# Patient Record
Sex: FEMALE | Race: WHITE | HISPANIC OR LATINO | Employment: PART TIME | URBAN - METROPOLITAN AREA
[De-identification: names, ages, dates, MRNs, and addresses within clinical notes are randomized per-mention and may not be internally consistent; named-entity substitution may affect disease eponyms.]

---

## 2017-04-17 ENCOUNTER — HOSPITAL ENCOUNTER (OUTPATIENT)
Dept: FAMILY MEDICINE CLINIC | Facility: CLINIC | Age: 62
Setting detail: SPECIMEN
Discharge: HOME | End: 2017-04-17
Attending: FAMILY MEDICINE | Admitting: FAMILY MEDICINE

## 2017-04-17 LAB
ALBUMIN SERPL-MCNC: 3.6 G/DL (ref 3.5–4.8)
ALBUMIN/GLOB SERPL: 1.2 {RATIO} (ref 1–1.7)
ALP SERPL-CCNC: 77 IU/L (ref 32–91)
ALT SERPL-CCNC: 22 IU/L (ref 14–54)
ANION GAP SERPL CALC-SCNC: 16.5 MMOL/L (ref 10–20)
AST SERPL-CCNC: 23 IU/L (ref 15–41)
BASOPHILS # BLD AUTO: 0.1 10*3/UL (ref 0–0.2)
BASOPHILS NFR BLD AUTO: 1 % (ref 0–2)
BILIRUB SERPL-MCNC: 0.6 MG/DL (ref 0.3–1.2)
BUN SERPL-MCNC: 13 MG/DL (ref 8–20)
BUN/CREAT SERPL: 18.6 (ref 5.4–26.2)
CALCIUM SERPL-MCNC: 9.4 MG/DL (ref 8.9–10.3)
CHLORIDE SERPL-SCNC: 106 MMOL/L (ref 101–111)
CONV CO2: 26 MMOL/L (ref 22–32)
CONV TOTAL PROTEIN: 6.7 G/DL (ref 6.1–7.9)
CREAT UR-MCNC: 0.7 MG/DL (ref 0.4–1)
DIFFERENTIAL METHOD BLD: (no result)
EOSINOPHIL # BLD AUTO: 0.4 10*3/UL (ref 0–0.3)
EOSINOPHIL # BLD AUTO: 4 % (ref 0–3)
ERYTHROCYTE [DISTWIDTH] IN BLOOD BY AUTOMATED COUNT: 14.1 % (ref 11.5–14.5)
GLOBULIN UR ELPH-MCNC: 3.1 G/DL (ref 2.5–3.8)
GLUCOSE SERPL-MCNC: 93 MG/DL (ref 65–99)
HCT VFR BLD AUTO: 45.2 % (ref 35–49)
HGB BLD-MCNC: 15.1 G/DL (ref 12–15)
LYMPHOCYTES # BLD AUTO: 2.4 10*3/UL (ref 0.8–4.8)
LYMPHOCYTES NFR BLD AUTO: 25 % (ref 18–42)
MCH RBC QN AUTO: 28.4 PG (ref 26–32)
MCHC RBC AUTO-ENTMCNC: 33.4 G/DL (ref 32–36)
MCV RBC AUTO: 85 FL (ref 80–94)
MONOCYTES # BLD AUTO: 0.8 10*3/UL (ref 0.1–1.3)
MONOCYTES NFR BLD AUTO: 9 % (ref 2–11)
NEUTROPHILS # BLD AUTO: 5.8 10*3/UL (ref 2.3–8.6)
NEUTROPHILS NFR BLD AUTO: 61 % (ref 50–75)
NRBC BLD AUTO-RTO: 0 /100{WBCS}
NRBC/RBC NFR BLD MANUAL: 0 10*3/UL
PLATELET # BLD AUTO: 184 10*3/UL (ref 150–450)
PMV BLD AUTO: 10.5 FL (ref 7.4–10.4)
POTASSIUM SERPL-SCNC: 4.5 MMOL/L (ref 3.6–5.1)
RBC # BLD AUTO: 5.32 10*6/UL (ref 4–5.4)
SODIUM SERPL-SCNC: 144 MMOL/L (ref 136–144)
WBC # BLD AUTO: 9.4 10*3/UL (ref 4.5–11.5)

## 2017-10-23 ENCOUNTER — HOSPITAL ENCOUNTER (OUTPATIENT)
Dept: FAMILY MEDICINE CLINIC | Facility: CLINIC | Age: 62
Setting detail: SPECIMEN
Discharge: HOME | End: 2017-10-23
Attending: FAMILY MEDICINE | Admitting: FAMILY MEDICINE

## 2017-10-23 LAB
ALBUMIN SERPL-MCNC: 3.7 G/DL (ref 3.5–4.8)
ALBUMIN/GLOB SERPL: 1.2 {RATIO} (ref 1–1.7)
ALP SERPL-CCNC: 81 IU/L (ref 32–91)
ALT SERPL-CCNC: 18 IU/L (ref 14–54)
ANION GAP SERPL CALC-SCNC: 12.1 MMOL/L (ref 10–20)
AST SERPL-CCNC: 20 IU/L (ref 15–41)
BASOPHILS # BLD AUTO: 0.1 10*3/UL (ref 0–0.2)
BASOPHILS NFR BLD AUTO: 1 % (ref 0–2)
BILIRUB SERPL-MCNC: 0.5 MG/DL (ref 0.3–1.2)
BUN SERPL-MCNC: 15 MG/DL (ref 8–20)
BUN/CREAT SERPL: 18.8 (ref 5.4–26.2)
CALCIUM SERPL-MCNC: 9 MG/DL (ref 8.9–10.3)
CHLORIDE SERPL-SCNC: 105 MMOL/L (ref 101–111)
CHOLEST SERPL-MCNC: 133 MG/DL
CHOLEST/HDLC SERPL: 4.5 {RATIO}
CONV CO2: 27 MMOL/L (ref 22–32)
CONV LDL CHOLESTEROL DIRECT: 78 MG/DL (ref 0–100)
CONV PLATELETS GIANT IN BLOOD BY LIGHT MICROSCOPY: (no result)
CONV SMEAR REVIEW: (no result)
CONV TOTAL PROTEIN: 6.9 G/DL (ref 6.1–7.9)
CREAT UR-MCNC: 0.8 MG/DL (ref 0.4–1)
DIFFERENTIAL METHOD BLD: (no result)
EOSINOPHIL # BLD AUTO: 0.3 10*3/UL (ref 0–0.3)
EOSINOPHIL # BLD AUTO: 4 % (ref 0–3)
ERYTHROCYTE [DISTWIDTH] IN BLOOD BY AUTOMATED COUNT: 14.4 % (ref 11.5–14.5)
GLOBULIN UR ELPH-MCNC: 3.2 G/DL (ref 2.5–3.8)
GLUCOSE SERPL-MCNC: 85 MG/DL (ref 65–99)
HCT VFR BLD AUTO: 44.6 % (ref 35–49)
HDLC SERPL-MCNC: 30 MG/DL
HGB BLD-MCNC: 14.7 G/DL (ref 12–15)
LDLC/HDLC SERPL: 2.6 {RATIO}
LIPID INTERPRETATION: ABNORMAL
LYMPHOCYTES # BLD AUTO: 2 10*3/UL (ref 0.8–4.8)
LYMPHOCYTES NFR BLD AUTO: 23 % (ref 18–42)
MCH RBC QN AUTO: 28.5 PG (ref 26–32)
MCHC RBC AUTO-ENTMCNC: 33.1 G/DL (ref 32–36)
MCV RBC AUTO: 86.3 FL (ref 80–94)
MONOCYTES # BLD AUTO: 0.8 10*3/UL (ref 0.1–1.3)
MONOCYTES NFR BLD AUTO: 9 % (ref 2–11)
NEUTROPHILS # BLD AUTO: 5.4 10*3/UL (ref 2.3–8.6)
NEUTROPHILS NFR BLD AUTO: 63 % (ref 50–75)
NRBC BLD AUTO-RTO: 0 /100{WBCS}
PLATELET # BLD AUTO: 178 10*3/UL (ref 150–450)
PMV BLD AUTO: 10.2 FL (ref 7.4–10.4)
POTASSIUM SERPL-SCNC: 4.1 MMOL/L (ref 3.6–5.1)
RBC # BLD AUTO: 5.16 10*6/UL (ref 4–5.4)
SODIUM SERPL-SCNC: 140 MMOL/L (ref 136–144)
TRIGL SERPL-MCNC: 219 MG/DL
TSH SERPL-ACNC: 2.29 UIU/ML (ref 0.34–5.6)
VLDLC SERPL CALC-MCNC: 25.2 MG/DL
WBC # BLD AUTO: 8.6 10*3/UL (ref 4.5–11.5)

## 2017-11-06 ENCOUNTER — HOSPITAL ENCOUNTER (OUTPATIENT)
Dept: MAMMOGRAPHY | Facility: HOSPITAL | Age: 62
Discharge: HOME | End: 2017-11-06
Attending: FAMILY MEDICINE | Admitting: FAMILY MEDICINE

## 2017-11-13 ENCOUNTER — HOSPITAL ENCOUNTER (OUTPATIENT)
Dept: SLEEP MEDICINE | Facility: HOSPITAL | Age: 62
Discharge: HOME | End: 2017-11-13
Attending: INTERNAL MEDICINE | Admitting: INTERNAL MEDICINE

## 2018-04-23 ENCOUNTER — HOSPITAL ENCOUNTER (OUTPATIENT)
Dept: FAMILY MEDICINE CLINIC | Facility: CLINIC | Age: 63
Setting detail: SPECIMEN
Discharge: HOME | End: 2018-04-23
Attending: FAMILY MEDICINE | Admitting: FAMILY MEDICINE

## 2018-04-23 LAB
ANION GAP SERPL CALC-SCNC: 10.6 MMOL/L (ref 10–20)
BUN SERPL-MCNC: 9 MG/DL (ref 8–20)
BUN/CREAT SERPL: 11.3 (ref 5.4–26.2)
CALCIUM SERPL-MCNC: 9.2 MG/DL (ref 8.9–10.3)
CHLORIDE SERPL-SCNC: 107 MMOL/L (ref 101–111)
CONV CO2: 26 MMOL/L (ref 22–32)
CREAT UR-MCNC: 0.8 MG/DL (ref 0.4–1)
GLUCOSE SERPL-MCNC: 85 MG/DL (ref 65–99)
POTASSIUM SERPL-SCNC: 3.6 MMOL/L (ref 3.6–5.1)
SODIUM SERPL-SCNC: 140 MMOL/L (ref 136–144)

## 2018-10-25 ENCOUNTER — HOSPITAL ENCOUNTER (OUTPATIENT)
Dept: FAMILY MEDICINE CLINIC | Facility: CLINIC | Age: 63
Setting detail: SPECIMEN
Discharge: HOME | End: 2018-10-25
Attending: FAMILY MEDICINE | Admitting: FAMILY MEDICINE

## 2018-10-25 LAB
ALBUMIN SERPL-MCNC: 3.6 G/DL (ref 3.5–4.8)
ALBUMIN/GLOB SERPL: 1.2 {RATIO} (ref 1–1.7)
ALP SERPL-CCNC: 92 IU/L (ref 32–91)
ALT SERPL-CCNC: 17 IU/L (ref 14–54)
ANION GAP SERPL CALC-SCNC: 14 MMOL/L (ref 10–20)
AST SERPL-CCNC: 19 IU/L (ref 15–41)
BASOPHILS # BLD AUTO: 0.1 10*3/UL (ref 0–0.2)
BASOPHILS NFR BLD AUTO: 1 % (ref 0–2)
BILIRUB SERPL-MCNC: 0.7 MG/DL (ref 0.3–1.2)
BUN SERPL-MCNC: 12 MG/DL (ref 8–20)
BUN/CREAT SERPL: 15 (ref 5.4–26.2)
CALCIUM SERPL-MCNC: 8.9 MG/DL (ref 8.9–10.3)
CHLORIDE SERPL-SCNC: 105 MMOL/L (ref 101–111)
CHOLEST SERPL-MCNC: 135 MG/DL
CHOLEST/HDLC SERPL: 4.1 {RATIO}
CONV CO2: 25 MMOL/L (ref 22–32)
CONV LDL CHOLESTEROL DIRECT: 88 MG/DL (ref 0–100)
CONV TOTAL PROTEIN: 6.6 G/DL (ref 6.1–7.9)
CREAT UR-MCNC: 0.8 MG/DL (ref 0.4–1)
DIFFERENTIAL METHOD BLD: (no result)
EOSINOPHIL # BLD AUTO: 0.2 10*3/UL (ref 0–0.3)
EOSINOPHIL # BLD AUTO: 4 % (ref 0–3)
ERYTHROCYTE [DISTWIDTH] IN BLOOD BY AUTOMATED COUNT: 14.6 % (ref 11.5–14.5)
GLOBULIN UR ELPH-MCNC: 3 G/DL (ref 2.5–3.8)
GLUCOSE SERPL-MCNC: 120 MG/DL (ref 65–99)
HCT VFR BLD AUTO: 45.7 % (ref 35–49)
HDLC SERPL-MCNC: 33 MG/DL
HGB BLD-MCNC: 15.1 G/DL (ref 12–15)
LDLC/HDLC SERPL: 2.6 {RATIO}
LIPID INTERPRETATION: ABNORMAL
LYMPHOCYTES # BLD AUTO: 1.9 10*3/UL (ref 0.8–4.8)
LYMPHOCYTES NFR BLD AUTO: 28 % (ref 18–42)
MCH RBC QN AUTO: 28.7 PG (ref 26–32)
MCHC RBC AUTO-ENTMCNC: 33.1 G/DL (ref 32–36)
MCV RBC AUTO: 86.6 FL (ref 80–94)
MONOCYTES # BLD AUTO: 0.6 10*3/UL (ref 0.1–1.3)
MONOCYTES NFR BLD AUTO: 8 % (ref 2–11)
NEUTROPHILS # BLD AUTO: 4 10*3/UL (ref 2.3–8.6)
NEUTROPHILS NFR BLD AUTO: 59 % (ref 50–75)
NRBC BLD AUTO-RTO: 0 /100{WBCS}
NRBC/RBC NFR BLD MANUAL: 0 10*3/UL
PLATELET # BLD AUTO: 172 10*3/UL (ref 150–450)
PMV BLD AUTO: 11.3 FL (ref 7.4–10.4)
POTASSIUM SERPL-SCNC: 4 MMOL/L (ref 3.6–5.1)
RBC # BLD AUTO: 5.27 10*6/UL (ref 4–5.4)
SODIUM SERPL-SCNC: 140 MMOL/L (ref 136–144)
TRIGL SERPL-MCNC: 246 MG/DL
TSH SERPL-ACNC: 4.26 UIU/ML (ref 0.34–5.6)
VLDLC SERPL CALC-MCNC: 14.2 MG/DL
WBC # BLD AUTO: 6.8 10*3/UL (ref 4.5–11.5)

## 2018-11-05 ENCOUNTER — HOSPITAL ENCOUNTER (OUTPATIENT)
Dept: MAMMOGRAPHY | Facility: HOSPITAL | Age: 63
Discharge: HOME | End: 2018-11-05
Attending: FAMILY MEDICINE | Admitting: FAMILY MEDICINE

## 2018-11-12 ENCOUNTER — HOSPITAL ENCOUNTER (OUTPATIENT)
Dept: SLEEP MEDICINE | Facility: HOSPITAL | Age: 63
Discharge: HOME | End: 2018-11-12
Attending: INTERNAL MEDICINE | Admitting: INTERNAL MEDICINE

## 2019-08-06 RX ORDER — LEVOTHYROXINE SODIUM 75 MCG
TABLET ORAL
Qty: 90 TABLET | Refills: 0 | Status: SHIPPED | OUTPATIENT
Start: 2019-08-06 | End: 2019-11-05 | Stop reason: DRUGHIGH

## 2019-10-28 ENCOUNTER — LAB (OUTPATIENT)
Dept: LAB | Facility: HOSPITAL | Age: 64
End: 2019-10-28

## 2019-10-28 ENCOUNTER — OFFICE VISIT (OUTPATIENT)
Dept: FAMILY MEDICINE CLINIC | Facility: CLINIC | Age: 64
End: 2019-10-28

## 2019-10-28 VITALS
RESPIRATION RATE: 18 BRPM | OXYGEN SATURATION: 95 % | WEIGHT: 212 LBS | HEIGHT: 60 IN | TEMPERATURE: 98 F | DIASTOLIC BLOOD PRESSURE: 82 MMHG | BODY MASS INDEX: 41.62 KG/M2 | HEART RATE: 63 BPM | SYSTOLIC BLOOD PRESSURE: 150 MMHG

## 2019-10-28 DIAGNOSIS — E78.5 HYPERLIPIDEMIA, UNSPECIFIED HYPERLIPIDEMIA TYPE: ICD-10-CM

## 2019-10-28 DIAGNOSIS — E03.9 ACQUIRED HYPOTHYROIDISM: ICD-10-CM

## 2019-10-28 DIAGNOSIS — I35.0 NONRHEUMATIC AORTIC VALVE STENOSIS: ICD-10-CM

## 2019-10-28 DIAGNOSIS — I10 ESSENTIAL HYPERTENSION: ICD-10-CM

## 2019-10-28 DIAGNOSIS — Z00.00 WELLNESS EXAMINATION: Primary | ICD-10-CM

## 2019-10-28 LAB
ALBUMIN SERPL-MCNC: 3.9 G/DL (ref 3.5–5.2)
ALBUMIN/GLOB SERPL: 1 G/DL
ALP SERPL-CCNC: 98 U/L (ref 39–117)
ALT SERPL W P-5'-P-CCNC: 16 U/L (ref 1–33)
ANION GAP SERPL CALCULATED.3IONS-SCNC: 9.9 MMOL/L (ref 5–15)
AST SERPL-CCNC: 14 U/L (ref 1–32)
BASOPHILS # BLD AUTO: 0.05 10*3/MM3 (ref 0–0.2)
BASOPHILS NFR BLD AUTO: 0.8 % (ref 0–1.5)
BILIRUB SERPL-MCNC: 0.5 MG/DL (ref 0.2–1.2)
BUN BLD-MCNC: 18 MG/DL (ref 8–23)
BUN/CREAT SERPL: 22.8 (ref 7–25)
CALCIUM SPEC-SCNC: 9.4 MG/DL (ref 8.6–10.5)
CHLORIDE SERPL-SCNC: 101 MMOL/L (ref 98–107)
CHOLEST SERPL-MCNC: 133 MG/DL (ref 0–200)
CO2 SERPL-SCNC: 27.1 MMOL/L (ref 22–29)
CREAT BLD-MCNC: 0.79 MG/DL (ref 0.57–1)
DEPRECATED RDW RBC AUTO: 43.4 FL (ref 37–54)
EOSINOPHIL # BLD AUTO: 0.29 10*3/MM3 (ref 0–0.4)
EOSINOPHIL NFR BLD AUTO: 4.7 % (ref 0.3–6.2)
ERYTHROCYTE [DISTWIDTH] IN BLOOD BY AUTOMATED COUNT: 13.7 % (ref 12.3–15.4)
GFR SERPL CREATININE-BSD FRML MDRD: 74 ML/MIN/1.73
GFR SERPL CREATININE-BSD FRML MDRD: 89 ML/MIN/1.73
GLOBULIN UR ELPH-MCNC: 3.9 GM/DL
GLUCOSE BLD-MCNC: 104 MG/DL (ref 65–99)
HCT VFR BLD AUTO: 49.3 % (ref 34–46.6)
HDLC SERPL-MCNC: 35 MG/DL (ref 40–60)
HGB BLD-MCNC: 15.9 G/DL (ref 12–15.9)
IMM GRANULOCYTES # BLD AUTO: 0.02 10*3/MM3 (ref 0–0.05)
IMM GRANULOCYTES NFR BLD AUTO: 0.3 % (ref 0–0.5)
LDLC SERPL CALC-MCNC: 71 MG/DL (ref 0–100)
LDLC/HDLC SERPL: 2.02 {RATIO}
LYMPHOCYTES # BLD AUTO: 1.85 10*3/MM3 (ref 0.7–3.1)
LYMPHOCYTES NFR BLD AUTO: 30.2 % (ref 19.6–45.3)
MCH RBC QN AUTO: 28 PG (ref 26.6–33)
MCHC RBC AUTO-ENTMCNC: 32.3 G/DL (ref 31.5–35.7)
MCV RBC AUTO: 86.8 FL (ref 79–97)
MONOCYTES # BLD AUTO: 0.5 10*3/MM3 (ref 0.1–0.9)
MONOCYTES NFR BLD AUTO: 8.2 % (ref 5–12)
NEUTROPHILS # BLD AUTO: 3.42 10*3/MM3 (ref 1.7–7)
NEUTROPHILS NFR BLD AUTO: 55.8 % (ref 42.7–76)
NRBC BLD AUTO-RTO: 0.2 /100 WBC (ref 0–0.2)
PLATELET # BLD AUTO: 179 10*3/MM3 (ref 140–450)
PMV BLD AUTO: 12.7 FL (ref 6–12)
POTASSIUM BLD-SCNC: 4.4 MMOL/L (ref 3.5–5.2)
PROT SERPL-MCNC: 7.8 G/DL (ref 6–8.5)
RBC # BLD AUTO: 5.68 10*6/MM3 (ref 3.77–5.28)
SODIUM BLD-SCNC: 138 MMOL/L (ref 136–145)
TRIGL SERPL-MCNC: 137 MG/DL (ref 0–150)
TSH SERPL DL<=0.05 MIU/L-ACNC: 7.43 UIU/ML (ref 0.27–4.2)
VLDLC SERPL-MCNC: 27.4 MG/DL (ref 5–40)
WBC NRBC COR # BLD: 6.13 10*3/MM3 (ref 3.4–10.8)

## 2019-10-28 PROCEDURE — 84443 ASSAY THYROID STIM HORMONE: CPT

## 2019-10-28 PROCEDURE — 85025 COMPLETE CBC W/AUTO DIFF WBC: CPT

## 2019-10-28 PROCEDURE — 80061 LIPID PANEL: CPT

## 2019-10-28 PROCEDURE — 99396 PREV VISIT EST AGE 40-64: CPT | Performed by: FAMILY MEDICINE

## 2019-10-28 PROCEDURE — 36415 COLL VENOUS BLD VENIPUNCTURE: CPT

## 2019-10-28 PROCEDURE — 80053 COMPREHEN METABOLIC PANEL: CPT

## 2019-10-28 RX ORDER — SIMVASTATIN 20 MG
1 TABLET ORAL EVERY 24 HOURS
COMMUNITY
Start: 2018-01-28 | End: 2019-11-24 | Stop reason: SDUPTHER

## 2019-10-28 NOTE — PROGRESS NOTES
"Magalie Graves is a 64 y.o. female.     Chief Complaint   Patient presents with   • Annual Exam     ANTHEM INS PHYSICAL       HPI  Chief complaint: Wellness exam    Patient is a 64-year-old female comes in for wellness exam.      Patient has history of stable hypertension hyperlipidemia aortic stenosis sleep apnea fatty liver Gastroesophageal reflux disease hypo thyroidism aortic stenosis.  Patient had aortic valve replaced.    She has lost 10 pounds since last time.  She is doing this by decreasing her caloric intake        The following portions of the patient's history were reviewed and updated as appropriate: allergies, current medications, past family history, past medical history, past social history, past surgical history and problem list.    Review of Systems   Constitutional: Negative for chills and fever.   HENT: Negative for congestion, sinus pressure and swollen glands.    Eyes: Negative for blurred vision and pain.   Respiratory: Negative for cough and shortness of breath.    Cardiovascular: Negative for chest pain and leg swelling.   Gastrointestinal: Negative for abdominal pain, nausea and indigestion.   Endocrine: Negative for cold intolerance, heat intolerance, polydipsia, polyphagia and polyuria.   Skin: Negative for dry skin, rash and bruise.   Neurological: Negative for dizziness, syncope and headache.   Psychiatric/Behavioral: Negative for dysphoric mood and stress.       Objective     /82 (BP Location: Right arm, Patient Position: Sitting, Cuff Size: Adult)   Pulse 63   Temp 98 °F (36.7 °C) (Oral)   Resp 18   Ht 152.4 cm (60\")   Wt 96.2 kg (212 lb)   SpO2 95%   BMI 41.40 kg/m²     Physical Exam   Constitutional: She is oriented to person, place, and time. She appears well-developed and well-nourished.   HENT:   Head: Normocephalic and atraumatic.   Eyes: Conjunctivae and EOM are normal. Pupils are equal, round, and reactive to light.   Neck: Normal range of motion. Neck " supple.   Cardiovascular: Normal rate, regular rhythm and intact distal pulses.   Murmur heard.   Crescendo systolic murmur is present with a grade of 1/6.  Pulmonary/Chest: Effort normal and breath sounds normal.   Abdominal: Soft. Bowel sounds are normal.   Musculoskeletal: Normal range of motion.   Neurological: She is alert and oriented to person, place, and time.   Skin: Skin is warm and dry.   Psychiatric: She has a normal mood and affect. Her behavior is normal.   Nursing note and vitals reviewed.        Assessment/Plan   Lulu was seen today for annual exam.    Diagnoses and all orders for this visit:    Wellness examination  -     Mammo Screening Digital Tomosynthesis Bilateral With CAD; Future    Essential hypertension  -     Comprehensive Metabolic Panel; Future  -     CBC & Differential; Future    Hyperlipidemia, unspecified hyperlipidemia type  -     Lipid Panel; Future    Nonrheumatic aortic valve stenosis    Acquired hypothyroidism  -     TSH; Future      Patient Instructions   Continue your current medications and treatment.    Hsave the follow up labs done and call for results.    Follow up in the office in 1year.      Hardeep Camejo Jr., MD    10/28/19

## 2019-10-28 NOTE — PATIENT INSTRUCTIONS
Continue your current medications and treatment.    Hsave the follow up labs done and call for results.    Follow up in the office in 1year.

## 2019-11-04 ENCOUNTER — OFFICE VISIT (OUTPATIENT)
Dept: SLEEP MEDICINE | Facility: HOSPITAL | Age: 64
End: 2019-11-04

## 2019-11-04 VITALS
WEIGHT: 209.4 LBS | DIASTOLIC BLOOD PRESSURE: 75 MMHG | OXYGEN SATURATION: 93 % | SYSTOLIC BLOOD PRESSURE: 130 MMHG | HEIGHT: 60 IN | HEART RATE: 75 BPM | BODY MASS INDEX: 41.11 KG/M2

## 2019-11-04 DIAGNOSIS — G47.33 OBSTRUCTIVE SLEEP APNEA, ADULT: Primary | ICD-10-CM

## 2019-11-04 PROCEDURE — G0463 HOSPITAL OUTPT CLINIC VISIT: HCPCS

## 2019-11-04 NOTE — PROGRESS NOTES
SLEEP MEDICINE CONSULT      Patient Identification:     Name: Lulu Graves   Age: 64 y.o.   Gender: female   : 1955   MRN: [id number]     Date of consult: 2019    PCP/Referring Physician(s):     PCP: Hardeep Camejo Jr., MD  Referring Provider: Inan Dunaway MD      Chief Complaint:   Obstructive sleep apnea.  Yearly follow-up for compliance.    History of Present Illness:   This is a very pleasant 84 years old lady who is here today for yearly follow-up for compliance after using her mask with given pressures for an year or so.  Memory card has been downloaded.    She uses nasal pillows which she finds quite comfortable.  No air leaks as noted by her.  The pressures are very tolerable to her.  She uses humidifier on regular basis.    Her usual bedtime nowadays is from 11 PM to midnight.  She dozes off within 15 minutes.  She wakes up around 6:30 AM to start her day.  She does not wake up at night.  She denies any symptoms of dry mouth or heartburn at night or in the morning.  She has stopped all antiacids.  No sinus congestion at night or in the morning.  She takes Zyrtec at bedtime.  She denies any symptoms of headaches.    She has woken up in the morning awake and alert and rested but with body aches and pains.  Her sciatic nerve pain is acting up.  She does not like coffee.  She likes to have 2 glasses of iced tea throughout the day.  Rarely has a decaffeinated coffee.  She remains active throughout the day she does not take any naps.  She has not dozed off during sedentary activities.  No episodes of supraventricular tachycardia at night or during the daytime.    Allergies, Medications, and Past History:   Allergies:    No Known Allergies  Current Medications:    Prior to Admission medications    Medication Sig Start Date End Date Taking? Authorizing Provider   aspirin 81 MG tablet Take 1 tablet by mouth Daily. 12  Yes Provider, MD Cait   metoprolol tartrate (LOPRESSOR) 25 MG  tablet TAKE 1/2 TABLET BY MOUTH TWICE A DAY 10/7/19  Yes Coleen Hawk APRN   PROBIOTIC PRODUCT PO Take 1 capsule by mouth Daily. 9/8/16  Yes ProviderCait MD   simvastatin (ZOCOR) 20 MG tablet Take 1 tablet by mouth Daily. 1/28/18  Yes Provider, MD Cait   SYNTHROID 75 MCG tablet TAKE 1 TABLET ONCE DAILY 8/6/19  Yes Hardeep Camejo Jr., MD     Past Medical History:    Past Medical History:   Diagnosis Date   • Aortic stenosis    • GERD (gastroesophageal reflux disease)    • Hyperlipidemia    • Hypertension    • Hypothyroidism    • PAT (paroxysmal atrial tachycardia) (CMS/[facility])       Past Surgical History:    Past Surgical History:   Procedure Laterality Date   • AORTIC VALVE REPAIR/REPLACEMENT        Social History:    Social History     Tobacco Use   • Smoking status: Never Smoker   • Smokeless tobacco: Never Used   Substance Use Topics   • Alcohol use: No     Frequency: Never   • Drug use: No     Family Medical History:  Family History   Problem Relation Age of Onset   • Other Father          Review of Systems:   Constitutional:  Denies fever or chills .she has lost 10 pounds in last 2 months.    Eyes:  Denies change in visual acuity   HENT:  Denies nasal congestion, sore throat or post nasal drainage . she takes Zyrtec 10 mg on regular basis at night.  Respiratory:  Denies cough, shortness of breath or dyspnea on exertion recently.   Cardiovascular:  Denies chest pain or edema .  Has a history of supraventricular tachycardia.  Underwent ablation.  Aortic valve replacement with a pig valve.  GI:  Denies abdominal pain, nausea, vomiting, bloody stools or diarrhea .  No aerophagia.  :  Denies dysuria or nocturia   Musculoskeletal: She has low back pain with radiculopathy.     Integument:  Denies rash   Neurologic:  Denies headache, focal weakness or sensory changes. No history of stroke or seizures.   Endocrine:  Denies polyuria or polydipsia   Lymphatic:  Denies swollen glands   Psychiatric:   "Denies depression, anxiety or claustrophobia      Physical Exam:     Vitals:    11/04/19 1346   BP: 130/75   Pulse: 75   SpO2: 93%   Weight: 95 kg (209 lb 6.4 oz)   Height: 152.4 cm (60\")     HEENT: Head is normocephalic, atraumatic, normal distribution of hair. Pupils reactive to light. Ocular movements intact. No nasal congestion on sniff test. No deviated nasal septum. No micrognathia.  Throat: Mallapatti stage 4, tongue midline, mucosa moist.  Neck: no JVD, thyromegaly, mass, +midline trachea, Neck short supple.  No lymphadenopathy.  Lungs: clear, no wheeze, rhonchi, crackles  Cardiovascular: S1, S2, RRR no murmurs, rubs or gallops  Abd: +BS's soft NT/ND, no CVA tenderness.  Obese.   Ext: no edema, cyanosis, clubbing, pulses intact  Neuro: Awake alert, oriented to time place and person. Grossly intact to motor, sensory cerebral, and cerebellar (without focal deficit)  Psych: No overt benjamin, depression, psychosis or inappropriate behavior  Skin: No rash, cellulitis, or ulcerations.  No facial rash or erosions    DIAGNOSTIC DATA  Memory card download shows data from 11/12/2018 to 11/3/2019.  Overall 357 days of data reviewed.  100% use noted.  Maximum hours use 9 hours 35 minutes minimum time use 4 hours 6 minutes.  100% of the time the mask has been used for more than 4 hours.  The average apnea hypopnea index was 1.8 events per hour an auto CPAP mode of therapy with a pressure range between 6-20 CWP.  Assessment/Plan:   Obstructive sleep apnea:  This is a very pleasant lady who has been diagnosed with obstructive sleep apnea.  She uses an auto CPAP mode of therapy.  She has remained compliant with therapy.  She is getting full benefit from it.    The results of the memory card download was discussed in detail with the patient all questions were answered.  Recommendation.  She is advised to continue using her mask with given pressures on a nightly basis.  She is advised to use the mask with given pressures for at " least 4 hours a minimum benefit or as long as she sleeps a maximum benefit.  She is advised to use the mask with given pressure for nap.:  Obesity;  Based on the BMI of 40.9.  She has lost weight on a weight loss regimen.  Recommendation.  She is advised to continue her weight loss regimen.    Driving instructions. Patient is advised to avoid driving if tired or somnolent. To avoid all alcoholic beverages or medications causing somnolence.         Diagnosis Plan   1. Obstructive sleep apnea, adult  CPAP Therapy     No orders of the defined types were placed in this encounter.    Orders Placed This Encounter   Procedures   • CPAP Therapy     Order Specific Question:   PAP Machine Brand     Answer:   Respironics     Order Specific Question:   PAP Tubing (1 per 3 months)     Answer:    6' Standard tubing     Order Specific Question:   PAP Mask (1 per 3 months)     Answer:    Nasal mask     Order Specific Question:   Nasal cushion or pillow (2 per month)     Answer:    Pillow replacement for use on nasal cannula type     Order Specific Question:   PAP Accessories     Answer:    Water Chamber for PAP Humidifier (1 per 6 month) &  Filter PAP Disposable (2 per month) &  Filter PAP Non-Disposable (1 per 6 months) &  Chinstrap to be used with PAP (1 per 6 months) &  Headgear to be used with PAP (1 per 6 mo)     Order Specific Question:   Does the patient have Obstructive Sleep Apnea or other qualifying diagnosis for PAP ordered?     Answer:   Yes     Order Specific Question:   Does the patient require humidification?     Answer:   Yes     Order Specific Question:   Humidifier     Answer:    Humidifier Heated for CPAP or BiPAP     Order Specific Question:   The patient requires a PAP Device & continued use of Supplies to administer effective PAP therapy at the frequency of use ordered above     Answer:   Yes     Order Specific Question:   Initial Supplies and refills authorized for  12 months?     Answer:   Yes     Order Specific Question:   The face to face evaluation was performed on     Answer:   11/4/2019     Order Specific Question:   Length of Need (99 Months = Lifetime)     Answer:   99 Months = Lifetime       This document has been electronically signed by:  Inna Dunaway MD  11/4/2019  2:17 PM

## 2019-11-05 ENCOUNTER — TELEPHONE (OUTPATIENT)
Dept: FAMILY MEDICINE CLINIC | Facility: CLINIC | Age: 64
End: 2019-11-05

## 2019-11-05 RX ORDER — LEVOTHYROXINE SODIUM 0.1 MG/1
100 TABLET ORAL DAILY
Qty: 90 TABLET | Refills: 1 | Status: SHIPPED | OUTPATIENT
Start: 2019-11-05 | End: 2020-03-19 | Stop reason: SDUPTHER

## 2019-11-05 NOTE — TELEPHONE ENCOUNTER
Regarding: Test Results Question  Contact: [phone]  ----- Message from Mychart, Generic sent at 11/5/2019 12:05 PM EST -----    I have a question about TSH resulted on 10/28/19, 5:08 PM.    I just was wondering about TSH results. Seems to be way out of normal range. What do I need to do to do to get it normal?      Thanks    Tresa

## 2019-11-07 ENCOUNTER — HOSPITAL ENCOUNTER (OUTPATIENT)
Dept: MAMMOGRAPHY | Facility: HOSPITAL | Age: 64
Discharge: HOME | End: 2019-11-07
Admitting: FAMILY MEDICINE

## 2019-11-07 DIAGNOSIS — Z00.00 WELLNESS EXAMINATION: ICD-10-CM

## 2019-11-07 PROCEDURE — 77063 BREAST TOMOSYNTHESIS BI: CPT

## 2019-11-07 PROCEDURE — 77067 SCR MAMMO BI INCL CAD: CPT

## 2019-11-25 RX ORDER — SIMVASTATIN 20 MG
TABLET ORAL
Qty: 90 TABLET | Refills: 0 | Status: SHIPPED | OUTPATIENT
Start: 2019-11-25 | End: 2020-03-02 | Stop reason: SDUPTHER

## 2020-03-03 RX ORDER — SIMVASTATIN 20 MG
20 TABLET ORAL EVERY EVENING
Qty: 90 TABLET | Refills: 1 | Status: SHIPPED | OUTPATIENT
Start: 2020-03-03 | End: 2020-09-04

## 2020-03-19 ENCOUNTER — OFFICE VISIT (OUTPATIENT)
Dept: CARDIOLOGY | Facility: CLINIC | Age: 65
End: 2020-03-19

## 2020-03-19 VITALS
HEART RATE: 71 BPM | BODY MASS INDEX: 38.09 KG/M2 | OXYGEN SATURATION: 94 % | HEIGHT: 60 IN | WEIGHT: 194 LBS | DIASTOLIC BLOOD PRESSURE: 76 MMHG | SYSTOLIC BLOOD PRESSURE: 113 MMHG

## 2020-03-19 DIAGNOSIS — E03.9 ACQUIRED HYPOTHYROIDISM: ICD-10-CM

## 2020-03-19 DIAGNOSIS — I35.0 NONRHEUMATIC AORTIC VALVE STENOSIS: Primary | ICD-10-CM

## 2020-03-19 DIAGNOSIS — G47.33 OBSTRUCTIVE SLEEP APNEA: ICD-10-CM

## 2020-03-19 DIAGNOSIS — E78.00 PURE HYPERCHOLESTEROLEMIA: ICD-10-CM

## 2020-03-19 DIAGNOSIS — I10 ESSENTIAL HYPERTENSION: ICD-10-CM

## 2020-03-19 DIAGNOSIS — Z95.2 STATUS POST AORTIC VALVE REPLACEMENT: ICD-10-CM

## 2020-03-19 PROCEDURE — 99213 OFFICE O/P EST LOW 20 MIN: CPT | Performed by: INTERNAL MEDICINE

## 2020-03-19 RX ORDER — LEVOTHYROXINE SODIUM 0.1 MG/1
100 TABLET ORAL DAILY
Qty: 90 TABLET | Refills: 1 | Status: SHIPPED | OUTPATIENT
Start: 2020-03-19 | End: 2020-10-16

## 2020-03-19 NOTE — PROGRESS NOTES
"    Subjective:     Encounter Date:03/19/2020      Patient ID: Lulu Graves is a 64 y.o. female.    Chief Complaint:  History of Present Illness 64-year-old white female with history of severe aortic stenosis status post aortic valve replacement with a bioprosthetic valve history of hypertension hyperlipidemia gastroesophageal valve disease and also sleep apnea and hypothyroidism presents to my office for follow-up.  Patient is currently stable without any symptoms of chest pain or shortness of breath at rest on exertion.  No complains of any PND orthopnea.  No palpitation dizziness syncope or swelling of the feet.  Patient has been taking all the medicines regularly.  Patient does not smoke.  Patient is trying to exercise regularly.  She follows a good diet.    The following portions of the patient's history were reviewed and updated as appropriate: allergies, current medications, past family history, past medical history, past social history, past surgical history and problem list.  Past Medical History:   Diagnosis Date   • Aortic stenosis    • Aortic valve replaced 12/04/2013   • GERD (gastroesophageal reflux disease)    • Heart murmur 1996   • Hyperlipidemia    • Hypertension    • Hypothyroidism    • PAT (paroxysmal atrial tachycardia) (CMS/[facility])    • Sleep apnea 2011     Past Surgical History:   Procedure Laterality Date   • ABLATION OF DYSRHYTHMIC FOCUS  2012 or 2013   • AORTIC VALVE REPAIR/REPLACEMENT     • AORTIC VALVE SURGERY  12/04/2013   • CARDIAC CATHETERIZATION      Before open heart surgery   • CARDIAC VALVE REPLACEMENT  12/04/2013     /76   Pulse 71   Ht 152.4 cm (60\")   Wt 88 kg (194 lb)   SpO2 94%   BMI 37.89 kg/m²   Family History   Problem Relation Age of Onset   • Other Father        Current Outpatient Medications:   •  aspirin 81 MG tablet, Take 1 tablet by mouth Daily., Disp: , Rfl:   •  levothyroxine (SYNTHROID) 100 MCG tablet, Take 1 tablet by mouth Daily., Disp: 90 tablet, Rfl: 1  • "  metoprolol tartrate (LOPRESSOR) 25 MG tablet, TAKE 1/2 TABLET BY MOUTH TWO TIMES A DAY , Disp: 90 tablet, Rfl: 0  •  PROBIOTIC PRODUCT PO, Take 1 capsule by mouth Daily., Disp: , Rfl:   •  simvastatin (ZOCOR) 20 MG tablet, Take 1 tablet by mouth Every Evening., Disp: 90 tablet, Rfl: 1  No Known Allergies  Social History     Socioeconomic History   • Marital status:      Spouse name: Not on file   • Number of children: Not on file   • Years of education: Not on file   • Highest education level: Not on file   Tobacco Use   • Smoking status: Never Smoker   • Smokeless tobacco: Never Used   Substance and Sexual Activity   • Alcohol use: No     Frequency: Never   • Drug use: Never   • Sexual activity: Yes     Partners: Male     Birth control/protection: None     Review of Systems   Constitution: Negative for fever and malaise/fatigue.   Cardiovascular: Negative for chest pain, dyspnea on exertion, leg swelling and palpitations.   Respiratory: Negative for cough and shortness of breath.    Skin: Negative for rash.   Gastrointestinal: Negative for abdominal pain, nausea and vomiting.   Neurological: Negative for focal weakness, headaches, light-headedness and numbness.   All other systems reviewed and are negative.             Objective:     Physical Exam   Constitutional: She appears well-developed and well-nourished.   HENT:   Head: Normocephalic and atraumatic.   Eyes: Conjunctivae are normal. No scleral icterus.   Neck: Normal range of motion. Neck supple. No JVD present. Carotid bruit is not present.   Cardiovascular: Normal rate, regular rhythm, S1 normal, S2 normal, normal heart sounds and intact distal pulses. PMI is not displaced.   Pulmonary/Chest: Effort normal and breath sounds normal. She has no wheezes. She has no rales.   Abdominal: Soft. Bowel sounds are normal.   Neurological: She is alert. She has normal strength.   Skin: Skin is warm and dry. No rash noted.     Procedures    Lab Review:        Assessment:          Diagnosis Plan   1. Nonrheumatic aortic valve stenosis     2. Essential hypertension     3. Pure hypercholesterolemia     4. Status post aortic valve replacement     5. Obstructive sleep apnea     6. Acquired hypothyroidism            Plan:       Patient has history of aortic stenosis status post aortic valve replacement with a bioprosthetic valve and is currently stable on medications  Patient has normal function  Patient is sleep apnea and uses CPAP machine  Patient blood pressure and heart rate are stable  Patient's lipids are followed by the primary care doctor  Patient is also on thyroid medicine for hypothyroidism.

## 2020-09-04 RX ORDER — SIMVASTATIN 20 MG
TABLET ORAL
Qty: 90 TABLET | Refills: 0 | Status: SHIPPED | OUTPATIENT
Start: 2020-09-04 | End: 2020-12-07

## 2020-09-10 ENCOUNTER — OFFICE VISIT (OUTPATIENT)
Dept: FAMILY MEDICINE CLINIC | Facility: CLINIC | Age: 65
End: 2020-09-10

## 2020-09-10 ENCOUNTER — HOSPITAL ENCOUNTER (OUTPATIENT)
Dept: GENERAL RADIOLOGY | Facility: HOSPITAL | Age: 65
Discharge: HOME | End: 2020-09-10
Admitting: FAMILY MEDICINE

## 2020-09-10 VITALS
RESPIRATION RATE: 20 BRPM | HEIGHT: 60 IN | HEART RATE: 54 BPM | OXYGEN SATURATION: 96 % | DIASTOLIC BLOOD PRESSURE: 75 MMHG | WEIGHT: 199.8 LBS | TEMPERATURE: 96.6 F | SYSTOLIC BLOOD PRESSURE: 127 MMHG | BODY MASS INDEX: 39.23 KG/M2

## 2020-09-10 DIAGNOSIS — M25.521 RIGHT ELBOW PAIN: ICD-10-CM

## 2020-09-10 DIAGNOSIS — M25.521 RIGHT ELBOW PAIN: Primary | ICD-10-CM

## 2020-09-10 PROCEDURE — 73080 X-RAY EXAM OF ELBOW: CPT

## 2020-09-10 PROCEDURE — 99213 OFFICE O/P EST LOW 20 MIN: CPT | Performed by: FAMILY MEDICINE

## 2020-09-10 NOTE — PROGRESS NOTES
"Subjective   Lulu Graves is a 65 y.o. female.     Chief Complaint   Patient presents with   • Elbow Pain     right X 1 month   • Fall       HPI  Chief complaint: Right elbow pain    Patient is a 65-year-old white female comes in with right elbow pain.  Patient states she fell a month ago on her elbow.  She states she has had pain since then.  She states initially the elbow was bruised.  She complains of tenderness in the proximal ulna close to the olecranon.  She states she has full range of motion of her elbow.  She does not complain of weakness.        The following portions of the patient's history were reviewed and updated as appropriate: allergies, current medications, past family history, past medical history, past social history, past surgical history and problem list.    Review of Systems    Objective     /75 (BP Location: Left arm, Patient Position: Sitting, Cuff Size: Large Adult)   Pulse 54   Temp 96.6 °F (35.9 °C) (Infrared)   Resp 20   Ht 152.4 cm (60\")   Wt 90.6 kg (199 lb 12.8 oz)   SpO2 96%   BMI 39.02 kg/m²     Physical Exam   Constitutional: She is oriented to person, place, and time. She appears well-developed and well-nourished.   HENT:   Head: Normocephalic and atraumatic.   Eyes: Pupils are equal, round, and reactive to light. Conjunctivae and EOM are normal.   Neck: Normal range of motion. Neck supple.   Cardiovascular: Normal rate, regular rhythm, normal heart sounds and intact distal pulses.   Pulmonary/Chest: Effort normal and breath sounds normal.   Abdominal: Soft. Bowel sounds are normal.   Musculoskeletal: Normal range of motion.   FROM of the elbow;   No effusion;  Tenderness of the proximal ulna with a defect close to the olecranon   Neurological: She is alert and oriented to person, place, and time.   Skin: Skin is warm and dry.   Psychiatric: She has a normal mood and affect. Her behavior is normal.   Nursing note and vitals reviewed.        Assessment/Plan   Lulu was " seen today for elbow pain and fall.    Diagnoses and all orders for this visit:    Right elbow pain  -     XR Elbow 3+ View Right; Future      Patient Instructions   Have the xrays done and call for results.    Further care will depend on the results of the xrays and how you progress.      Hardeep Camejo Jr., MD    09/10/20

## 2020-09-10 NOTE — PATIENT INSTRUCTIONS
Have the xrays done and call for results.    Further care will depend on the results of the xrays and how you progress.

## 2020-09-11 ENCOUNTER — TELEPHONE (OUTPATIENT)
Dept: FAMILY MEDICINE CLINIC | Facility: CLINIC | Age: 65
End: 2020-09-11

## 2020-09-11 NOTE — TELEPHONE ENCOUNTER
I spoke with the patient.  Her xrays did not reveal a fracture.  I recommended symptomatic Rx.  The next step would be to do an MRI if her symptoms persist.

## 2020-10-16 RX ORDER — LEVOTHYROXINE SODIUM 0.1 MG/1
TABLET ORAL
Qty: 30 TABLET | Refills: 0 | Status: SHIPPED | OUTPATIENT
Start: 2020-10-16 | End: 2021-01-27

## 2020-10-29 ENCOUNTER — LAB (OUTPATIENT)
Dept: LAB | Facility: HOSPITAL | Age: 65
End: 2020-10-29

## 2020-10-29 ENCOUNTER — OFFICE VISIT (OUTPATIENT)
Dept: FAMILY MEDICINE CLINIC | Facility: CLINIC | Age: 65
End: 2020-10-29

## 2020-10-29 VITALS
RESPIRATION RATE: 20 BRPM | WEIGHT: 206 LBS | HEIGHT: 60 IN | SYSTOLIC BLOOD PRESSURE: 134 MMHG | OXYGEN SATURATION: 96 % | HEART RATE: 69 BPM | TEMPERATURE: 96.9 F | BODY MASS INDEX: 40.44 KG/M2 | DIASTOLIC BLOOD PRESSURE: 78 MMHG

## 2020-10-29 DIAGNOSIS — E78.5 HYPERLIPIDEMIA, UNSPECIFIED HYPERLIPIDEMIA TYPE: ICD-10-CM

## 2020-10-29 DIAGNOSIS — I35.0 NONRHEUMATIC AORTIC VALVE STENOSIS: ICD-10-CM

## 2020-10-29 DIAGNOSIS — I10 ESSENTIAL HYPERTENSION: ICD-10-CM

## 2020-10-29 DIAGNOSIS — K21.9 GASTROESOPHAGEAL REFLUX DISEASE WITHOUT ESOPHAGITIS: ICD-10-CM

## 2020-10-29 DIAGNOSIS — E03.9 ACQUIRED HYPOTHYROIDISM: ICD-10-CM

## 2020-10-29 DIAGNOSIS — Z00.00 WELLNESS EXAMINATION: Primary | ICD-10-CM

## 2020-10-29 PROBLEM — M25.521 RIGHT ELBOW PAIN: Status: RESOLVED | Noted: 2020-09-10 | Resolved: 2020-10-29

## 2020-10-29 LAB
ALBUMIN SERPL-MCNC: 4 G/DL (ref 3.5–5.2)
ALBUMIN/GLOB SERPL: 1.3 G/DL
ALP SERPL-CCNC: 97 U/L (ref 39–117)
ALT SERPL W P-5'-P-CCNC: 13 U/L (ref 1–33)
ANION GAP SERPL CALCULATED.3IONS-SCNC: 8.9 MMOL/L (ref 5–15)
AST SERPL-CCNC: 19 U/L (ref 1–32)
BASOPHILS # BLD AUTO: 0.04 10*3/MM3 (ref 0–0.2)
BASOPHILS NFR BLD AUTO: 0.7 % (ref 0–1.5)
BILIRUB SERPL-MCNC: 0.4 MG/DL (ref 0–1.2)
BUN SERPL-MCNC: 15 MG/DL (ref 8–23)
BUN/CREAT SERPL: 21.1 (ref 7–25)
CALCIUM SPEC-SCNC: 9.3 MG/DL (ref 8.6–10.5)
CHLORIDE SERPL-SCNC: 104 MMOL/L (ref 98–107)
CHOLEST SERPL-MCNC: 151 MG/DL (ref 0–200)
CO2 SERPL-SCNC: 28.1 MMOL/L (ref 22–29)
CREAT SERPL-MCNC: 0.71 MG/DL (ref 0.57–1)
DEPRECATED RDW RBC AUTO: 40.7 FL (ref 37–54)
EOSINOPHIL # BLD AUTO: 0.23 10*3/MM3 (ref 0–0.4)
EOSINOPHIL NFR BLD AUTO: 3.8 % (ref 0.3–6.2)
ERYTHROCYTE [DISTWIDTH] IN BLOOD BY AUTOMATED COUNT: 12.7 % (ref 12.3–15.4)
GFR SERPL CREATININE-BSD FRML MDRD: 83 ML/MIN/1.73
GLOBULIN UR ELPH-MCNC: 3.1 GM/DL
GLUCOSE SERPL-MCNC: 87 MG/DL (ref 65–99)
HCT VFR BLD AUTO: 45.2 % (ref 34–46.6)
HDLC SERPL-MCNC: 42 MG/DL (ref 40–60)
HGB BLD-MCNC: 15.1 G/DL (ref 12–15.9)
IMM GRANULOCYTES # BLD AUTO: 0.02 10*3/MM3 (ref 0–0.05)
IMM GRANULOCYTES NFR BLD AUTO: 0.3 % (ref 0–0.5)
LDLC SERPL CALC-MCNC: 86 MG/DL (ref 0–100)
LDLC/HDLC SERPL: 1.98 {RATIO}
LYMPHOCYTES # BLD AUTO: 1.91 10*3/MM3 (ref 0.7–3.1)
LYMPHOCYTES NFR BLD AUTO: 31.3 % (ref 19.6–45.3)
MCH RBC QN AUTO: 29.5 PG (ref 26.6–33)
MCHC RBC AUTO-ENTMCNC: 33.4 G/DL (ref 31.5–35.7)
MCV RBC AUTO: 88.3 FL (ref 79–97)
MONOCYTES # BLD AUTO: 0.56 10*3/MM3 (ref 0.1–0.9)
MONOCYTES NFR BLD AUTO: 9.2 % (ref 5–12)
NEUTROPHILS NFR BLD AUTO: 3.34 10*3/MM3 (ref 1.7–7)
NEUTROPHILS NFR BLD AUTO: 54.7 % (ref 42.7–76)
NRBC BLD AUTO-RTO: 0 /100 WBC (ref 0–0.2)
PLATELET # BLD AUTO: 159 10*3/MM3 (ref 140–450)
PMV BLD AUTO: 12.6 FL (ref 6–12)
POTASSIUM SERPL-SCNC: 4.1 MMOL/L (ref 3.5–5.2)
PROT SERPL-MCNC: 7.1 G/DL (ref 6–8.5)
RBC # BLD AUTO: 5.12 10*6/MM3 (ref 3.77–5.28)
SODIUM SERPL-SCNC: 141 MMOL/L (ref 136–145)
TRIGL SERPL-MCNC: 130 MG/DL (ref 0–150)
TSH SERPL DL<=0.05 MIU/L-ACNC: 0.4 UIU/ML (ref 0.27–4.2)
VLDLC SERPL-MCNC: 23 MG/DL (ref 5–40)
WBC # BLD AUTO: 6.1 10*3/MM3 (ref 3.4–10.8)

## 2020-10-29 PROCEDURE — 99396 PREV VISIT EST AGE 40-64: CPT | Performed by: FAMILY MEDICINE

## 2020-10-29 PROCEDURE — 84443 ASSAY THYROID STIM HORMONE: CPT

## 2020-10-29 PROCEDURE — 85025 COMPLETE CBC W/AUTO DIFF WBC: CPT

## 2020-10-29 PROCEDURE — 80061 LIPID PANEL: CPT

## 2020-10-29 PROCEDURE — 80053 COMPREHEN METABOLIC PANEL: CPT

## 2020-10-29 PROCEDURE — 36415 COLL VENOUS BLD VENIPUNCTURE: CPT

## 2020-10-29 NOTE — PROGRESS NOTES
"Magalie Graves is a 65 y.o. female.     Chief Complaint   Patient presents with   • Annual Exam     [facility] physical       HPI  Chief complaint: Wellness exam    The patient is a 65-year-old white female comes in for wellness exam.    Patient has a history of hypertension hyperlipidemia aortic valvular disease with aortic valve replacement sleep apnea hypothyroidism.    These problems are all stable.    She says her elbow is significantly better.  I saw her recently for elbow pain.        The following portions of the patient's history were reviewed and updated as appropriate: allergies, current medications, past family history, past medical history, past social history, past surgical history and problem list.    Review of Systems   Constitutional: Negative for chills and fever.   HENT: Negative for congestion, sinus pressure and swollen glands.    Eyes: Negative for blurred vision and pain.   Respiratory: Negative for cough and shortness of breath.    Cardiovascular: Negative for chest pain and leg swelling.   Gastrointestinal: Negative for abdominal pain, nausea and indigestion.   Endocrine: Negative for cold intolerance, heat intolerance, polydipsia, polyphagia and polyuria.   Skin: Negative for dry skin, rash and bruise.   Neurological: Negative for dizziness, syncope and headache.   Psychiatric/Behavioral: Negative for dysphoric mood and stress.       Objective     /78 (BP Location: Left arm, Patient Position: Sitting, Cuff Size: Large Adult)   Pulse 69   Temp 96.9 °F (36.1 °C) (Infrared)   Resp 20   Ht 152.4 cm (60\")   Wt 93.4 kg (206 lb)   SpO2 96%   BMI 40.23 kg/m²     Physical Exam  Vitals signs and nursing note reviewed.   Constitutional:       Appearance: She is well-developed. She is obese.   HENT:      Head: Normocephalic and atraumatic.      Nose: Nose normal.      Mouth/Throat:      Mouth: Mucous membranes are moist.      Pharynx: Oropharynx is clear.   Eyes:      " Extraocular Movements: Extraocular movements intact.      Conjunctiva/sclera: Conjunctivae normal.      Pupils: Pupils are equal, round, and reactive to light.   Neck:      Musculoskeletal: Normal range of motion and neck supple.   Cardiovascular:      Rate and Rhythm: Normal rate and regular rhythm.      Pulses: Normal pulses.      Heart sounds: Normal heart sounds.   Pulmonary:      Effort: Pulmonary effort is normal.      Breath sounds: Normal breath sounds.   Abdominal:      General: Abdomen is flat. Bowel sounds are normal.      Palpations: Abdomen is soft.   Musculoskeletal: Normal range of motion.   Skin:     General: Skin is warm and dry.   Neurological:      Mental Status: She is alert and oriented to person, place, and time.   Psychiatric:         Behavior: Behavior normal.         Thought Content: Thought content normal.         Judgment: Judgment normal.           Assessment/Plan   Diagnoses and all orders for this visit:    1. Wellness examination (Primary)    2. Essential hypertension  -     CBC & Differential; Future  -     Comprehensive Metabolic Panel; Future  -     TSH; Future    3. Hyperlipidemia, unspecified hyperlipidemia type  -     Lipid Panel; Future    4. Acquired hypothyroidism    5. Gastroesophageal reflux disease without esophagitis    6. Nonrheumatic aortic valve stenosis      Patient Instructions   Continue your current medications and treatment.    Have the follow up labs done and call for results.    Get a PAP smear.    Follow up in the office in 12 months.          Hardeep Camejo Jr., MD    10/29/20

## 2020-10-29 NOTE — PATIENT INSTRUCTIONS
Continue your current medications and treatment.    Have the follow up labs done and call for results.    Get a PAP smear.    Follow up in the office in 12 months.

## 2020-11-11 ENCOUNTER — OFFICE VISIT (OUTPATIENT)
Dept: SLEEP MEDICINE | Facility: HOSPITAL | Age: 65
End: 2020-11-11

## 2020-11-11 VITALS
DIASTOLIC BLOOD PRESSURE: 68 MMHG | HEIGHT: 60 IN | BODY MASS INDEX: 39.27 KG/M2 | WEIGHT: 200 LBS | HEART RATE: 72 BPM | OXYGEN SATURATION: 97 % | SYSTOLIC BLOOD PRESSURE: 120 MMHG

## 2020-11-11 DIAGNOSIS — G47.33 OBSTRUCTIVE SLEEP APNEA, ADULT: Primary | ICD-10-CM

## 2020-11-11 PROCEDURE — G0463 HOSPITAL OUTPT CLINIC VISIT: HCPCS

## 2020-11-11 NOTE — PROGRESS NOTES
SLEEP MEDICINE CONSULT      Patient Identification:     Name: Lulu Graves   Age: 65 y.o.   Gender: female   : 1955   MRN: [id number]     Date of consult: 2020    PCP/Referring Physician(s):     PCP: Hardeep Camejo Jr., MD  Referring Provider: Hardeep Camejo Jr., *      Chief Complaint:   Obstructive sleep apnea.  Yearly follow-up for compliance.    History of Present Illness:   This is a very pleasant 65 years old lady who is here today for yearly follow-up for compliance after using her mask with given pressures for an year or so.  Memory card has been downloaded.    She uses nasal pillows which she finds quite comfortable.  No air leaks as noted by her.  The pressures are very tolerable to her.  She uses humidifier on regular basis.    Her usual bedtime nowadays is from 11.30 PM to midnight.  She dozes off within 15 minutes.  She wakes up around 7:30 AM to 8 AM to start her day.  She does not wake up at night.  She denies any symptoms of dry mouth or heartburn at night or in the morning.  She has stopped all antiacids.  No sinus congestion at night or in the morning.  She takes Zyrtec at bedtime.  She denies any symptoms of headaches.    She has woken up in the morning awake and alert and rested but with body aches and pains.  She does not like coffee.  She likes to have 2 glasses of iced tea throughout the day.  Rarely has a decaffeinated coffee.  She remains active throughout the day she does not take any naps.  She has not dozed off during sedentary activities.  No episodes of supraventricular tachycardia at night or during the daytime.    Allergies, Medications, and Past History:   Allergies:    No Known Allergies  Current Medications:    Prior to Admission medications    Medication Sig Start Date End Date Taking? Authorizing Provider   aspirin 81 MG tablet Take 1 tablet by mouth Daily. 12  Yes Provider, MD Cait   metoprolol tartrate (LOPRESSOR) 25 MG tablet TAKE 1/2 TABLET BY  MOUTH TWICE A DAY 10/7/19  Yes Coleen Hawk APRN   PROBIOTIC PRODUCT PO Take 1 capsule by mouth Daily. 16  Yes Provider, MD Cait   simvastatin (ZOCOR) 20 MG tablet Take 1 tablet by mouth Daily. 18  Yes Provider, MD Cait   SYNTHROID 75 MCG tablet TAKE 1 TABLET ONCE DAILY 19  Yes Hardeep Camejo Jr., MD     Past Medical History:    Past Medical History:   Diagnosis Date   • Aortic stenosis    • Aortic valve replaced 2013   • GERD (gastroesophageal reflux disease)    • Heart murmur    • Hyperlipidemia    • Hypertension    • Hypothyroidism    • PAT (paroxysmal atrial tachycardia) (CMS/[facility])    • Sleep apnea       Past Surgical History:    Past Surgical History:   Procedure Laterality Date   • ABLATION OF DYSRHYTHMIC FOCUS   or    • AORTIC VALVE REPAIR/REPLACEMENT     • AORTIC VALVE SURGERY  2013   • CARDIAC CATHETERIZATION      Before open heart surgery   • CARDIAC VALVE REPLACEMENT  2013   •  SECTION  1982   • COLONOSCOPY     • TUBAL ABDOMINAL LIGATION        Social History:    Social History     Tobacco Use   • Smoking status: Never Smoker   • Smokeless tobacco: Never Used   Substance Use Topics   • Alcohol use: No     Frequency: Never   • Drug use: No     Family Medical History:  Family History   Problem Relation Age of Onset   • Other Father    • Arthritis Mother    • Diabetes Brother    • Hearing loss Brother    • Stroke Paternal Grandmother          Review of Systems:   Constitutional:  Denies fever or chills .she has lost 30 pounds in one year butgained some weight back during pandemic.  Eyes:  Denies change in visual acuity   HENT:  Denies nasal congestion, sore throat or post nasal drainage . she takes Zyrtec 10 mg on regular basis at night.  Respiratory:  Denies cough, shortness of breath or dyspnea on exertion recently.   Cardiovascular:  Denies chest pain or edema .  Has a history of supraventricular tachycardia.  Underwent  "ablation.  Aortic valve replacement with a pig valve.  GI:  Denies abdominal pain, nausea, vomiting, bloody stools or diarrhea .  No aerophagia.  :  Denies dysuria or nocturia   Musculoskeletal: She has low back pain with radiculopathy.     Integument:  Denies rash   Neurologic:  Denies headache, focal weakness or sensory changes. No history of stroke or seizures.   Endocrine:  Denies polyuria or polydipsia   Lymphatic:  Denies swollen glands   Psychiatric:  Denies depression, anxiety or claustrophobia      Physical Exam:     Vitals:    11/11/20 1400   BP: 120/68   BP Location: Right arm   Patient Position: Sitting   Cuff Size: Adult   Pulse: 72   SpO2: 97%   Weight: 90.7 kg (200 lb)   Height: 152.4 cm (60\")     HEENT: Head is normocephalic, atraumatic, normal distribution of hair. Pupils reactive to light. Ocular movements intact. No nasal congestion on sniff test. No deviated nasal septum. No micrognathia.  Throat: Mallapatti stage 4, tongue midline, mucosa moist.  Neck: no JVD, thyromegaly, mass, +midline trachea, Neck short supple.  No lymphadenopathy.  Lungs: clear, no wheeze, rhonchi, crackles  Cardiovascular: S1, S2, RRR no murmurs, rubs or gallops  Abd: +BS's soft NT/ND, no CVA tenderness.  Obese.   Ext: no edema, cyanosis, clubbing, pulses intact  Neuro: Awake alert, oriented to time place and person. Grossly intact to motor, sensory cerebral, and cerebellar (without focal deficit)  Psych: No overt benjamin, depression, psychosis or inappropriate behavior  Skin: No rash, cellulitis, or ulcerations.  No facial rash or erosions    DIAGNOSTIC DATA  Memory card download shows data from 11/15/2019 to 11/10/2020.  Overall 362 days of data reviewed.  99.7% use noted.  Maximum hours use 9 hours 3 minutes . minimum time used 30 seconds.  99.4% of the time the mask has been used for more than 4 hours.  The average apnea hypopnea index was 2.6 events per hour an auto CPAP mode of therapy with a pressure range between " 6-20 CWP.  Assessment/Plan:   Obstructive sleep apnea:  This is a very pleasant lady who has been diagnosed with obstructive sleep apnea.  She uses an auto CPAP mode of therapy.  She has remained compliant with therapy.  She is getting full benefit from it.    The results of the memory card download was discussed in detail with the patient all questions were answered.  Recommendation.  She is advised to continue using her mask with given pressures on a nightly basis.  She is advised to use the mask with given pressures for at least 4 hours a minimum benefit or as long as she sleeps a maximum benefit.  She is advised to use the mask with given pressure for nap.:  Obesity;  Based on the BMI of 39.6.  She has lost weight on a weight loss regimen.  Recommendation.  She is advised to continue her weight loss regimen.    Driving instructions. Patient is advised to avoid driving if tired or somnolent. To avoid all alcoholic beverages or medications causing somnolence.         Diagnosis Plan   1. Obstructive sleep apnea, adult  CPAP Therapy     No orders of the defined types were placed in this encounter.    Orders Placed This Encounter   Procedures   • CPAP Therapy     Order Specific Question:   Equipment:     Answer:   PAP Supplies Only     Order Specific Question:   PAP Tubing (1 per 3 months)     Answer:    6' Standard tubing     Order Specific Question:   PAP Mask (1 per 3 months)     Answer:    Nasal mask     Order Specific Question:   Nasal cushion or pillow (2 per month)     Answer:    Pillow replacement for use on nasal cannula type     Order Specific Question:   PAP Accessories     Answer:    Water Chamber for PAP Humidifier (1 per 6 month) &  Filter PAP Disposable (2 per month) &  Filter PAP Non-Disposable (1 per 6 months) &  Chinstrap to be used with PAP (1 per 6 months) &  Headgear to be used with PAP (1 per 6 mo)     Order Specific Question:   Does the patient have  Obstructive Sleep Apnea or other qualifying diagnosis for PAP ordered?     Answer:   Yes     Order Specific Question:   Does the patient require humidification?     Answer:   Yes     Order Specific Question:   Humidifier     Answer:    Humidifier Heated for CPAP or BiPAP     Order Specific Question:   If ordering a BiPAP for LUÍS a CPAP has been tried and/or ruled out?     Answer:   Does not apply     Order Specific Question:   The patient requires a PAP Device & continued use of Supplies to administer effective PAP therapy at the frequency of use ordered above     Answer:   Yes     Order Specific Question:   Initial Supplies and refills authorized for 12 months?     Answer:   Yes     Order Specific Question:   Length of Need (99 Months = Lifetime)     Answer:   99 Months = Lifetime       This document has been electronically signed by:  Inna Dunaway MD  11/11/2020  14:33 EST

## 2020-12-04 ENCOUNTER — TELEPHONE (OUTPATIENT)
Dept: FAMILY MEDICINE CLINIC | Facility: CLINIC | Age: 65
End: 2020-12-04

## 2020-12-04 DIAGNOSIS — Z12.39 ENCOUNTER FOR SCREENING FOR MALIGNANT NEOPLASM OF BREAST, UNSPECIFIED SCREENING MODALITY: Primary | ICD-10-CM

## 2020-12-07 RX ORDER — SIMVASTATIN 20 MG
TABLET ORAL
Qty: 90 TABLET | Refills: 0 | Status: SHIPPED | OUTPATIENT
Start: 2020-12-07 | End: 2021-03-03

## 2020-12-17 ENCOUNTER — HOSPITAL ENCOUNTER (OUTPATIENT)
Dept: MAMMOGRAPHY | Facility: HOSPITAL | Age: 65
Discharge: HOME | End: 2020-12-17
Admitting: NURSE PRACTITIONER

## 2020-12-17 PROCEDURE — 77063 BREAST TOMOSYNTHESIS BI: CPT

## 2020-12-17 PROCEDURE — 77067 SCR MAMMO BI INCL CAD: CPT

## 2021-01-27 RX ORDER — LEVOTHYROXINE SODIUM 0.1 MG/1
TABLET ORAL
Qty: 90 TABLET | Refills: 0 | Status: SHIPPED | OUTPATIENT
Start: 2021-01-27 | End: 2021-05-11

## 2021-03-03 RX ORDER — SIMVASTATIN 20 MG
TABLET ORAL
Qty: 90 TABLET | Refills: 2 | Status: SHIPPED | OUTPATIENT
Start: 2021-03-03 | End: 2021-11-11

## 2021-03-18 ENCOUNTER — HOSPITAL ENCOUNTER (OUTPATIENT)
Dept: CARDIOLOGY | Facility: HOSPITAL | Age: 66
Discharge: HOME | End: 2021-03-18
Admitting: INTERNAL MEDICINE

## 2021-03-18 ENCOUNTER — OFFICE VISIT (OUTPATIENT)
Dept: CARDIOLOGY | Facility: CLINIC | Age: 66
End: 2021-03-18

## 2021-03-18 VITALS
BODY MASS INDEX: 40.84 KG/M2 | HEIGHT: 60 IN | TEMPERATURE: 97.8 F | OXYGEN SATURATION: 98 % | SYSTOLIC BLOOD PRESSURE: 134 MMHG | HEART RATE: 54 BPM | DIASTOLIC BLOOD PRESSURE: 68 MMHG | WEIGHT: 208 LBS

## 2021-03-18 VITALS — HEIGHT: 60 IN | WEIGHT: 200 LBS | BODY MASS INDEX: 39.27 KG/M2

## 2021-03-18 DIAGNOSIS — E78.00 PURE HYPERCHOLESTEROLEMIA: ICD-10-CM

## 2021-03-18 DIAGNOSIS — G47.33 OBSTRUCTIVE SLEEP APNEA: ICD-10-CM

## 2021-03-18 DIAGNOSIS — E03.9 ACQUIRED HYPOTHYROIDISM: ICD-10-CM

## 2021-03-18 DIAGNOSIS — I35.0 NONRHEUMATIC AORTIC VALVE STENOSIS: ICD-10-CM

## 2021-03-18 DIAGNOSIS — I35.0 NONRHEUMATIC AORTIC VALVE STENOSIS: Primary | ICD-10-CM

## 2021-03-18 DIAGNOSIS — I10 ESSENTIAL HYPERTENSION: ICD-10-CM

## 2021-03-18 DIAGNOSIS — Z95.2 STATUS POST AORTIC VALVE REPLACEMENT: ICD-10-CM

## 2021-03-18 LAB
BH CV ECHO MEAS - AO MAX PG (FULL): 20.6 MMHG
BH CV ECHO MEAS - AO MAX PG: 24.7 MMHG
BH CV ECHO MEAS - AO MEAN PG (FULL): 15.1 MMHG
BH CV ECHO MEAS - AO MEAN PG: 17.4 MMHG
BH CV ECHO MEAS - AO ROOT AREA (BSA CORRECTED): 1.6
BH CV ECHO MEAS - AO ROOT AREA: 7.3 CM^2
BH CV ECHO MEAS - AO ROOT DIAM: 3 CM
BH CV ECHO MEAS - AO V2 MAX: 248.1 CM/SEC
BH CV ECHO MEAS - AO V2 MEAN: 202.5 CM/SEC
BH CV ECHO MEAS - AO V2 VTI: 67.3 CM
BH CV ECHO MEAS - ASC AORTA: 3.7 CM
BH CV ECHO MEAS - BSA(HAYCOCK): 2 M^2
BH CV ECHO MEAS - BSA: 1.9 M^2
BH CV ECHO MEAS - BZI_BMI: 39.1 KILOGRAMS/M^2
BH CV ECHO MEAS - BZI_METRIC_HEIGHT: 152.4 CM
BH CV ECHO MEAS - BZI_METRIC_WEIGHT: 90.7 KG
BH CV ECHO MEAS - EDV(CUBED): 57.6 ML
BH CV ECHO MEAS - EDV(MOD-SP4): 70.2 ML
BH CV ECHO MEAS - EDV(TEICH): 64.4 ML
BH CV ECHO MEAS - EF(CUBED): 91.5 %
BH CV ECHO MEAS - EF(MOD-SP4): 64.7 %
BH CV ECHO MEAS - EF(TEICH): 87 %
BH CV ECHO MEAS - ESV(CUBED): 4.9 ML
BH CV ECHO MEAS - ESV(MOD-SP4): 24.8 ML
BH CV ECHO MEAS - ESV(TEICH): 8.4 ML
BH CV ECHO MEAS - FS: 56.1 %
BH CV ECHO MEAS - IVS/LVPW: 1.3
BH CV ECHO MEAS - IVSD: 1.4 CM
BH CV ECHO MEAS - LA DIMENSION: 3.5 CM
BH CV ECHO MEAS - LA/AO: 1.1
BH CV ECHO MEAS - LV DIASTOLIC VOL/BSA (35-75): 37.6 ML/M^2
BH CV ECHO MEAS - LV MASS(C)D: 165.9 GRAMS
BH CV ECHO MEAS - LV MASS(C)DI: 88.9 GRAMS/M^2
BH CV ECHO MEAS - LV MAX PG: 4.1 MMHG
BH CV ECHO MEAS - LV MEAN PG: 2.4 MMHG
BH CV ECHO MEAS - LV SYSTOLIC VOL/BSA (12-30): 13.3 ML/M^2
BH CV ECHO MEAS - LV V1 MAX: 100.9 CM/SEC
BH CV ECHO MEAS - LV V1 MEAN: 74 CM/SEC
BH CV ECHO MEAS - LV V1 VTI: 24.3 CM
BH CV ECHO MEAS - LVIDD: 3.9 CM
BH CV ECHO MEAS - LVIDS: 1.7 CM
BH CV ECHO MEAS - LVPWD: 1.1 CM
BH CV ECHO MEAS - MV A MAX VEL: 114 CM/SEC
BH CV ECHO MEAS - MV DEC SLOPE: 357.1 CM/SEC^2
BH CV ECHO MEAS - MV DEC TIME: 0.3 SEC
BH CV ECHO MEAS - MV E MAX VEL: 108.3 CM/SEC
BH CV ECHO MEAS - MV E/A: 0.95
BH CV ECHO MEAS - MV MAX PG: 5.4 MMHG
BH CV ECHO MEAS - MV MEAN PG: 1.9 MMHG
BH CV ECHO MEAS - MV V2 MAX: 115.8 CM/SEC
BH CV ECHO MEAS - MV V2 MEAN: 62.2 CM/SEC
BH CV ECHO MEAS - MV V2 VTI: 39.2 CM
BH CV ECHO MEAS - PULM SYS VEL: 49.9 CM/SEC
BH CV ECHO MEAS - RAP SYSTOLE: 3 MMHG
BH CV ECHO MEAS - RVDD: 2.9 CM
BH CV ECHO MEAS - RVSP: 25.8 MMHG
BH CV ECHO MEAS - SI(AO): 263.1 ML/M^2
BH CV ECHO MEAS - SI(CUBED): 28.3 ML/M^2
BH CV ECHO MEAS - SI(MOD-SP4): 24.3 ML/M^2
BH CV ECHO MEAS - SI(TEICH): 30 ML/M^2
BH CV ECHO MEAS - SV(AO): 491 ML
BH CV ECHO MEAS - SV(CUBED): 52.7 ML
BH CV ECHO MEAS - SV(MOD-SP4): 45.4 ML
BH CV ECHO MEAS - SV(TEICH): 56.1 ML
BH CV ECHO MEAS - TR MAX VEL: 238.6 CM/SEC
MAXIMAL PREDICTED HEART RATE: 156 BPM
STRESS TARGET HR: 133 BPM

## 2021-03-18 PROCEDURE — 93306 TTE W/DOPPLER COMPLETE: CPT

## 2021-03-18 PROCEDURE — 93306 TTE W/DOPPLER COMPLETE: CPT | Performed by: INTERNAL MEDICINE

## 2021-03-18 PROCEDURE — 99214 OFFICE O/P EST MOD 30 MIN: CPT | Performed by: INTERNAL MEDICINE

## 2021-03-18 NOTE — PROGRESS NOTES
"    Subjective:     Encounter Date:2021      Patient ID: Lulu Graves is a 65 y.o. female.    Chief Complaint:  History of Present Illness 65-year-old white female with history of aortic valve disorder status post aortic valve replacement history of hypertension hyperlipidemia history of sleep apnea presents to my office for follow-up.  Patient is currently stable without any symptoms of chest pain or shortness of breath at rest on exertion.  No complains any PND orthopnea.  No palpitation dizziness syncope or swelling of the feet.  She is been taking her medicines regularly.  She is also quite active.  She does not smoke    The following portions of the patient's history were reviewed and updated as appropriate: allergies, current medications, past family history, past medical history, past social history, past surgical history and problem list.  Past Medical History:   Diagnosis Date   • Aortic stenosis    • Aortic valve replaced 2013   • GERD (gastroesophageal reflux disease)    • Heart murmur    • Hyperlipidemia    • Hypertension    • Hypothyroidism    • PAT (paroxysmal atrial tachycardia) (CMS/HCC)    • Sleep apnea      Past Surgical History:   Procedure Laterality Date   • ABLATION OF DYSRHYTHMIC FOCUS   or    • AORTIC VALVE REPAIR/REPLACEMENT     • AORTIC VALVE SURGERY  2013   • CARDIAC CATHETERIZATION      Before open heart surgery   • CARDIAC VALVE REPLACEMENT  2013   •  SECTION  1982   • COLONOSCOPY     • TUBAL ABDOMINAL LIGATION       /68 (BP Location: Left arm, Patient Position: Sitting)   Pulse 54   Temp 97.8 °F (36.6 °C)   Ht 152.4 cm (60\")   Wt 94.3 kg (208 lb)   SpO2 98%   BMI 40.62 kg/m²   Family History   Problem Relation Age of Onset   • Other Father    • Arthritis Mother    • Diabetes Brother    • Hearing loss Brother    • Stroke Paternal Grandmother        Current Outpatient Medications:   •  aspirin 81 MG tablet, Take 1 tablet " by mouth Daily., Disp: , Rfl:   •  levothyroxine (SYNTHROID, LEVOTHROID) 100 MCG tablet, TAKE 1 TABLET BY MOUTH ONE TIME A DAY , Disp: 90 tablet, Rfl: 0  •  metoprolol tartrate (LOPRESSOR) 25 MG tablet, Take 0.5 tablets by mouth 2 (Two) Times a Day., Disp: 90 tablet, Rfl: 0  •  PROBIOTIC PRODUCT PO, Take 1 capsule by mouth Daily., Disp: , Rfl:   •  simvastatin (ZOCOR) 20 MG tablet, TAKE 1 TABLET BY MOUTH IN THE EVENING , Disp: 90 tablet, Rfl: 2  No Known Allergies  Social History     Socioeconomic History   • Marital status:      Spouse name: Not on file   • Number of children: Not on file   • Years of education: Not on file   • Highest education level: Not on file   Tobacco Use   • Smoking status: Never Smoker   • Smokeless tobacco: Never Used   Substance and Sexual Activity   • Alcohol use: No   • Drug use: No   • Sexual activity: Yes     Partners: Male     Birth control/protection: None     Review of Systems   Constitutional: Negative for fever and malaise/fatigue.   Cardiovascular: Negative for chest pain, dyspnea on exertion and palpitations.   Respiratory: Negative for cough and shortness of breath.    Skin: Negative for rash.   Gastrointestinal: Negative for abdominal pain, nausea and vomiting.   Neurological: Negative for focal weakness and headaches.   All other systems reviewed and are negative.             Objective:     Constitutional:       Appearance: Well-developed.   Eyes:      General: No scleral icterus.     Conjunctiva/sclera: Conjunctivae normal.   HENT:      Head: Normocephalic and atraumatic.   Neck:      Vascular: No carotid bruit or JVD.   Pulmonary:      Effort: Pulmonary effort is normal.      Breath sounds: Normal breath sounds. No wheezing. No rales.   Cardiovascular:      Normal rate. Regular rhythm.   Pulses:     Intact distal pulses.   Abdominal:      General: Bowel sounds are normal.      Palpations: Abdomen is soft.   Musculoskeletal:      Cervical back: Normal range of motion  and neck supple. Skin:     General: Skin is warm and dry.      Findings: No rash.   Neurological:      Mental Status: Alert.       Procedures    Lab Review:         MDM  1.  Aortic valve disorder  Patient had severe aortic stenosis and is status post aortic valve placement with a bioprosthetic valve  Patient had an echocardiogram which showed normally functioning bioprosthetic valve with normal LV function  2.  Hypertension  Patient blood pressures currently stable on medications  3.  Hyperlipidemia  Patient has been on statins and her lipids are followed by the primary care doctor  4.  Sleep apnea  Patient has obstructive sleep apnea and uses a CPAP machine and is followed by the primary care doctor.

## 2021-05-11 RX ORDER — LEVOTHYROXINE SODIUM 0.1 MG/1
TABLET ORAL
Qty: 90 TABLET | Refills: 0 | Status: SHIPPED | OUTPATIENT
Start: 2021-05-11 | End: 2021-08-13

## 2021-07-09 ENCOUNTER — LAB (OUTPATIENT)
Dept: LAB | Facility: HOSPITAL | Age: 66
End: 2021-07-09

## 2021-07-09 ENCOUNTER — OFFICE VISIT (OUTPATIENT)
Dept: FAMILY MEDICINE CLINIC | Facility: CLINIC | Age: 66
End: 2021-07-09

## 2021-07-09 VITALS
BODY MASS INDEX: 42.05 KG/M2 | WEIGHT: 214.2 LBS | HEART RATE: 89 BPM | OXYGEN SATURATION: 97 % | DIASTOLIC BLOOD PRESSURE: 88 MMHG | HEIGHT: 60 IN | TEMPERATURE: 96.9 F | SYSTOLIC BLOOD PRESSURE: 159 MMHG

## 2021-07-09 DIAGNOSIS — J01.90 ACUTE NON-RECURRENT SINUSITIS, UNSPECIFIED LOCATION: Primary | ICD-10-CM

## 2021-07-09 DIAGNOSIS — M81.0 OSTEOPOROSIS, UNSPECIFIED OSTEOPOROSIS TYPE, UNSPECIFIED PATHOLOGICAL FRACTURE PRESENCE: ICD-10-CM

## 2021-07-09 DIAGNOSIS — J01.00 ACUTE NON-RECURRENT MAXILLARY SINUSITIS: Primary | ICD-10-CM

## 2021-07-09 PROCEDURE — 99213 OFFICE O/P EST LOW 20 MIN: CPT | Performed by: FAMILY MEDICINE

## 2021-07-09 PROCEDURE — U0004 COV-19 TEST NON-CDC HGH THRU: HCPCS

## 2021-07-09 PROCEDURE — C9803 HOPD COVID-19 SPEC COLLECT: HCPCS

## 2021-07-09 PROCEDURE — U0005 INFEC AGEN DETEC AMPLI PROBE: HCPCS

## 2021-07-09 RX ORDER — AMOXICILLIN 875 MG/1
875 TABLET, COATED ORAL 2 TIMES DAILY
Qty: 20 TABLET | Refills: 0 | Status: SHIPPED | OUTPATIENT
Start: 2021-07-09 | End: 2021-11-08

## 2021-07-09 NOTE — PATIENT INSTRUCTIONS
Take the Anti-biotics as prescribed.    Get the CoVID test.    Rest; get plenty of fluids.    Follow up in the office in 2 weeks or sooner if needed.

## 2021-07-09 NOTE — PROGRESS NOTES
"Subjective   Lulu Graves is a 66 y.o. female.     Chief Complaint   Patient presents with   • Nasal Congestion     congestion for several days   • Fever     reports temp up to 99.9       HPI  Chief complaint: Sinus congestion, fever    Patient is a 66-year-old white female states she has been ill for 2 weeks.  Patient states that for the past 2 weeks she has had sinus congestion and pressure in the maxillary sinus.  She denied sore throat.  She has had nonproductive cough.  Patient states she flew on an airplane.  She had symptoms prior to flying in an airplane.  She states that since flying in airplane her sinus pressure is gotten worse.  She states it is worse in the left maxillary sinus area than the right.  She states she has had a fever up to degrees Fahrenheit above her baseline for the past 2 days.        The following portions of the patient's history were reviewed and updated as appropriate: allergies, current medications, past family history, past medical history, past social history, past surgical history and problem list.    Review of Systems    Objective     /88 (BP Location: Right arm, Patient Position: Sitting, Cuff Size: Adult)   Pulse 89   Temp 96.9 °F (36.1 °C) (Infrared)   Ht 152.4 cm (60\")   Wt 97.2 kg (214 lb 3.2 oz)   SpO2 97%   BMI 41.83 kg/m²     Physical Exam  Vitals and nursing note reviewed.   Constitutional:       Appearance: She is well-developed. She is obese.   HENT:      Head: Normocephalic and atraumatic.      Nose:      Comments: Nasal congestion both maxillary sinus areas     Mouth/Throat:      Mouth: Mucous membranes are moist.      Pharynx: Oropharynx is clear.   Eyes:      Extraocular Movements: Extraocular movements intact.      Conjunctiva/sclera: Conjunctivae normal.      Pupils: Pupils are equal, round, and reactive to light.   Cardiovascular:      Rate and Rhythm: Normal rate and regular rhythm.      Heart sounds: Normal heart sounds.   Pulmonary:      Effort: " Pulmonary effort is normal.      Breath sounds: Normal breath sounds.   Abdominal:      General: Bowel sounds are normal.      Palpations: Abdomen is soft.   Musculoskeletal:         General: Normal range of motion.      Cervical back: Neck supple.   Skin:     General: Skin is warm and dry.   Neurological:      Mental Status: She is alert and oriented to person, place, and time.   Psychiatric:         Behavior: Behavior normal.         Thought Content: Thought content normal.         Judgment: Judgment normal.           Assessment/Plan   Diagnoses and all orders for this visit:    1. Acute non-recurrent maxillary sinusitis (Primary)-the patient was advised that she likely has sinusitis.  I recommended Augmentin 875 twice a day.  Patient wanted to be tested for Covid.  -     COVID PRE-OP / PRE-PROCEDURE SCREENING ORDER (NO ISOLATION) - Swab, Nasopharynx; Future    2. Osteoporosis, unspecified osteoporosis type, unspecified pathological fracture presence  -     DEXA Bone Density Axial    Other orders  -     amoxicillin (AMOXIL) 875 MG tablet; Take 1 tablet by mouth 2 (Two) Times a Day.  Dispense: 20 tablet; Refill: 0      Patient Instructions   Take the Anti-biotics as prescribed.    Get the CoVID test.    Rest; get plenty of fluids.    Follow up in the office in 2 weeks or sooner if needed.      Hardeep Camejo Jr., MD    07/09/21

## 2021-07-10 LAB — SARS-COV-2 ORF1AB RESP QL NAA+PROBE: DETECTED

## 2021-08-13 RX ORDER — LEVOTHYROXINE SODIUM 0.1 MG/1
TABLET ORAL
Qty: 90 TABLET | Refills: 0 | Status: SHIPPED | OUTPATIENT
Start: 2021-08-13 | End: 2021-11-11

## 2021-08-16 ENCOUNTER — HOSPITAL ENCOUNTER (OUTPATIENT)
Dept: BONE DENSITY | Facility: HOSPITAL | Age: 66
Discharge: HOME | End: 2021-08-16
Admitting: FAMILY MEDICINE

## 2021-08-16 PROCEDURE — 77080 DXA BONE DENSITY AXIAL: CPT

## 2021-11-08 ENCOUNTER — LAB (OUTPATIENT)
Dept: LAB | Facility: HOSPITAL | Age: 66
End: 2021-11-08

## 2021-11-08 ENCOUNTER — OFFICE VISIT (OUTPATIENT)
Dept: FAMILY MEDICINE CLINIC | Facility: CLINIC | Age: 66
End: 2021-11-08

## 2021-11-08 VITALS
HEART RATE: 62 BPM | OXYGEN SATURATION: 97 % | BODY MASS INDEX: 43.74 KG/M2 | TEMPERATURE: 96.8 F | SYSTOLIC BLOOD PRESSURE: 143 MMHG | HEIGHT: 60 IN | RESPIRATION RATE: 18 BRPM | DIASTOLIC BLOOD PRESSURE: 74 MMHG | WEIGHT: 222.8 LBS

## 2021-11-08 DIAGNOSIS — E78.5 HYPERLIPIDEMIA, UNSPECIFIED HYPERLIPIDEMIA TYPE: ICD-10-CM

## 2021-11-08 DIAGNOSIS — E03.9 ACQUIRED HYPOTHYROIDISM: ICD-10-CM

## 2021-11-08 DIAGNOSIS — I10 PRIMARY HYPERTENSION: Primary | ICD-10-CM

## 2021-11-08 DIAGNOSIS — I10 PRIMARY HYPERTENSION: ICD-10-CM

## 2021-11-08 DIAGNOSIS — Z00.00 ENCOUNTER FOR ANNUAL WELLNESS EXAM IN MEDICARE PATIENT: ICD-10-CM

## 2021-11-08 DIAGNOSIS — I35.0 NONRHEUMATIC AORTIC VALVE STENOSIS: Chronic | ICD-10-CM

## 2021-11-08 DIAGNOSIS — K21.9 GASTROESOPHAGEAL REFLUX DISEASE WITHOUT ESOPHAGITIS: Chronic | ICD-10-CM

## 2021-11-08 LAB
ANION GAP SERPL CALCULATED.3IONS-SCNC: 4.4 MMOL/L (ref 5–15)
BUN SERPL-MCNC: 17 MG/DL (ref 8–23)
BUN/CREAT SERPL: 22.4 (ref 7–25)
CALCIUM SPEC-SCNC: 9.6 MG/DL (ref 8.6–10.5)
CHLORIDE SERPL-SCNC: 106 MMOL/L (ref 98–107)
CHOLEST SERPL-MCNC: 136 MG/DL (ref 0–200)
CO2 SERPL-SCNC: 28.6 MMOL/L (ref 22–29)
CREAT SERPL-MCNC: 0.76 MG/DL (ref 0.57–1)
GFR SERPL CREATININE-BSD FRML MDRD: 76 ML/MIN/1.73
GLUCOSE SERPL-MCNC: 99 MG/DL (ref 65–99)
HDLC SERPL-MCNC: 39 MG/DL (ref 40–60)
LDLC SERPL CALC-MCNC: 77 MG/DL (ref 0–100)
LDLC/HDLC SERPL: 1.93 {RATIO}
POTASSIUM SERPL-SCNC: 4.9 MMOL/L (ref 3.5–5.2)
SODIUM SERPL-SCNC: 139 MMOL/L (ref 136–145)
TRIGL SERPL-MCNC: 108 MG/DL (ref 0–150)
TSH SERPL DL<=0.05 MIU/L-ACNC: 1.13 UIU/ML (ref 0.27–4.2)
VLDLC SERPL-MCNC: 20 MG/DL (ref 5–40)

## 2021-11-08 PROCEDURE — 99214 OFFICE O/P EST MOD 30 MIN: CPT | Performed by: FAMILY MEDICINE

## 2021-11-08 PROCEDURE — 1160F RVW MEDS BY RX/DR IN RCRD: CPT | Performed by: FAMILY MEDICINE

## 2021-11-08 PROCEDURE — 84443 ASSAY THYROID STIM HORMONE: CPT

## 2021-11-08 PROCEDURE — 36415 COLL VENOUS BLD VENIPUNCTURE: CPT

## 2021-11-08 PROCEDURE — G0402 INITIAL PREVENTIVE EXAM: HCPCS | Performed by: FAMILY MEDICINE

## 2021-11-08 PROCEDURE — 1170F FXNL STATUS ASSESSED: CPT | Performed by: FAMILY MEDICINE

## 2021-11-08 PROCEDURE — 80048 BASIC METABOLIC PNL TOTAL CA: CPT

## 2021-11-08 PROCEDURE — 80061 LIPID PANEL: CPT

## 2021-11-08 PROCEDURE — 1126F AMNT PAIN NOTED NONE PRSNT: CPT | Performed by: FAMILY MEDICINE

## 2021-11-08 NOTE — PROGRESS NOTES
"Magalie Graves is a 66 y.o. female.     Chief Complaint   Patient presents with   • Welcome To Medicare   • Hypertension   • Hyperlipidemia       HPI  Chief complaint: Hypertension hyperlipidemia hypothyroidism    The patient is a 66-year-old white female comes in for follow-up and maintenance of her current problems which include    1.  Hypertension-stable-patient metoprolol 25 mg daily.  She denied headache lightheadedness dizziness or chest pain.    2.  Hyperlipidemia-stable and was on Zocor 40 mg daily.  She denies myalgias and arthralgias.    3.  Hypothyroidism-stable-patient is currently on Synthroid 0.1 mg daily.  She denied heat or cold intolerance tremor weight gain or weight loss.    4.  Valvular heart disease/status post aortic valve replacement-stable-patient denies chest pain shortness breath orthopnea or PND.    5.  Low back pain with sciatica-no-patient is having periodic episodes of pain in her lower back radiates into her legs.  She denied fever bowel or bladder problems.  She denied weakness in the lower extremities.      The following portions of the patient's history were reviewed and updated as appropriate: allergies, current medications, past family history, past medical history, past social history, past surgical history and problem list.    Review of Systems    Objective     /74 (BP Location: Left arm, Patient Position: Sitting, Cuff Size: Large Adult)   Pulse 62   Temp 96.8 °F (36 °C) (Infrared)   Resp 18   Ht 152.4 cm (60\")   Wt 101 kg (222 lb 12.8 oz)   SpO2 97%   BMI 43.51 kg/m²     Physical Exam  Vitals and nursing note reviewed.   Constitutional:       Appearance: She is well-developed. She is obese.   HENT:      Head: Normocephalic and atraumatic.      Nose: Nose normal.      Mouth/Throat:      Mouth: Mucous membranes are moist.      Pharynx: Oropharynx is clear.   Eyes:      Extraocular Movements: Extraocular movements intact.      Conjunctiva/sclera: " Conjunctivae normal.      Pupils: Pupils are equal, round, and reactive to light.   Cardiovascular:      Rate and Rhythm: Normal rate and regular rhythm.      Heart sounds: Normal heart sounds.   Pulmonary:      Effort: Pulmonary effort is normal.      Breath sounds: Normal breath sounds.   Abdominal:      General: Bowel sounds are normal.      Palpations: Abdomen is soft.   Musculoskeletal:         General: Normal range of motion.      Cervical back: Neck supple.   Skin:     General: Skin is warm and dry.   Neurological:      Mental Status: She is alert and oriented to person, place, and time.   Psychiatric:         Behavior: Behavior normal.         Thought Content: Thought content normal.         Judgment: Judgment normal.           Assessment/Plan   Diagnoses and all orders for this visit:    1. Primary hypertension (Primary)  -     Basic Metabolic Panel; Future    2. Encounter for annual wellness exam in Medicare patient    3. Hyperlipidemia, unspecified hyperlipidemia type  -     Lipid Panel; Future  -     ALT; Future    4. Acquired hypothyroidism  -     TSH; Future    5. Gastroesophageal reflux disease without esophagitis    6. Nonrheumatic aortic valve stenosis      Patient Instructions   Conitue your current medications and treatment.    Create a living will.    Have the follow up labs done and call for rersults.  Advance Directive    Advance directives are legal documents that let you make choices ahead of time about your health care and medical treatment in case you become unable to communicate for yourself. Advance directives are a way for you to make known your wishes to family, friends, and health care providers. This can let others know about your end-of-life care if you become unable to communicate.  Discussing and writing advance directives should happen over time rather than all at once. Advance directives can be changed depending on your situation and what you want, even after you have signed the  advance directives.  There are different types of advance directives, such as:  · Medical power of .  · Living will.  · Do not resuscitate (DNR) or do not attempt resuscitation (DNAR) order.  Health care proxy and medical power of   A health care proxy is also called a health care agent. This is a person who is appointed to make medical decisions for you in cases where you are unable to make the decisions yourself. Generally, people choose someone they know well and trust to represent their preferences. Make sure to ask this person for an agreement to act as your proxy. A proxy may have to exercise judgment in the event of a medical decision for which your wishes are not known.  A medical power of  is a legal document that names your health care proxy. Depending on the laws in your state, after the document is written, it may also need to be:  · Signed.  · Notarized.  · Dated.  · Copied.  · Witnessed.  · Incorporated into your medical record.  You may also want to appoint someone to manage your money in a situation in which you are unable to do so. This is called a durable power of  for finances. It is a separate legal document from the durable power of  for health care. You may choose the same person or someone different from your health care proxy to act as your agent in money matters.  If you do not appoint a proxy, or if there is a concern that the proxy is not acting in your best interests, a court may appoint a guardian to act on your behalf.  Living will  A living will is a set of instructions that state your wishes about medical care when you cannot express them yourself. Health care providers should keep a copy of your living will in your medical record. You may want to give a copy to family members or friends. To alert caregivers in case of an emergency, you can place a card in your wallet to let them know that you have a living will and where they can find it. A  living will is used if you become:  · Terminally ill.  · Disabled.  · Unable to communicate or make decisions.  Items to consider in your living will include:  · To use or not to use life-support equipment, such as dialysis machines and breathing machines (ventilators).  · A DNR or DNAR order. This tells health care providers not to use cardiopulmonary resuscitation (CPR) if breathing or heartbeat stops.  · To use or not to use tube feeding.  · To be given or not to be given food and fluids.  · Comfort (palliative) care when the goal becomes comfort rather than a cure.  · Donation of organs and tissues.  A living will does not give instructions for distributing your money and property if you should pass away.  DNR or DNAR  A DNR or DNAR order is a request not to have CPR in the event that your heart stops beating or you stop breathing. If a DNR or DNAR order has not been made and shared, a health care provider will try to help any patient whose heart has stopped or who has stopped breathing. If you plan to have surgery, talk with your health care provider about how your DNR or DNAR order will be followed if problems occur.  What if I do not have an advance directive?  If you do not have an advance directive, some states assign family decision makers to act on your behalf based on how closely you are related to them. Each state has its own laws about advance directives. You may want to check with your health care provider, , or state representative about the laws in your state.  Summary  · Advance directives are the legal documents that allow you to make choices ahead of time about your health care and medical treatment in case you become unable to tell others about your care.  · The process of discussing and writing advance directives should happen over time. You can change the advance directives, even after you have signed them.  · Advance directives include DNR or DNAR orders, living killian, and designating  an agent as your medical power of .  This information is not intended to replace advice given to you by your health care provider. Make sure you discuss any questions you have with your health care provider.  Document Revised: 01/28/2021 Document Reviewed: 07/16/2020  [employer] Patient Education © 2021 [employer]        Hardeep Camejo Jr., MD    11/08/21

## 2021-11-08 NOTE — PATIENT INSTRUCTIONS
Conitue your current medications and treatment.    Create a living will.    Have the follow up labs done and call for rersults.  Advance Directive    Advance directives are legal documents that let you make choices ahead of time about your health care and medical treatment in case you become unable to communicate for yourself. Advance directives are a way for you to make known your wishes to family, friends, and health care providers. This can let others know about your end-of-life care if you become unable to communicate.  Discussing and writing advance directives should happen over time rather than all at once. Advance directives can be changed depending on your situation and what you want, even after you have signed the advance directives.  There are different types of advance directives, such as:  · Medical power of .  · Living will.  · Do not resuscitate (DNR) or do not attempt resuscitation (DNAR) order.  Health care proxy and medical power of   A health care proxy is also called a health care agent. This is a person who is appointed to make medical decisions for you in cases where you are unable to make the decisions yourself. Generally, people choose someone they know well and trust to represent their preferences. Make sure to ask this person for an agreement to act as your proxy. A proxy may have to exercise judgment in the event of a medical decision for which your wishes are not known.  A medical power of  is a legal document that names your health care proxy. Depending on the laws in your state, after the document is written, it may also need to be:  · Signed.  · Notarized.  · Dated.  · Copied.  · Witnessed.  · Incorporated into your medical record.  You may also want to appoint someone to manage your money in a situation in which you are unable to do so. This is called a durable power of  for finances. It is a separate legal document from the durable power of  for  health care. You may choose the same person or someone different from your health care proxy to act as your agent in money matters.  If you do not appoint a proxy, or if there is a concern that the proxy is not acting in your best interests, a court may appoint a guardian to act on your behalf.  Living will  A living will is a set of instructions that state your wishes about medical care when you cannot express them yourself. Health care providers should keep a copy of your living will in your medical record. You may want to give a copy to family members or friends. To alert caregivers in case of an emergency, you can place a card in your wallet to let them know that you have a living will and where they can find it. A living will is used if you become:  · Terminally ill.  · Disabled.  · Unable to communicate or make decisions.  Items to consider in your living will include:  · To use or not to use life-support equipment, such as dialysis machines and breathing machines (ventilators).  · A DNR or DNAR order. This tells health care providers not to use cardiopulmonary resuscitation (CPR) if breathing or heartbeat stops.  · To use or not to use tube feeding.  · To be given or not to be given food and fluids.  · Comfort (palliative) care when the goal becomes comfort rather than a cure.  · Donation of organs and tissues.  A living will does not give instructions for distributing your money and property if you should pass away.  DNR or DNAR  A DNR or DNAR order is a request not to have CPR in the event that your heart stops beating or you stop breathing. If a DNR or DNAR order has not been made and shared, a health care provider will try to help any patient whose heart has stopped or who has stopped breathing. If you plan to have surgery, talk with your health care provider about how your DNR or DNAR order will be followed if problems occur.  What if I do not have an advance directive?  If you do not have an advance  directive, some states assign family decision makers to act on your behalf based on how closely you are related to them. Each state has its own laws about advance directives. You may want to check with your health care provider, , or state representative about the laws in your state.  Summary  · Advance directives are the legal documents that allow you to make choices ahead of time about your health care and medical treatment in case you become unable to tell others about your care.  · The process of discussing and writing advance directives should happen over time. You can change the advance directives, even after you have signed them.  · Advance directives include DNR or DNAR orders, living killian, and designating an agent as your medical power of .  This information is not intended to replace advice given to you by your health care provider. Make sure you discuss any questions you have with your health care provider.  Document Revised: 01/28/2021 Document Reviewed: 07/16/2020  [employer] Patient Education © 2021 [employer]

## 2021-11-08 NOTE — PROGRESS NOTES
The ABCs of the Annual Wellness Visit  [place] to Medicare Visit    Chief Complaint   Patient presents with   • Welcome To Medicare   • Hypertension   • Hyperlipidemia     Subjective {   History of Present Illness:  Lulu Graves is a 66 y.o. female who presents for a  Welcome to Medicare Visit.    The following portions of the patient's history were reviewed and   updated as appropriate: allergies, current medications, past family history, past medical history, past social history, past surgical history and problem list.     Compared to one year ago, the patient feels her physical   health is the same.    Compared to one year ago, the patient feels her mental   health is the same.    Recent Hospitalizations:  She was not admitted to the hospital during the last year.       Current Medical Providers:  Patient Care Team:  Hardeep Camejo Jr., MD as PCP - General  Jayson Edmondson MD as Consulting Physician (Cardiology)    Outpatient Medications Prior to Visit   Medication Sig Dispense Refill   • aspirin 81 MG tablet Take 1 tablet by mouth Daily.     • levothyroxine (SYNTHROID, LEVOTHROID) 100 MCG tablet TAKE 1 TABLET BY MOUTH EVERY DAY  90 tablet 0   • metoprolol tartrate (LOPRESSOR) 25 MG tablet TAKE 1/2 TABLET BY MOUTH TWO TIMES A DAY  90 tablet 0   • PROBIOTIC PRODUCT PO Take 1 capsule by mouth Daily.     • simvastatin (ZOCOR) 20 MG tablet TAKE 1 TABLET BY MOUTH IN THE EVENING  90 tablet 2   • amoxicillin (AMOXIL) 875 MG tablet Take 1 tablet by mouth 2 (Two) Times a Day. 20 tablet 0     No facility-administered medications prior to visit.       No opioid medication identified on active medication list. I have reviewed chart for other potential  high risk medication/s and harmful drug interactions in the elderly.          Aspirin is on active medication list. Aspirin use is indicated based on review of current medical condition/s. Pros and cons of this therapy have been discussed today. Benefits of this medication  "outweigh potential harm.  Patient has been encouraged to continue taking this medication.  .      Patient Active Problem List   Diagnosis   • Status post aortic valve replacement   • Steatosis of liver   • Sleep apnea   • Hypothyroidism   • Hypertension   • Hyperlipidemia   • Gastroesophageal reflux disease   • Aortic stenosis     Advance Care Planning  Advance Directive is not on file.  ACP discussion was held with the patient during this visit. Patient does not have an advance directive, information provided.          Objective      Vitals:    11/08/21 0842   BP: 143/74   BP Location: Left arm   Patient Position: Sitting   Cuff Size: Large Adult   Pulse: 62   Resp: 18   Temp: 96.8 °F (36 °C)   TempSrc: Infrared   SpO2: 97%   Weight: 101 kg (222 lb 12.8 oz)   Height: 152.4 cm (60\")   PainSc: 0-No pain     BMI Readings from Last 1 Encounters:   11/08/21 43.51 kg/m²   BMI is above normal parameters. Recommendations include: exercise counseling    Does the patient have evidence of cognitive impairment? No    Physical Exam  Vitals and nursing note reviewed.   Constitutional:       Appearance: She is well-developed. She is obese.   HENT:      Head: Normocephalic and atraumatic.      Nose: Nose normal.      Mouth/Throat:      Mouth: Mucous membranes are moist.      Pharynx: Oropharynx is clear.   Eyes:      Extraocular Movements: Extraocular movements intact.      Conjunctiva/sclera: Conjunctivae normal.      Pupils: Pupils are equal, round, and reactive to light.   Cardiovascular:      Rate and Rhythm: Normal rate and regular rhythm.      Heart sounds: Normal heart sounds.   Pulmonary:      Effort: Pulmonary effort is normal.      Breath sounds: Normal breath sounds.   Abdominal:      General: Bowel sounds are normal.      Palpations: Abdomen is soft.   Musculoskeletal:         General: Normal range of motion.      Cervical back: Neck supple.   Skin:     General: Skin is warm and dry.   Neurological:      Mental Status: " She is alert and oriented to person, place, and time.   Psychiatric:         Behavior: Behavior normal.         Thought Content: Thought content normal.         Judgment: Judgment normal.              Procedures       HEALTH RISK ASSESSMENT    Smoking Status:  Social History     Tobacco Use   Smoking Status Never Smoker   Smokeless Tobacco Never Used     Alcohol Consumption:  Social History     Substance and Sexual Activity   Alcohol Use No       Fall Risk Screen:    AVTAR Fall Risk Assessment was completed, and patient is at LOW risk for falls.Assessment completed on:11/8/2021    Depression Screen:   PHQ-2/PHQ-9 Depression Screening 11/8/2021   Little interest or pleasure in doing things 0   Feeling down, depressed, or hopeless 0   Total Score 0       Health Habits and Functional and Cognitive Screening:  Functional & Cognitive Status 11/8/2021   Do you have difficulty preparing food and eating? No   Do you have difficulty bathing yourself, getting dressed or grooming yourself? No   Do you have difficulty using the toilet? No   Do you have difficulty moving around from place to place? No   Do you have trouble with steps or getting out of a bed or a chair? No   Current Diet Well Balanced Diet   Dental Exam Not up to date   Eye Exam Up to date   Exercise (times per week) 3 times per week   Current Exercises Include Walking   Do you need help using the phone?  No   Are you deaf or do you have serious difficulty hearing?  No   Do you need help with transportation? No   Do you need help shopping? No   Do you need help preparing meals?  No   Do you need help with housework?  No   Do you need help with laundry? No   Do you need help taking your medications? No   Do you need help managing money? No   Do you ever drive or ride in a car without wearing a seat belt? No   Have you felt unusual stress, anger or loneliness in the last month? No   Who do you live with? Spouse   If you need help, do you have trouble finding  someone available to you? No   Have you been bothered in the last four weeks by sexual problems? No   Do you have difficulty concentrating, remembering or making decisions? No       Visual Acuity:    No exam data present    Age-appropriate Screening Schedule:  Refer to the list below for future screening recommendations based on patient's age, sex and/or medical conditions. Orders for these recommended tests are listed in the plan section. The patient has been provided with a written plan.    Health Maintenance   Topic Date Due   • PAP SMEAR  Never done   • INFLUENZA VACCINE  08/01/2021   • LIPID PANEL  11/08/2021 (Originally 10/29/2021)   • TDAP/TD VACCINES (1 - Tdap) 11/08/2022 (Originally 4/12/1974)   • MAMMOGRAM  12/17/2022   • DXA SCAN  08/16/2023   • ZOSTER VACCINE  Completed          Assessment/Plan   CMS Preventative Services Quick Reference  Risk Factors Identified During Encounter  Immunizations Discussed/Encouraged (specific Immunizations; Td, Influenza, Pneumococcal 23, Shingrix and COVID19  The above risks/problems have been discussed with the patient.  Pertinent information has been shared with the patient in the After Visit Summary.  Follow up plans and orders are seen below in the Assessment/Plan Section.    Diagnoses and all orders for this visit:    1. Encounter for annual wellness exam in Medicare patient (Primary)    2. Primary hypertension  -     Basic Metabolic Panel; Future    3. Hyperlipidemia, unspecified hyperlipidemia type  -     Lipid Panel; Future  -     ALT; Future    4. Acquired hypothyroidism  -     TSH; Future    5. Gastroesophageal reflux disease without esophagitis        Follow Up:   Return in about 6 months (around 5/8/2022).     An After Visit Summary and PPPS were made available to the patient.

## 2021-11-11 RX ORDER — LEVOTHYROXINE SODIUM 0.1 MG/1
TABLET ORAL
Qty: 90 TABLET | Refills: 0 | Status: SHIPPED | OUTPATIENT
Start: 2021-11-11 | End: 2022-02-24

## 2021-11-11 RX ORDER — SIMVASTATIN 20 MG
TABLET ORAL
Qty: 90 TABLET | Refills: 0 | Status: SHIPPED | OUTPATIENT
Start: 2021-11-11 | End: 2022-02-24

## 2021-11-16 ENCOUNTER — OFFICE VISIT (OUTPATIENT)
Dept: SLEEP MEDICINE | Facility: CLINIC | Age: 66
End: 2021-11-16

## 2021-11-16 VITALS
HEART RATE: 60 BPM | HEIGHT: 60 IN | SYSTOLIC BLOOD PRESSURE: 122 MMHG | DIASTOLIC BLOOD PRESSURE: 72 MMHG | WEIGHT: 222 LBS | BODY MASS INDEX: 43.59 KG/M2 | OXYGEN SATURATION: 95 %

## 2021-11-16 DIAGNOSIS — Z99.89 OSA ON CPAP: Primary | ICD-10-CM

## 2021-11-16 DIAGNOSIS — E66.01 CLASS 3 SEVERE OBESITY DUE TO EXCESS CALORIES WITHOUT SERIOUS COMORBIDITY WITH BODY MASS INDEX (BMI) OF 40.0 TO 44.9 IN ADULT (HCC): ICD-10-CM

## 2021-11-16 DIAGNOSIS — G47.33 OSA ON CPAP: Primary | ICD-10-CM

## 2021-11-16 DIAGNOSIS — Z95.2 STATUS POST AORTIC VALVE REPLACEMENT: Chronic | ICD-10-CM

## 2021-11-16 DIAGNOSIS — I10 PRIMARY HYPERTENSION: Chronic | ICD-10-CM

## 2021-11-16 PROBLEM — E66.813 CLASS 3 SEVERE OBESITY DUE TO EXCESS CALORIES WITHOUT SERIOUS COMORBIDITY WITH BODY MASS INDEX (BMI) OF 40.0 TO 44.9 IN ADULT: Status: ACTIVE | Noted: 2021-11-16

## 2021-11-16 PROCEDURE — 99203 OFFICE O/P NEW LOW 30 MIN: CPT | Performed by: INTERNAL MEDICINE

## 2021-11-16 PROCEDURE — G0463 HOSPITAL OUTPT CLINIC VISIT: HCPCS

## 2021-11-16 NOTE — PROGRESS NOTES
[address] [address]  Phone   Fax       Lulu Graves  1955  66 y.o.  female      PCP:Hardeep Camejo Jr., MD    Type of service: Initial New Patient Office Visit  Date of service: 11/16/2021    Chief Complaint   Patient presents with   • Sleep Apnea   • Obesity       History of present illness;  Lulu Graves is a new patient for me and was seen today for sleep apnea. I have reviewed her medical records. She is retired from [employer] but now works at [facility].    She had a sleep study at [facility] on November 14, 2011 which shows severe sleep apnea with AHI of 36.6/h. She is on auto CPAP. However her CPAP is approximately 10 years old. She says that she does not go to sleep without CPAP and uses on a regular basis. I have reviewed her sleep study which is scanned into our computers.    Patient gives the following sleep history.  Sleep schedule:  Bedtime: 11 PM  Wake time: 6:30 AM  Normally takes about 20 minutes to fall asleep      The Smart card downloaded on 11/16/2021 has been independently reviewed by me and discussed the data with the patient. It shows the following..  Compliance; 100%  > 4 hr use, 100%  Average use of the device 7 hours and 36 minutes per night  Residual AHI: 2.3 /hr (normal less than 5)  Mask type: Nasal pillows  DME: Change DME to [facility]    Past medical history: (Relevant to sleep medicine)  1. Sleep apnea  2. Hypertension  3. S/p aortic valve replacement  4. Obesity  5. Hypothyroidism  6. Hyperlipidemia    • Medications are reviewed by me and documented in the encounter  • Allergies reviewed and documented in encounter    Social history:  Do you drive a commercial vehicle:  No   Shift work:  No   Tobacco use:  No   Alcohol use:  0 per week  Caffeinated drinks: 1    FAMILY HISTORY (Your mother, father, brothers and sisters)  1. Negative for sleep apnea    REVIEW OF  "SYSTEMS.  Full review of systems available on the intake form which is scanned in the media tab.  The relevant positive are noted below  1. Daytime excessive sleepiness with [place] Sleepiness Scale :Total score: 1   2. Snoring resolved      Physical exam:  Vitals:    11/16/21 1140   BP: 122/72   BP Location: Left arm   Patient Position: Sitting   Cuff Size: Adult   Pulse: 60   SpO2: 95%   Weight: 101 kg (222 lb)   Height: 152.4 cm (60\")    Body mass index is 43.36 kg/m².    HEENT: Head is atraumatic, normocephalic  Eyes: pupils are round equal and reacting to light and accommodation, conjunctiva normal  Nose: no nasal septal defects or deviation and the nasal passages are clear, no nasal polyps,  Throat: tonsils are not enlarged, tongue normal, oral airway Mallampati class 3  NECK: , trachea is in the midline, thyroid not enlarged  RESPIRATORY SYSTEM: Breath sounds are equal on both sides, there are no wheezes   CARDIOVASULAR SYSTEM: Heart sounds are regular rhythm and butch rate, no edema  EXTREMITES: No cyanosis, clubbing  NEUROLOGICAL SYSTEM: Oriented x 3, no gross motor defects, gait normal    Reviewed her sleep study  Labs reviewed.  TSH Results:  TSH    TSH 11/8/21   TSH 1.130                 Assessment and plan:  Obstructive sleep apnea ( G 47.33).  The symptoms of sleep apnea have improved with the device and the treatment.  Patient's compliance with the device is excellent for treatment of sleep apnea.  I have independently reviewed the smart card down load and discussed with the patient the download data and encouarged the patient to continue to use the device.The residual AHI is acceptable. The device is benefiting the patient and the device is medically necessary. Without proper control of sleep apnea and good compliance there is a increased risk for hypertension, diabetes mellitus and nonrestorative sleep with hypersomnia which can increase risk for motor vehicle accidents.  Untreated sleep apnea is " also a risk factor for development of atrial fibrillation, pulmonary hypertension and stroke. Patient's CPAP is about 10 years old and she needs a new CPAP. I have sent a order to [facility]. After getting the new CPAP she will call to set up a follow-up appointment For compliance to be seen between 31 to 90 days.  · Obesity class 3, with BMI Body mass index is 43.36 kg/m².. I have discussed the relationship between weight and sleep apnea.There is direct correlation between weight and severity of sleep apnea.  Weight reduction is encouraged, as it is going to reduce the severity of sleep apnea. I have also discussed with the patient diet and exercise to achieve ideal body weight  · Hypertension,  Essential hypertension is highly correlated with sleep apnea. Treating sleep apnea will assist in good blood pressure control.    I have also discussed with the patient the following  • Sleep hygiene: Maintaining a regular bedtime and wake time, not to watch television or work in bed, limit caffeine-containing beverages before bed time and avoid naps during the day  • Adequate amount of sleep.  Generally most people needs about 7 to 8 hours of sleep.  • Return for 31 to 90 days after PAP setup with down load..  Patient's questions were answered.        Angelique Wylie MD  Sleep Medicine.  Medical Director, [facility]  11/16/2021 ,

## 2021-12-24 DIAGNOSIS — Z13.9 ENCOUNTER FOR HEALTH-RELATED SCREENING: Primary | ICD-10-CM

## 2022-01-06 ENCOUNTER — APPOINTMENT (OUTPATIENT)
Dept: MAMMOGRAPHY | Facility: HOSPITAL | Age: 67
End: 2022-01-06

## 2022-01-13 ENCOUNTER — HOSPITAL ENCOUNTER (OUTPATIENT)
Dept: MAMMOGRAPHY | Facility: HOSPITAL | Age: 67
Discharge: HOME | End: 2022-01-13
Admitting: FAMILY MEDICINE

## 2022-01-13 DIAGNOSIS — Z13.9 ENCOUNTER FOR HEALTH-RELATED SCREENING: ICD-10-CM

## 2022-01-13 PROCEDURE — 77067 SCR MAMMO BI INCL CAD: CPT

## 2022-01-13 PROCEDURE — 77063 BREAST TOMOSYNTHESIS BI: CPT

## 2022-02-24 RX ORDER — LEVOTHYROXINE SODIUM 0.1 MG/1
TABLET ORAL
Qty: 90 TABLET | Refills: 0 | Status: SHIPPED | OUTPATIENT
Start: 2022-02-24 | End: 2022-05-23

## 2022-02-24 RX ORDER — SIMVASTATIN 20 MG
TABLET ORAL
Qty: 90 TABLET | Refills: 0 | Status: SHIPPED | OUTPATIENT
Start: 2022-02-24 | End: 2022-05-23

## 2022-03-01 ENCOUNTER — OFFICE VISIT (OUTPATIENT)
Dept: SLEEP MEDICINE | Facility: CLINIC | Age: 67
End: 2022-03-01

## 2022-03-01 VITALS
DIASTOLIC BLOOD PRESSURE: 78 MMHG | WEIGHT: 222 LBS | OXYGEN SATURATION: 94 % | SYSTOLIC BLOOD PRESSURE: 134 MMHG | HEIGHT: 60 IN | HEART RATE: 66 BPM | BODY MASS INDEX: 43.59 KG/M2

## 2022-03-01 DIAGNOSIS — E66.01 CLASS 3 SEVERE OBESITY DUE TO EXCESS CALORIES WITHOUT SERIOUS COMORBIDITY WITH BODY MASS INDEX (BMI) OF 40.0 TO 44.9 IN ADULT: ICD-10-CM

## 2022-03-01 DIAGNOSIS — G47.33 OSA ON CPAP: Primary | ICD-10-CM

## 2022-03-01 DIAGNOSIS — Z99.89 OSA ON CPAP: Primary | ICD-10-CM

## 2022-03-01 PROCEDURE — 99213 OFFICE O/P EST LOW 20 MIN: CPT | Performed by: INTERNAL MEDICINE

## 2022-03-01 PROCEDURE — G0463 HOSPITAL OUTPT CLINIC VISIT: HCPCS

## 2022-03-01 NOTE — PROGRESS NOTES
"  [address] [address]  Phone   Fax       SLEEP CLINIC FOLLOW UP PROGRESS NOTE.    Lulu Graves  1955  66 y.o.  female      PCP: Hardeep Camejo Jr., MD      Date of visit: 3/1/2022    Chief Complaint   Patient presents with   • Sleep Apnea   • Obesity       HPI:  This is a 66 y.o. years old patient is here for the management of obstructive sleep apnea.  Sleep apnea is severe in severity with a AHI of 36/hr. Patient is using positive airway pressure therapy with auto CPAP and the symptoms of snoring, non-restorative sleep and daytime excessive sleepiness have improved significantly on the therapy. Normally goes to bed at 10:30 PM and wakes up at 6:30 AM.  The patient wakes up none time(s) during the night and has no problem going back to sleep.  Feels refreshed after waking up.  Patient also denies headaches and nasal congestion.  She has no problems she says that she cannot go to sleep without the CPAP    Medications and allergies are reviewed by me and documented in the encounter.     SOCIAL (habits pertaining to sleep medicine)  History tobacco use:No   History of alcohol use: 0 per week  Caffeine use: 2     REVIEW OF SYSTEMS:   [place] Sleepiness Scale :Total score: 1   Nasal congestion:No   Dry mouth/nose:No   Post nasal drip; No   Acid reflux/Heartburn:No   Abd bloating:No   Morning headache:No   Anxiety:No   Depression:No    PHYSICAL EXAMINATION:  CONSTITUTIONAL:  Vitals:    03/01/22 0832   BP: 134/78   BP Location: Left arm   Patient Position: Sitting   Cuff Size: Adult   Pulse: 66   SpO2: 94%   Weight: 101 kg (222 lb)   Height: 152.4 cm (60\")    Body mass index is 43.36 kg/m².   NOSE: nasal passages are clear, No deformities noted   RESP SYSTEM: Not in any respiratory distress, no chest deformities noted,   CARDIOVASULAR: No edema noted  NEURO: Oriented x 3, gait normal,  Mood and affect appeared appropriate      Data " reviewed:  The Smart card downloaded on 3/1/2022 has been reviewed independently by me for compliance and discussed the data with the patient.   Compliance; 100%  More than 4 hr use, 100%  Average use of the device 7 hours and 37 minutes per night  Residual AHI: 2.3 /hr (goal < 5.0 /hr)  Mask type: Nasal pillows  Device: ResMed  DME: [facility]      ASSESSMENT AND PLAN:  · Obstructive sleep apnea ( G 47.33).  The symptoms of sleep apnea have improved with the device and the treatment.  Patient's compliance with the device is excellent for treatment of sleep apnea.  I have independently reviewed the smart card down load and discussed with the patient the download data and encouarged the patient to continue to use the device.The residual AHI is acceptable. The device is benefiting the patient and the device is medically necessary.  Without proper control of sleep apnea and good compliance there is a increased risk for hypertension, diabetes mellitus and nonrestorative sleep with hypersomnia which can increase risk for motor vehicle accidents.  Untreated sleep apnea is also a risk factor for development of atrial fibrillation, pulmonary hypertension and stroke. The patient is also instructed to get the supplies from the [facility] and and change them on a regular basis.  A prescription for supplies has been sent to the [facility].  I have also discussed the good sleep hygiene habits and adequate amount of sleep needed for good health.  · Obesity morbid with BMI is Body mass index is 43.36 kg/m².. I have discuss the relationship between the weight and sleep apnea. The benefit of weight loss in reducing severity of sleep apnea was discussed. Discussed diet and exercise with the patient to achieve ideal BMI.   · Return in about 1 year (around 3/1/2023) for Annual visit with smartcard download. . Patient's questions were answered.      Angelique Wylie MD  Sleep Medicine.  Medical Director, [facility]  and [facility]  3/1/2022 ,

## 2022-03-24 ENCOUNTER — OFFICE VISIT (OUTPATIENT)
Dept: CARDIOLOGY | Facility: CLINIC | Age: 67
End: 2022-03-24

## 2022-03-24 VITALS
HEART RATE: 56 BPM | SYSTOLIC BLOOD PRESSURE: 134 MMHG | OXYGEN SATURATION: 96 % | BODY MASS INDEX: 44.17 KG/M2 | WEIGHT: 225 LBS | HEIGHT: 60 IN | DIASTOLIC BLOOD PRESSURE: 74 MMHG

## 2022-03-24 DIAGNOSIS — E78.00 PURE HYPERCHOLESTEROLEMIA: ICD-10-CM

## 2022-03-24 DIAGNOSIS — I10 ESSENTIAL HYPERTENSION: ICD-10-CM

## 2022-03-24 DIAGNOSIS — G47.33 OBSTRUCTIVE SLEEP APNEA: ICD-10-CM

## 2022-03-24 DIAGNOSIS — I35.0 NONRHEUMATIC AORTIC VALVE STENOSIS: Primary | ICD-10-CM

## 2022-03-24 PROCEDURE — 99214 OFFICE O/P EST MOD 30 MIN: CPT | Performed by: INTERNAL MEDICINE

## 2022-03-24 NOTE — PROGRESS NOTES
"    Subjective:     Encounter Date:2022      Patient ID: Lulu Graves is a 66 y.o. female.    Chief Complaint:  History of Present Illness 65-year-old white female with history of aortic valve disorder hypertension hyperlipidemia sleep apnea presents to my office for follow-up.  Patient is currently stable without any symptoms of chest pain or shortness of breath at rest on exertion.  No complains any PND orthopnea.  No palpitation dizziness syncope or swelling of the feet.  Patient has been taking all meds regularly.  Patient does not smoke.  Patient is doing exercise regularly follows a good diet.    The following portions of the patient's history were reviewed and updated as appropriate: allergies, current medications, past family history, past medical history, past social history, past surgical history and problem list.  Past Medical History:   Diagnosis Date   • Aortic stenosis    • Aortic valve replaced 2013   • Cataract    • GERD (gastroesophageal reflux disease)    • Heart murmur    • Hyperlipidemia    • Hypertension    • Hypothyroidism    • Osteopenia    • PAT (paroxysmal atrial tachycardia) (HCC)    • Sleep apnea      Past Surgical History:   Procedure Laterality Date   • ABLATION OF DYSRHYTHMIC FOCUS   or    • AORTIC VALVE REPAIR/REPLACEMENT     • AORTIC VALVE SURGERY  2013   • CARDIAC CATHETERIZATION      Before open heart surgery   • CARDIAC VALVE REPLACEMENT  2013   •  SECTION  1982   • COLONOSCOPY     • TUBAL ABDOMINAL LIGATION       /74 (BP Location: Left arm, Patient Position: Sitting, Cuff Size: Adult)   Pulse 56   Ht 152.4 cm (60\")   Wt 102 kg (225 lb)   SpO2 96%   BMI 43.94 kg/m²   Family History   Problem Relation Age of Onset   • Other Father    • Arthritis Mother    • Diabetes Brother    • Hearing loss Brother    • Stroke Paternal Grandmother        Current Outpatient Medications:   •  aspirin 81 MG tablet, Take 1 tablet by " mouth Daily., Disp: , Rfl:   •  levothyroxine (SYNTHROID, LEVOTHROID) 100 MCG tablet, TAKE 1 TABLET BY MOUTH EVERY DAY, Disp: 90 tablet, Rfl: 0  •  metoprolol tartrate (LOPRESSOR) 25 MG tablet, TAKE 1/2 TABLET BY MOUTH TWO TIMES A DAY, Disp: 90 tablet, Rfl: 0  •  PROBIOTIC PRODUCT PO, Take 1 capsule by mouth Daily., Disp: , Rfl:   •  simvastatin (ZOCOR) 20 MG tablet, TAKE 1 TABLET BY MOUTH IN THE EVENING, Disp: 90 tablet, Rfl: 0  No Known Allergies  Social History     Socioeconomic History   • Marital status:    Tobacco Use   • Smoking status: Never Smoker   • Smokeless tobacco: Never Used   Vaping Use   • Vaping Use: Never used   Substance and Sexual Activity   • Alcohol use: No   • Drug use: No   • Sexual activity: Yes     Partners: Male     Birth control/protection: None     Review of Systems   Constitutional: Negative for fever and malaise/fatigue.   Cardiovascular: Negative for chest pain, dyspnea on exertion and palpitations.   Respiratory: Negative for cough and shortness of breath.    Skin: Negative for rash.   Gastrointestinal: Negative for abdominal pain, nausea and vomiting.   Neurological: Negative for dizziness, focal weakness and headaches.   All other systems reviewed and are negative.             Objective:     Constitutional:       Appearance: Well-developed.   Eyes:      General: No scleral icterus.     Conjunctiva/sclera: Conjunctivae normal.   HENT:      Head: Normocephalic and atraumatic.   Neck:      Vascular: No carotid bruit or JVD.   Pulmonary:      Effort: Pulmonary effort is normal.      Breath sounds: Normal breath sounds. No wheezing. No rales.   Cardiovascular:      Normal rate. Regular rhythm.      Murmurs: There is a systolic murmur.   Pulses:     Intact distal pulses.   Abdominal:      General: Bowel sounds are normal.      Palpations: Abdomen is soft.   Musculoskeletal:      Cervical back: Normal range of motion and neck supple. Skin:     General: Skin is warm and dry.       Findings: No rash.   Neurological:      Mental Status: Alert.       Procedures    Lab Review:         MDM  1.  Aortic valve disorder  Patient has history of aortic stenosis status post aortic valve replacement with a bioprosthetic valve and is currently stable  2.  Hypertension  Patient blood pressure currently stable on metoprolol  3.  Hyperlipidemia  Patient is on simvastatin and the lipid levels are well within normal limits  4.  Sleep apnea  Patient has sleep apnea and uses a CPAP machine.      Patient's previous medical records, labs, and EKG were reviewed and discussed with the patient at today's visit.

## 2022-05-09 ENCOUNTER — OFFICE VISIT (OUTPATIENT)
Dept: FAMILY MEDICINE CLINIC | Facility: CLINIC | Age: 67
End: 2022-05-09

## 2022-05-09 VITALS
RESPIRATION RATE: 18 BRPM | WEIGHT: 227 LBS | DIASTOLIC BLOOD PRESSURE: 79 MMHG | BODY MASS INDEX: 44.57 KG/M2 | OXYGEN SATURATION: 96 % | SYSTOLIC BLOOD PRESSURE: 136 MMHG | TEMPERATURE: 97 F | HEART RATE: 65 BPM | HEIGHT: 60 IN

## 2022-05-09 DIAGNOSIS — E03.9 ACQUIRED HYPOTHYROIDISM: Chronic | ICD-10-CM

## 2022-05-09 DIAGNOSIS — Z23 NEED FOR PNEUMOCOCCAL VACCINATION: ICD-10-CM

## 2022-05-09 DIAGNOSIS — E78.5 HYPERLIPIDEMIA, UNSPECIFIED HYPERLIPIDEMIA TYPE: Chronic | ICD-10-CM

## 2022-05-09 DIAGNOSIS — I35.0 NONRHEUMATIC AORTIC VALVE STENOSIS: Chronic | ICD-10-CM

## 2022-05-09 DIAGNOSIS — I10 PRIMARY HYPERTENSION: Primary | Chronic | ICD-10-CM

## 2022-05-09 DIAGNOSIS — K21.9 GASTROESOPHAGEAL REFLUX DISEASE WITHOUT ESOPHAGITIS: Chronic | ICD-10-CM

## 2022-05-09 PROCEDURE — 90677 PCV20 VACCINE IM: CPT | Performed by: FAMILY MEDICINE

## 2022-05-09 PROCEDURE — G0009 ADMIN PNEUMOCOCCAL VACCINE: HCPCS | Performed by: FAMILY MEDICINE

## 2022-05-09 PROCEDURE — 99214 OFFICE O/P EST MOD 30 MIN: CPT | Performed by: FAMILY MEDICINE

## 2022-05-09 NOTE — PROGRESS NOTES
"Subjective   Lulu Graves is a 67 y.o. female.     Chief Complaint   Patient presents with   • Hypertension     6 month follow up   • Hypothyroidism   • Hyperlipidemia       HPI  Chief complaint: Hypertension hyperlipidemia status post aortic valve replacement gastroesophageal reflux disease hypothyroidism    The patient is a 67-year-old white female comes in for follow-up and maintenance of her current problems which include    1. hypertension-stable-patient is currently on metoprolol 25 mg daily.  She is asymptomatic.    2.  Hyperlipidemia-stable after patient on Zocor 20 mg daily.  She denied myalgias and arthralgias.  She denied nausea or anorexia.    3.  Hypothyroidism-stable-patient is currently on Synthroid 0.1 mg daily.  She denied heat or cold intolerance tremor weight gain or weight loss.    4.  Aortic stenosis-stable-patient had aortic valve replacement.  She denies chest pain shortness of breath orthopnea or PND.  The patient is on aspirin 81 mg daily.        The following portions of the patient's history were reviewed and updated as appropriate: allergies, current medications, past family history, past medical history, past social history, past surgical history and problem list.    Review of Systems    Objective     /79 (BP Location: Right arm, Patient Position: Sitting, Cuff Size: Large Adult)   Pulse 65   Temp 97 °F (36.1 °C) (Infrared)   Resp 18   Ht 152.4 cm (60\")   Wt 103 kg (227 lb)   SpO2 96%   BMI 44.33 kg/m²     Physical Exam  Vitals and nursing note reviewed.   Constitutional:       Appearance: She is well-developed. She is obese.   HENT:      Head: Normocephalic and atraumatic.      Nose: Nose normal.      Mouth/Throat:      Mouth: Mucous membranes are moist.      Pharynx: Oropharynx is clear.   Eyes:      Extraocular Movements: Extraocular movements intact.      Conjunctiva/sclera: Conjunctivae normal.      Pupils: Pupils are equal, round, and reactive to light.   Cardiovascular: "      Rate and Rhythm: Normal rate and regular rhythm.      Heart sounds: Normal heart sounds.   Pulmonary:      Effort: Pulmonary effort is normal.      Breath sounds: Normal breath sounds.   Abdominal:      General: Bowel sounds are normal.      Palpations: Abdomen is soft.   Musculoskeletal:         General: Normal range of motion.      Cervical back: Neck supple.   Skin:     General: Skin is warm and dry.   Neurological:      Mental Status: She is alert and oriented to person, place, and time.   Psychiatric:         Behavior: Behavior normal.         Thought Content: Thought content normal.         Judgment: Judgment normal.           Assessment/Plan   Diagnoses and all orders for this visit:    1. Primary hypertension (Primary)    2. Hyperlipidemia, unspecified hyperlipidemia type    3. Acquired hypothyroidism    4. Gastroesophageal reflux disease without esophagitis    5. Nonrheumatic aortic valve stenosis      Patient Instructions   Continue your current medications and treatment.    Follow up in the office in 6 months.    Laboratory testing at that time.      Hardeep Camejo Jr., MD    05/09/22  Answers for HPI/ROS submitted by the patient on 5/2/2022  What is the primary reason for your visit?: Physical

## 2022-05-09 NOTE — PATIENT INSTRUCTIONS
Continue your current medications and treatment.    Follow up in the office in 1 year.    Laboratory testing at that time.

## 2022-05-09 NOTE — ADDENDUM NOTE
Addended by: SANJIV SUTTON on: 5/9/2022 11:23 AM     Modules accepted: Orders     No Vaccines Administered.

## 2022-05-23 RX ORDER — LEVOTHYROXINE SODIUM 0.1 MG/1
TABLET ORAL
Qty: 90 TABLET | Refills: 0 | Status: SHIPPED | OUTPATIENT
Start: 2022-05-23 | End: 2022-08-22

## 2022-05-23 RX ORDER — SIMVASTATIN 20 MG
TABLET ORAL
Qty: 90 TABLET | Refills: 0 | Status: SHIPPED | OUTPATIENT
Start: 2022-05-23 | End: 2022-08-22

## 2022-08-22 RX ORDER — SIMVASTATIN 20 MG
TABLET ORAL
Qty: 90 TABLET | Refills: 0 | Status: SHIPPED | OUTPATIENT
Start: 2022-08-22 | End: 2022-11-29

## 2022-08-22 RX ORDER — LEVOTHYROXINE SODIUM 0.1 MG/1
TABLET ORAL
Qty: 90 TABLET | Refills: 0 | Status: SHIPPED | OUTPATIENT
Start: 2022-08-22 | End: 2022-12-05

## 2022-11-29 RX ORDER — SIMVASTATIN 20 MG
TABLET ORAL
Qty: 30 TABLET | Refills: 0 | Status: SHIPPED | OUTPATIENT
Start: 2022-11-29 | End: 2022-12-26

## 2022-12-05 RX ORDER — LEVOTHYROXINE SODIUM 0.1 MG/1
TABLET ORAL
Qty: 90 TABLET | Refills: 0 | Status: SHIPPED | OUTPATIENT
Start: 2022-12-05 | End: 2023-02-28

## 2022-12-26 RX ORDER — SIMVASTATIN 20 MG
TABLET ORAL
Qty: 30 TABLET | Refills: 0 | Status: SHIPPED | OUTPATIENT
Start: 2022-12-26 | End: 2023-01-11

## 2023-01-11 RX ORDER — SIMVASTATIN 20 MG
TABLET ORAL
Qty: 30 TABLET | Refills: 0 | Status: SHIPPED | OUTPATIENT
Start: 2023-01-11 | End: 2023-02-15

## 2023-02-15 RX ORDER — SIMVASTATIN 20 MG
TABLET ORAL
Qty: 90 TABLET | Refills: 0 | Status: SHIPPED | OUTPATIENT
Start: 2023-02-15

## 2023-02-28 RX ORDER — LEVOTHYROXINE SODIUM 0.1 MG/1
TABLET ORAL
Qty: 90 TABLET | Refills: 0 | Status: SHIPPED | OUTPATIENT
Start: 2023-02-28

## 2023-03-23 ENCOUNTER — OFFICE VISIT (OUTPATIENT)
Dept: CARDIOLOGY | Facility: CLINIC | Age: 68
End: 2023-03-23
Payer: MEDICARE

## 2023-03-23 VITALS
HEIGHT: 60 IN | BODY MASS INDEX: 44.17 KG/M2 | OXYGEN SATURATION: 99 % | SYSTOLIC BLOOD PRESSURE: 132 MMHG | WEIGHT: 225 LBS | HEART RATE: 59 BPM | DIASTOLIC BLOOD PRESSURE: 72 MMHG

## 2023-03-23 DIAGNOSIS — I10 ESSENTIAL HYPERTENSION: ICD-10-CM

## 2023-03-23 DIAGNOSIS — G47.33 OBSTRUCTIVE SLEEP APNEA: ICD-10-CM

## 2023-03-23 DIAGNOSIS — I35.0 NONRHEUMATIC AORTIC VALVE STENOSIS: Primary | ICD-10-CM

## 2023-03-23 DIAGNOSIS — E78.00 PURE HYPERCHOLESTEROLEMIA: ICD-10-CM

## 2023-03-23 PROCEDURE — 3078F DIAST BP <80 MM HG: CPT | Performed by: INTERNAL MEDICINE

## 2023-03-23 PROCEDURE — 3075F SYST BP GE 130 - 139MM HG: CPT | Performed by: INTERNAL MEDICINE

## 2023-03-23 PROCEDURE — 99214 OFFICE O/P EST MOD 30 MIN: CPT | Performed by: INTERNAL MEDICINE

## 2023-03-23 NOTE — PROGRESS NOTES
"    Subjective:     Encounter Date:2023      Patient ID: Lulu Graves is a 67 y.o. female.    Chief Complaint:  History of Present Illness 67-year-old white female with history of aortic valve disorder status post aortic valve replacement hypertension hyperlipidemia sleep apnea presents to office for follow-up.  Patient is currently stable without any signs of chest pain or shortness of breath at rest on exertion.  No complaint of any PND orthopnea.  No palpitation but has occasional dizziness without any syncope.  No swelling of the feet.  She is taking her medicines regularly.  She does not smoke.    The following portions of the patient's history were reviewed and updated as appropriate: allergies, current medications, past family history, past medical history, past social history, past surgical history and problem list.  Past Medical History:   Diagnosis Date   • Allergic    • Aortic stenosis    • Aortic valve replaced 2013   • Cataract    • GERD (gastroesophageal reflux disease)    • Heart murmur    • Hyperlipidemia    • Hypertension    • Hypothyroidism    • Obesity    • Osteopenia    • PAT (paroxysmal atrial tachycardia) (HCC)    • Sleep apnea      Past Surgical History:   Procedure Laterality Date   • ABLATION OF DYSRHYTHMIC FOCUS   or    • AORTIC VALVE REPAIR/REPLACEMENT     • AORTIC VALVE SURGERY  2013   • CARDIAC CATHETERIZATION      Before open heart surgery   • CARDIAC VALVE REPLACEMENT  2013   •  SECTION  1982   • COLONOSCOPY     • TUBAL ABDOMINAL LIGATION       /72   Pulse 59   Ht 152.4 cm (60\")   Wt 102 kg (225 lb)   SpO2 99%   BMI 43.94 kg/m²   Family History   Problem Relation Age of Onset   • Other Father         Had Parkinsens and Alzheimers   • Arthritis Mother    • Diabetes Brother    • Hearing loss Brother    • Stroke Paternal Grandmother        Current Outpatient Medications:   •  aspirin 81 MG tablet, Take 1 tablet by mouth " Daily., Disp: , Rfl:   •  levothyroxine (SYNTHROID, LEVOTHROID) 100 MCG tablet, TAKE 1 TABLET BY MOUTH EVERY DAY, Disp: 90 tablet, Rfl: 0  •  metoprolol tartrate (LOPRESSOR) 25 MG tablet, TAKE 1/2 TABLET BY MOUTH TWO TIMES A DAY, Disp: 90 tablet, Rfl: 0  •  PROBIOTIC PRODUCT PO, Take 1 capsule by mouth Daily., Disp: , Rfl:   •  simvastatin (ZOCOR) 20 MG tablet, TAKE 1 TABLET BY MOUTH IN THE EVENING, Disp: 90 tablet, Rfl: 0  No Known Allergies  Social History     Socioeconomic History   • Marital status:    Tobacco Use   • Smoking status: Never   • Smokeless tobacco: Never   Vaping Use   • Vaping Use: Never used   Substance and Sexual Activity   • Alcohol use: No   • Drug use: No   • Sexual activity: Yes     Partners: Male     Birth control/protection: None     Review of Systems   Constitutional: Negative for malaise/fatigue.   Cardiovascular: Negative for chest pain, dyspnea on exertion, leg swelling and palpitations.   Respiratory: Negative for cough and shortness of breath.    Gastrointestinal: Negative for abdominal pain, nausea and vomiting.   Neurological: Positive for light-headedness. Negative for dizziness, focal weakness, headaches and numbness.   All other systems reviewed and are negative.             Objective:     Constitutional:       Appearance: Well-developed.   Eyes:      General: No scleral icterus.     Conjunctiva/sclera: Conjunctivae normal.   HENT:      Head: Normocephalic and atraumatic.   Neck:      Vascular: No carotid bruit or JVD.   Pulmonary:      Effort: Pulmonary effort is normal.      Breath sounds: Normal breath sounds. No wheezing. No rales.   Cardiovascular:      Normal rate. Regular rhythm.      Murmurs: There is a systolic murmur.   Pulses:     Intact distal pulses.   Abdominal:      General: Bowel sounds are normal.      Palpations: Abdomen is soft.   Musculoskeletal:      Cervical back: Normal range of motion and neck supple. Skin:     General: Skin is warm and dry.       Findings: No rash.   Neurological:      Mental Status: Alert.       Procedures    Lab Review:         MDM  1.  Aortic valve disease  Patient has severe aortic stenosis with bicuspid valve status post aortic valve replacement with a bioprosthetic valve and will have an echocardiogram and exam next visit and she has normal function in the past  2.  Hypertension  Patient blood pressure currently stable on metoprolol  3.  Hyperlipidemia  Patient is on simvastatin the lipid levels are well within normal limits  4.  Sleep apnea  Patient is sleep apnea and uses a CPAP machine    Patient's previous medical records, labs, and EKG were reviewed and discussed with the patient at today's visit.

## 2023-05-17 RX ORDER — SIMVASTATIN 20 MG
TABLET ORAL
Qty: 30 TABLET | Refills: 0 | Status: SHIPPED | OUTPATIENT
Start: 2023-05-17

## 2023-06-02 RX ORDER — LEVOTHYROXINE SODIUM 0.1 MG/1
TABLET ORAL
Qty: 90 TABLET | Refills: 0 | Status: SHIPPED | OUTPATIENT
Start: 2023-06-02

## 2023-09-09 RX ORDER — LEVOTHYROXINE SODIUM 0.1 MG/1
TABLET ORAL
Qty: 90 TABLET | Refills: 1 | Status: SHIPPED | OUTPATIENT
Start: 2023-09-09

## 2023-10-10 ENCOUNTER — HOSPITAL ENCOUNTER (OUTPATIENT)
Facility: HOSPITAL | Age: 68
Discharge: HOME | End: 2023-10-10
Attending: STUDENT IN AN ORGANIZED HEALTH CARE EDUCATION/TRAINING PROGRAM | Admitting: STUDENT IN AN ORGANIZED HEALTH CARE EDUCATION/TRAINING PROGRAM
Payer: MEDICARE

## 2023-10-10 VITALS
RESPIRATION RATE: 16 BRPM | WEIGHT: 219 LBS | HEIGHT: 60 IN | BODY MASS INDEX: 43 KG/M2 | OXYGEN SATURATION: 96 % | HEART RATE: 62 BPM | DIASTOLIC BLOOD PRESSURE: 71 MMHG | SYSTOLIC BLOOD PRESSURE: 164 MMHG | TEMPERATURE: 98.6 F

## 2023-10-10 DIAGNOSIS — L02.91 ABSCESS: Primary | ICD-10-CM

## 2023-10-10 PROCEDURE — G0463 HOSPITAL OUTPT CLINIC VISIT: HCPCS | Performed by: STUDENT IN AN ORGANIZED HEALTH CARE EDUCATION/TRAINING PROGRAM

## 2023-10-10 RX ORDER — DOXYCYCLINE 100 MG/1
100 CAPSULE ORAL 2 TIMES DAILY
Qty: 14 CAPSULE | Refills: 0 | Status: SHIPPED | OUTPATIENT
Start: 2023-10-10 | End: 2023-10-17

## 2023-10-10 NOTE — FSED PROVIDER NOTE
Subjective   History of Present Illness  Patient is a 68-year-old female presents emergency department due to concern for abscess.  Patient has a history of aortic valve replacement, GERD, hypertension, hyperlipidemia, hypothyroidism, sleep apnea.  Patient states she noted a small pustule to the inside of her right thigh on 10/2/2023 which has progressively gotten more painful and red and warm to touch.  She has not noticed any significant drainage, but notes it feels firm surrounding the area.  No history of abscesses or MRSA or diabetes.  She denies any fever, chills.      Review of Systems   Constitutional:  Negative for activity change and fever.   HENT:  Negative for congestion, rhinorrhea and sore throat.    Respiratory:  Negative for cough and shortness of breath.    Cardiovascular:  Negative for chest pain.   Gastrointestinal:  Negative for abdominal pain, nausea and vomiting.   Genitourinary:  Negative for dysuria.   Musculoskeletal:  Negative for back pain and myalgias.   Skin:  Positive for wound. Negative for rash.   Neurological:  Negative for dizziness, light-headedness and headaches.   Psychiatric/Behavioral:  Negative for confusion.        Past Medical History:   Diagnosis Date    Allergic     Aortic stenosis     Aortic valve replaced 2013    Cataract     GERD (gastroesophageal reflux disease)     Glaucoma 2023    Just started    Heart murmur     Hyperlipidemia     Hypertension     Hypothyroidism     Obesity     Osteopenia     PAT (paroxysmal atrial tachycardia)     Sleep apnea        No Known Allergies    Past Surgical History:   Procedure Laterality Date    ABLATION OF DYSRHYTHMIC FOCUS   or     AORTIC VALVE REPAIR/REPLACEMENT      AORTIC VALVE SURGERY  2013    CARDIAC CATHETERIZATION      Before open heart surgery    CARDIAC VALVE REPLACEMENT  2013     SECTION  1982    COLONOSCOPY  2016    TUBAL ABDOMINAL LIGATION         Family History    Problem Relation Age of Onset    Arthritis Mother     Other Father         Had Parkinsens and Alzheimers    Diabetes Brother     Hearing loss Brother     Stroke Paternal Grandmother     Sleep apnea Neg Hx        Social History     Socioeconomic History    Marital status:    Tobacco Use    Smoking status: Never     Passive exposure: Past    Smokeless tobacco: Never   Vaping Use    Vaping Use: Never used   Substance and Sexual Activity    Alcohol use: No    Drug use: No    Sexual activity: Yes     Partners: Male     Birth control/protection: None           Objective   Physical Exam  Vitals and nursing note reviewed.   Constitutional:       General: She is not in acute distress.     Appearance: Normal appearance. She is not ill-appearing.   HENT:      Head: Normocephalic and atraumatic.      Nose: Nose normal. No congestion.      Mouth/Throat:      Mouth: Mucous membranes are moist.      Pharynx: No oropharyngeal exudate.   Eyes:      Conjunctiva/sclera: Conjunctivae normal.   Cardiovascular:      Rate and Rhythm: Normal rate and regular rhythm.      Heart sounds: No murmur heard.  Pulmonary:      Effort: Pulmonary effort is normal.      Breath sounds: No wheezing, rhonchi or rales.   Abdominal:      General: Abdomen is flat.      Palpations: Abdomen is soft.      Tenderness: There is no abdominal tenderness. There is no guarding or rebound.   Musculoskeletal:         General: No swelling or tenderness. Normal range of motion.      Cervical back: Normal range of motion and neck supple.   Skin:     General: Skin is warm and dry.      Findings: Abscess present.      Comments: Small pustule to the right inner thigh with surrounding induration, erythema and warmth.  There is no active drainage, no fluctuance.   Neurological:      General: No focal deficit present.      Mental Status: She is alert and oriented to person, place, and time.         Procedures           ED Course                                            Medical Decision Making  Patient is a 68-year-old female presents emergency department due to concern for abscess to her right thigh.  On arrival she is afebrile, hemodynamically stable in no acute distress.  Physical examination shows a small pustule to the right medial thigh, there is surrounding induration but no active drainage, and no fluctuance.  Patient with no focal fluid collection for incision and drainage but likely with small pustule with surrounding cellulitis.  Patient instructed to apply warm compresses, Epsom salt baths will be discharged home with an antibiotic.  She is given strict return precautions and discharged home in stable condition.    Problems Addressed:  Abscess: complicated acute illness or injury    Risk  Prescription drug management.        Final diagnoses:   Abscess       ED Disposition  ED Disposition       ED Disposition   Discharge    Condition   Stable    Comment   --               PATIENT CONNECTION - [facility] [address] [phone]  Schedule an appointment as soon as possible for a visit       [place] [address] [place] [id number] [phone]             Medication List        New Prescriptions      doxycycline 100 MG capsule  Commonly known as: MONODOX  Take 1 capsule by mouth 2 (Two) Times a Day for 7 days.               Where to Get Your Medications        These medications were sent to [id number] - [place], [place] - [address]  - [phone]  - [phone]  [address] [place] [address]      Phone: [phone]   doxycycline 100 MG capsule

## 2023-10-10 NOTE — DISCHARGE INSTRUCTIONS
You were seen and evaluated for your abscess. Clean affected area with soapy water and rinse thoroughly, without scrubbing.    You've been prescribed an antibiotic.  Please take all antibiotics as prescribed and finish course of medication.  You may experience diarrhea while taking an antibiotic.  Please eat yogurt if possible for pro-biotic coverage.    If your redness starts spreading significantly, you have red streaking towards your body, or new pus coming from your limb please return to the emergency department.Return to the ER immediately for severe or worsening symptoms or for the development of any new worrisome symptoms including but not limited to fever (temperature greater than 100.3 degrees), nausea, vomiting, spreading redness, pus drainage, severe swelling, or severe pain.

## 2023-10-30 ENCOUNTER — HOSPITAL ENCOUNTER (OUTPATIENT)
Facility: HOSPITAL | Age: 68
Discharge: HOME | End: 2023-10-30
Attending: STUDENT IN AN ORGANIZED HEALTH CARE EDUCATION/TRAINING PROGRAM | Admitting: STUDENT IN AN ORGANIZED HEALTH CARE EDUCATION/TRAINING PROGRAM
Payer: MEDICARE

## 2023-10-30 VITALS
TEMPERATURE: 98 F | SYSTOLIC BLOOD PRESSURE: 148 MMHG | BODY MASS INDEX: 43 KG/M2 | WEIGHT: 219 LBS | RESPIRATION RATE: 20 BRPM | OXYGEN SATURATION: 97 % | HEART RATE: 91 BPM | DIASTOLIC BLOOD PRESSURE: 81 MMHG | HEIGHT: 60 IN

## 2023-10-30 DIAGNOSIS — J01.00 ACUTE NON-RECURRENT MAXILLARY SINUSITIS: Primary | ICD-10-CM

## 2023-10-30 LAB
FLUAV SUBTYP SPEC NAA+PROBE: NOT DETECTED
FLUBV RNA ISLT QL NAA+PROBE: NOT DETECTED
SARS-COV-2 RNA RESP QL NAA+PROBE: NOT DETECTED
STREP A PCR: NOT DETECTED

## 2023-10-30 PROCEDURE — 87636 SARSCOV2 & INF A&B AMP PRB: CPT | Performed by: STUDENT IN AN ORGANIZED HEALTH CARE EDUCATION/TRAINING PROGRAM

## 2023-10-30 PROCEDURE — G0463 HOSPITAL OUTPT CLINIC VISIT: HCPCS | Performed by: STUDENT IN AN ORGANIZED HEALTH CARE EDUCATION/TRAINING PROGRAM

## 2023-10-30 PROCEDURE — 87651 STREP A DNA AMP PROBE: CPT | Performed by: STUDENT IN AN ORGANIZED HEALTH CARE EDUCATION/TRAINING PROGRAM

## 2023-10-30 RX ORDER — GUAIFENESIN 600 MG/1
1200 TABLET, EXTENDED RELEASE ORAL 2 TIMES DAILY
Qty: 20 TABLET | Refills: 0 | Status: SHIPPED | OUTPATIENT
Start: 2023-10-30 | End: 2023-11-04

## 2023-10-30 RX ORDER — AMOXICILLIN AND CLAVULANATE POTASSIUM 875; 125 MG/1; MG/1
1 TABLET, FILM COATED ORAL 2 TIMES DAILY
Qty: 14 TABLET | Refills: 0 | Status: SHIPPED | OUTPATIENT
Start: 2023-10-30 | End: 2023-11-06

## 2023-10-30 NOTE — FSED PROVIDER NOTE
Subjective   History of Present Illness  Patient is a 68-year-old female who presents to the emergency department for nasal congestion, cough, sinus pain and pressure.  Patient has a history of GERD, hypertension, hypothyroidism, hyperlipidemia, sleep apnea.  Patient states her  was recently sick with similar symptoms, but she seems to have more severe symptoms.  She reports significant nasal congestion, sinus pain and pressure.  She denies any fever, chills.  Patient does report sore throat but denies any difficulty swallowing, difficulty breathing, stridor, voice change.  She does report an occasional cough, denies any associated shortness of breath, chest discomfort.  She is otherwise up-to-date on vaccinations, no recent travel.      Review of Systems   Constitutional:  Negative for activity change and fever.   HENT:  Positive for congestion, sinus pressure and sinus pain. Negative for rhinorrhea and sore throat.    Respiratory:  Positive for cough. Negative for shortness of breath.    Cardiovascular:  Negative for chest pain.   Gastrointestinal:  Negative for abdominal pain, nausea and vomiting.   Genitourinary:  Negative for dysuria.   Musculoskeletal:  Negative for back pain and myalgias.   Skin:  Negative for rash.   Neurological:  Negative for dizziness, light-headedness and headaches.   Psychiatric/Behavioral:  Negative for confusion.        Past Medical History:   Diagnosis Date    Allergic     Aortic stenosis     Aortic valve replaced 12/04/2013    Cataract     GERD (gastroesophageal reflux disease)     Glaucoma 02/16/2023    Just started    Heart murmur 1996    Hyperlipidemia     Hypertension     Hypothyroidism     Obesity     Osteopenia     PAT (paroxysmal atrial tachycardia)     Sleep apnea 2011       No Known Allergies    Past Surgical History:   Procedure Laterality Date    ABLATION OF DYSRHYTHMIC FOCUS  2012 or 2013    AORTIC VALVE REPAIR/REPLACEMENT      AORTIC VALVE SURGERY  12/04/2013     CARDIAC CATHETERIZATION      Before open heart surgery    CARDIAC VALVE REPLACEMENT  2013     SECTION  1982    COLONOSCOPY  2016    TUBAL ABDOMINAL LIGATION         Family History   Problem Relation Age of Onset    Arthritis Mother     Other Father         Had Parkinsens and Alzheimers    Diabetes Brother     Hearing loss Brother     Stroke Paternal Grandmother     Sleep apnea Neg Hx        Social History     Socioeconomic History    Marital status:    Tobacco Use    Smoking status: Never     Passive exposure: Past    Smokeless tobacco: Never   Vaping Use    Vaping Use: Never used   Substance and Sexual Activity    Alcohol use: No    Drug use: No    Sexual activity: Yes     Partners: Male     Birth control/protection: None           Objective   Physical Exam  Vitals and nursing note reviewed.   Constitutional:       General: She is not in acute distress.     Appearance: Normal appearance. She is not ill-appearing.   HENT:      Head: Normocephalic and atraumatic.      Right Ear: Tympanic membrane normal.      Left Ear: Tympanic membrane normal.      Nose: Mucosal edema and congestion present.      Right Sinus: Maxillary sinus tenderness present.      Left Sinus: Maxillary sinus tenderness present.      Mouth/Throat:      Mouth: Mucous membranes are moist.      Pharynx: No oropharyngeal exudate.      Comments: No posterior oropharyngeal erythema, no tonsillar swelling or exudates.  Uvula is midline without edema or swelling.  Oropharynx is patent, no voice change, no drooling, no stridor trismus. No facial swelling or submandibular fullness.   Eyes:      Conjunctiva/sclera: Conjunctivae normal.   Cardiovascular:      Rate and Rhythm: Normal rate and regular rhythm.      Heart sounds: No murmur heard.  Pulmonary:      Effort: Pulmonary effort is normal.      Breath sounds: No wheezing, rhonchi or rales.   Abdominal:      General: Abdomen is flat.      Palpations: Abdomen is soft.       Tenderness: There is no abdominal tenderness. There is no guarding or rebound.   Musculoskeletal:         General: No swelling or tenderness. Normal range of motion.      Cervical back: Normal range of motion and neck supple.   Skin:     General: Skin is warm and dry.      Findings: No rash.   Neurological:      General: No focal deficit present.      Mental Status: She is alert and oriented to person, place, and time.         Procedures           ED Course  ED Course as of 10/30/23 1053   Mon Oct 30, 2023   1037 COVID19: Not Detected [CO]   1037 Influenza A PCR: Not Detected [CO]   1037 Influenza B PCR: Not Detected [CO]   1037 STREP A PCR: Not Detected [CO]      ED Course User Index  [CO] Caity Johnson,                                            Medical Decision Making  Patient is a 68 y.o. femalewho presented to the emergency department with viral symptoms.  On arrival they were afebrile, hemodynamically stable in no acute distress.  Physical examination shows significant mucosal edema, sinus pain and pressure.  Differential diagnosis includes COVID, flu, strep.  Patient swabs are Negative.  Patient likely with acute sinusitis, duration of symptoms have been 10 days we will treat with antibiotic.  Patient stable for discharge home with PCP follow-up.      Problems Addressed:  Acute non-recurrent maxillary sinusitis: complicated acute illness or injury    Amount and/or Complexity of Data Reviewed  Labs:  Decision-making details documented in ED Course.    Risk  OTC drugs.  Prescription drug management.        Final diagnoses:   Acute non-recurrent maxillary sinusitis       ED Disposition  ED Disposition       ED Disposition   Discharge    Condition   Stable    Comment   --               Sushma Hall, GUILLERMO  [address] [address]  [phone]    Schedule an appointment as soon as possible for a visit in 1 week      [address]  [place] [id number] [phone]    As needed, If symptoms worsen         Medication List        New Prescriptions      amoxicillin-clavulanate 875-125 MG per tablet  Commonly known as: AUGMENTIN  Take 1 tablet by mouth 2 (Two) Times a Day for 7 days.     guaiFENesin 600 MG 12 hr tablet  Commonly known as: MUCINEX  Take 2 tablets by mouth 2 (Two) Times a Day for 5 days.               Where to Get Your Medications        These medications were sent to [id number] - [place], [place] - [address]  - [phone]  - [phone]   [address] [place] [address]      Phone: [phone]   amoxicillin-clavulanate 875-125 MG per tablet  guaiFENesin 600 MG 12 hr tablet

## 2023-10-30 NOTE — DISCHARGE INSTRUCTIONS
Your COVID, flu and strep test were negative.  You have been discharged home with an antibiotic for sinus infection, along with Mucinex.  Please take all of your medications as prescribed.    You've been prescribed an antibiotic.  Please take all antibiotics as prescribed and finish course of medication.  You may experience diarrhea while taking an antibiotic.  Please eat yogurt if possible for pro-biotic coverage.    Return to the ER immediately for severe or worsening symptoms or for the development of any new worrisome symptoms including but not limited to persistent difficulty breathing, fever greater than 100.3 degrees for greater than 48 hours, chest pain or inability to tolerate fluids by mouth.

## 2023-11-16 ENCOUNTER — OFFICE VISIT (OUTPATIENT)
Dept: FAMILY MEDICINE CLINIC | Facility: CLINIC | Age: 68
End: 2023-11-16
Payer: COMMERCIAL

## 2023-11-16 ENCOUNTER — HOSPITAL ENCOUNTER (OUTPATIENT)
Dept: GENERAL RADIOLOGY | Facility: HOSPITAL | Age: 68
Discharge: HOME | End: 2023-11-16
Admitting: NURSE PRACTITIONER
Payer: COMMERCIAL

## 2023-11-16 VITALS
HEART RATE: 57 BPM | TEMPERATURE: 98.3 F | WEIGHT: 222 LBS | DIASTOLIC BLOOD PRESSURE: 82 MMHG | BODY MASS INDEX: 43.59 KG/M2 | HEIGHT: 60 IN | OXYGEN SATURATION: 96 % | SYSTOLIC BLOOD PRESSURE: 144 MMHG

## 2023-11-16 DIAGNOSIS — J40 BRONCHITIS: Primary | ICD-10-CM

## 2023-11-16 DIAGNOSIS — J01.00 ACUTE NON-RECURRENT MAXILLARY SINUSITIS: ICD-10-CM

## 2023-11-16 DIAGNOSIS — J30.89 ENVIRONMENTAL AND SEASONAL ALLERGIES: ICD-10-CM

## 2023-11-16 DIAGNOSIS — J40 BRONCHITIS: ICD-10-CM

## 2023-11-16 DIAGNOSIS — R03.0 ELEVATED BLOOD PRESSURE READING: ICD-10-CM

## 2023-11-16 LAB
EXPIRATION DATE: NORMAL
FLUAV AG UPPER RESP QL IA.RAPID: NOT DETECTED
FLUBV AG UPPER RESP QL IA.RAPID: NOT DETECTED
INTERNAL CONTROL: NORMAL
Lab: NORMAL
SARS-COV-2 AG UPPER RESP QL IA.RAPID: NOT DETECTED

## 2023-11-16 PROCEDURE — 71046 X-RAY EXAM CHEST 2 VIEWS: CPT

## 2023-11-16 RX ORDER — CETIRIZINE HYDROCHLORIDE 10 MG/1
10 TABLET ORAL DAILY
COMMUNITY
End: 2023-11-16

## 2023-11-16 RX ORDER — DEXTROMETHORPHAN HYDROBROMIDE AND PROMETHAZINE HYDROCHLORIDE 15; 6.25 MG/5ML; MG/5ML
5 SYRUP ORAL 4 TIMES DAILY PRN
Qty: 200 ML | Refills: 0 | Status: SHIPPED | OUTPATIENT
Start: 2023-11-16 | End: 2023-11-26

## 2023-11-16 RX ORDER — FLUTICASONE PROPIONATE 50 MCG
2 SPRAY, SUSPENSION (ML) NASAL DAILY
Qty: 18.2 ML | Refills: 1 | Status: SHIPPED | OUTPATIENT
Start: 2023-11-16

## 2023-11-16 RX ORDER — METHYLPREDNISOLONE 4 MG/1
TABLET ORAL
Qty: 21 TABLET | Refills: 0 | Status: SHIPPED | OUTPATIENT
Start: 2023-11-16

## 2023-11-16 RX ORDER — LORATADINE 10 MG/1
10 TABLET ORAL DAILY
Qty: 30 TABLET | Refills: 0 | Status: SHIPPED | OUTPATIENT
Start: 2023-11-16 | End: 2023-12-16

## 2023-11-16 NOTE — PROGRESS NOTES
Subjective        Lulu Graves is a 68 y.o. female who presents to [facility].     Chief Complaint   Patient presents with    [facility]     OFFBOARD DR. STEVENS      Cough     COUGH/CONGESTION X3 WEEKS  SEEN AT [facility] 10/30/23       History of Present Illness    Patient presents to establish care and c/o cough/congestion for about 3 weeks.  This is my first time evaluating patient.     Cough/congestion - began 10/25/2023, was seen at [facility] on 10/30/2023 at which time strep, influenza, and covid were negative.  She was told to take mucinex dm which helped with nasal congestion and augmentin 875/125mg po twice daily x 7 days.  She completed course of antibiotic but still doesn't feel well.  She hasn't tried netipot.  The cough is occasionally productive of yellow/green/brown sputum.  No hemoptysis or wheezing.  Denies fever, sore throat now but throat was sore 3 weeks ago.  Her eyes have been slightly reddened since symptoms began.  Ear discomfort - new - in ear canal, began about a week ago after she scratched the inside of her ear with her fingernail. Her right ear has been uncomfortable this week, itches, no drainage. She reports having a scratch from her cpap mask just in front of her right ear on her face, has been applying otc antibiotic ointment.    The following portions of the patient's history were reviewed and updated as appropriate: allergies, current medications, past family history, past medical history, past social history, past surgical history and problem list.    No Known Allergies       Current Outpatient Medications:     aspirin 81 MG tablet, Take 1 tablet by mouth Daily., Disp: , Rfl:     levothyroxine (SYNTHROID, LEVOTHROID) 100 MCG tablet, TAKE 1 TABLET BY MOUTH EVERY DAY, Disp: 90 tablet, Rfl: 1    metoprolol tartrate (LOPRESSOR) 25 MG tablet, TAKE 1/2 TABLET BY MOUTH 2 TIMES A DAY, Disp: 90 tablet, Rfl: 0    PROBIOTIC PRODUCT PO, Take 1 capsule by mouth Daily.,  "Disp: , Rfl:     simvastatin (ZOCOR) 20 MG tablet, TAKE 1 TABLET BY MOUTH IN THE EVENING, Disp: 90 tablet, Rfl: 1    fluticasone (FLONASE) 50 MCG/ACT nasal spray, 2 sprays into the nostril(s) as directed by provider Daily., Disp: 18.2 mL, Rfl: 1    loratadine (Claritin) 10 MG tablet, Take 1 tablet by mouth Daily for 30 days., Disp: 30 tablet, Rfl: 0    methylPREDNISolone (MEDROL) 4 MG dose pack, Take as directed on package instructions., Disp: 21 tablet, Rfl: 0    promethazine-dextromethorphan (PROMETHAZINE-DM) 6.25-15 MG/5ML syrup, Take 5 mL by mouth 4 (Four) Times a Day As Needed for Cough for up to 10 days., Disp: 200 mL, Rfl: 0    Review of Systems   Constitutional:  Negative for chills, fever and unexpected weight change.   HENT:  Positive for ear pain and postnasal drip. Negative for ear discharge, facial swelling, mouth sores, rhinorrhea and sore throat.    Respiratory:  Positive for cough. Negative for shortness of breath and wheezing.    Cardiovascular:  Negative for chest pain.   Musculoskeletal:  Negative for myalgias.   Skin:  Negative for rash.   Neurological:  Negative for dizziness and headaches.        Objective     /82 (BP Location: Left arm, Patient Position: Sitting)   Pulse 57   Temp 98.3 °F (36.8 °C) (Oral)   Ht 151.8 cm (59.75\")   Wt 101 kg (222 lb)   SpO2 96%   BMI 43.72 kg/m²         Physical Exam  Vitals and nursing note reviewed.   Constitutional:       Appearance: Normal appearance. She is obese. She is not ill-appearing or diaphoretic (slightly scaly skin right external ear canal, no drainage or erythema).   HENT:      Head: Normocephalic.      Right Ear: Tympanic membrane normal.      Left Ear: Tympanic membrane, ear canal and external ear normal.      Nose: Congestion present. No rhinorrhea.      Right Sinus: No maxillary sinus tenderness or frontal sinus tenderness.      Left Sinus: No maxillary sinus tenderness or frontal sinus tenderness.      Mouth/Throat:      Mouth: " Mucous membranes are moist.      Pharynx: No oropharyngeal exudate or posterior oropharyngeal erythema.   Eyes:      General: No scleral icterus.     Pupils: Pupils are equal, round, and reactive to light.   Cardiovascular:      Rate and Rhythm: Normal rate and regular rhythm.      Pulses: Normal pulses.      Heart sounds: Normal heart sounds.   Pulmonary:      Effort: Pulmonary effort is normal.      Breath sounds: Normal breath sounds. No wheezing or rhonchi.   Abdominal:      General: Bowel sounds are normal.      Palpations: Abdomen is soft.      Tenderness: There is no abdominal tenderness.   Musculoskeletal:      Cervical back: Normal range of motion and neck supple.      Right lower leg: No edema.      Left lower leg: No edema.   Skin:     General: Skin is warm and dry.      Capillary Refill: Capillary refill takes less than 2 seconds.      Coloration: Skin is not jaundiced.      Findings: Abrasion (small abrasion right face just in front of ear) present.          Neurological:      Mental Status: She is alert and oriented to person, place, and time.   Psychiatric:         Mood and Affect: Mood normal.         Behavior: Behavior normal.           Result Review    The following data was reviewed by: GUILLERMO Curiel on 11/16/2023:    Covid Tests          10/30/2023    10:09   Common Labsle   COVID19 Not Detected      Lab Results   Component Value Date    SARSANTIGEN Not Detected 11/16/2023    FLUAAG Not Detected 11/16/2023    FLUBAG Not Detected 11/16/2023                 Assessment & Plan    Diagnoses and all orders for this visit:    1. Bronchitis (Primary)  -     methylPREDNISolone (MEDROL) 4 MG dose pack; Take as directed on package instructions.  Dispense: 21 tablet; Refill: 0  -     promethazine-dextromethorphan (PROMETHAZINE-DM) 6.25-15 MG/5ML syrup; Take 5 mL by mouth 4 (Four) Times a Day As Needed for Cough for up to 10 days.  Dispense: 200 mL; Refill: 0  -     XR Chest 2 View; Future  -     POCT  SARS-CoV-2 Antigen CATERINA + Flu    2. Acute non-recurrent maxillary sinusitis  -     fluticasone (FLONASE) 50 MCG/ACT nasal spray; 2 sprays into the nostril(s) as directed by provider Daily.  Dispense: 18.2 mL; Refill: 1    3. Elevated blood pressure reading    4. Environmental and seasonal allergies  -     loratadine (Claritin) 10 MG tablet; Take 1 tablet by mouth Daily for 30 days.  Dispense: 30 tablet; Refill: 0       Patient Instructions   Call office for xray results if you have not received a call from our office or [employer] message in 1-2 days.    Continue current medications and treatment.   Take otc mucinex per package instructions for the next week.   May need to consider treatment for blood pressure if blood pressure stays elevated.   Can use humidifier, saline nasal rinses, change from zyrtec to claritin.    Discussed side effects of steroids and promethazine dm.  She wishes to proceed with treatment.   R/o pna with persistent cough.   Advised to avoid scratching her ear or face with her fingernails.   No evidence of otitis media or otitis external on exam.       Follow Up   Return for Next scheduled follow up, Recheck blood pressure.    Patient was given instructions and counseling regarding her condition or for health maintenance advice. Please see specific information pulled into the AVS if appropriate.     Sushma Hall, GUILLERMO     11/16/23      Answers submitted by the patient for this visit:  Primary Reason for Visit (Submitted on 11/16/2023)  What is the primary reason for your visit?: Cough

## 2023-11-16 NOTE — PATIENT INSTRUCTIONS
Call office for xray results if you have not received a call from our office or [employer] message in 1-2 days.    Continue current medications and treatment.   Take otc mucinex per package instructions for the next week.

## 2023-12-06 ENCOUNTER — OFFICE VISIT (OUTPATIENT)
Dept: SURGERY | Facility: CLINIC | Age: 68
End: 2023-12-06
Payer: MEDICARE

## 2023-12-06 ENCOUNTER — HOSPITAL ENCOUNTER (OUTPATIENT)
Dept: CARDIOLOGY | Facility: HOSPITAL | Age: 68
Discharge: HOME | End: 2023-12-06
Payer: MEDICARE

## 2023-12-06 ENCOUNTER — HOSPITAL ENCOUNTER (OUTPATIENT)
Dept: GENERAL RADIOLOGY | Facility: HOSPITAL | Age: 68
Discharge: HOME | End: 2023-12-06
Payer: MEDICARE

## 2023-12-06 ENCOUNTER — LAB (OUTPATIENT)
Dept: LAB | Facility: HOSPITAL | Age: 68
End: 2023-12-06
Payer: MEDICARE

## 2023-12-06 ENCOUNTER — OFFICE VISIT (OUTPATIENT)
Dept: FAMILY MEDICINE CLINIC | Facility: CLINIC | Age: 68
End: 2023-12-06
Payer: COMMERCIAL

## 2023-12-06 VITALS
HEART RATE: 73 BPM | OXYGEN SATURATION: 96 % | BODY MASS INDEX: 43.19 KG/M2 | WEIGHT: 220 LBS | TEMPERATURE: 98.2 F | SYSTOLIC BLOOD PRESSURE: 129 MMHG | DIASTOLIC BLOOD PRESSURE: 82 MMHG | HEIGHT: 60 IN

## 2023-12-06 VITALS
OXYGEN SATURATION: 97 % | BODY MASS INDEX: 43.19 KG/M2 | TEMPERATURE: 97.9 F | DIASTOLIC BLOOD PRESSURE: 82 MMHG | HEART RATE: 63 BPM | SYSTOLIC BLOOD PRESSURE: 125 MMHG | HEIGHT: 60 IN | WEIGHT: 220 LBS

## 2023-12-06 DIAGNOSIS — M25.522 PAIN AND SWELLING OF LEFT ELBOW: ICD-10-CM

## 2023-12-06 DIAGNOSIS — S51.002A ELBOW WOUND, LEFT, INITIAL ENCOUNTER: ICD-10-CM

## 2023-12-06 DIAGNOSIS — S51.002A ELBOW WOUND, LEFT, INITIAL ENCOUNTER: Primary | ICD-10-CM

## 2023-12-06 DIAGNOSIS — M25.422 PAIN AND SWELLING OF LEFT ELBOW: ICD-10-CM

## 2023-12-06 DIAGNOSIS — L02.414 ABSCESS OF ARM, LEFT: Primary | ICD-10-CM

## 2023-12-06 LAB
ANION GAP SERPL CALCULATED.3IONS-SCNC: 7 MMOL/L (ref 5–15)
BASOPHILS # BLD AUTO: 0.04 10*3/MM3 (ref 0–0.2)
BASOPHILS NFR BLD AUTO: 0.6 % (ref 0–1.5)
BUN SERPL-MCNC: 17 MG/DL (ref 8–23)
BUN/CREAT SERPL: 23 (ref 7–25)
CALCIUM SPEC-SCNC: 9.1 MG/DL (ref 8.6–10.5)
CHLORIDE SERPL-SCNC: 106 MMOL/L (ref 98–107)
CO2 SERPL-SCNC: 29 MMOL/L (ref 22–29)
CREAT SERPL-MCNC: 0.74 MG/DL (ref 0.57–1)
DEPRECATED RDW RBC AUTO: 42.2 FL (ref 37–54)
EGFRCR SERPLBLD CKD-EPI 2021: 88.8 ML/MIN/1.73
EOSINOPHIL # BLD AUTO: 0.33 10*3/MM3 (ref 0–0.4)
EOSINOPHIL NFR BLD AUTO: 4.7 % (ref 0.3–6.2)
ERYTHROCYTE [DISTWIDTH] IN BLOOD BY AUTOMATED COUNT: 13.1 % (ref 12.3–15.4)
GLUCOSE SERPL-MCNC: 103 MG/DL (ref 65–99)
HCT VFR BLD AUTO: 44.8 % (ref 34–46.6)
HGB BLD-MCNC: 14.8 G/DL (ref 12–15.9)
IMM GRANULOCYTES # BLD AUTO: 0.03 10*3/MM3 (ref 0–0.05)
IMM GRANULOCYTES NFR BLD AUTO: 0.4 % (ref 0–0.5)
LYMPHOCYTES # BLD AUTO: 1.58 10*3/MM3 (ref 0.7–3.1)
LYMPHOCYTES NFR BLD AUTO: 22.7 % (ref 19.6–45.3)
MCH RBC QN AUTO: 29.4 PG (ref 26.6–33)
MCHC RBC AUTO-ENTMCNC: 33 G/DL (ref 31.5–35.7)
MCV RBC AUTO: 89.1 FL (ref 79–97)
MONOCYTES # BLD AUTO: 0.54 10*3/MM3 (ref 0.1–0.9)
MONOCYTES NFR BLD AUTO: 7.8 % (ref 5–12)
NEUTROPHILS NFR BLD AUTO: 4.44 10*3/MM3 (ref 1.7–7)
NEUTROPHILS NFR BLD AUTO: 63.8 % (ref 42.7–76)
NRBC BLD AUTO-RTO: 0 /100 WBC (ref 0–0.2)
PLATELET # BLD AUTO: 168 10*3/MM3 (ref 140–450)
PMV BLD AUTO: 12.8 FL (ref 6–12)
POTASSIUM SERPL-SCNC: 3.9 MMOL/L (ref 3.5–5.2)
RBC # BLD AUTO: 5.03 10*6/MM3 (ref 3.77–5.28)
SODIUM SERPL-SCNC: 142 MMOL/L (ref 136–145)
WBC NRBC COR # BLD AUTO: 6.96 10*3/MM3 (ref 3.4–10.8)

## 2023-12-06 PROCEDURE — 87147 CULTURE TYPE IMMUNOLOGIC: CPT

## 2023-12-06 PROCEDURE — 99203 OFFICE O/P NEW LOW 30 MIN: CPT | Performed by: SURGERY

## 2023-12-06 PROCEDURE — 36415 COLL VENOUS BLD VENIPUNCTURE: CPT

## 2023-12-06 PROCEDURE — 73070 X-RAY EXAM OF ELBOW: CPT

## 2023-12-06 PROCEDURE — 87186 SC STD MICRODIL/AGAR DIL: CPT

## 2023-12-06 PROCEDURE — 3079F DIAST BP 80-89 MM HG: CPT | Performed by: SURGERY

## 2023-12-06 PROCEDURE — 93005 ELECTROCARDIOGRAM TRACING: CPT | Performed by: SURGERY

## 2023-12-06 PROCEDURE — 87205 SMEAR GRAM STAIN: CPT

## 2023-12-06 PROCEDURE — 1160F RVW MEDS BY RX/DR IN RCRD: CPT | Performed by: SURGERY

## 2023-12-06 PROCEDURE — 1159F MED LIST DOCD IN RCRD: CPT | Performed by: SURGERY

## 2023-12-06 PROCEDURE — 87070 CULTURE OTHR SPECIMN AEROBIC: CPT

## 2023-12-06 PROCEDURE — 3074F SYST BP LT 130 MM HG: CPT | Performed by: SURGERY

## 2023-12-06 PROCEDURE — 85025 COMPLETE CBC W/AUTO DIFF WBC: CPT

## 2023-12-06 PROCEDURE — 80048 BASIC METABOLIC PNL TOTAL CA: CPT

## 2023-12-06 RX ORDER — TETANUS AND DIPHTHERIA TOXOIDS ADSORBED 2; 2 [LF]/.5ML; [LF]/.5ML
0.5 INJECTION INTRAMUSCULAR ONCE
Qty: 0.5 ML | Refills: 0 | Status: SHIPPED | OUTPATIENT
Start: 2023-12-06 | End: 2023-12-06 | Stop reason: ALTCHOICE

## 2023-12-06 RX ORDER — DOXYCYCLINE HYCLATE 100 MG/1
100 CAPSULE ORAL 2 TIMES DAILY
Qty: 20 CAPSULE | Refills: 0 | Status: SHIPPED | OUTPATIENT
Start: 2023-12-06

## 2023-12-06 NOTE — PROGRESS NOTES
"Subjective        Lulu Graves is a 68 y.o. female who presents to [facility].     Chief Complaint   Patient presents with    Abscess     [place] SL[place] [facility] [place] unknown antibiotic- reaction to antibiotic   Oral Augmentin, [place] [facility] [place] clinic not open at this time due to time change       History of Present Illness    Patient presents for abscess of left elbow.    Left elbow abscess - started on 11/27/23 looking a little pimple on her left elbow.  She left to go to [place] on vacation on 11/28/2023, states elbow turned hot and red.  She went to the urgent care center in [place] on 12/2/2023 because the area was painful and very swollen.  No fever.  She was taking tylenol without improvement in pain. The center is not open now, will open at 9am PST, I do not have records.  She states she received a shot of an antibiotic and was discharged home on course of augmentin 875/125mg po twice daily x 10 days.  Pt states she was told to use hibiclens soap to use once a week all over her body because she previously had abscess on right inner thigh in 10/2023.  She states xray and culture weren't performed in clinic in [place]. She stopped antibiotic after 2 days because her left arm swelled all the way to her knuckles. The wound opened up last night started draining \"pus like bloody drainage.\"  The left elbow pain and swelling has improved since this started draining. She denies known hx of mrsa.  Still no fever.  She is right hand dominant. She is not utd on tetanus vaccines, did not receive this at urgent care.       The following portions of the patient's history were reviewed and updated as appropriate: allergies, current medications, past family history, past medical history, past social history, past surgical history and problem list.    No Known Allergies       Current Outpatient Medications:     aspirin 81 MG tablet, Take 1 tablet by mouth Daily., " "Disp: , Rfl:     fluticasone (FLONASE) 50 MCG/ACT nasal spray, 2 sprays into the nostril(s) as directed by provider Daily., Disp: 18.2 mL, Rfl: 1    levothyroxine (SYNTHROID, LEVOTHROID) 100 MCG tablet, TAKE 1 TABLET BY MOUTH EVERY DAY, Disp: 90 tablet, Rfl: 1    loratadine (Claritin) 10 MG tablet, Take 1 tablet by mouth Daily for 30 days., Disp: 30 tablet, Rfl: 0    metoprolol tartrate (LOPRESSOR) 25 MG tablet, TAKE 1/2 TABLET BY MOUTH 2 TIMES A DAY, Disp: 90 tablet, Rfl: 0    PROBIOTIC PRODUCT PO, Take 1 capsule by mouth Daily., Disp: , Rfl:     simvastatin (ZOCOR) 20 MG tablet, TAKE 1 TABLET BY MOUTH IN THE EVENING, Disp: 90 tablet, Rfl: 1    doxycycline (VIBRAMYCIN) 100 MG capsule, Take 1 capsule by mouth 2 (Two) Times a Day., Disp: 20 capsule, Rfl: 0    Review of Systems   Constitutional:  Negative for chills, fatigue, fever and unexpected weight change.   Skin:  Positive for wound. Negative for rash.        Objective     /82 (BP Location: Right arm, Patient Position: Sitting, Cuff Size: Large Adult)   Pulse 63   Temp 97.9 °F (36.6 °C) (Oral)   Ht 151.8 cm (59.75\")   Wt 99.8 kg (220 lb)   SpO2 97%   BMI 43.33 kg/m²         Physical Exam  Vitals and nursing note reviewed.   Constitutional:       Appearance: Normal appearance. She is obese. She is not ill-appearing or diaphoretic.   HENT:      Head: Normocephalic.      Nose: Nose normal.      Mouth/Throat:      Mouth: Mucous membranes are moist.   Eyes:      General: No scleral icterus.     Pupils: Pupils are equal, round, and reactive to light.   Cardiovascular:      Rate and Rhythm: Normal rate and regular rhythm.      Pulses: Normal pulses.      Heart sounds: Murmur (grade 3 holosystolic) heard.   Pulmonary:      Effort: Pulmonary effort is normal.      Breath sounds: Normal breath sounds.   Abdominal:      General: Bowel sounds are normal.      Palpations: Abdomen is soft.      Tenderness: There is no abdominal tenderness.   Musculoskeletal:      " Right lower leg: No edema.      Left lower leg: No edema.   Lymphadenopathy:      Cervical: No cervical adenopathy.   Skin:     General: Skin is warm and dry.      Capillary Refill: Capillary refill takes less than 2 seconds.      Findings: Lesion present.      Comments: Left lateral aspect just distal to elbow - 1cm round firm but slightly fluctuant raised area, warmth noted, small amount of drainage is purulent and bloody, erythema and mild firmness surrounding raised area approximately 2 inches in diameter, no red streaks on upper arm or forearm, capillary refill wnl left hand,  strength wnl   Neurological:      Mental Status: She is alert and oriented to person, place, and time.   Psychiatric:         Mood and Affect: Mood normal.         Behavior: Behavior normal.     Patient gave verbal permission to capture image.          Result Review                   Assessment & Plan    Diagnoses and all orders for this visit:    1. Elbow wound, left, initial encounter (Primary)  -     Wound Culture - Wound, Elbow, Left; Future  -     Ambulatory Referral to General Surgery  -     XR Elbow 2 View Left; Future  -     Discontinue: tetanus-diphtheria toxoids (TDVAX 2-2) 2-2 LF/0.5ML injection; Inject 0.5 mL into the appropriate muscle as directed by prescriber 1 (One) Time for 1 dose.  Dispense: 0.5 mL; Refill: 0  -     CBC w AUTO Differential; Future  -     doxycycline (VIBRAMYCIN) 100 MG capsule; Take 1 capsule by mouth 2 (Two) Times a Day.  Dispense: 20 capsule; Refill: 0  -     Td Vaccine => 8yo PF (TDVAX) 2-2    2. Pain and swelling of left elbow  -     CBC w AUTO Differential; Future  -     doxycycline (VIBRAMYCIN) 100 MG capsule; Take 1 capsule by mouth 2 (Two) Times a Day.  Dispense: 20 capsule; Refill: 0       Patient Instructions   Call office for lab/xray results if you have not received a call from our office or [employer] message in 1-2 days.    Go see general surgeon today at 2 PM.  Follow up with me in 7  days.  Advised patient to go to emergency department if swelling of arm, severe pain, fever, malaise.  Will review records from urgent care center once orders have been received.  Discussed with patient that this needs incision and drainage as soon as possible.  Get xray, cbc.       Follow Up   Return in about 1 week (around 12/13/2023).    Patient was given instructions and counseling regarding her condition or for health maintenance advice. Please see specific information pulled into the AVS if appropriate.     GUILLERMO Curiel     12/06/23      Answers submitted by the patient for this visit:  Other (Submitted on 12/6/2023)  Please describe your symptoms.: Abscess on left arm. Diagnosed as mikaela infection. Allergic reaction to antibiotic given. Swelling n itching.  Have you had these symptoms before?: No  How long have you been having these symptoms?: 5-7 days  Primary Reason for Visit (Submitted on 12/6/2023)  What is the primary reason for your visit?: Other

## 2023-12-06 NOTE — PATIENT INSTRUCTIONS
Call office for lab/xray results if you have not received a call from our office or [employer] message in 1-2 days.    Go see general surgeon today at 2 PM.  Follow up with me in 7 days.  Advised patient to go to emergency department if swelling of arm, severe pain, fever, malaise.  Will review records from urgent care center once orders have been received.

## 2023-12-06 NOTE — H&P (VIEW-ONLY)
CHIEF COMPLAINT:    Left elbow abscess    HISTORY OF PRESENT ILLNESS:    Lulu Graves is a 68 y.o. female who is referred today after having been seen by her PCP for pain, redness, swelling and drainage on her left arm.  She states this began when she was on a trip to [place] with just swelling and soreness.  She was started on antibiotics at that time without complete resolution.  Now that she has active purulence and drainage she was sent to us for further management.  She states that she has had infections on her thighs and groin area previously.    Past Medical History:   Diagnosis Date    Allergic     Aortic stenosis     Aortic valve replaced 2013    Cataract     GERD (gastroesophageal reflux disease)     Glaucoma 2023    Just started    Heart murmur     Hyperlipidemia     Hypertension     Hypothyroidism     Obesity     Osteopenia     PAT (paroxysmal atrial tachycardia)     Sleep apnea        Past Surgical History:   Procedure Laterality Date    ABLATION OF DYSRHYTHMIC FOCUS   or     AORTIC VALVE REPAIR/REPLACEMENT      AORTIC VALVE SURGERY  2013    CARDIAC CATHETERIZATION      Before open heart surgery    CARDIAC VALVE REPLACEMENT  2013     SECTION  1982    COLONOSCOPY  2016    TUBAL ABDOMINAL LIGATION         Prior to Admission medications    Medication Sig Start Date End Date Taking? Authorizing Provider   aspirin 81 MG tablet Take 1 tablet by mouth Daily. 12  Yes Provider, MD Cait   doxycycline (VIBRAMYCIN) 100 MG capsule Take 1 capsule by mouth 2 (Two) Times a Day. 23  Yes Sushma Hall APRN   fluticasone (FLONASE) 50 MCG/ACT nasal spray 2 sprays into the nostril(s) as directed by provider Daily. 23  Yes Sushma Hall APRN   levothyroxine (SYNTHROID, LEVOTHROID) 100 MCG tablet TAKE 1 TABLET BY MOUTH EVERY DAY 23  Yes Hardeep Camejo Jr., MD   loratadine (Claritin) 10 MG tablet Take 1 tablet by mouth Daily for 30 days.  "11/16/23 12/16/23 Yes Sushma Hall APRN   metoprolol tartrate (LOPRESSOR) 25 MG tablet TAKE 1/2 TABLET BY MOUTH 2 TIMES A DAY 11/19/23  Yes Sushma Hall APRN   PROBIOTIC PRODUCT PO Take 1 capsule by mouth Daily. 9/8/16  Yes Provider, MD Cait   simvastatin (ZOCOR) 20 MG tablet TAKE 1 TABLET BY MOUTH IN THE EVENING 7/23/23  Yes Hardeep Camejo Jr., MD   methylPREDNISolone (MEDROL) 4 MG dose pack Take as directed on package instructions. 11/16/23 12/6/23  Sushma Hall APRN   tetanus-diphtheria toxoids (TDVAX 2-2) 2-2 LF/0.5ML injection Inject 0.5 mL into the appropriate muscle as directed by prescriber 1 (One) Time for 1 dose. 12/6/23 12/6/23  Sushma Hall APRN       No Known Allergies    Family History   Problem Relation Age of Onset    Arthritis Mother     Other Father         Had Parkinsens and Alzheimers    Diabetes Brother     Hearing loss Brother     Stroke Paternal Grandmother     Sleep apnea Neg Hx        Social History     Socioeconomic History    Marital status:    Tobacco Use    Smoking status: Never     Passive exposure: Past    Smokeless tobacco: Never   Vaping Use    Vaping Use: Never used   Substance and Sexual Activity    Alcohol use: No    Drug use: No    Sexual activity: Yes     Partners: Male     Birth control/protection: None       Review of Systems   Skin:  Positive for wound.       Objective     /82   Pulse 73   Temp 98.2 °F (36.8 °C) (Infrared)   Ht 152.4 cm (60\")   Wt 99.8 kg (220 lb)   SpO2 96%   BMI 42.97 kg/m²     Physical Exam  Constitutional:       General: She is not in acute distress.     Appearance: Normal appearance. She is obese. She is not ill-appearing, toxic-appearing or diaphoretic.   HENT:      Head: Normocephalic and atraumatic.   Eyes:      General:         Right eye: No discharge.         Left eye: No discharge.      Extraocular Movements: Extraocular movements intact.      Conjunctiva/sclera: Conjunctivae normal.   Pulmonary:      " Effort: Pulmonary effort is normal. No respiratory distress.   Skin:     General: Skin is warm.      Coloration: Skin is not jaundiced.          Neurological:      General: No focal deficit present.      Mental Status: She is alert and oriented to person, place, and time.   Psychiatric:         Mood and Affect: Mood normal.         Behavior: Behavior normal.         Thought Content: Thought content normal.         Judgment: Judgment normal.           ASSESSMENT:    Abscess on left forearm    PLAN:    The patient has an actively draining abscess on her left forearm.  I discussed with her drainage of this so that it can resolved.  We discussed about the potential of doing this in the office under local anesthetic versus doing this in the operating room with sedation or even general anesthetic.  She has a fair amount of anxiety regarding drainage and therefore has elected to have this done in the operating room.  I believe this is reasonable.  The risks and benefits of incision and drainage of left arm abscess were discussed with her and she has been scheduled.          This document has been electronically signed by Addy Torres MD on December 6, 2023 16:15 EST

## 2023-12-07 LAB
QT INTERVAL: 383 MS
QTC INTERVAL: 405 MS

## 2023-12-08 ENCOUNTER — ANESTHESIA (OUTPATIENT)
Dept: PERIOP | Facility: HOSPITAL | Age: 68
End: 2023-12-08
Payer: MEDICARE

## 2023-12-08 ENCOUNTER — ANESTHESIA EVENT (OUTPATIENT)
Dept: PERIOP | Facility: HOSPITAL | Age: 68
End: 2023-12-08
Payer: MEDICARE

## 2023-12-08 ENCOUNTER — HOSPITAL ENCOUNTER (OUTPATIENT)
Facility: HOSPITAL | Age: 68
Setting detail: HOSPITAL OUTPATIENT SURGERY
Discharge: HOME | End: 2023-12-08
Attending: SURGERY | Admitting: SURGERY
Payer: MEDICARE

## 2023-12-08 VITALS
BODY MASS INDEX: 44.27 KG/M2 | WEIGHT: 219.6 LBS | OXYGEN SATURATION: 92 % | TEMPERATURE: 97.5 F | HEIGHT: 59 IN | HEART RATE: 54 BPM | SYSTOLIC BLOOD PRESSURE: 149 MMHG | DIASTOLIC BLOOD PRESSURE: 69 MMHG | RESPIRATION RATE: 11 BRPM

## 2023-12-08 DIAGNOSIS — L02.414 ABSCESS OF ARM, LEFT: ICD-10-CM

## 2023-12-08 PROCEDURE — 25010000002 PROPOFOL 200 MG/20ML EMULSION: Performed by: NURSE ANESTHETIST, CERTIFIED REGISTERED

## 2023-12-08 PROCEDURE — 25010000002 DEXAMETHASONE PER 1 MG: Performed by: NURSE ANESTHETIST, CERTIFIED REGISTERED

## 2023-12-08 PROCEDURE — 10060 I&D ABSCESS SIMPLE/SINGLE: CPT | Performed by: SURGERY

## 2023-12-08 PROCEDURE — 25010000002 VANCOMYCIN HCL IN NACL 1.5-0.9 GM/500ML-% SOLUTION: Performed by: SURGERY

## 2023-12-08 PROCEDURE — 25010000002 BUPIVACAINE (PF) 0.25 % SOLUTION: Performed by: SURGERY

## 2023-12-08 PROCEDURE — 25810000003 LACTATED RINGERS PER 1000 ML: Performed by: ANESTHESIOLOGY

## 2023-12-08 PROCEDURE — 25010000002 ONDANSETRON PER 1 MG: Performed by: NURSE ANESTHETIST, CERTIFIED REGISTERED

## 2023-12-08 PROCEDURE — 25010000002 MIDAZOLAM PER 1 MG: Performed by: NURSE ANESTHETIST, CERTIFIED REGISTERED

## 2023-12-08 PROCEDURE — 25010000002 FENTANYL CITRATE (PF) 100 MCG/2ML SOLUTION: Performed by: NURSE ANESTHETIST, CERTIFIED REGISTERED

## 2023-12-08 RX ORDER — ONDANSETRON 2 MG/ML
4 INJECTION INTRAMUSCULAR; INTRAVENOUS ONCE AS NEEDED
Status: DISCONTINUED | OUTPATIENT
Start: 2023-12-08 | End: 2023-12-08 | Stop reason: HOSPADM

## 2023-12-08 RX ORDER — FENTANYL CITRATE 50 UG/ML
INJECTION, SOLUTION INTRAMUSCULAR; INTRAVENOUS AS NEEDED
Status: DISCONTINUED | OUTPATIENT
Start: 2023-12-08 | End: 2023-12-08 | Stop reason: SURG

## 2023-12-08 RX ORDER — MIDAZOLAM HYDROCHLORIDE 1 MG/ML
INJECTION INTRAMUSCULAR; INTRAVENOUS AS NEEDED
Status: DISCONTINUED | OUTPATIENT
Start: 2023-12-08 | End: 2023-12-08 | Stop reason: SURG

## 2023-12-08 RX ORDER — FENTANYL CITRATE 50 UG/ML
50 INJECTION, SOLUTION INTRAMUSCULAR; INTRAVENOUS
Status: DISCONTINUED | OUTPATIENT
Start: 2023-12-08 | End: 2023-12-08 | Stop reason: HOSPADM

## 2023-12-08 RX ORDER — LIDOCAINE HYDROCHLORIDE 20 MG/ML
INJECTION, SOLUTION EPIDURAL; INFILTRATION; INTRACAUDAL; PERINEURAL AS NEEDED
Status: DISCONTINUED | OUTPATIENT
Start: 2023-12-08 | End: 2023-12-08 | Stop reason: SURG

## 2023-12-08 RX ORDER — PROPOFOL 10 MG/ML
INJECTION, EMULSION INTRAVENOUS AS NEEDED
Status: DISCONTINUED | OUTPATIENT
Start: 2023-12-08 | End: 2023-12-08 | Stop reason: SURG

## 2023-12-08 RX ORDER — OXYCODONE HYDROCHLORIDE 5 MG/1
5 TABLET ORAL ONCE AS NEEDED
Status: DISCONTINUED | OUTPATIENT
Start: 2023-12-08 | End: 2023-12-08 | Stop reason: HOSPADM

## 2023-12-08 RX ORDER — BUPIVACAINE HYDROCHLORIDE 2.5 MG/ML
INJECTION, SOLUTION EPIDURAL; INFILTRATION; INTRACAUDAL AS NEEDED
Status: DISCONTINUED | OUTPATIENT
Start: 2023-12-08 | End: 2023-12-08 | Stop reason: HOSPADM

## 2023-12-08 RX ORDER — VANCOMYCIN/0.9 % SOD CHLORIDE 1.5G/250ML
15 PLASTIC BAG, INJECTION (ML) INTRAVENOUS ONCE
Status: COMPLETED | OUTPATIENT
Start: 2023-12-08 | End: 2023-12-08

## 2023-12-08 RX ORDER — IPRATROPIUM BROMIDE AND ALBUTEROL SULFATE 2.5; .5 MG/3ML; MG/3ML
3 SOLUTION RESPIRATORY (INHALATION) ONCE AS NEEDED
Status: DISCONTINUED | OUTPATIENT
Start: 2023-12-08 | End: 2023-12-08 | Stop reason: HOSPADM

## 2023-12-08 RX ORDER — SODIUM CHLORIDE, SODIUM LACTATE, POTASSIUM CHLORIDE, CALCIUM CHLORIDE 600; 310; 30; 20 MG/100ML; MG/100ML; MG/100ML; MG/100ML
1000 INJECTION, SOLUTION INTRAVENOUS CONTINUOUS
Status: DISCONTINUED | OUTPATIENT
Start: 2023-12-08 | End: 2023-12-08 | Stop reason: HOSPADM

## 2023-12-08 RX ORDER — LIDOCAINE HYDROCHLORIDE 10 MG/ML
0.5 INJECTION, SOLUTION INFILTRATION; PERINEURAL ONCE AS NEEDED
Status: DISCONTINUED | OUTPATIENT
Start: 2023-12-08 | End: 2023-12-08 | Stop reason: HOSPADM

## 2023-12-08 RX ORDER — SODIUM CHLORIDE 0.9 % (FLUSH) 0.9 %
10 SYRINGE (ML) INJECTION AS NEEDED
Status: DISCONTINUED | OUTPATIENT
Start: 2023-12-08 | End: 2023-12-08 | Stop reason: HOSPADM

## 2023-12-08 RX ORDER — DEXAMETHASONE SODIUM PHOSPHATE 4 MG/ML
INJECTION, SOLUTION INTRA-ARTICULAR; INTRALESIONAL; INTRAMUSCULAR; INTRAVENOUS; SOFT TISSUE AS NEEDED
Status: DISCONTINUED | OUTPATIENT
Start: 2023-12-08 | End: 2023-12-08 | Stop reason: SURG

## 2023-12-08 RX ORDER — ONDANSETRON 2 MG/ML
INJECTION INTRAMUSCULAR; INTRAVENOUS AS NEEDED
Status: DISCONTINUED | OUTPATIENT
Start: 2023-12-08 | End: 2023-12-08 | Stop reason: SURG

## 2023-12-08 RX ORDER — NALOXONE HCL 0.4 MG/ML
0.4 VIAL (ML) INJECTION AS NEEDED
Status: DISCONTINUED | OUTPATIENT
Start: 2023-12-08 | End: 2023-12-08 | Stop reason: HOSPADM

## 2023-12-08 RX ADMIN — DEXAMETHASONE SODIUM PHOSPHATE 4 MG: 4 INJECTION, SOLUTION INTRAMUSCULAR; INTRAVENOUS at 12:59

## 2023-12-08 RX ADMIN — Medication 1500 MG: at 11:43

## 2023-12-08 RX ADMIN — MIDAZOLAM 2 MG: 1 INJECTION INTRAMUSCULAR; INTRAVENOUS at 12:40

## 2023-12-08 RX ADMIN — ONDANSETRON 4 MG: 2 INJECTION INTRAMUSCULAR; INTRAVENOUS at 12:59

## 2023-12-08 RX ADMIN — PROPOFOL 200 MG: 10 INJECTION, EMULSION INTRAVENOUS at 12:42

## 2023-12-08 RX ADMIN — FENTANYL CITRATE 50 MCG: 50 INJECTION, SOLUTION INTRAMUSCULAR; INTRAVENOUS at 12:40

## 2023-12-08 RX ADMIN — LIDOCAINE HYDROCHLORIDE 100 MG: 20 INJECTION, SOLUTION EPIDURAL; INFILTRATION; INTRACAUDAL; PERINEURAL at 12:42

## 2023-12-08 RX ADMIN — FENTANYL CITRATE 50 MCG: 50 INJECTION, SOLUTION INTRAMUSCULAR; INTRAVENOUS at 12:48

## 2023-12-08 RX ADMIN — SODIUM CHLORIDE, POTASSIUM CHLORIDE, SODIUM LACTATE AND CALCIUM CHLORIDE 1000 ML: 600; 310; 30; 20 INJECTION, SOLUTION INTRAVENOUS at 11:43

## 2023-12-08 NOTE — ANESTHESIA PREPROCEDURE EVALUATION
Anesthesia Evaluation     Patient summary reviewed and Nursing notes reviewed   no history of anesthetic complications:   NPO Solid Status: > 8 hours  NPO Liquid Status: > 8 hours           Airway   Dental      Pulmonary    (+) ,sleep apnea  (-) not a smoker  Cardiovascular     ECG reviewed    (+) hypertension, valvular problems/murmurs (Bioprosthetic valve) AS, hyperlipidemia    ROS comment: EF normal    Neuro/Psych- negative ROS  GI/Hepatic/Renal/Endo    (+) morbid obesity, GERD well controlled, thyroid problem hypothyroidism    Musculoskeletal (-) negative ROS    Abdominal    Substance History - negative use     OB/GYN negative ob/gyn ROS         Other - negative ROS                     Anesthesia Plan    ASA 3     general     intravenous induction     Anesthetic plan, risks, benefits, and alternatives have been provided, discussed and informed consent has been obtained with: patient.    Plan discussed with CRNA and CAA.    CODE STATUS:

## 2023-12-08 NOTE — ANESTHESIA POSTPROCEDURE EVALUATION
Patient: Lulu Graves    Procedure Summary       Date: 12/08/23 Room / Location: [facility] OR 08 / [facility] MAIN OR    Anesthesia Start: 1237 Anesthesia Stop: 1317    Procedure: INCISION AND DRAINAGE ABSCESS, left arm (Left) Diagnosis:       Abscess of arm, left      (Abscess of arm, left [L02.414])    Surgeons: Addy Torres MD Provider: Bradley Barber MD    Anesthesia Type: general ASA Status: 3            Anesthesia Type: general    Vitals  Vitals Value Taken Time   /69 12/08/23 1349   Temp 97.6 °F (36.4 °C) 12/08/23 1317   Pulse 58 12/08/23 1350   Resp 17 12/08/23 1327   SpO2 96 % 12/08/23 1350   Vitals shown include unfiled device data.        Post Anesthesia Care and Evaluation    Patient location during evaluation: PACU  Patient participation: complete - patient participated  Level of consciousness: awake  Pain scale: See nurse's notes for pain score.  Pain management: adequate    Airway patency: patent  Anesthetic complications: No anesthetic complications  PONV Status: none  Cardiovascular status: acceptable  Respiratory status: acceptable and spontaneous ventilation  Hydration status: acceptable    Comments: Patient seen and examined postoperatively; vital signs stable; SpO2 greater than or equal to 90%; cardiopulmonary status stable; nausea/vomiting adequately controlled; pain adequately controlled; no apparent anesthesia complications; patient discharged from anesthesia care when discharge criteria were met

## 2023-12-08 NOTE — OP NOTE
Operative Note    Lulu Graves  12/8/2023    Pre-op Diagnosis:   Abscess of arm, left [L02.414]    Post-op Diagnosis:     Post-Op Diagnosis Codes:     * Abscess of arm, left [L02.414]    Procedure/CPT® Codes:      Procedure(s):  INCISION AND DRAINAGE ABSCESS, left arm    Surgeon(s):  Addy Torres MD    Anesthesia: Choice    Staff:   Circulator: Lora Lim RN  Scrub Person: Anna Jacobs    Estimated Blood Loss: minimal    Specimens:                None      Drains: * No LDAs found *    Findings: Abscess on left arm    Complications: None    Indication: Abscess of the left arm    Operative Note:    The patient was seen, marked, and consented preoperatively.  Following this she was brought to the operating room and placed in supine position on the OR table.  General anesthetic was administered and an airway was placed and secured by the anesthesia service.  A briefing was performed.  The area was prepped and draped.  A timeout was performed.    Following timeout local anesthetic was injected around the abscess on the left outer forearm.  The partially denuded skin overlying the abscess cavity was then excised using a scalpel and electrocautery.  What appeared to be an infected cyst with abscess was then debrided from the surrounding normal subcutaneous tissues using electrocautery.  It had been previously cultured so no further culture material was obtained.  The wound was irrigated.  Moist gauze was packed into the wound and was covered with clean dressings.  The patient was then awakened.          This document has been electronically signed by Addy Torres MD on December 8, 2023 13:46 YANY Torres MD     Date: 12/8/2023  Time: 13:45 EST

## 2023-12-08 NOTE — ANESTHESIA PROCEDURE NOTES
Airway  Urgency: elective    Date/Time: 12/8/2023 12:43 PM  End Time:12/8/2023 12:43 PM  Airway not difficult    General Information and Staff    Patient location during procedure: OR  CRNA/CAA: Mitch Cavazos CRNA    Indications and Patient Condition  Indications: GA.    Preoxygenated: yes  MILS maintained throughout  Mask difficulty assessment: 1 - vent by mask    Final Airway Details  Final airway type: supraglottic airway      Successful airway: I-gel  Size 4     Number of attempts at approach: 1  Assessment: lips, teeth, and gum same as pre-op    Additional Comments  Good seal present. Atraumatic placement

## 2023-12-09 LAB
BACTERIA SPEC AEROBE CULT: ABNORMAL
GRAM STN SPEC: ABNORMAL
GRAM STN SPEC: ABNORMAL

## 2023-12-11 DIAGNOSIS — J30.89 ENVIRONMENTAL AND SEASONAL ALLERGIES: ICD-10-CM

## 2023-12-11 RX ORDER — LORATADINE 10 MG/1
10 TABLET ORAL DAILY
Qty: 30 TABLET | Refills: 0 | Status: SHIPPED | OUTPATIENT
Start: 2023-12-11

## 2023-12-12 ENCOUNTER — TELEPHONE (OUTPATIENT)
Dept: SURGERY | Facility: CLINIC | Age: 68
End: 2023-12-12
Payer: MEDICARE

## 2023-12-15 ENCOUNTER — OFFICE VISIT (OUTPATIENT)
Dept: SURGERY | Facility: CLINIC | Age: 68
End: 2023-12-15
Payer: MEDICARE

## 2023-12-15 VITALS
HEIGHT: 59 IN | DIASTOLIC BLOOD PRESSURE: 80 MMHG | HEART RATE: 66 BPM | TEMPERATURE: 98.2 F | SYSTOLIC BLOOD PRESSURE: 118 MMHG | BODY MASS INDEX: 44.19 KG/M2 | OXYGEN SATURATION: 96 % | WEIGHT: 219.2 LBS

## 2023-12-15 DIAGNOSIS — Z09 ENCOUNTER FOR FOLLOW-UP: Primary | ICD-10-CM

## 2023-12-15 PROCEDURE — 99024 POSTOP FOLLOW-UP VISIT: CPT | Performed by: SURGERY

## 2023-12-15 NOTE — PROGRESS NOTES
CHIEF COMPLAINT:    Chief Complaint   Patient presents with    Post-op     I&D Wound Check Left Arm Abscess 12/8/23       HISTORY OF PRESENT ILLNESS:    Lulu Graves is a 68 y.o. female who underwent incision and drainage of an abscess on her left forearm on 12/8/2023.  She returns today for follow-up.  She is been keeping the area clean with soap and water and keeping covered with a bandage.  She reports no issues with the site.    EXAM:  Vitals:    12/15/23 0938   BP: 118/80   Pulse: 66   Temp: 98.2 °F (36.8 °C)   SpO2: 96%         Healing wound on left forearm    ASSESSMENT:    Status post drainage of left forearm abscess     PLAN:    Continue local wound care.  May use peroxide for a few days to help clean the area.  See me back in about 2 and half weeks.          This document has been electronically signed by Addy Torres MD on December 15, 2023 09:48 EST       no

## 2023-12-18 ENCOUNTER — OFFICE VISIT (OUTPATIENT)
Dept: FAMILY MEDICINE CLINIC | Facility: CLINIC | Age: 68
End: 2023-12-18
Payer: MEDICARE

## 2023-12-18 VITALS
HEART RATE: 61 BPM | HEIGHT: 60 IN | RESPIRATION RATE: 16 BRPM | OXYGEN SATURATION: 93 % | TEMPERATURE: 97.2 F | WEIGHT: 220 LBS | BODY MASS INDEX: 43.19 KG/M2 | DIASTOLIC BLOOD PRESSURE: 77 MMHG | SYSTOLIC BLOOD PRESSURE: 137 MMHG

## 2023-12-18 DIAGNOSIS — Z12.31 ENCOUNTER FOR SCREENING MAMMOGRAM FOR MALIGNANT NEOPLASM OF BREAST: ICD-10-CM

## 2023-12-18 DIAGNOSIS — E03.9 ACQUIRED HYPOTHYROIDISM: ICD-10-CM

## 2023-12-18 DIAGNOSIS — Z01.419 PAP SMEAR, AS PART OF ROUTINE GYNECOLOGICAL EXAMINATION: ICD-10-CM

## 2023-12-18 DIAGNOSIS — G47.33 OSA ON CPAP: ICD-10-CM

## 2023-12-18 DIAGNOSIS — M85.89 OSTEOPENIA OF MULTIPLE SITES: ICD-10-CM

## 2023-12-18 DIAGNOSIS — I35.0 NONRHEUMATIC AORTIC VALVE STENOSIS: Chronic | ICD-10-CM

## 2023-12-18 DIAGNOSIS — R73.09 ELEVATED RANDOM BLOOD GLUCOSE LEVEL: ICD-10-CM

## 2023-12-18 DIAGNOSIS — E66.01 CLASS 3 SEVERE OBESITY DUE TO EXCESS CALORIES WITHOUT SERIOUS COMORBIDITY WITH BODY MASS INDEX (BMI) OF 40.0 TO 44.9 IN ADULT: ICD-10-CM

## 2023-12-18 DIAGNOSIS — J30.89 ENVIRONMENTAL AND SEASONAL ALLERGIES: ICD-10-CM

## 2023-12-18 DIAGNOSIS — E78.5 HYPERLIPIDEMIA, UNSPECIFIED HYPERLIPIDEMIA TYPE: ICD-10-CM

## 2023-12-18 DIAGNOSIS — L02.414 ABSCESS OF LEFT ARM: Primary | ICD-10-CM

## 2023-12-18 DIAGNOSIS — I10 PRIMARY HYPERTENSION: ICD-10-CM

## 2023-12-18 NOTE — PATIENT INSTRUCTIONS
Continue current medications and treatment.   Have labs drawn 3-4 days prior to f/u appointment.   Follow up with cardiology, sleep medicine, and general surgery per their recommendations.  Call office for test results if you have not received a call from our office or [employer] message in 1-2 days.

## 2023-12-18 NOTE — ASSESSMENT & PLAN NOTE
Patient's (Body mass index is 42.97 kg/m².) indicates that they are morbidly/severely obese (BMI > 40 or > 35 with obesity - related health condition) with health conditions that include obstructive sleep apnea and hypertension . Weight is unchanged. BMI  is above average; BMI management plan is completed. We discussed portion control and increasing exercise.

## 2023-12-18 NOTE — PROGRESS NOTES
"Subjective        Lulu Graves is a 68 y.o. female who presents to [facility].     Chief Complaint   Patient presents with    Abscess     Elbow (left) One week follow Up     Hypertension     6 month follow up     Hyperlipidemia    Hypothyroidism       History of Present Illness    Patient presents for follow up of abscess and chronic conditions.    Left arm abscess - was seen 12/6/2023 with abscess on left arm, had gone to [facility] [place].  I prescribed doxycycline 100mg po bid x 10 days, referred to general surgery.  Cx was + for MRSA.  She had I&D performed by Dr. Torres on 12/8/2023.  She saw Dr. Torres 12/15/2023 for f/u visit, was advised to continue cleaning area with soap and water then cover with bandage, follow up with him in 2 weeks.  She feels good.  No left arm pain.  No fever.  No red streaks on arm or swelling.   Hypertension - stable - doesn't take blood pressure at home. No chest pain, shortness of breath, lower extremity swelling.  She is taking metoprolol 25 mg 1/2 tablet twice daily. Doesn't follow low sodium diet.  Hyperlipidemia - stable - currently taking simvastatin 20mg po in evening, doesn't follow low cholesterol diet. Last lipid panel 6/2023 with low HDL at 35 but otw unremarkable lipid panel.  Hypothyroidism - stable - takes synthroid 100mcg po daily.  Weight is stable. No heat or cold intolerance.  Would like to lose weight.  Aortic stenosis - stable - previously had aortic valve replacement with bioprosthetic valve.  Takes aspirin 81mg po daily. Followed by Dr. Edmondson. She states she is to have echocardiogram in 3/2024.   Seasonal/environmental allergies - stable - takes claritin 10mg po daily, fluticasone 2 sprays intranasal daily.   Sleep apnea - stable - followed by Dr. Wylie, wears cpap every night.   Osteopenia - stable - last dexa 8/2021 with osteopenia noted, not taking medication.     Pt has not had pap \"in years,\" would like to " "find new gyn.    The following portions of the patient's history were reviewed and updated as appropriate: allergies, current medications, past family history, past medical history, past social history, past surgical history and problem list.    No Known Allergies       Current Outpatient Medications:     aspirin 81 MG tablet, Take 1 tablet by mouth Daily., Disp: , Rfl:     fluticasone (FLONASE) 50 MCG/ACT nasal spray, 2 sprays into the nostril(s) as directed by provider Daily., Disp: 18.2 mL, Rfl: 1    levothyroxine (SYNTHROID, LEVOTHROID) 100 MCG tablet, TAKE 1 TABLET BY MOUTH EVERY DAY, Disp: 90 tablet, Rfl: 1    loratadine (CLARITIN) 10 MG tablet, TAKE 1 TABLET BY MOUTH EVERY DAY, Disp: 30 tablet, Rfl: 0    metoprolol tartrate (LOPRESSOR) 25 MG tablet, TAKE 1/2 TABLET BY MOUTH 2 TIMES A DAY, Disp: 90 tablet, Rfl: 0    PROBIOTIC PRODUCT PO, Take 1 capsule by mouth Daily., Disp: , Rfl:     simvastatin (ZOCOR) 20 MG tablet, TAKE 1 TABLET BY MOUTH IN THE EVENING, Disp: 90 tablet, Rfl: 1    Review of Systems   Constitutional:  Negative for fatigue, fever and unexpected weight change.   HENT:  Negative for dental problem and trouble swallowing.    Respiratory:  Negative for chest tightness, shortness of breath and stridor.    Cardiovascular:  Negative for chest pain, palpitations and leg swelling.   Gastrointestinal:  Negative for abdominal pain, constipation, diarrhea and nausea.   Endocrine: Negative for polydipsia, polyphagia and polyuria.   Skin:  Positive for wound. Negative for pallor and rash.   Neurological:  Negative for dizziness, tremors, seizures, numbness and headaches.        Objective     /77   Pulse 61   Temp 97.2 °F (36.2 °C) (Infrared)   Resp 16   Ht 152.4 cm (60\")   Wt 99.8 kg (220 lb)   SpO2 93%   BMI 42.97 kg/m²         Physical Exam  Vitals and nursing note reviewed.   Constitutional:       Appearance: Normal appearance. She is obese. She is not ill-appearing or diaphoretic.   HENT: "      Head: Normocephalic.      Nose: Nose normal.      Mouth/Throat:      Mouth: Mucous membranes are moist.   Eyes:      General: No scleral icterus.     Pupils: Pupils are equal, round, and reactive to light.   Cardiovascular:      Rate and Rhythm: Normal rate and regular rhythm.      Pulses: Normal pulses.      Heart sounds: Murmur (systolic) heard.   Pulmonary:      Effort: Pulmonary effort is normal. No respiratory distress.      Breath sounds: Normal breath sounds. No wheezing.   Abdominal:      General: Bowel sounds are normal.      Palpations: Abdomen is soft.      Tenderness: There is no abdominal tenderness. There is no guarding.   Musculoskeletal:      Right lower leg: No edema.      Left lower leg: No edema.   Lymphadenopathy:      Cervical: No cervical adenopathy.   Skin:     General: Skin is warm and dry.      Capillary Refill: Capillary refill takes less than 2 seconds.             Comments: Bandage removed - approx 1cm x 0.6cm lesion 0.3cm deep noted distal to left elbow, no warmth or streaks on arms   Neurological:      Mental Status: She is alert and oriented to person, place, and time.   Psychiatric:         Mood and Affect: Mood normal.         Behavior: Behavior normal.           Result Review    The following data was reviewed by: GUILLERMO Curiel on 12/18/2023:  Common labs          6/26/2023    12:35 12/6/2023    10:42 12/6/2023    15:40   Common Labs   Glucose   103    BUN   17    Creatinine   0.74    Sodium   142    Potassium   3.9    Chloride   106    Calcium   9.1    ALT (SGPT) 16      WBC  6.96     Hemoglobin  14.8     Hematocrit  44.8     Platelets  168     Total Cholesterol 128      Triglycerides 150      HDL Cholesterol 35      LDL Cholesterol  67        CMP          6/26/2023    12:35 12/6/2023    15:40   CMP   Glucose  103    BUN  17    Creatinine  0.74    EGFR  88.8    Sodium  142    Potassium  3.9    Chloride  106    Calcium  9.1    ALT (SGPT) 16     BUN/Creatinine Ratio   23.0    Anion Gap  7.0      CBC          12/6/2023    10:42   CBC   WBC 6.96    RBC 5.03    Hemoglobin 14.8    Hematocrit 44.8    MCV 89.1    MCH 29.4    MCHC 33.0    RDW 13.1    Platelets 168      CBC w/diff          12/6/2023    10:42   CBC w/Diff   WBC 6.96    RBC 5.03    Hemoglobin 14.8    Hematocrit 44.8    MCV 89.1    MCH 29.4    MCHC 33.0    RDW 13.1    Platelets 168    Neutrophil Rel % 63.8    Immature Granulocyte Rel % 0.4    Lymphocyte Rel % 22.7    Monocyte Rel % 7.8    Eosinophil Rel % 4.7    Basophil Rel % 0.6      Lipid Panel          6/26/2023    12:35   Lipid Panel   Total Cholesterol 128    Triglycerides 150    HDL Cholesterol 35    VLDL Cholesterol 26    LDL Cholesterol  67    LDL/HDL Ratio 1.80      TSH          6/26/2023    12:35   TSH   TSH 0.562      Electrolytes          12/6/2023    15:40   Electrolytes   Sodium 142    Potassium 3.9    Chloride 106    Calcium 9.1      Renal Profile          12/6/2023    15:40   Renal Profile   BUN 17    Creatinine 0.74      BMP          12/6/2023    15:40   BMP   BUN 17    Creatinine 0.74    Sodium 142    Potassium 3.9    Chloride 106    CO2 29.0    Calcium 9.1            XR ELBOW 2 VW LEFT     Date of Exam: 12/6/2023 10:59 AM EST     Indication: left elbow abscess, swelling. Elbow wound.     Comparison: None available.     Findings:  There is no acute fracture or dislocation. No effusion or periosteal reaction identified. No gross osseous lesion is seen. No soft tissue gas or radiopaque foreign body is identified. There is soft tissue thickening noted over the posterior elbow joint.     IMPRESSION:  Impression:  Soft tissue thickening. Negative for acute bony injury.        Electronically Signed: Ferd Spangler MD    12/6/2023 3:14 PM EST      XR CHEST 2 VW     Date of Exam: 11/16/2023 10:30 AM EST     Indication: bronchitis, cough x 3 weeks     Comparison: 12/6/2013     Findings:  Status post median sternotomy. Cardiac mediastinal contours are within normal  limits. Status post aortic valve repair. Lungs are clear bilaterally. Regional skeleton is unremarkable.     IMPRESSION:  Impression:     1. No acute cardiopulmonary disease.        Electronically Signed: Trent Escalona MD    11/16/2023 1:25 PM EST     MAMMO SCREENING DIGITAL TOMOSYNTHESIS BILATERAL W CAD-     Date of Exam: 1/13/2022 11:02 AM     Indication: Screening     Comparison: 12/17/2020, 11/07/2019, 11/05/2018, 11/6/2017 and 11/3/2016     Technique: MLO and CC views of bilateral breast(s) were obtained  according to routine screening breast mammography protocol with  tomosynthesis.       The mammographic images were interpreted with the assistance of a  computer aided detection system.      FINDINGS:  There are scattered areas of fibroglandular density. There are no  suspicious masses, suspicious microcalcifications, or architectural  distortion in either breast.      IMPRESSION:  No mammographic signs of malignancy. Recommend routine mammographic  screening.     BI-RADS ASSESSMENT: BI-RADS 1. Negative.     The patient's information is entered into a computerized reminder system  with a targeted due date for the next mammogram.     Note:  It has been reported that there is approximately a 15% false  negative in mammography.  Therefore, management of a palpable  abnormality should not be deferred because of a negative mammogram.           Electronically Signed By-Keith Godwin DO On:1/13/2022 2:33 PM    DATE OF EXAM:   8/16/2021 3:06 PM     PROCEDURE:   DEXA BONE DENSITY AXIAL-     INDICATIONS:   osteoporosis; M81.0-Age-related osteoporosis without current  pathological fracture     TECHNIQUE:   Lumbar vertebral and proximal femoral  Dual-Energy X-ray Absorptiometry  (DEXA) was performed on the [employer] [facility].      FINDINGS:   A full detailed summary report from the dexa scan performed is located  in the patients chart in [facility].      According to the World Health Organization criteria, this is  classified  as osteopenia.     Using the FRAX scores, which utilized risk factors and femoral neck bone  density:  The 10 year probability of major osteoporotic fracture is  5.9 %.  The 10 year probability of a hip fracture is  0.2 %.        IMPRESSION:  Findings consistent with osteopenia.     Copies of the computerized summary reports can be obtained from [employer] via  the health information department of [facility].      Electronically Signed By-Fred Chen MD On:8/16/2021 3:31 PM          Assessment & Plan    Diagnoses and all orders for this visit:    1. Abscess of left arm (Primary)  Comments:  +mrsa    2. Primary hypertension  -     Comprehensive Metabolic Panel; Future    3. Hyperlipidemia, unspecified hyperlipidemia type  -     Lipid Panel; Future    4. Acquired hypothyroidism  -     CBC Auto Differential; Future  -     TSH Rfx On Abnormal To Free T4; Future    5. Environmental and seasonal allergies    6. Nonrheumatic aortic valve stenosis    7. LUÍS on CPAP    8. Class 3 severe obesity due to excess calories without serious comorbidity with body mass index (BMI) of 40.0 to 44.9 in adult  Assessment & Plan:  Patient's (Body mass index is 42.97 kg/m².) indicates that they are morbidly/severely obese (BMI > 40 or > 35 with obesity - related health condition) with health conditions that include obstructive sleep apnea and hypertension . Weight is unchanged. BMI  is above average; BMI management plan is completed. We discussed portion control and increasing exercise.     Orders:  -     Ambulatory Referral to Weight Management Program    9. Osteopenia of multiple sites  -     DEXA Bone Density Axial    10. Encounter for screening mammogram for malignant neoplasm of breast  -     Mammo Screening Digital Tomosynthesis Bilateral With CAD; Future    11. Pap smear, as part of routine gynecological examination  -     Ambulatory Referral to Gynecology    12. Elevated random blood glucose level  -     Hemoglobin  A1c; Future       Patient Instructions   Continue current medications and treatment.   Have labs drawn 3-4 days prior to f/u appointment.   Follow up with cardiology, sleep medicine, and general surgery per their recommendations.  Call office for test results if you have not received a call from our office or [employer]bs[employer] message in 1-2 days.          Follow Up   Return in about 6 months (around 6/18/2024) for Annual physical, Recheck, Hypertension, Hypothyroid.    Patient was given instructions and counseling regarding her condition or for health maintenance advice. Please see specific information pulled into the AVS if appropriate.     Sushma Hall, GUILLERMO     12/18/23      Answers submitted by the patient for this visit:  Other (Submitted on 12/11/2023)  Please describe your symptoms.: Follow up on abscess  Have you had these symptoms before?: Yes  How long have you been having these symptoms?: 5-7 days  Please list any medications you are currently taking for this condition.: Doxycycline 100 mg  Please describe any probable cause for these symptoms. : Abscess on elbow area  Primary Reason for Visit (Submitted on 12/11/2023)  What is the primary reason for your visit?: Other

## 2023-12-27 ENCOUNTER — TELEPHONE (OUTPATIENT)
Dept: BARIATRICS/WEIGHT MGMT | Facility: CLINIC | Age: 68
End: 2023-12-27
Payer: MEDICARE

## 2024-01-04 ENCOUNTER — OFFICE VISIT (OUTPATIENT)
Dept: SURGERY | Facility: CLINIC | Age: 69
End: 2024-01-04
Payer: MEDICARE

## 2024-01-04 VITALS
HEART RATE: 61 BPM | DIASTOLIC BLOOD PRESSURE: 73 MMHG | WEIGHT: 219 LBS | BODY MASS INDEX: 43 KG/M2 | TEMPERATURE: 98.6 F | HEIGHT: 60 IN | OXYGEN SATURATION: 97 % | SYSTOLIC BLOOD PRESSURE: 128 MMHG

## 2024-01-04 DIAGNOSIS — Z09 ENCOUNTER FOR FOLLOW-UP: Primary | ICD-10-CM

## 2024-01-04 PROCEDURE — 99024 POSTOP FOLLOW-UP VISIT: CPT | Performed by: SURGERY

## 2024-01-04 NOTE — PROGRESS NOTES
CHIEF COMPLAINT:    Chief Complaint   Patient presents with    Post-op Follow-up     PO follow up I & D abscess left arm 12-8-23       HISTORY OF PRESENT ILLNESS:    Lulu Graves is a 68 y.o. female who underwent drainage of an abscess on the left forearm previously.  She returns today for further follow-up.  She notes no issues or complaints.    EXAM:  Vitals:    01/04/24 0931   BP: 128/73   Pulse: 61   Temp: 98.6 °F (37 °C)   SpO2: 97%         Granulating wound on left forearm    ASSESSMENT:    Status post drainage of abscess on left forearm    PLAN:    Area appears to be healing appropriately.  Continue local wound care.  See me as needed.          This document has been electronically signed by Addy Torres MD on January 4, 2024 10:48 EST

## 2024-01-09 ENCOUNTER — TELEPHONE (OUTPATIENT)
Dept: BARIATRICS/WEIGHT MGMT | Facility: CLINIC | Age: 69
End: 2024-01-09
Payer: MEDICARE

## 2024-01-09 NOTE — TELEPHONE ENCOUNTER
Pt left vm at office stating she needs to reschedule her 1.22.24 appt    Returned call left vm at [phone]

## 2024-01-10 DIAGNOSIS — J30.89 ENVIRONMENTAL AND SEASONAL ALLERGIES: ICD-10-CM

## 2024-01-10 RX ORDER — LORATADINE 10 MG/1
10 TABLET ORAL DAILY
Qty: 30 TABLET | Refills: 0 | Status: SHIPPED | OUTPATIENT
Start: 2024-01-10

## 2024-01-30 RX ORDER — SIMVASTATIN 20 MG
TABLET ORAL
Qty: 90 TABLET | Refills: 1 | Status: SHIPPED | OUTPATIENT
Start: 2024-01-30

## 2024-02-02 NOTE — PROGRESS NOTES
Nutrition Services    Patient Name: Lulu Graves  YOB: 1955  MRN: [id number]  Date of Service: 24      ICD-10-CM ICD-9-CM   1. Obesity, Class III, BMI 40-49.9 (morbid obesity)  E66.01 278.01        NUTRITION ASSESSMENT - BARIATRIC SURGERY      Reason for Visit Weight loss, no hx of surgery     H&P      Past Medical History:   Diagnosis Date    Allergic     Aortic stenosis     Aortic valve replaced 2013    Cataract     GERD (gastroesophageal reflux disease)     Glaucoma 2023    Just started    Heart murmur     Hyperlipidemia     Hypertension     Hypothyroidism     MRSA (methicillin resistant Staphylococcus aureus)     left arm abscess    Obesity     Osteopenia     PAT (paroxysmal atrial tachycardia)     Sleep apnea        Past Surgical History:   Procedure Laterality Date    ABLATION OF DYSRHYTHMIC FOCUS   or     AORTIC VALVE REPAIR/REPLACEMENT      AORTIC VALVE SURGERY  2013    CARDIAC CATHETERIZATION      Before open heart surgery    CARDIAC VALVE REPLACEMENT  2013     SECTION  1982    COLONOSCOPY  2016    INCISION AND DRAINAGE ABSCESS Left 2023    Procedure: INCISION AND DRAINAGE ABSCESS, left arm;  Surgeon: Addy Torres MD;  Location: [facility];  Service: General;  Laterality: Left;    TUBAL ABDOMINAL LIGATION          Previous Goals          Encounter Information        Visit Narrative     Patient interested in weight loss but no surgical intervention as well as no medical intervention. Reviewed 24 hr recall with patient. Patient asking for list of protein and carb foods. Provided weight loss grocery guide. Encouraged patient to plan all meals and snacks with protien carb and color. Patient to add in more water and try some flavored murray to see if it helps her drink more fluids. Patient to look up low impact exercises on youtube and aim to add in 2-3 days each week.     Diet Recall:   Breakfast: cereal or eggs  "sometimes (egg sandwich)  Lunch: orders out when working (antionette ratliff without bread)  Dinner: eats out often with  (shares pizza or sandwiches or mexican food), cooks at home sometimes (chili, spaghetti)  Snacks: no usual snacks   Beverages: protein shakes in morning, decaf coffee, peach tea snapple, starry sodas 2x/day (little cans)    Exercise: no current exercise    Supplements: probiotic, elderberry    Self Monitoring:          Anthropometrics        Current Height, Weight Height: 152.4 cm (60\")  Weight: 100 kg (221 lb 3.2 oz) (02/05/24 1031)            Wt Readings from Last 30 Encounters:   02/05/24 1031 100 kg (221 lb 3.2 oz)   01/04/24 0931 99.3 kg (219 lb)   12/18/23 0948 99.8 kg (220 lb)   12/15/23 0938 99.4 kg (219 lb 3.2 oz)   12/08/23 1128 99.6 kg (219 lb 9.6 oz)   12/06/23 1612 99.8 kg (220 lb)   12/06/23 1401 99.8 kg (220 lb)   12/06/23 0912 99.8 kg (220 lb)   11/16/23 0911 101 kg (222 lb)   10/30/23 1007 99.3 kg (219 lb)   10/10/23 1012 99.3 kg (219 lb)   07/18/23 1113 102 kg (225 lb)   06/26/23 1141 101 kg (222 lb)   03/23/23 1430 102 kg (225 lb)   05/09/22 1008 103 kg (227 lb)   03/24/22 1007 102 kg (225 lb)   03/01/22 0832 101 kg (222 lb)   11/16/21 1140 101 kg (222 lb)   11/08/21 0842 101 kg (222 lb 12.8 oz)   07/09/21 1440 97.2 kg (214 lb 3.2 oz)   03/18/21 1257 90.7 kg (200 lb)   03/18/21 1335 94.3 kg (208 lb)   11/11/20 1400 90.7 kg (200 lb)   10/29/20 0916 93.4 kg (206 lb)   09/10/20 1134 90.6 kg (199 lb 12.8 oz)   03/19/20 1135 88 kg (194 lb)   11/04/19 1346 95 kg (209 lb 6.4 oz)   10/28/19 0950 96.2 kg (212 lb)   03/14/19 1112 97.7 kg (215 lb 8 oz)   10/25/18 1008 100 kg (221 lb 6.4 oz)   07/23/18 1628 99.6 kg (219 lb 8 oz)      BMI kg/m2 Body mass index is 43.2 kg/m².       Nutrition Diagnosis         Nutrition Dx Statement Overweight/obesity RT multifactorial biochemical, behavioral and environmental contributors to disease AEB BMI 43.2 kg/m^2         Nutrition Intervention         " Nutrition Intervention Nutrition education related to diet modification and physical activity        Monitor/Evaluation        New Goals Patient to plan meals with protein, carb and color  Patient to cut back on sodas and increase water intake  Patient to exercise 2-3 days each week for 10-15 minutes        Total time spent with pt 30 minutes of which 30 minutes were spent on education.       Electronically signed by:  Anabelle De La Torre RD  02/05/24 10:33 EST

## 2024-02-05 ENCOUNTER — OFFICE VISIT (OUTPATIENT)
Dept: BARIATRICS/WEIGHT MGMT | Facility: CLINIC | Age: 69
End: 2024-02-05
Payer: MEDICARE

## 2024-02-05 VITALS — WEIGHT: 221.2 LBS | BODY MASS INDEX: 43.43 KG/M2 | HEIGHT: 60 IN

## 2024-02-05 DIAGNOSIS — E66.01 OBESITY, CLASS III, BMI 40-49.9 (MORBID OBESITY): Primary | ICD-10-CM

## 2024-02-05 PROCEDURE — 1160F RVW MEDS BY RX/DR IN RCRD: CPT | Performed by: DIETITIAN, REGISTERED

## 2024-02-05 PROCEDURE — 1159F MED LIST DOCD IN RCRD: CPT | Performed by: DIETITIAN, REGISTERED

## 2024-02-05 PROCEDURE — G0270 MNT SUBS TX FOR CHANGE DX: HCPCS | Performed by: DIETITIAN, REGISTERED

## 2024-02-10 DIAGNOSIS — J30.89 ENVIRONMENTAL AND SEASONAL ALLERGIES: ICD-10-CM

## 2024-02-10 RX ORDER — LORATADINE 10 MG/1
10 TABLET ORAL DAILY
Qty: 30 TABLET | Refills: 11 | Status: SHIPPED | OUTPATIENT
Start: 2024-02-10

## 2024-02-27 NOTE — PROGRESS NOTES
Nutrition Services    Patient Name: Lulu Graves  YOB: 1955  MRN: [id number]  Date of Service: 24      ICD-10-CM ICD-9-CM   1. Obesity, Class III, BMI 40-49.9 (morbid obesity)  E66.01 278.01        NUTRITION ASSESSMENT - BARIATRIC SURGERY      Reason for Visit Weight loss, follow up     H&P      Past Medical History:   Diagnosis Date    Allergic     Aortic stenosis     Aortic valve replaced 2013    Cataract     GERD (gastroesophageal reflux disease)     Glaucoma 2023    Just started    Heart murmur     Hyperlipidemia     Hypertension     Hypothyroidism     MRSA (methicillin resistant Staphylococcus aureus)     left arm abscess    Obesity     Osteopenia     PAT (paroxysmal atrial tachycardia)     Sleep apnea        Past Surgical History:   Procedure Laterality Date    ABLATION OF DYSRHYTHMIC FOCUS   or     AORTIC VALVE REPAIR/REPLACEMENT      AORTIC VALVE SURGERY  2013    CARDIAC CATHETERIZATION      Before open heart surgery    CARDIAC VALVE REPLACEMENT  2013     SECTION  1982    COLONOSCOPY  2016    INCISION AND DRAINAGE ABSCESS Left 2023    Procedure: INCISION AND DRAINAGE ABSCESS, left arm;  Surgeon: Addy Torres MD;  Location: [facility] OR;  Service: General;  Laterality: Left;    TUBAL ABDOMINAL LIGATION          Previous Goals   Patient to plan meals with protein, carb and color - working on  Patient to cut back on sodas and increase water intake - working on  Patient to exercise 2-3 days each week for 10-15 minutes - working on       Encounter Information        Visit Narrative     Patient has been trying to find exercises she enjoys. Patient fond a [employer] channel with exercises she likes and did do 1 video for 30 minutes . Patient to plan on finding more routines on the same channel so she can do different exercises. Patient has been introducing more salads and bought whole wheat pastas to try. Patient reports she  "has been more consciences when picking food while eating out and has been trying to have more balanced meals. Patient has been trying to increase water intake but water flavorings still too sweet. Encouraged patient to try Hint murray to see if that works better for her.     Diet Recall:   Breakfast:  Lunch:  Dinner:  Snacks:  Beverages: trying to increase water intake but flavorings are still too sweet    Exercise: patient has been looking for low impact exercises    Supplements:    Self Monitoring:          Anthropometrics        Current Height, Weight Height: 152.4 cm (60\")  Weight: 100 kg (220 lb 6.4 oz) (03/04/24 1033)            Wt Readings from Last 30 Encounters:   03/04/24 1033 100 kg (220 lb 6.4 oz)   02/05/24 1031 100 kg (221 lb 3.2 oz)   01/04/24 0931 99.3 kg (219 lb)   12/18/23 0948 99.8 kg (220 lb)   12/15/23 0938 99.4 kg (219 lb 3.2 oz)   12/08/23 1128 99.6 kg (219 lb 9.6 oz)   12/06/23 1612 99.8 kg (220 lb)   12/06/23 1401 99.8 kg (220 lb)   12/06/23 0912 99.8 kg (220 lb)   11/16/23 0911 101 kg (222 lb)   10/30/23 1007 99.3 kg (219 lb)   10/10/23 1012 99.3 kg (219 lb)   07/18/23 1113 102 kg (225 lb)   06/26/23 1141 101 kg (222 lb)   03/23/23 1430 102 kg (225 lb)   05/09/22 1008 103 kg (227 lb)   03/24/22 1007 102 kg (225 lb)   03/01/22 0832 101 kg (222 lb)   11/16/21 1140 101 kg (222 lb)   11/08/21 0842 101 kg (222 lb 12.8 oz)   07/09/21 1440 97.2 kg (214 lb 3.2 oz)   03/18/21 1257 90.7 kg (200 lb)   03/18/21 1335 94.3 kg (208 lb)   11/11/20 1400 90.7 kg (200 lb)   10/29/20 0916 93.4 kg (206 lb)   09/10/20 1134 90.6 kg (199 lb 12.8 oz)   03/19/20 1135 88 kg (194 lb)   11/04/19 1346 95 kg (209 lb 6.4 oz)   10/28/19 0950 96.2 kg (212 lb)   03/14/19 1112 97.7 kg (215 lb 8 oz)   10/25/18 1008 100 kg (221 lb 6.4 oz)      BMI kg/m2 Body mass index is 43.04 kg/m².       Nutrition Diagnosis         Nutrition Dx Statement Overweight/obesity RT multifactorial biochemical, behavioral and environmental " contributors to disease AEB BMI 43.04 kg/m^2         Nutrition Intervention         Nutrition Intervention Nutrition education related to diet modification and physical activity        Monitor/Evaluation        New Goals Patient to plan meals with protein, carb and color  Patient to decrease soda intake and increase water  Patient to exercise 1-2 days each week for 30 minutes using [employer] channel videos       Total time spent with pt 15 minutes of which 15 minutes were spent on education.       Electronically signed by:  Anabelle De La Torre RD  03/04/24 10:34 EST

## 2024-02-29 ENCOUNTER — HOSPITAL ENCOUNTER (OUTPATIENT)
Dept: MAMMOGRAPHY | Facility: HOSPITAL | Age: 69
Discharge: HOME | End: 2024-02-29
Payer: MEDICARE

## 2024-02-29 ENCOUNTER — HOSPITAL ENCOUNTER (OUTPATIENT)
Dept: BONE DENSITY | Facility: HOSPITAL | Age: 69
Discharge: HOME | End: 2024-02-29
Payer: MEDICARE

## 2024-02-29 DIAGNOSIS — R92.8 ABNORMAL MAMMOGRAM OF RIGHT BREAST: Primary | ICD-10-CM

## 2024-02-29 DIAGNOSIS — Z12.31 ENCOUNTER FOR SCREENING MAMMOGRAM FOR MALIGNANT NEOPLASM OF BREAST: ICD-10-CM

## 2024-02-29 PROCEDURE — 77063 BREAST TOMOSYNTHESIS BI: CPT

## 2024-02-29 PROCEDURE — 77067 SCR MAMMO BI INCL CAD: CPT

## 2024-02-29 PROCEDURE — 77080 DXA BONE DENSITY AXIAL: CPT

## 2024-03-04 ENCOUNTER — OFFICE VISIT (OUTPATIENT)
Dept: BARIATRICS/WEIGHT MGMT | Facility: CLINIC | Age: 69
End: 2024-03-04
Payer: MEDICARE

## 2024-03-04 VITALS — HEIGHT: 60 IN | BODY MASS INDEX: 43.27 KG/M2 | WEIGHT: 220.4 LBS

## 2024-03-04 DIAGNOSIS — E66.01 OBESITY, CLASS III, BMI 40-49.9 (MORBID OBESITY): Primary | ICD-10-CM

## 2024-03-04 PROCEDURE — 1160F RVW MEDS BY RX/DR IN RCRD: CPT | Performed by: DIETITIAN, REGISTERED

## 2024-03-04 PROCEDURE — 1159F MED LIST DOCD IN RCRD: CPT | Performed by: DIETITIAN, REGISTERED

## 2024-03-04 PROCEDURE — G0270 MNT SUBS TX FOR CHANGE DX: HCPCS | Performed by: DIETITIAN, REGISTERED

## 2024-03-11 RX ORDER — LEVOTHYROXINE SODIUM 0.1 MG/1
TABLET ORAL
Qty: 90 TABLET | Refills: 1 | Status: SHIPPED | OUTPATIENT
Start: 2024-03-11

## 2024-03-14 ENCOUNTER — HOSPITAL ENCOUNTER (OUTPATIENT)
Dept: MAMMOGRAPHY | Facility: HOSPITAL | Age: 69
Discharge: HOME | End: 2024-03-14
Payer: MEDICARE

## 2024-03-14 ENCOUNTER — HOSPITAL ENCOUNTER (OUTPATIENT)
Dept: ULTRASOUND IMAGING | Facility: HOSPITAL | Age: 69
Discharge: HOME | End: 2024-03-14
Payer: MEDICARE

## 2024-03-14 DIAGNOSIS — R92.8 ABNORMAL MAMMOGRAM OF RIGHT BREAST: ICD-10-CM

## 2024-03-14 PROCEDURE — G0279 TOMOSYNTHESIS, MAMMO: HCPCS

## 2024-03-14 PROCEDURE — 76642 ULTRASOUND BREAST LIMITED: CPT

## 2024-03-14 PROCEDURE — 77065 DX MAMMO INCL CAD UNI: CPT

## 2024-03-18 ENCOUNTER — TELEPHONE (OUTPATIENT)
Dept: CARDIOLOGY | Facility: CLINIC | Age: 69
End: 2024-03-18

## 2024-03-18 DIAGNOSIS — I35.9 NONRHEUMATIC AORTIC VALVE DISORDER: Primary | ICD-10-CM

## 2024-03-18 DIAGNOSIS — I35.0 NONRHEUMATIC AORTIC (VALVE) STENOSIS: ICD-10-CM

## 2024-03-18 NOTE — TELEPHONE ENCOUNTER
Please enter new order for echo. Current order expires 3/29/2024 and she is needing to be rescheduled due to echo tech being out. Thank you

## 2024-04-22 ENCOUNTER — TELEPHONE (OUTPATIENT)
Dept: FAMILY MEDICINE CLINIC | Facility: CLINIC | Age: 69
End: 2024-04-22
Payer: MEDICARE

## 2024-04-22 DIAGNOSIS — J01.00 ACUTE NON-RECURRENT MAXILLARY SINUSITIS: ICD-10-CM

## 2024-04-22 RX ORDER — FLUTICASONE PROPIONATE 50 MCG
2 SPRAY, SUSPENSION (ML) NASAL DAILY
Qty: 18.2 ML | Refills: 2 | Status: SHIPPED | OUTPATIENT
Start: 2024-04-22

## 2024-04-29 ENCOUNTER — HOSPITAL ENCOUNTER (OUTPATIENT)
Dept: CARDIOLOGY | Facility: HOSPITAL | Age: 69
Discharge: HOME | End: 2024-04-29
Admitting: INTERNAL MEDICINE
Payer: COMMERCIAL

## 2024-04-29 ENCOUNTER — OFFICE VISIT (OUTPATIENT)
Dept: CARDIOLOGY | Facility: CLINIC | Age: 69
End: 2024-04-29
Payer: MEDICARE

## 2024-04-29 VITALS
HEIGHT: 60 IN | OXYGEN SATURATION: 97 % | BODY MASS INDEX: 43.78 KG/M2 | WEIGHT: 223 LBS | HEART RATE: 70 BPM | SYSTOLIC BLOOD PRESSURE: 129 MMHG | DIASTOLIC BLOOD PRESSURE: 75 MMHG

## 2024-04-29 DIAGNOSIS — I35.0 NONRHEUMATIC AORTIC (VALVE) STENOSIS: ICD-10-CM

## 2024-04-29 DIAGNOSIS — I35.9 NONRHEUMATIC AORTIC VALVE DISORDER: ICD-10-CM

## 2024-04-29 DIAGNOSIS — I35.9 NONRHEUMATIC AORTIC VALVE DISORDER: Primary | ICD-10-CM

## 2024-04-29 DIAGNOSIS — G47.33 OBSTRUCTIVE SLEEP APNEA: ICD-10-CM

## 2024-04-29 DIAGNOSIS — I47.19 PAROXYSMAL ATRIAL TACHYCARDIA: ICD-10-CM

## 2024-04-29 DIAGNOSIS — E78.00 PURE HYPERCHOLESTEROLEMIA: ICD-10-CM

## 2024-04-29 DIAGNOSIS — I10 PRIMARY HYPERTENSION: ICD-10-CM

## 2024-04-29 LAB
BH CV ECHO MEAS - AO MAX PG: 49.4 MMHG
BH CV ECHO MEAS - AO MEAN PG: 31.5 MMHG
BH CV ECHO MEAS - AO ROOT DIAM: 3.3 CM
BH CV ECHO MEAS - AO V2 MAX: 341.4 CM/SEC
BH CV ECHO MEAS - AO V2 VTI: 76.3 CM
BH CV ECHO MEAS - AVA(I,D): 1.2 CM2
BH CV ECHO MEAS - EDV(CUBED): 77.3 ML
BH CV ECHO MEAS - EF(MOD-BP): 60 %
BH CV ECHO MEAS - ESV(CUBED): 26.8 ML
BH CV ECHO MEAS - FS: 29.8 %
BH CV ECHO MEAS - IVS/LVPW: 0.96 CM
BH CV ECHO MEAS - IVSD: 1.13 CM
BH CV ECHO MEAS - LA DIMENSION: 3.7 CM
BH CV ECHO MEAS - LV MASS(C)D: 171.9 GRAMS
BH CV ECHO MEAS - LV MAX PG: 4.9 MMHG
BH CV ECHO MEAS - LV MEAN PG: 2.8 MMHG
BH CV ECHO MEAS - LV V1 MAX: 110.5 CM/SEC
BH CV ECHO MEAS - LV V1 VTI: 28.5 CM
BH CV ECHO MEAS - LVIDD: 4.3 CM
BH CV ECHO MEAS - LVIDS: 3 CM
BH CV ECHO MEAS - LVOT AREA: 3.2 CM2
BH CV ECHO MEAS - LVOT DIAM: 2.02 CM
BH CV ECHO MEAS - LVPWD: 1.18 CM
BH CV ECHO MEAS - MV A MAX VEL: 140.3 CM/SEC
BH CV ECHO MEAS - MV DEC SLOPE: 306.7 CM/SEC2
BH CV ECHO MEAS - MV DEC TIME: 0.3 SEC
BH CV ECHO MEAS - MV E MAX VEL: 91.2 CM/SEC
BH CV ECHO MEAS - MV E/A: 0.65
BH CV ECHO MEAS - MV MAX PG: 7.5 MMHG
BH CV ECHO MEAS - MV MEAN PG: 2.5 MMHG
BH CV ECHO MEAS - MV V2 VTI: 35.7 CM
BH CV ECHO MEAS - MVA(VTI): 2.6 CM2
BH CV ECHO MEAS - PA ACC TIME: 0.07 SEC
BH CV ECHO MEAS - PULM A REVS DUR: 0.09 SEC
BH CV ECHO MEAS - PULM A REVS VEL: 26 CM/SEC
BH CV ECHO MEAS - PULM DIAS VEL: 33.5 CM/SEC
BH CV ECHO MEAS - PULM S/D: 1.58
BH CV ECHO MEAS - PULM SYS VEL: 52.8 CM/SEC
BH CV ECHO MEAS - RAP SYSTOLE: 3 MMHG
BH CV ECHO MEAS - RV MAX PG: 5.5 MMHG
BH CV ECHO MEAS - RV V1 MAX: 116.9 CM/SEC
BH CV ECHO MEAS - RV V1 VTI: 19 CM
BH CV ECHO MEAS - RVSP: 28.5 MMHG
BH CV ECHO MEAS - SV(LVOT): 91.6 ML
BH CV ECHO MEAS - TR MAX PG: 25.5 MMHG
BH CV ECHO MEAS - TR MAX VEL: 252.5 CM/SEC

## 2024-04-29 PROCEDURE — 3078F DIAST BP <80 MM HG: CPT | Performed by: INTERNAL MEDICINE

## 2024-04-29 PROCEDURE — 1160F RVW MEDS BY RX/DR IN RCRD: CPT | Performed by: INTERNAL MEDICINE

## 2024-04-29 PROCEDURE — 93306 TTE W/DOPPLER COMPLETE: CPT | Performed by: INTERNAL MEDICINE

## 2024-04-29 PROCEDURE — 99214 OFFICE O/P EST MOD 30 MIN: CPT | Performed by: INTERNAL MEDICINE

## 2024-04-29 PROCEDURE — 3074F SYST BP LT 130 MM HG: CPT | Performed by: INTERNAL MEDICINE

## 2024-04-29 PROCEDURE — 1159F MED LIST DOCD IN RCRD: CPT | Performed by: INTERNAL MEDICINE

## 2024-04-29 PROCEDURE — 93306 TTE W/DOPPLER COMPLETE: CPT

## 2024-04-29 NOTE — PROGRESS NOTES
"    Subjective:     Encounter Date:2024      Patient ID: Lulu Graves is a 69 y.o. female.    Chief Complaint:  History of Present Illness 69-year-old white female with history of aortic valve disease status post surgery hypertension hyperlipidemia and sleep apnea presents to office for follow-up.  Patient is currently stable without any symptoms of chest pain or shortness of breath at rest or exertion.  No complaint of any PND orthopnea.  No palpitation dizziness syncope or swelling of the feet.  Patient is taking all her medicines regularly.  Patient does not smoke.    The following portions of the patient's history were reviewed and updated as appropriate: allergies, current medications, past family history, past medical history, past social history, past surgical history, and problem list.  Past Medical History:   Diagnosis Date    Allergic     Aortic stenosis     Aortic valve replaced 2013    Cataract     GERD (gastroesophageal reflux disease)     Glaucoma 2023    Just started    Heart murmur     Hyperlipidemia     Hypertension     Hypothyroidism     MRSA (methicillin resistant Staphylococcus aureus)     left arm abscess    Obesity     Osteopenia     PAT (paroxysmal atrial tachycardia)     Sleep apnea      Past Surgical History:   Procedure Laterality Date    ABLATION OF DYSRHYTHMIC FOCUS   or     AORTIC VALVE REPAIR/REPLACEMENT      AORTIC VALVE SURGERY  2013    CARDIAC CATHETERIZATION      Before open heart surgery    CARDIAC VALVE REPLACEMENT  2013     SECTION  1982    COLONOSCOPY  2016    INCISION AND DRAINAGE ABSCESS Left 2023    Procedure: INCISION AND DRAINAGE ABSCESS, left arm;  Surgeon: Addy Torres MD;  Location: [facility];  Service: General;  Laterality: Left;    TUBAL ABDOMINAL LIGATION       /75   Pulse 70   Ht 152.4 cm (60\")   Wt 101 kg (223 lb)   SpO2 97%   BMI 43.55 kg/m²   Family History   Problem " Relation Age of Onset    Arthritis Mother     Other Father         Had Parkinsens and Alzheimers    Diabetes Brother     Hearing loss Brother     Stroke Paternal Grandmother     Sleep apnea Neg Hx        Current Outpatient Medications:     aspirin 81 MG tablet, Take 1 tablet by mouth Daily., Disp: , Rfl:     fluticasone (FLONASE) 50 MCG/ACT nasal spray, 2 sprays into the nostril(s) as directed by provider Daily., Disp: 18.2 mL, Rfl: 2    levothyroxine (SYNTHROID, LEVOTHROID) 100 MCG tablet, TAKE 1 TABLET BY MOUTH EVERY DAY, Disp: 90 tablet, Rfl: 1    loratadine (CLARITIN) 10 MG tablet, TAKE 1 TABLET BY MOUTH EVERY DAY, Disp: 30 tablet, Rfl: 11    metoprolol tartrate (LOPRESSOR) 25 MG tablet, TAKE 1/2 TABLET BY MOUTH 2 TIMES A DAY, Disp: 90 tablet, Rfl: 0    PROBIOTIC PRODUCT PO, Take 1 capsule by mouth Daily., Disp: , Rfl:     simvastatin (ZOCOR) 20 MG tablet, TAKE 1 TABLET BY MOUTH IN THE EVENING, Disp: 90 tablet, Rfl: 1  No Known Allergies  Social History     Socioeconomic History    Marital status:    Tobacco Use    Smoking status: Never     Passive exposure: Past    Smokeless tobacco: Never   Vaping Use    Vaping status: Never Used   Substance and Sexual Activity    Alcohol use: No    Drug use: No    Sexual activity: Yes     Partners: Male     Birth control/protection: None     Review of Systems   Constitutional: Negative for malaise/fatigue.   Cardiovascular:  Negative for chest pain, dyspnea on exertion, leg swelling and palpitations.   Respiratory:  Negative for cough and shortness of breath.    Gastrointestinal:  Negative for abdominal pain, nausea and vomiting.   Neurological:  Negative for dizziness, focal weakness, headaches, light-headedness and numbness.   All other systems reviewed and are negative.             Objective:     Constitutional:       Appearance: Well-developed.   Eyes:      General: No scleral icterus.     Conjunctiva/sclera: Conjunctivae normal.   HENT:      Head: Normocephalic  and atraumatic.   Neck:      Vascular: No carotid bruit or JVD.   Pulmonary:      Effort: Pulmonary effort is normal.      Breath sounds: Normal breath sounds. No wheezing. No rales.   Cardiovascular:      Normal rate. Regular rhythm.   Pulses:     Intact distal pulses.   Abdominal:      General: Bowel sounds are normal.      Palpations: Abdomen is soft.   Musculoskeletal:      Cervical back: Normal range of motion and neck supple. Skin:     General: Skin is warm and dry.      Findings: No rash.   Neurological:      Mental Status: Alert.       Procedures    Lab Review:         MDM    Aortic stenosis  Patient had severe aortic stenosis status post aortic valve replacement with bioprosthetic valve and is currently stable with normal function and has residual mild to moderate aortic stenosis by echocardiogram today.    Hypertension  Patient blood pressure currently stable on metoprolol    Hyperlipidemia  Patient is on simvastatin and the lipid levels are followed by the primary care doctor    Sleep apnea  Patient is sleep apnea and uses a CPAP machine    Paroxysmal atrial tachycardia  Patient is currently on beta-blockers and is asymptomatic at this time    Patient's previous medical records, labs, and EKG were reviewed and discussed with the patient at today's visit.

## 2024-05-02 ENCOUNTER — OFFICE VISIT (OUTPATIENT)
Dept: FAMILY MEDICINE CLINIC | Facility: CLINIC | Age: 69
End: 2024-05-02
Payer: MEDICARE

## 2024-05-02 ENCOUNTER — LAB (OUTPATIENT)
Dept: LAB | Facility: HOSPITAL | Age: 69
End: 2024-05-02
Payer: MEDICARE

## 2024-05-02 VITALS
RESPIRATION RATE: 16 BRPM | OXYGEN SATURATION: 97 % | TEMPERATURE: 98.4 F | BODY MASS INDEX: 43.7 KG/M2 | HEIGHT: 60 IN | WEIGHT: 222.6 LBS | DIASTOLIC BLOOD PRESSURE: 74 MMHG | SYSTOLIC BLOOD PRESSURE: 115 MMHG | HEART RATE: 58 BPM

## 2024-05-02 DIAGNOSIS — Z86.14 HISTORY OF MRSA INFECTION: ICD-10-CM

## 2024-05-02 DIAGNOSIS — L02.211 ABSCESS OF SKIN OF ABDOMEN: ICD-10-CM

## 2024-05-02 DIAGNOSIS — L02.211 ABSCESS OF SKIN OF ABDOMEN: Primary | ICD-10-CM

## 2024-05-02 PROCEDURE — 3078F DIAST BP <80 MM HG: CPT | Performed by: NURSE PRACTITIONER

## 2024-05-02 PROCEDURE — 87070 CULTURE OTHR SPECIMN AEROBIC: CPT

## 2024-05-02 PROCEDURE — 3074F SYST BP LT 130 MM HG: CPT | Performed by: NURSE PRACTITIONER

## 2024-05-02 PROCEDURE — 87186 SC STD MICRODIL/AGAR DIL: CPT

## 2024-05-02 PROCEDURE — 87147 CULTURE TYPE IMMUNOLOGIC: CPT

## 2024-05-02 PROCEDURE — 87205 SMEAR GRAM STAIN: CPT

## 2024-05-02 PROCEDURE — 99214 OFFICE O/P EST MOD 30 MIN: CPT | Performed by: NURSE PRACTITIONER

## 2024-05-02 RX ORDER — CHLORHEXIDINE GLUCONATE 40 MG/ML
1 SOLUTION TOPICAL WEEKLY
COMMUNITY

## 2024-05-02 RX ORDER — SULFAMETHOXAZOLE AND TRIMETHOPRIM 800; 160 MG/1; MG/1
1 TABLET ORAL 2 TIMES DAILY
Qty: 14 TABLET | Refills: 0 | Status: SHIPPED | OUTPATIENT
Start: 2024-05-02

## 2024-05-02 NOTE — PROGRESS NOTES
Subjective        Lulu Graves is a 69 y.o. female who presents to [facility].     Chief Complaint   Patient presents with    Abscess     Right breast, pt states it has popped and bled.   1 week, started as a white spot  Monthly occurrence. Site is hard to touch surrounding area.       History of Present Illness    Answers submitted by the patient for this visit:  Other (Submitted on 4/29/2024)  Please describe your symptoms.: Abscess  Have you had these symptoms before?: Yes  How long have you been having these symptoms?: 1-4 days  Primary Reason for Visit (Submitted on 4/29/2024)  What is the primary reason for your visit?: Other    Patient presents with c/o abscess below right breast.    Right upper abdomen abscess - this episode began about a week ago, started looking like a pimple, is on right upper abdomen below bra line.  It popped yesterday and drained bloody liquid.  No fever.  She has been applying mupirocin ointment that she had left over from prior abscesses, would culture of left arm previously + for MRSA.  Tylenol otc helps with the pain/soreness of the area.  In the past she has taken doxycycline for the abscesses with good results.  She has also been prescribed augmentin, can't say if this helps.  The area is firm. She denies fever, myalgia, nausea, vomiting, diarrhea.  She has not seen dermatology previously but reports having numerous episodes in past of similar skin lesions on her left arm, abdomen, and thigh.  She takes hibiclens shower weekly but isn't sure this helps.  Her son also gets similar skin lesions.      The following portions of the patient's history were reviewed and updated as appropriate: allergies, current medications, past family history, past medical history, past social history, past surgical history and problem list.    No Known Allergies       Current Outpatient Medications:     aspirin 81 MG tablet, Take 1 tablet by mouth Daily., Disp: , Rfl:      "Chlorhexidine Gluconate (Hibiclens) 4 % solution, Apply 1 Application topically 1 (One) Time Per Week. Chlorhexadine with shower once weekly, Disp: , Rfl:     Elderberry-Vitamin C-Zinc ([employer] GUMMY PO), Take 500 mg by mouth Daily., Disp: , Rfl:     fluticasone (FLONASE) 50 MCG/ACT nasal spray, 2 sprays into the nostril(s) as directed by provider Daily., Disp: 18.2 mL, Rfl: 2    levothyroxine (SYNTHROID, LEVOTHROID) 100 MCG tablet, TAKE 1 TABLET BY MOUTH EVERY DAY, Disp: 90 tablet, Rfl: 1    loratadine (CLARITIN) 10 MG tablet, TAKE 1 TABLET BY MOUTH EVERY DAY, Disp: 30 tablet, Rfl: 11    metoprolol tartrate (LOPRESSOR) 25 MG tablet, TAKE 1/2 TABLET BY MOUTH 2 TIMES A DAY, Disp: 90 tablet, Rfl: 0    multivitamin with minerals (MULTIVITAMIN ADULT PO), Take 1 tablet by mouth Daily., Disp: , Rfl:     mupirocin (BACTROBAN) 2 % ointment, Apply 1 Application topically to the appropriate area as directed 3 (Three) Times a Day., Disp: , Rfl:     PROBIOTIC PRODUCT PO, Take 1 capsule by mouth Daily., Disp: , Rfl:     simvastatin (ZOCOR) 20 MG tablet, TAKE 1 TABLET BY MOUTH IN THE EVENING, Disp: 90 tablet, Rfl: 1    sulfamethoxazole-trimethoprim (Bactrim DS) 800-160 MG per tablet, Take 1 tablet by mouth 2 (Two) Times a Day., Disp: 14 tablet, Rfl: 0    Review of Systems   Constitutional:  Negative for fever and unexpected weight change.   HENT:  Negative for trouble swallowing.    Respiratory:  Negative for cough, shortness of breath, wheezing and stridor.    Cardiovascular:  Negative for chest pain and palpitations.   Gastrointestinal:  Negative for abdominal pain, nausea and vomiting.   Skin:  Positive for wound. Negative for color change.   Neurological:  Negative for dizziness, seizures and syncope.        Objective     /74 (BP Location: Right arm, Patient Position: Sitting, Cuff Size: Adult)   Pulse 58   Temp 98.4 °F (36.9 °C) (Oral)   Resp 16   Ht 152.4 cm (60\")   Wt 101 kg (222 lb 9.6 oz)   " SpO2 97%   BMI 43.47 kg/m²         Physical Exam  Vitals and nursing note reviewed.   Constitutional:       General: She is not in acute distress.     Appearance: Normal appearance. She is obese. She is not ill-appearing or diaphoretic.   HENT:      Head: Normocephalic and atraumatic.      Mouth/Throat:      Mouth: Mucous membranes are moist.   Eyes:      General: No scleral icterus.     Conjunctiva/sclera: Conjunctivae normal.   Cardiovascular:      Rate and Rhythm: Regular rhythm. Bradycardia present.      Heart sounds: Murmur (grade 3 holosystolic) heard.   Pulmonary:      Effort: Pulmonary effort is normal. No respiratory distress.      Breath sounds: Normal breath sounds. No wheezing or rales.   Abdominal:      General: Bowel sounds are normal. There is no distension.      Palpations: Abdomen is soft.      Tenderness: There is no abdominal tenderness. There is no guarding.   Musculoskeletal:      Right lower leg: No edema.      Left lower leg: No edema.   Skin:     General: Skin is warm and dry.      Capillary Refill: Capillary refill takes less than 2 seconds.      Coloration: Skin is not jaundiced.      Findings: No bruising.             Comments: Right upper abdomen area of firmness approximately 1.5cm in diameter, round, small 1-2mm open area center of firmness, very scant amount of serosanguinous drainage, no fluctuance, no odor   Neurological:      Mental Status: She is alert and oriented to person, place, and time.      Gait: Gait normal.   Psychiatric:         Mood and Affect: Mood normal.         Behavior: Behavior normal.         Thought Content: Thought content normal.         Judgment: Judgment normal.         PHQ-2 Depression Screening  Little interest or pleasure in doing things? 0-->not at all   Feeling down, depressed, or hopeless? 0-->not at all   PHQ-2 Total Score 0      Result Review    The following data was reviewed by: GUILLERMO Curiel on 05/02/2024:       ntains abnormal data Wound  Culture - Wound, Elbow, Left  Order: [id number]  Status: Final result       Visible to patient: Yes (seen)       Next appt: 06/10/2024 at 10:30 AM in [facility] ([address])       Dx: Elbow wound, left, initial encounter    Specimen Information: Elbow, Left; Wound   0 Result Notes  Wound Culture   Lab   Scant growth (1+) Staphylococcus aureus, MRSA Abnormal  [facility]     Methicillin resistant Staphylococcus aureus, Patient may be an isolation risk.            Gram Stain  Lab   Few (2+) WBCs per low power field [facility]      No organisms seen [facility]              Susceptibility     Staphylococcus aureus, MRSA     SAM     Clindamycin 0.25 ug/ml Resistant     Erythromycin >=8 ug/ml Resistant     Inducible Clindamycin Resistance POS ug/ml Positive     Oxacillin >=4 ug/ml Resistant     Rifampin <=0.5 ug/ml Susceptible     Tetracycline <=1 ug/ml Susceptible     Trimethoprim + Sulfamethoxazole <=10 ug/ml Susceptible     Vancomycin <=0.5 ug/ml Susceptible               Linear View     Susceptibility Comments    Staphylococcus aureus, MRSA   This isolate is presumed to be clindamycin resistant based on detection of inducible clindamycin resistance.  Clindamycin may still be effective in some patients.                    Assessment & Plan    Diagnoses and all orders for this visit:    1. Abscess of skin of abdomen (Primary)  -     Cancel: Ambulatory Referral to Dermatology  -     Wound Culture - Wound, Abdominal Wall; Future  -     Ambulatory Referral to Dermatology  -     sulfamethoxazole-trimethoprim (Bactrim DS) 800-160 MG per tablet; Take 1 tablet by mouth 2 (Two) Times a Day.  Dispense: 14 tablet; Refill: 0    2. History of MRSA infection  -     Cancel: Ambulatory Referral to Dermatology  -     Wound Culture - Wound, Abdominal Wall; Future  -     Ambulatory Referral to Dermatology  -     sulfamethoxazole-trimethoprim (Bactrim DS) 800-160 MG per tablet; Take 1 tablet by mouth 2 (Two) Times a Day.   Dispense: 14 tablet; Refill: 0       Patient Instructions   Call office for lab results if you have not received a call from our office or [employer] message in 1-2 days.    If you develop fever, chills please go to er or contact clinic.  May need to see general surgery.   Continue current medications and treatment.   Wash right upper abdomen with dial soap 2-3x daily, apply mupirocin and a dry bandage.  Avoid touching with your bare hands, use disposable gloves if possible.  The area is not fluctuant at this time.      Follow Up   Return in about 5 days (around 5/7/2024) for Recheck abdominal wound.    Patient was given instructions and counseling regarding her condition or for health maintenance advice. Please see specific information pulled into the AVS if appropriate.     Sushma Hall, GUILLERMO     05/02/24

## 2024-05-04 LAB
BACTERIA SPEC AEROBE CULT: ABNORMAL
GRAM STN SPEC: ABNORMAL
GRAM STN SPEC: ABNORMAL

## 2024-05-06 ENCOUNTER — OFFICE VISIT (OUTPATIENT)
Dept: FAMILY MEDICINE CLINIC | Facility: CLINIC | Age: 69
End: 2024-05-06
Payer: MEDICARE

## 2024-05-06 ENCOUNTER — TELEPHONE (OUTPATIENT)
Dept: FAMILY MEDICINE CLINIC | Facility: CLINIC | Age: 69
End: 2024-05-06

## 2024-05-06 ENCOUNTER — OFFICE VISIT (OUTPATIENT)
Dept: SURGERY | Facility: CLINIC | Age: 69
End: 2024-05-06
Payer: MEDICARE

## 2024-05-06 VITALS
HEIGHT: 60 IN | OXYGEN SATURATION: 96 % | BODY MASS INDEX: 42.92 KG/M2 | WEIGHT: 218.6 LBS | SYSTOLIC BLOOD PRESSURE: 132 MMHG | RESPIRATION RATE: 16 BRPM | TEMPERATURE: 98.1 F | HEART RATE: 78 BPM | DIASTOLIC BLOOD PRESSURE: 82 MMHG

## 2024-05-06 VITALS
TEMPERATURE: 98.6 F | HEIGHT: 60 IN | OXYGEN SATURATION: 96 % | BODY MASS INDEX: 42.8 KG/M2 | WEIGHT: 218 LBS | HEART RATE: 74 BPM | DIASTOLIC BLOOD PRESSURE: 70 MMHG | SYSTOLIC BLOOD PRESSURE: 139 MMHG

## 2024-05-06 DIAGNOSIS — Z86.14 HISTORY OF MRSA INFECTION: ICD-10-CM

## 2024-05-06 DIAGNOSIS — L72.3 INFECTED SEBACEOUS CYST OF SKIN: Primary | ICD-10-CM

## 2024-05-06 DIAGNOSIS — L02.211 ABSCESS OF SKIN OF ABDOMEN: Primary | ICD-10-CM

## 2024-05-06 DIAGNOSIS — L08.9 INFECTED SEBACEOUS CYST OF SKIN: Primary | ICD-10-CM

## 2024-05-06 PROCEDURE — 1159F MED LIST DOCD IN RCRD: CPT | Performed by: NURSE PRACTITIONER

## 2024-05-06 PROCEDURE — 3079F DIAST BP 80-89 MM HG: CPT | Performed by: NURSE PRACTITIONER

## 2024-05-06 PROCEDURE — 3075F SYST BP GE 130 - 139MM HG: CPT | Performed by: SURGERY

## 2024-05-06 PROCEDURE — 1159F MED LIST DOCD IN RCRD: CPT | Performed by: SURGERY

## 2024-05-06 PROCEDURE — 99203 OFFICE O/P NEW LOW 30 MIN: CPT | Performed by: SURGERY

## 2024-05-06 PROCEDURE — 1160F RVW MEDS BY RX/DR IN RCRD: CPT | Performed by: NURSE PRACTITIONER

## 2024-05-06 PROCEDURE — 3075F SYST BP GE 130 - 139MM HG: CPT | Performed by: NURSE PRACTITIONER

## 2024-05-06 PROCEDURE — 99213 OFFICE O/P EST LOW 20 MIN: CPT | Performed by: NURSE PRACTITIONER

## 2024-05-06 PROCEDURE — 3078F DIAST BP <80 MM HG: CPT | Performed by: SURGERY

## 2024-05-06 PROCEDURE — 1160F RVW MEDS BY RX/DR IN RCRD: CPT | Performed by: SURGERY

## 2024-05-09 ENCOUNTER — OFFICE VISIT (OUTPATIENT)
Dept: SURGERY | Facility: CLINIC | Age: 69
End: 2024-05-09
Payer: MEDICARE

## 2024-05-09 VITALS
HEIGHT: 60 IN | TEMPERATURE: 98.6 F | WEIGHT: 219 LBS | SYSTOLIC BLOOD PRESSURE: 116 MMHG | BODY MASS INDEX: 43 KG/M2 | OXYGEN SATURATION: 97 % | HEART RATE: 69 BPM | DIASTOLIC BLOOD PRESSURE: 57 MMHG

## 2024-05-09 DIAGNOSIS — L08.9 INFECTED CYST OF SKIN: Primary | ICD-10-CM

## 2024-05-09 DIAGNOSIS — L72.9 INFECTED CYST OF SKIN: Primary | ICD-10-CM

## 2024-05-09 PROCEDURE — 3078F DIAST BP <80 MM HG: CPT | Performed by: SURGERY

## 2024-05-09 PROCEDURE — 1159F MED LIST DOCD IN RCRD: CPT | Performed by: SURGERY

## 2024-05-09 PROCEDURE — 1160F RVW MEDS BY RX/DR IN RCRD: CPT | Performed by: SURGERY

## 2024-05-09 PROCEDURE — 3074F SYST BP LT 130 MM HG: CPT | Performed by: SURGERY

## 2024-05-09 PROCEDURE — 99213 OFFICE O/P EST LOW 20 MIN: CPT | Performed by: SURGERY

## 2024-05-09 RX ORDER — SULFAMETHOXAZOLE AND TRIMETHOPRIM 800; 160 MG/1; MG/1
1 TABLET ORAL 2 TIMES DAILY
Qty: 14 TABLET | Refills: 0 | Status: SHIPPED | OUTPATIENT
Start: 2024-05-09 | End: 2024-05-16

## 2024-05-20 ENCOUNTER — OFFICE VISIT (OUTPATIENT)
Dept: SURGERY | Facility: CLINIC | Age: 69
End: 2024-05-20
Payer: MEDICARE

## 2024-05-20 VITALS
OXYGEN SATURATION: 97 % | WEIGHT: 220 LBS | BODY MASS INDEX: 43.19 KG/M2 | HEART RATE: 66 BPM | HEIGHT: 60 IN | DIASTOLIC BLOOD PRESSURE: 69 MMHG | SYSTOLIC BLOOD PRESSURE: 109 MMHG | TEMPERATURE: 98.6 F

## 2024-05-20 DIAGNOSIS — L72.3 SEBACEOUS CYST: Primary | ICD-10-CM

## 2024-05-20 PROCEDURE — 1160F RVW MEDS BY RX/DR IN RCRD: CPT | Performed by: SURGERY

## 2024-05-20 PROCEDURE — 3074F SYST BP LT 130 MM HG: CPT | Performed by: SURGERY

## 2024-05-20 PROCEDURE — 99213 OFFICE O/P EST LOW 20 MIN: CPT | Performed by: SURGERY

## 2024-05-20 PROCEDURE — 3078F DIAST BP <80 MM HG: CPT | Performed by: SURGERY

## 2024-05-20 PROCEDURE — 1159F MED LIST DOCD IN RCRD: CPT | Performed by: SURGERY

## 2024-05-20 NOTE — PROGRESS NOTES
CHIEF COMPLAINT:    Chief Complaint   Patient presents with    Abscess     Follow up abdominal abscess        HISTORY OF PRESENT ILLNESS:    Lulu Graves is a 69 y.o. female who was previously seen for inflamed sebaceous cyst in the right upper quadrant of the abdominal region.  She was placed on oral antibiotics for this.  She returns today for continued follow-up.  She states that the area has nearly completely resolved at this point.    EXAM:  Vitals:    05/20/24 0912   BP: 109/69   Pulse: 66   Temp: 98.6 °F (37 °C)   SpO2: 97%         Small residual cyst right upper quadrant abdominal wall    ASSESSMENT:    Previously inflamed sebaceous cyst right upper quadrant abdominal wall    PLAN:    Currently she does not desire any further intervention on the area.  I believe that is reasonable.  I did caution her that the area could become inflamed again.  She likely still has a cyst in this region.  She will call or return if she has any problems with the site.          This document has been electronically signed by Addy Torres MD on May 20, 2024 09:22 EDT

## 2024-06-14 ENCOUNTER — LAB (OUTPATIENT)
Dept: LAB | Facility: HOSPITAL | Age: 69
End: 2024-06-14
Payer: MEDICARE

## 2024-06-14 DIAGNOSIS — E03.9 ACQUIRED HYPOTHYROIDISM: ICD-10-CM

## 2024-06-14 DIAGNOSIS — E78.5 HYPERLIPIDEMIA, UNSPECIFIED HYPERLIPIDEMIA TYPE: ICD-10-CM

## 2024-06-14 DIAGNOSIS — I10 PRIMARY HYPERTENSION: ICD-10-CM

## 2024-06-14 DIAGNOSIS — R73.09 ELEVATED RANDOM BLOOD GLUCOSE LEVEL: ICD-10-CM

## 2024-06-14 LAB
ALBUMIN SERPL-MCNC: 4 G/DL (ref 3.5–5.2)
ALBUMIN/GLOB SERPL: 1.3 G/DL
ALP SERPL-CCNC: 108 U/L (ref 39–117)
ALT SERPL W P-5'-P-CCNC: 15 U/L (ref 1–33)
ANION GAP SERPL CALCULATED.3IONS-SCNC: 7 MMOL/L (ref 5–15)
AST SERPL-CCNC: 19 U/L (ref 1–32)
BASOPHILS # BLD AUTO: 0.06 10*3/MM3 (ref 0–0.2)
BASOPHILS NFR BLD AUTO: 0.8 % (ref 0–1.5)
BILIRUB SERPL-MCNC: 0.4 MG/DL (ref 0–1.2)
BUN SERPL-MCNC: 16 MG/DL (ref 8–23)
BUN/CREAT SERPL: 22.2 (ref 7–25)
CALCIUM SPEC-SCNC: 9 MG/DL (ref 8.6–10.5)
CHLORIDE SERPL-SCNC: 106 MMOL/L (ref 98–107)
CHOLEST SERPL-MCNC: 132 MG/DL (ref 0–200)
CO2 SERPL-SCNC: 30 MMOL/L (ref 22–29)
CREAT SERPL-MCNC: 0.72 MG/DL (ref 0.57–1)
DEPRECATED RDW RBC AUTO: 42.6 FL (ref 37–54)
EGFRCR SERPLBLD CKD-EPI 2021: 91.2 ML/MIN/1.73
EOSINOPHIL # BLD AUTO: 0.32 10*3/MM3 (ref 0–0.4)
EOSINOPHIL NFR BLD AUTO: 4.1 % (ref 0.3–6.2)
ERYTHROCYTE [DISTWIDTH] IN BLOOD BY AUTOMATED COUNT: 13.1 % (ref 12.3–15.4)
GLOBULIN UR ELPH-MCNC: 3 GM/DL
GLUCOSE SERPL-MCNC: 112 MG/DL (ref 65–99)
HBA1C MFR BLD: 5.9 % (ref 4.8–5.6)
HCT VFR BLD AUTO: 47.8 % (ref 34–46.6)
HDLC SERPL-MCNC: 38 MG/DL (ref 40–60)
HGB BLD-MCNC: 15.8 G/DL (ref 12–15.9)
IMM GRANULOCYTES # BLD AUTO: 0.03 10*3/MM3 (ref 0–0.05)
IMM GRANULOCYTES NFR BLD AUTO: 0.4 % (ref 0–0.5)
LDLC SERPL CALC-MCNC: 67 MG/DL (ref 0–100)
LDLC/HDLC SERPL: 1.63 {RATIO}
LYMPHOCYTES # BLD AUTO: 2.32 10*3/MM3 (ref 0.7–3.1)
LYMPHOCYTES NFR BLD AUTO: 29.9 % (ref 19.6–45.3)
MCH RBC QN AUTO: 29.6 PG (ref 26.6–33)
MCHC RBC AUTO-ENTMCNC: 33.1 G/DL (ref 31.5–35.7)
MCV RBC AUTO: 89.5 FL (ref 79–97)
MONOCYTES # BLD AUTO: 0.65 10*3/MM3 (ref 0.1–0.9)
MONOCYTES NFR BLD AUTO: 8.4 % (ref 5–12)
NEUTROPHILS NFR BLD AUTO: 4.37 10*3/MM3 (ref 1.7–7)
NEUTROPHILS NFR BLD AUTO: 56.4 % (ref 42.7–76)
NRBC BLD AUTO-RTO: 0 /100 WBC (ref 0–0.2)
PLATELET # BLD AUTO: 159 10*3/MM3 (ref 140–450)
PMV BLD AUTO: 12.4 FL (ref 6–12)
POTASSIUM SERPL-SCNC: 4.4 MMOL/L (ref 3.5–5.2)
PROT SERPL-MCNC: 7 G/DL (ref 6–8.5)
RBC # BLD AUTO: 5.34 10*6/MM3 (ref 3.77–5.28)
SODIUM SERPL-SCNC: 143 MMOL/L (ref 136–145)
TRIGL SERPL-MCNC: 160 MG/DL (ref 0–150)
TSH SERPL DL<=0.05 MIU/L-ACNC: 2.71 UIU/ML (ref 0.27–4.2)
VLDLC SERPL-MCNC: 27 MG/DL (ref 5–40)
WBC NRBC COR # BLD AUTO: 7.75 10*3/MM3 (ref 3.4–10.8)

## 2024-06-14 PROCEDURE — 80053 COMPREHEN METABOLIC PANEL: CPT

## 2024-06-14 PROCEDURE — 80061 LIPID PANEL: CPT

## 2024-06-14 PROCEDURE — 84443 ASSAY THYROID STIM HORMONE: CPT

## 2024-06-14 PROCEDURE — 36415 COLL VENOUS BLD VENIPUNCTURE: CPT

## 2024-06-14 PROCEDURE — 83036 HEMOGLOBIN GLYCOSYLATED A1C: CPT

## 2024-06-14 PROCEDURE — 85025 COMPLETE CBC W/AUTO DIFF WBC: CPT

## 2024-06-17 ENCOUNTER — TELEPHONE (OUTPATIENT)
Dept: FAMILY MEDICINE CLINIC | Facility: CLINIC | Age: 69
End: 2024-06-17

## 2024-07-15 ENCOUNTER — OFFICE VISIT (OUTPATIENT)
Dept: FAMILY MEDICINE CLINIC | Facility: CLINIC | Age: 69
End: 2024-07-15
Payer: MEDICARE

## 2024-07-15 VITALS
SYSTOLIC BLOOD PRESSURE: 135 MMHG | OXYGEN SATURATION: 94 % | TEMPERATURE: 98.9 F | WEIGHT: 226 LBS | DIASTOLIC BLOOD PRESSURE: 81 MMHG | BODY MASS INDEX: 44.37 KG/M2 | HEIGHT: 60 IN | RESPIRATION RATE: 18 BRPM | HEART RATE: 72 BPM

## 2024-07-15 DIAGNOSIS — M85.89 OSTEOPENIA OF MULTIPLE SITES: ICD-10-CM

## 2024-07-15 DIAGNOSIS — J30.89 ENVIRONMENTAL AND SEASONAL ALLERGIES: ICD-10-CM

## 2024-07-15 DIAGNOSIS — R73.03 PREDIABETES: ICD-10-CM

## 2024-07-15 DIAGNOSIS — E78.5 HYPERLIPIDEMIA, UNSPECIFIED HYPERLIPIDEMIA TYPE: ICD-10-CM

## 2024-07-15 DIAGNOSIS — Z95.2 STATUS POST AORTIC VALVE REPLACEMENT: Chronic | ICD-10-CM

## 2024-07-15 DIAGNOSIS — I10 PRIMARY HYPERTENSION: ICD-10-CM

## 2024-07-15 DIAGNOSIS — G47.33 OSA ON CPAP: ICD-10-CM

## 2024-07-15 DIAGNOSIS — E03.9 ACQUIRED HYPOTHYROIDISM: ICD-10-CM

## 2024-07-15 DIAGNOSIS — E66.01 CLASS 3 SEVERE OBESITY DUE TO EXCESS CALORIES WITHOUT SERIOUS COMORBIDITY WITH BODY MASS INDEX (BMI) OF 40.0 TO 44.9 IN ADULT: ICD-10-CM

## 2024-07-15 DIAGNOSIS — Z00.00 ENCOUNTER FOR SUBSEQUENT ANNUAL WELLNESS VISIT IN MEDICARE PATIENT: Primary | ICD-10-CM

## 2024-07-15 PROCEDURE — G0439 PPPS, SUBSEQ VISIT: HCPCS | Performed by: NURSE PRACTITIONER

## 2024-07-15 PROCEDURE — 99214 OFFICE O/P EST MOD 30 MIN: CPT | Performed by: NURSE PRACTITIONER

## 2024-07-15 PROCEDURE — 1126F AMNT PAIN NOTED NONE PRSNT: CPT | Performed by: NURSE PRACTITIONER

## 2024-07-15 PROCEDURE — 1160F RVW MEDS BY RX/DR IN RCRD: CPT | Performed by: NURSE PRACTITIONER

## 2024-07-15 PROCEDURE — 1159F MED LIST DOCD IN RCRD: CPT | Performed by: NURSE PRACTITIONER

## 2024-07-15 PROCEDURE — 3075F SYST BP GE 130 - 139MM HG: CPT | Performed by: NURSE PRACTITIONER

## 2024-07-15 PROCEDURE — 3079F DIAST BP 80-89 MM HG: CPT | Performed by: NURSE PRACTITIONER

## 2024-07-15 NOTE — PATIENT INSTRUCTIONS
Continue current medications and treatment.   Have pap with gyn as previously recommended.  Recommend avoiding added sugars.   Have hemoglobin A1c redrawn in 3 months.  Call office for lab results if you have not received a call from our office or [email or web address] message in 1-2 days.    Can try Noom or weight watchers.  Follow up with cardiology, sleep medicine, general surgery, gyn per their recommendations.

## 2024-07-15 NOTE — PROGRESS NOTES
The ABCs of the Annual Wellness Visit  Subsequent Medicare Wellness Visit    Magalie Graves is a 69 y.o. female who presents for a Subsequent Medicare Wellness Visit.    The following portions of the patient's history were reviewed and   updated as appropriate: allergies, current medications, past family history, past medical history, past social history, past surgical history, and problem list.    Compared to one year ago, the patient feels her physical   health is the same.    Compared to one year ago, the patient feels her mental   health is the same.    Recent Hospitalizations:  This patient has had a [facility] admission record on file within the last 365 days.    Current Medical Providers:  Patient Care Team:  Sushma Hall APRN as PCP - General (Nurse Practitioner)  Jayson Edmondson MD as Consulting Physician (Cardiology)  Addy Torres MD as Surgeon (General Surgery)  Anabelle De La Torre RD as Dietitian (Nutrition)  Angelique Wylie MD as Consulting Physician (Sleep Medicine)    Outpatient Medications Prior to Visit   Medication Sig Dispense Refill    aspirin 81 MG tablet Take 1 tablet by mouth Daily.      Elderberry-Vitamin C-Zinc (ELDERBERRY IMMUNE HEALTH GUMMY PO) Take 500 mg by mouth Daily.      fluticasone (FLONASE) 50 MCG/ACT nasal spray 2 sprays into the nostril(s) as directed by provider Daily. 18.2 mL 2    levothyroxine (SYNTHROID, LEVOTHROID) 100 MCG tablet TAKE 1 TABLET BY MOUTH EVERY DAY 90 tablet 1    loratadine (CLARITIN) 10 MG tablet TAKE 1 TABLET BY MOUTH EVERY DAY 30 tablet 11    metoprolol tartrate (LOPRESSOR) 25 MG tablet Take 0.5 tablets by mouth 2 (Two) Times a Day. 90 tablet 0    multivitamin with minerals (MULTIVITAMIN ADULT PO) Take 1 tablet by mouth Daily.      PROBIOTIC PRODUCT PO Take 1 capsule by mouth Daily.      simvastatin (ZOCOR) 20 MG tablet TAKE 1 TABLET BY MOUTH IN THE EVENING 90 tablet 1    Chlorhexidine Gluconate (Hibiclens) 4 %  "solution Apply 1 Application topically 1 (One) Time Per Week. Chlorhexadine with shower once weekly (Patient not taking: Reported on 7/15/2024)       No facility-administered medications prior to visit.       No opioid medication identified on active medication list. I have reviewed chart for other potential  high risk medication/s and harmful drug interactions in the elderly.        Aspirin is on active medication list. Aspirin use is indicated based on review of current medical condition/s. Pros and cons of this therapy have been discussed today. Benefits of this medication outweigh potential harm.  Patient has been encouraged to continue taking this medication.  .      Patient Active Problem List   Diagnosis    Status post aortic valve replacement    Steatosis of liver    LUÍS on CPAP    Hypothyroidism    Hypertension    Hyperlipidemia    Gastroesophageal reflux disease    Aortic stenosis    Class 3 severe obesity due to excess calories without serious comorbidity with body mass index (BMI) of 40.0 to 44.9 in adult    Environmental and seasonal allergies    Osteopenia of multiple sites    Prediabetes     Advance Care Planning   Advance Care Planning     Advance Directive is not on file.  ACP discussion was held with the patient during this visit. Patient has an advance directive (not in EMR), copy requested.     Objective    Vitals:    07/15/24 1307   BP: 135/81   BP Location: Left arm   Patient Position: Sitting   Cuff Size: Large Adult   Pulse: 72   Resp: 18   Temp: 98.9 °F (37.2 °C)   TempSrc: Oral   SpO2: 94%   Weight: 103 kg (226 lb)   Height: 152.4 cm (60\")     Estimated body mass index is 44.14 kg/m² as calculated from the following:    Height as of this encounter: 152.4 cm (60\").    Weight as of this encounter: 103 kg (226 lb).           Does the patient have evidence of cognitive impairment? No  ACE III MINI    ATTENTION  What is the year: correct  What is the month of the year: correct  What is the day " of the week?: correct  What is the date?: correct  MEMORY  Repeat address three times, only score third attempt: [address], [place]: 7  HOW MANY ANIMALS DID THE PATIENT NAME  Verbal Fluency -- Animal Names (0-25): 22+  CLOCK DRAWING  Clock Drawing: All Correct  MEMORY RECALL  Tell me what you remember about that name and address we were repeating at the beginnin  ACE TOTAL SCORE  Total ACE Score - <25/30 strongly suggests cognitive impairment; <21/30 almost certainly shows dementia: 29    Lab Results   Component Value Date    TRIG 160 (H) 2024    HDL 38 (L) 2024    LDL 67 2024    VLDL 27 2024    HGBA1C 5.90 (H) 2024        HEALTH RISK ASSESSMENT    Smoking Status:  Social History     Tobacco Use   Smoking Status Never    Passive exposure: Past   Smokeless Tobacco Never     Alcohol Consumption:  Social History     Substance and Sexual Activity   Alcohol Use Never     Fall Risk Screen:    AVTAR Fall Risk Assessment was completed, and patient is at LOW risk for falls.Assessment completed on:7/15/2024    Depression Screenin/15/2024     1:06 PM   PHQ-2/PHQ-9 Depression Screening   Little Interest or Pleasure in Doing Things 0-->not at all   Feeling Down, Depressed or Hopeless 0-->not at all   PHQ-9: Brief Depression Severity Measure Score 0       Health Habits and Functional and Cognitive Screenin/8/2024    10:12 AM   Functional & Cognitive Status   Do you have difficulty preparing food and eating? No   Do you have difficulty bathing yourself, getting dressed or grooming yourself? No   Do you have difficulty using the toilet? No   Do you have difficulty moving around from place to place? No   Do you have trouble with steps or getting out of a bed or a chair? No   Current Diet Unhealthy Diet   Dental Exam Up to date   Eye Exam Up to date   Exercise (times per week) 0 times per week   Current Exercises Include No Regular Exercise   Do you  need help using the phone?  No   Are you deaf or do you have serious difficulty hearing?  No   Do you need help to go to places out of walking distance? No   Do you need help shopping? No   Do you need help preparing meals?  No   Do you need help with housework?  No   Do you need help with laundry? No   Do you need help taking your medications? No   Do you need help managing money? No   Do you ever drive or ride in a car without wearing a seat belt? No   Have you felt unusual stress, anger or loneliness in the last month? No   Who do you live with? Spouse   If you need help, do you have trouble finding someone available to you? No   Have you been bothered in the last four weeks by sexual problems? No   Do you have difficulty concentrating, remembering or making decisions? No       Age-appropriate Screening Schedule:  Refer to the list below for future screening recommendations based on patient's age, sex and/or medical conditions. Orders for these recommended tests are listed in the plan section. The patient has been provided with a written plan.    Health Maintenance   Topic Date Due    PAP SMEAR  Never done    COVID-19 Vaccine (5 - 2023-24 season) 09/01/2023    INFLUENZA VACCINE  08/01/2024    BMI FOLLOWUP  03/04/2025    LIPID PANEL  06/14/2025    ANNUAL WELLNESS VISIT  07/15/2025    DXA SCAN  02/28/2026    MAMMOGRAM  03/14/2026    COLORECTAL CANCER SCREENING  04/12/2026    TDAP/TD VACCINES (2 - Tdap) 12/06/2033    HEPATITIS C SCREENING  Completed    Pneumococcal Vaccine 65+  Completed    ZOSTER VACCINE  Completed                  CMS Preventative Services Quick Reference  Risk Factors Identified During Encounter  Immunizations Discussed/Encouraged: COVID19  The above risks/problems have been discussed with the patient.  Pertinent information has been shared with the patient in the After Visit Summary.  An After Visit Summary and PPPS were made available to the patient.    Follow Up:   Next Medicare Wellness visit  to be scheduled in 1 year.       Additional E&M Note during same encounter follows:  Patient has multiple medical problems which are significant and separately identifiable that require additional work above and beyond the Medicare Wellness Visit.      Chief Complaint  Medicare Wellness-subsequent, Hypertension, and Hyperlipidemia    Subjective        HPI  Lulu Graves is also being seen today for up of chronic stable conditions including hypertension, hyperlipidemia, hypothyroidism, aortic stenosis, seasonal/environmental allergies, sleep apnea, osteopenia.    Hypertension-stable-does not consistently take blood pressure at home.  Denies chest pain, shortness of breath, lower extremity swelling.  She takes metoprolol 25 mg half tablet twice daily.  She is not specifically following low-sodium diet.  Hyperlipidemia-stable-taking simvastatin 20 mg in evening, not specifically following low-cholesterol diet.  Most recent lipid panel 6/2024 with total cholesterol and LDL within normal limits, triglycerides elevated 160, HDL low 38.  Prediabetes-hemoglobin A1c 5.9 in 6/2024.  She does not take medication for diabetes.  Hypothyroidism-stable-taking Synthroid 100 mcg by mouth daily.  Denies heat or cold intolerance.  Is interested in losing weight.  BMI is 44.14.  Aortic jztxccgo-fqonzv-ddojag post aortic valve replacement with bioprosthetic valve.  Currently taking 81 mg aspirin daily.  Her cardiologist is Dr. Edmondson.  Seasonal/environmental allergies-stable-taking Claritin 10 mg daily, fluticasone 2 sprays daily.  Feels like symptoms are well-controlled.  Sleep drpho-tnihiv-udhluxcj by Dr. Wylie with sleep medicine, compliant with CPAP therapy nightly.  Feels well rested.    Lbpgkhmbnb-sajngtqc-ygnaaq-most recent DEXA 2/2024 indicated normal bone mineral density.  Cyst of abdomen - took extended course of abx, was evaluated by Dr. Torres with general  surgery.  Inflammation resolved, she declined to have cyst  "removed, was advised inflammation may recur.     Objective   Vital Signs:  /81 (BP Location: Left arm, Patient Position: Sitting, Cuff Size: Large Adult)   Pulse 72   Temp 98.9 °F (37.2 °C) (Oral)   Resp 18   Ht 152.4 cm (60\")   Wt 103 kg (226 lb)   SpO2 94%   BMI 44.14 kg/m²     Physical Exam  Vitals and nursing note reviewed.   Constitutional:       General: She is not in acute distress.     Appearance: Normal appearance. She is obese. She is not ill-appearing or diaphoretic.   HENT:      Head: Normocephalic and atraumatic.      Nose: Nose normal.      Mouth/Throat:      Mouth: Mucous membranes are moist.   Eyes:      General: No scleral icterus.     Conjunctiva/sclera: Conjunctivae normal.   Cardiovascular:      Rate and Rhythm: Normal rate and regular rhythm.      Pulses: Normal pulses.      Heart sounds: Murmur (systolic) heard.   Pulmonary:      Effort: Pulmonary effort is normal. No respiratory distress.      Breath sounds: Normal breath sounds. No wheezing or rales.   Abdominal:      General: Bowel sounds are normal. There is no distension.      Palpations: Abdomen is soft.      Tenderness: There is no abdominal tenderness. There is no guarding.   Musculoskeletal:      Right lower leg: No edema.      Left lower leg: No edema.   Lymphadenopathy:      Cervical: No cervical adenopathy.   Skin:     General: Skin is warm and dry.      Capillary Refill: Capillary refill takes less than 2 seconds.      Coloration: Skin is not jaundiced.      Findings: No bruising.   Neurological:      Mental Status: She is alert and oriented to person, place, and time.      Gait: Gait normal.   Psychiatric:         Mood and Affect: Mood normal.         Behavior: Behavior normal.         Thought Content: Thought content normal.         Judgment: Judgment normal.          The following data was reviewed by: GUILLERMO Curiel on 07/15/2024:  Common labs          12/6/2023    10:42 12/6/2023    15:40 6/14/2024    07:46 "   Common Labs   Glucose  103  112    BUN  17  16    Creatinine  0.74  0.72    Sodium  142  143    Potassium  3.9  4.4    Chloride  106  106    Calcium  9.1  9.0    Albumin   4.0    Total Bilirubin   0.4    Alkaline Phosphatase   108    AST (SGOT)   19    ALT (SGPT)   15    WBC 6.96   7.75    Hemoglobin 14.8   15.8    Hematocrit 44.8   47.8    Platelets 168   159    Total Cholesterol   132    Triglycerides   160    HDL Cholesterol   38    LDL Cholesterol    67    Hemoglobin A1C   5.90      CMP          12/6/2023    15:40 6/14/2024    07:46   CMP   Glucose 103  112    BUN 17  16    Creatinine 0.74  0.72    EGFR 88.8  91.2    Sodium 142  143    Potassium 3.9  4.4    Chloride 106  106    Calcium 9.1  9.0    Total Protein  7.0    Albumin  4.0    Globulin  3.0    Total Bilirubin  0.4    Alkaline Phosphatase  108    AST (SGOT)  19    ALT (SGPT)  15    Albumin/Globulin Ratio  1.3    BUN/Creatinine Ratio 23.0  22.2    Anion Gap 7.0  7.0      CBC          12/6/2023    10:42 6/14/2024    07:46   CBC   WBC 6.96  7.75    RBC 5.03  5.34    Hemoglobin 14.8  15.8    Hematocrit 44.8  47.8    MCV 89.1  89.5    MCH 29.4  29.6    MCHC 33.0  33.1    RDW 13.1  13.1    Platelets 168  159      CBC w/diff          12/6/2023    10:42 6/14/2024    07:46   CBC w/Diff   WBC 6.96  7.75    RBC 5.03  5.34    Hemoglobin 14.8  15.8    Hematocrit 44.8  47.8    MCV 89.1  89.5    MCH 29.4  29.6    MCHC 33.0  33.1    RDW 13.1  13.1    Platelets 168  159    Neutrophil Rel % 63.8  56.4    Immature Granulocyte Rel % 0.4  0.4    Lymphocyte Rel % 22.7  29.9    Monocyte Rel % 7.8  8.4    Eosinophil Rel % 4.7  4.1    Basophil Rel % 0.6  0.8      Lipid Panel          6/14/2024    07:46   Lipid Panel   Total Cholesterol 132    Triglycerides 160    HDL Cholesterol 38    VLDL Cholesterol 27    LDL Cholesterol  67    LDL/HDL Ratio 1.63      TSH          6/14/2024    07:46   TSH   TSH 2.710      A1C Last 3 Results          6/14/2024    07:46   HGBA1C Last 3 Results    Hemoglobin A1C 5.90          Data reviewed : Radiologic studies   and Cardiology studies      DATE OF EXAM:   2/29/2024 9:05 AM     PROCEDURE:   DEXA BONE DENSITY AXIAL-     INDICATIONS:   osteopenia; M85.89-Other specified disorders of bone density and  structure, multiple sites     TECHNIQUE:   Lumbar vertebral and proximal femoral Dual-Energy X-ray Absorptiometry  (DEXA) was performed on the [employer] W.      FINDINGS:   A full detailed summary report from the DEXA scan performed is located  in the patients chart in [facility].      According to the World Health Organization criteria, this is classified  as within normal limits  T score lumbar spine -0.8. 5.1% change. T score left femoral neck -0.1.  -1.4% change.     Using the FRAX scores, which utilized risk factors and femoral neck bone  density:  The 10 year probability of major osteoporotic fracture is 6.2%.  The 10 year probability of a hip fracture is 0.3%.     IMPRESSION:  Normal Bone Mineral Density.        Copies of the computerized summary reports can be obtained from [facility] via  the health information department of [facility].         Electronically Signed By-Fady Lane MD On:2/29/2024 9:35 AM     Clinical Indication    Valvular Function   Dx: Nonrheumatic aortic valve disorder [I35.9 (ICD-10-CM)]; Nonrheumatic aortic (valve) stenosis [I35.0 (ICD-10-CM)]     Interpretation Summary         Left ventricular ejection fraction appears to be 56 - 60%.    Left ventricular diastolic function is consistent with (grade I) impaired relaxation.    Mild to moderate aortic valve stenosis is present.    There is a prosthetic aortic valve present.    Estimated right ventricular systolic pressure from tricuspid regurgitation is normal (<35 mmHg).       Assessment and Plan   Diagnoses and all orders for this visit:    1. Encounter for subsequent annual wellness visit in Medicare patient (Primary)    2. Primary hypertension    3. Hyperlipidemia, unspecified  hyperlipidemia type    4. Prediabetes  -     Ambulatory Referral to Diabetic Education  -     Hemoglobin A1c; Future    5. Acquired hypothyroidism    6. Environmental and seasonal allergies    7. LUÍS on CPAP    8. Class 3 severe obesity due to excess calories without serious comorbidity with body mass index (BMI) of 40.0 to 44.9 in adult    9. Status post aortic valve replacement    10. Osteopenia of multiple sites  Comments:  repeat Dexa 2/2024 indicated normal bone mineral density      Patient Instructions   Continue current medications and treatment.   Have pap with gyn as previously recommended.  Recommend avoiding added sugars.   Have hemoglobin A1c redrawn in 3 months.  Call office for lab results if you have not received a call from our office or [employer] message in 1-2 days.    Can try Noom or weight watchers.  Follow up with cardiology, sleep medicine, general surgery, gyn per their recommendations.     May consider initiation of metformin if hemoglobin A1c increases at repeat lab.  Recommend at least 30 minutes of exercise 5 days per week.  Bone mineral density improved on most recent dexa scan.       Follow Up   Return in about 6 months (around 1/15/2025) for Recheck, Hypertension, Hyperlipidemia.  Patient was given instructions and counseling regarding her condition or for health maintenance advice. Please see specific information pulled into the AVS if appropriate.

## 2024-07-29 RX ORDER — SIMVASTATIN 20 MG
20 TABLET ORAL EVERY EVENING
Qty: 90 TABLET | Refills: 1 | Status: SHIPPED | OUTPATIENT
Start: 2024-07-29

## 2024-07-30 ENCOUNTER — OFFICE VISIT (OUTPATIENT)
Dept: SLEEP MEDICINE | Facility: CLINIC | Age: 69
End: 2024-07-30
Payer: MEDICARE

## 2024-07-30 VITALS
OXYGEN SATURATION: 97 % | HEART RATE: 67 BPM | HEIGHT: 60 IN | WEIGHT: 225 LBS | BODY MASS INDEX: 44.17 KG/M2 | DIASTOLIC BLOOD PRESSURE: 67 MMHG | SYSTOLIC BLOOD PRESSURE: 132 MMHG

## 2024-07-30 DIAGNOSIS — E66.01 CLASS 3 SEVERE OBESITY DUE TO EXCESS CALORIES WITHOUT SERIOUS COMORBIDITY WITH BODY MASS INDEX (BMI) OF 40.0 TO 44.9 IN ADULT: ICD-10-CM

## 2024-07-30 DIAGNOSIS — G47.33 OSA ON CPAP: Primary | ICD-10-CM

## 2024-07-30 PROCEDURE — 3078F DIAST BP <80 MM HG: CPT | Performed by: INTERNAL MEDICINE

## 2024-07-30 PROCEDURE — 99213 OFFICE O/P EST LOW 20 MIN: CPT | Performed by: INTERNAL MEDICINE

## 2024-07-30 PROCEDURE — 3075F SYST BP GE 130 - 139MM HG: CPT | Performed by: INTERNAL MEDICINE

## 2024-07-30 PROCEDURE — 1160F RVW MEDS BY RX/DR IN RCRD: CPT | Performed by: INTERNAL MEDICINE

## 2024-07-30 PROCEDURE — 1159F MED LIST DOCD IN RCRD: CPT | Performed by: INTERNAL MEDICINE

## 2024-07-30 PROCEDURE — G0463 HOSPITAL OUTPT CLINIC VISIT: HCPCS

## 2024-07-30 NOTE — PROGRESS NOTES
"  [id number] [facility], [address]  [place] [address]  Phone   Fax       SLEEP CLINIC FOLLOW UP PROGRESS NOTE.    Lulu Graves  [id number]   1955  69 y.o.  female      PCP: Sushma Hall APRN      Date of visit: 7/30/2024    Chief Complaint   Patient presents with    Follow-up    Sleep Apnea       HPI:  This is a 69 y.o. years old patient is here for the management of obstructive sleep apnea.  Sleep apnea is severe in severity with a AHI of 36/hr. Patient is using positive airway pressure therapy with auto CPAP and the symptoms of sleep apnea have improved significantly on the therapy. Normally patient goes to bed at 11 PM and wakes up at 630 AM .  The patient wakes up 0 time(s) during the night and has no problem going back to sleep.  Feels refreshed after waking up.  At present she is working for [employer] and she is working from home.    Medications and allergies are reviewed by me and documented in the encounter.     SOCIAL (habits pertaining to sleep medicine)  History tobacco use:No   History of alcohol use: 0 per week  Caffeine use: 2     REVIEW OF SYSTEMS:   Pertaining positive symptoms are:  [place] Sleepiness Scale :Total score: 2 2      PHYSICAL EXAMINATION:  CONSTITUTIONAL:  Vitals:    07/30/24 0700   BP: 132/67   BP Location: Left arm   Patient Position: Sitting   Pulse: 67   SpO2: 97%   Weight: 102 kg (225 lb)   Height: 152.4 cm (60\")    Body mass index is 43.94 kg/m².   NOSE: nasal passages are clear, No deformities noted   RESP SYSTEM: Not in any respiratory distress, no chest deformities noted,   CARDIOVASULAR: No edema noted  NEURO: Oriented x 3, gait normal,  Mood and affect appeared appropriate      Data reviewed:  The Smart card downloaded on 7/30/2024 has been reviewed independently by me for compliance and discussed the data with the patient.   Compliance; 100%  More than 4 hr use, 100%  Average use of the device 6 hours and 45 minutes " per night  Residual AHI: 1.8 /hr (goal < 5.0 /hr)  Mask type: Nasal pillows  Device: ResMed  DME: [employer]      ASSESSMENT AND PLAN:  Obstructive sleep apnea ( G 47.33).  The symptoms of sleep apnea have improved with the device and the treatment.  Patient's compliance with the device is excellent for treatment of sleep apnea.  I have independently reviewed the smart card down load and discussed with the patient the download data and encouarged the patient to continue to use the device.The residual AHI is acceptable. The device is benefiting the patient and the device is medically necessary.  Without proper control of sleep apnea and good compliance there is a increased risk for hypertension, diabetes mellitus and nonrestorative sleep with hypersomnia which can increase risk for motor vehicle accidents.  Untreated sleep apnea is also a risk factor for development of atrial fibrillation, pulmonary hypertension, insulin resistance and stroke. The patient is also instructed to get the supplies from the [employer] and and change them on a regular basis.  A prescription for supplies has been sent to the [employer] company.  I have also discussed the good sleep hygiene habits and adequate amount of sleep needed for good health.  Obesity  3 with BMI is Body mass index is 43.94 kg/m².. I have discuss the relationship between the weight and sleep apnea. The benefit of weight loss in reducing severity of sleep apnea was discussed. Discussed diet and exercise with the patient to achieve ideal BMI.   Return in about 1 year (around 7/30/2025) for with smart card down load. . Patient's questions were answered.    7/30/2024  Angelique Wylie MD  Sleep Medicine.  Medical Director,   [facility], [facility] sleep centers.

## 2024-08-22 ENCOUNTER — OFFICE VISIT (OUTPATIENT)
Dept: ENDOCRINOLOGY | Facility: CLINIC | Age: 69
End: 2024-08-22
Payer: MEDICARE

## 2024-08-22 DIAGNOSIS — R73.03 PREDIABETES: Primary | ICD-10-CM

## 2024-08-22 NOTE — PROGRESS NOTES
Met with patient from 1000 to 1115 for a total of 1 and 15 minutes.  Discussed pathophysiology of diabetes.  Discussed plate method and label reading/carb counting.  Discussed monitoring, when, how and where to record.  Enc physical activity.   Set goals.   Answered questions.

## 2024-09-18 RX ORDER — METOPROLOL TARTRATE 25 MG/1
12.5 TABLET, FILM COATED ORAL 2 TIMES DAILY
Qty: 90 TABLET | Refills: 1 | Status: SHIPPED | OUTPATIENT
Start: 2024-09-18

## 2024-09-18 RX ORDER — LEVOTHYROXINE SODIUM 100 UG/1
100 TABLET ORAL DAILY
Qty: 90 TABLET | Refills: 1 | Status: SHIPPED | OUTPATIENT
Start: 2024-09-18

## 2024-10-14 ENCOUNTER — LAB (OUTPATIENT)
Dept: LAB | Facility: HOSPITAL | Age: 69
End: 2024-10-14
Payer: MEDICARE

## 2024-10-14 DIAGNOSIS — R73.03 PREDIABETES: ICD-10-CM

## 2024-10-14 LAB — HBA1C MFR BLD: 6.09 % (ref 4.8–5.6)

## 2024-10-14 PROCEDURE — 36415 COLL VENOUS BLD VENIPUNCTURE: CPT

## 2024-10-14 PROCEDURE — 83036 HEMOGLOBIN GLYCOSYLATED A1C: CPT

## 2025-01-13 ENCOUNTER — HOSPITAL ENCOUNTER (OUTPATIENT)
Dept: CARDIOLOGY | Facility: HOSPITAL | Age: 70
Discharge: HOME | End: 2025-01-13
Payer: MEDICARE

## 2025-01-13 ENCOUNTER — OFFICE VISIT (OUTPATIENT)
Dept: FAMILY MEDICINE CLINIC | Facility: CLINIC | Age: 70
End: 2025-01-13
Payer: MEDICARE

## 2025-01-13 ENCOUNTER — LAB (OUTPATIENT)
Dept: LAB | Facility: HOSPITAL | Age: 70
End: 2025-01-13
Payer: MEDICARE

## 2025-01-13 VITALS
HEIGHT: 60 IN | OXYGEN SATURATION: 96 % | TEMPERATURE: 98 F | HEART RATE: 77 BPM | BODY MASS INDEX: 43.39 KG/M2 | DIASTOLIC BLOOD PRESSURE: 84 MMHG | WEIGHT: 221 LBS | RESPIRATION RATE: 15 BRPM | SYSTOLIC BLOOD PRESSURE: 136 MMHG

## 2025-01-13 DIAGNOSIS — G47.33 OSA ON CPAP: ICD-10-CM

## 2025-01-13 DIAGNOSIS — E03.9 ACQUIRED HYPOTHYROIDISM: ICD-10-CM

## 2025-01-13 DIAGNOSIS — E03.9 ACQUIRED HYPOTHYROIDISM: Primary | ICD-10-CM

## 2025-01-13 DIAGNOSIS — I10 PRIMARY HYPERTENSION: ICD-10-CM

## 2025-01-13 DIAGNOSIS — R00.2 PALPITATIONS: ICD-10-CM

## 2025-01-13 DIAGNOSIS — R73.03 PREDIABETES: ICD-10-CM

## 2025-01-13 DIAGNOSIS — R71.8 ELEVATED HEMATOCRIT: ICD-10-CM

## 2025-01-13 DIAGNOSIS — Z95.2 STATUS POST AORTIC VALVE REPLACEMENT: ICD-10-CM

## 2025-01-13 DIAGNOSIS — E78.2 MIXED HYPERLIPIDEMIA: ICD-10-CM

## 2025-01-13 DIAGNOSIS — I10 PRIMARY HYPERTENSION: Primary | ICD-10-CM

## 2025-01-13 LAB
ALBUMIN SERPL-MCNC: 4 G/DL (ref 3.5–5.2)
ALBUMIN/GLOB SERPL: 1.1 G/DL
ALP SERPL-CCNC: 104 U/L (ref 39–117)
ALT SERPL W P-5'-P-CCNC: 16 U/L (ref 1–33)
ANION GAP SERPL CALCULATED.3IONS-SCNC: 9.9 MMOL/L (ref 5–15)
AST SERPL-CCNC: 26 U/L (ref 1–32)
BASOPHILS # BLD AUTO: 0.05 10*3/MM3 (ref 0–0.2)
BASOPHILS NFR BLD AUTO: 0.6 % (ref 0–1.5)
BILIRUB SERPL-MCNC: 0.5 MG/DL (ref 0–1.2)
BUN SERPL-MCNC: 16 MG/DL (ref 8–23)
BUN/CREAT SERPL: 20.3 (ref 7–25)
CALCIUM SPEC-SCNC: 9.9 MG/DL (ref 8.6–10.5)
CHLORIDE SERPL-SCNC: 104 MMOL/L (ref 98–107)
CO2 SERPL-SCNC: 27.1 MMOL/L (ref 22–29)
CREAT SERPL-MCNC: 0.79 MG/DL (ref 0.57–1)
DEPRECATED RDW RBC AUTO: 43.5 FL (ref 37–54)
EGFRCR SERPLBLD CKD-EPI 2021: 81.6 ML/MIN/1.73
EOSINOPHIL # BLD AUTO: 0.26 10*3/MM3 (ref 0–0.4)
EOSINOPHIL NFR BLD AUTO: 3.4 % (ref 0.3–6.2)
ERYTHROCYTE [DISTWIDTH] IN BLOOD BY AUTOMATED COUNT: 13.2 % (ref 12.3–15.4)
GLOBULIN UR ELPH-MCNC: 3.5 GM/DL
GLUCOSE SERPL-MCNC: 87 MG/DL (ref 65–99)
HBA1C MFR BLD: 5.95 % (ref 4.8–5.6)
HCT VFR BLD AUTO: 47.5 % (ref 34–46.6)
HGB BLD-MCNC: 15.5 G/DL (ref 12–15.9)
IMM GRANULOCYTES # BLD AUTO: 0.03 10*3/MM3 (ref 0–0.05)
IMM GRANULOCYTES NFR BLD AUTO: 0.4 % (ref 0–0.5)
LYMPHOCYTES # BLD AUTO: 2.11 10*3/MM3 (ref 0.7–3.1)
LYMPHOCYTES NFR BLD AUTO: 27.3 % (ref 19.6–45.3)
MCH RBC QN AUTO: 29.2 PG (ref 26.6–33)
MCHC RBC AUTO-ENTMCNC: 32.6 G/DL (ref 31.5–35.7)
MCV RBC AUTO: 89.5 FL (ref 79–97)
MONOCYTES # BLD AUTO: 0.65 10*3/MM3 (ref 0.1–0.9)
MONOCYTES NFR BLD AUTO: 8.4 % (ref 5–12)
NEUTROPHILS NFR BLD AUTO: 4.64 10*3/MM3 (ref 1.7–7)
NEUTROPHILS NFR BLD AUTO: 59.9 % (ref 42.7–76)
NRBC BLD AUTO-RTO: 0 /100 WBC (ref 0–0.2)
PLATELET # BLD AUTO: 170 10*3/MM3 (ref 140–450)
PMV BLD AUTO: 12.3 FL (ref 6–12)
POTASSIUM SERPL-SCNC: 4.1 MMOL/L (ref 3.5–5.2)
PROT SERPL-MCNC: 7.5 G/DL (ref 6–8.5)
QT INTERVAL: 385 MS
QTC INTERVAL: 392 MS
RBC # BLD AUTO: 5.31 10*6/MM3 (ref 3.77–5.28)
SODIUM SERPL-SCNC: 141 MMOL/L (ref 136–145)
T4 FREE SERPL-MCNC: 1.1 NG/DL (ref 0.93–1.7)
TSH SERPL DL<=0.05 MIU/L-ACNC: 6.71 UIU/ML (ref 0.27–4.2)
WBC NRBC COR # BLD AUTO: 7.74 10*3/MM3 (ref 3.4–10.8)

## 2025-01-13 PROCEDURE — 1160F RVW MEDS BY RX/DR IN RCRD: CPT | Performed by: NURSE PRACTITIONER

## 2025-01-13 PROCEDURE — 3075F SYST BP GE 130 - 139MM HG: CPT | Performed by: NURSE PRACTITIONER

## 2025-01-13 PROCEDURE — 80053 COMPREHEN METABOLIC PANEL: CPT

## 2025-01-13 PROCEDURE — 84439 ASSAY OF FREE THYROXINE: CPT

## 2025-01-13 PROCEDURE — 3079F DIAST BP 80-89 MM HG: CPT | Performed by: NURSE PRACTITIONER

## 2025-01-13 PROCEDURE — 99214 OFFICE O/P EST MOD 30 MIN: CPT | Performed by: NURSE PRACTITIONER

## 2025-01-13 PROCEDURE — 93005 ELECTROCARDIOGRAM TRACING: CPT | Performed by: NURSE PRACTITIONER

## 2025-01-13 PROCEDURE — 85025 COMPLETE CBC W/AUTO DIFF WBC: CPT

## 2025-01-13 PROCEDURE — 84443 ASSAY THYROID STIM HORMONE: CPT

## 2025-01-13 PROCEDURE — 1159F MED LIST DOCD IN RCRD: CPT | Performed by: NURSE PRACTITIONER

## 2025-01-13 PROCEDURE — 1126F AMNT PAIN NOTED NONE PRSNT: CPT | Performed by: NURSE PRACTITIONER

## 2025-01-13 PROCEDURE — 83036 HEMOGLOBIN GLYCOSYLATED A1C: CPT

## 2025-01-13 PROCEDURE — 36415 COLL VENOUS BLD VENIPUNCTURE: CPT

## 2025-01-13 RX ORDER — LEVOTHYROXINE SODIUM 112 UG/1
112 TABLET ORAL DAILY
Qty: 90 TABLET | Refills: 0 | Status: SHIPPED | OUTPATIENT
Start: 2025-01-13

## 2025-01-13 NOTE — PATIENT INSTRUCTIONS
Call office for lab results if you have not received a call from our office or [employer] message in 1-2 days.    Continue current medications and treatment.   Follow up with Dr. Edmondson for palpitations. Go to er if symptoms recur.   Please take blood pressure daily, keep record, bring with you to follow up.

## 2025-01-13 NOTE — PROGRESS NOTES
Subjective        Lulu Graves is a 69 y.o. female who presents to [facility].     Chief Complaint   Patient presents with    Hypertension    Hypothyroidism       Hypertension  Pertinent negatives include no chest pain, headaches or neck pain.   Hypothyroidism  Patient reports no diaphoresis or fatigue.       Answers submitted by the patient for this visit:  Primary Reason for Visit (Submitted on 1/6/2025)  What is the primary reason for your visit?: Problem Not Listed    Patient presents for follow-up of hypertension and hypothyroidism.    Hypertension/aortic stenosis s/p aortic valve replacement with bioprosthetic valve- followed by cardiology Dr. Edmondson, taking aspirin 81 mg daily and Lopressor 25 mg 0.5 tablets twice daily. She had experienced a few episodes of fluttering in her chest over the past few months, has occurred a few times, once occurred while sitting and one while walking in to [employer], was short of breath with the fluttering, her watch said her heart rate was in the 90's.  Last time this occurred was 1/3/2025.  Denies chest pain or dizziness. She is not checking her blood pressure at home.  She does not drink caffeine.  This recent EKG 12/23 showed sinus rhythm.  Echocardiogram 4/2024 showed LVEF 56-60%, LV diastolic fracture consistent with grade 1 impaired relaxation, mild/moderate aortic valve stenosis, prosthetic aortic valve present  Hypothyroidism-stable-taking Synthroid 100 mcg daily.  No heat or cold intolerance. Denies dysphagia.  Most recent tsh wnl 6/2024.    Prediabetes-stable-not on medication.  Most recent hemoglobin A1c was 6.09 in 10/24.  Hyperlipidemia-stable-taking simvastatin 20 mg nightly.  No myalgia or jaundice.  Most recent lipid panel 6/24 with elevated triglycerides 160, low HDL 38, lipid panel OTW WNL.  Sleep apnea-compliant with CPAP, followed by sleep medicine Dr. Wylie. Feels sleep is restorative.      The following portions of the  patient's history were reviewed and updated as appropriate: allergies, current medications, past family history, past medical history, past social history, past surgical history and problem list.    No Known Allergies       Current Outpatient Medications:     aspirin 81 MG tablet, Take 1 tablet by mouth Daily., Disp: , Rfl:     Chlorhexidine Gluconate (Hibiclens) 4 % solution, Apply 1 Application topically 1 (One) Time Per Week. Chlorhexadine with shower once weekly, Disp: , Rfl:     Elderberry-Vitamin C-Zinc (iZettle PO), Take 500 mg by mouth Daily., Disp: , Rfl:     fluticasone (FLONASE) 50 MCG/ACT nasal spray, 2 sprays into the nostril(s) as directed by provider Daily., Disp: 18.2 mL, Rfl: 2    levothyroxine (SYNTHROID, LEVOTHROID) 100 MCG tablet, Take 1 tablet by mouth Daily., Disp: 90 tablet, Rfl: 1    loratadine (CLARITIN) 10 MG tablet, TAKE 1 TABLET BY MOUTH EVERY DAY, Disp: 30 tablet, Rfl: 11    metoprolol tartrate (LOPRESSOR) 25 MG tablet, Take 0.5 tablets by mouth 2 (Two) Times a Day., Disp: 90 tablet, Rfl: 1    multivitamin with minerals (MULTIVITAMIN ADULT PO), Take 1 tablet by mouth Daily., Disp: , Rfl:     NON FORMULARY, [place], Cinnamon, Apple Cider Vinegar, Tumeric, Curmemic, berberine, and ginseng supplement, Disp: , Rfl:     PROBIOTIC PRODUCT PO, Take 1 capsule by mouth Daily., Disp: , Rfl:     simvastatin (ZOCOR) 20 MG tablet, Take 1 tablet by mouth Every Evening., Disp: 90 tablet, Rfl: 1    Review of Systems   Constitutional:  Negative for chills, diaphoresis, fatigue and fever.   HENT:  Negative for congestion and sore throat.    Respiratory:  Negative for cough.    Cardiovascular:  Negative for chest pain.   Gastrointestinal:  Negative for abdominal pain, nausea and vomiting.   Genitourinary:  Negative for dysuria.   Musculoskeletal:  Negative for myalgias and neck pain.   Skin:  Negative for rash.   Neurological:  Negative for weakness, numbness and headaches.     "    Objective     /84 (BP Location: Left arm, Patient Position: Sitting, Cuff Size: Large Adult)   Pulse 77   Temp 98 °F (36.7 °C) (Oral)   Resp 15   Ht 152.4 cm (60\")   Wt 100 kg (221 lb)   SpO2 96%   BMI 43.16 kg/m²         Physical Exam  Vitals and nursing note reviewed.   Constitutional:       General: She is not in acute distress.     Appearance: Normal appearance. She is obese. She is not ill-appearing or diaphoretic.   HENT:      Head: Normocephalic and atraumatic.      Nose: Nose normal.      Mouth/Throat:      Mouth: Mucous membranes are moist.   Eyes:      General: No scleral icterus.     Conjunctiva/sclera: Conjunctivae normal.   Cardiovascular:      Rate and Rhythm: Normal rate and regular rhythm.      Pulses: Normal pulses.      Heart sounds: Murmur heard.   Pulmonary:      Effort: Pulmonary effort is normal. No respiratory distress.      Breath sounds: Normal breath sounds. No wheezing.   Abdominal:      General: Bowel sounds are normal. There is no distension.      Palpations: Abdomen is soft.      Tenderness: There is no abdominal tenderness. There is no guarding.   Musculoskeletal:      Cervical back: Normal range of motion and neck supple.      Right lower leg: No edema.      Left lower leg: No edema.   Skin:     General: Skin is warm and dry.      Capillary Refill: Capillary refill takes less than 2 seconds.      Coloration: Skin is not jaundiced.   Neurological:      Mental Status: She is alert and oriented to person, place, and time.      Gait: Gait normal.   Psychiatric:         Mood and Affect: Mood normal.         Behavior: Behavior normal.         Thought Content: Thought content normal.         Judgment: Judgment normal.         PHQ-2 Depression Screening  Little interest or pleasure in doing things? Not at all   Feeling down, depressed, or hopeless? Not at all   PHQ-2 Total Score 0         Result Review    The following data was reviewed by: GUILLERMO Cruiel on " 01/13/2025:  Common labs          6/14/2024    07:46 10/14/2024    08:31 1/13/2025    11:49   Common Labs   Glucose 112   87    BUN 16   16    Creatinine 0.72   0.79    Sodium 143   141    Potassium 4.4   4.1    Chloride 106   104    Calcium 9.0   9.9    Albumin 4.0   4.0    Total Bilirubin 0.4   0.5    Alkaline Phosphatase 108   104    AST (SGOT) 19   26    ALT (SGPT) 15   16    WBC 7.75   7.74    Hemoglobin 15.8   15.5    Hematocrit 47.8   47.5    Platelets 159   170    Total Cholesterol 132      Triglycerides 160      HDL Cholesterol 38      LDL Cholesterol  67      Hemoglobin A1C 5.90  6.09  5.95      CMP          6/14/2024    07:46 1/13/2025    11:49   CMP   Glucose 112  87    BUN 16  16    Creatinine 0.72  0.79    EGFR 91.2  81.6    Sodium 143  141    Potassium 4.4  4.1    Chloride 106  104    Calcium 9.0  9.9    Total Protein 7.0  7.5    Albumin 4.0  4.0    Globulin 3.0  3.5    Total Bilirubin 0.4  0.5    Alkaline Phosphatase 108  104    AST (SGOT) 19  26    ALT (SGPT) 15  16    Albumin/Globulin Ratio 1.3  1.1    BUN/Creatinine Ratio 22.2  20.3    Anion Gap 7.0  9.9      CBC          6/14/2024    07:46 1/13/2025    11:49   CBC   WBC 7.75  7.74    RBC 5.34  5.31    Hemoglobin 15.8  15.5    Hematocrit 47.8  47.5    MCV 89.5  89.5    MCH 29.6  29.2    MCHC 33.1  32.6    RDW 13.1  13.2    Platelets 159  170      CBC w/diff          6/14/2024    07:46   CBC w/Diff   WBC 7.75    RBC 5.34    Hemoglobin 15.8    Hematocrit 47.8    MCV 89.5    MCH 29.6    MCHC 33.1    RDW 13.1    Platelets 159    Neutrophil Rel % 56.4    Immature Granulocyte Rel % 0.4    Lymphocyte Rel % 29.9    Monocyte Rel % 8.4    Eosinophil Rel % 4.1    Basophil Rel % 0.8      Lipid Panel          6/14/2024    07:46   Lipid Panel   Total Cholesterol 132    Triglycerides 160    HDL Cholesterol 38    VLDL Cholesterol 27    LDL Cholesterol  67    LDL/HDL Ratio 1.63      TSH          6/14/2024    07:46 1/13/2025    11:49   TSH   TSH 2.710  6.710   "    BMP          6/14/2024    07:46 1/13/2025    11:49   BMP   BUN 16  16    Creatinine 0.72  0.79    Sodium 143  141    Potassium 4.4  4.1    Chloride 106  104    CO2 30.0  27.1    Calcium 9.0  9.9      A1C Last 3 Results          6/14/2024    07:46 10/14/2024    08:31 1/13/2025    11:49   HGBA1C Last 3 Results   Hemoglobin A1C 5.90  6.09  5.95      Data reviewed : Cardiology studies      Complete Transthoracic Echocardiogram with Complete Doppler and Color Flow    Accession Number: [id number]   Date of Study: 4/29/24   Ordering Provider: Jayson Edmondson MD   Clinical Indications: Valvular Function        [facility]  Performing Staff   Cardiology: Jayson Edmondson MD    Tech: Christel Hooker RCS        Patient Hx Of Height, Weight, and Vitals    Height Weight BSA (Calculated - sq m) BMI (Calculated) Retired BMI (kg/m2) Pulse BP   152.4 cm (60\") 101 kg (223 lb) 1.95 sq meters 43.6  70 129/75      [facility] PACS Images     Show images for Adult Transthoracic Echo Complete W/ Cont if Necessary Per Protocol   Clinical Indication    Valvular Function   Dx: Nonrheumatic aortic valve disorder [I35.9 (ICD-10-CM)]; Nonrheumatic aortic (valve) stenosis [I35.0 (ICD-10-CM)]     Interpretation Summary         Left ventricular ejection fraction appears to be 56 - 60%.    Left ventricular diastolic function is consistent with (grade I) impaired relaxation.    Mild to moderate aortic valve stenosis is present.    There is a prosthetic aortic valve present.    Estimated right ventricular systolic pressure from tricuspid regurgitation is normal (<35 mmHg).     HEART RATE= 67  bpm  RR Interval= 896  ms  NM Interval= 162  ms  P Horizontal Axis= -37  deg  P Front Axis= 39  deg  QRSD Interval= 83  ms  QT Interval= 383  ms  QTcB= 405  ms  QRS Axis= 29  deg  T Wave Axis= 158  deg  - ABNORMAL ECG -  Sinus rhythm  Anteroseptal infarct, old  When compared with ECG of 09-Dec-2013 7:53:50,  Significant rate decrease  Electronically Signed " By: Alexander Steve ([facility]) 07-Dec-2023 14:41:03  Date and Time of Study: 2023-12-06 16:01:09      Assessment & Plan    Diagnoses and all orders for this visit:    1. Primary hypertension (Primary)  -     ECG 12 Lead; Future  -     CBC & Differential; Future  -     Comprehensive Metabolic Panel; Future    2. Status post aortic valve replacement  -     ECG 12 Lead; Future    3. Palpitations  -     ECG 12 Lead; Future  -     CBC & Differential; Future  -     Comprehensive Metabolic Panel; Future  -     TSH Rfx On Abnormal To Free T4; Future    4. Acquired hypothyroidism  -     TSH Rfx On Abnormal To Free T4; Future    5. Prediabetes  -     Hemoglobin A1c; Future    6. Mixed hyperlipidemia    7. LUÍS on CPAP       Patient Instructions   Call office for lab results if you have not received a call from our office or "[employer] message in 1-2 days.    Continue current medications and treatment.   Follow up with Dr. Edmondson for palpitations. Go to er if symptoms recur.   Please take blood pressure daily, keep record, bring with you to follow up.   Office staff contacted cardiology to get appointment, patient notified that she is now scheduled with GUILLERMO Larose 1/16/2025.  I sent a message to Dr. Edmondson, he states to have her see his office clinic this week and they will order Holter monitor through his office.      Follow Up   Return in about 6 months (around 7/13/2025) for Recheck, Hypothyroid, prediabetes, hypertension.    Patient was given instructions and counseling regarding her condition or for health maintenance advice. Please see specific information pulled into the AVS if appropriate.     GUILLERMO Curiel     01/13/25

## 2025-01-14 ENCOUNTER — HOSPITAL ENCOUNTER (INPATIENT)
Facility: HOSPITAL | Age: 70
LOS: 2 days | Discharge: HOME | DRG: 287 | End: 2025-01-17
Attending: EMERGENCY MEDICINE | Admitting: STUDENT IN AN ORGANIZED HEALTH CARE EDUCATION/TRAINING PROGRAM
Payer: MEDICARE

## 2025-01-14 ENCOUNTER — APPOINTMENT (OUTPATIENT)
Dept: GENERAL RADIOLOGY | Facility: HOSPITAL | Age: 70
DRG: 287 | End: 2025-01-14
Payer: MEDICARE

## 2025-01-14 DIAGNOSIS — I35.0 AORTIC STENOSIS, SEVERE: ICD-10-CM

## 2025-01-14 DIAGNOSIS — R07.9 CHEST PAIN, UNSPECIFIED TYPE: Primary | ICD-10-CM

## 2025-01-14 DIAGNOSIS — Z95.2 STATUS POST AORTIC VALVE REPLACEMENT: ICD-10-CM

## 2025-01-14 LAB
ANION GAP SERPL CALCULATED.3IONS-SCNC: 16 MMOL/L (ref 10–20)
ANION GAP SERPL CALCULATED.3IONS-SCNC: 9.2 MMOL/L (ref 5–15)
ANISOCYTOSIS BLD QL: NORMAL
APTT PPP: 27.3 SECONDS (ref 22.7–35.4)
BASOPHILS # BLD AUTO: 0.05 10*3/MM3 (ref 0–0.2)
BASOPHILS NFR BLD AUTO: 0.5 % (ref 0–1.5)
BUN BLDA-MCNC: 19 MG/DL (ref 8–26)
BUN SERPL-MCNC: 17 MG/DL (ref 8–23)
BUN/CREAT SERPL: 25.8 (ref 7–25)
CA-I BLDA-SCNC: 1.12 MMOL/L (ref 1.12–1.32)
CALCIUM SPEC-SCNC: 9.7 MG/DL (ref 8.6–10.5)
CHLORIDE BLDA-SCNC: 104 MMOL/L (ref 98–109)
CHLORIDE SERPL-SCNC: 107 MMOL/L (ref 98–107)
CO2 BLDA-SCNC: 26 MMOL/L (ref 24–29)
CO2 SERPL-SCNC: 25.8 MMOL/L (ref 22–29)
CREAT BLDA-MCNC: 0.8 MG/DL (ref 0.6–1.3)
CREAT SERPL-MCNC: 0.66 MG/DL (ref 0.57–1)
DEPRECATED RDW RBC AUTO: 44 FL (ref 37–54)
EGFRCR SERPLBLD CKD-EPI 2021: 80.4 ML/MIN/1.73
EGFRCR SERPLBLD CKD-EPI 2021: 95.7 ML/MIN/1.73
EOSINOPHIL # BLD AUTO: 0.35 10*3/MM3 (ref 0–0.4)
EOSINOPHIL NFR BLD AUTO: 3.8 % (ref 0.3–6.2)
ERYTHROCYTE [DISTWIDTH] IN BLOOD BY AUTOMATED COUNT: 13.2 % (ref 12.3–15.4)
GEN 5 1HR TROPONIN T REFLEX: 10 NG/L
GLUCOSE BLDC GLUCOMTR-MCNC: 89 MG/DL (ref 70–105)
GLUCOSE SERPL-MCNC: 87 MG/DL (ref 65–99)
HCT VFR BLD AUTO: 46.4 % (ref 34–46.6)
HCT VFR BLDA CALC: 45 % (ref 38–51)
HGB BLD-MCNC: 15.3 G/DL (ref 12–15.9)
HGB BLDA-MCNC: 15.3 G/DL (ref 12–17)
HOLD SPECIMEN: NORMAL
HOLD SPECIMEN: NORMAL
IMM GRANULOCYTES # BLD AUTO: 0.03 10*3/MM3 (ref 0–0.05)
IMM GRANULOCYTES NFR BLD AUTO: 0.3 % (ref 0–0.5)
INR PPP: 1 (ref 0.9–1.1)
LARGE PLATELETS: NORMAL
LYMPHOCYTES # BLD AUTO: 2.13 10*3/MM3 (ref 0.7–3.1)
LYMPHOCYTES NFR BLD AUTO: 23.2 % (ref 19.6–45.3)
MCH RBC QN AUTO: 29.6 PG (ref 26.6–33)
MCHC RBC AUTO-ENTMCNC: 33 G/DL (ref 31.5–35.7)
MCV RBC AUTO: 89.7 FL (ref 79–97)
MONOCYTES # BLD AUTO: 0.94 10*3/MM3 (ref 0.1–0.9)
MONOCYTES NFR BLD AUTO: 10.2 % (ref 5–12)
NEUTROPHILS NFR BLD AUTO: 5.68 10*3/MM3 (ref 1.7–7)
NEUTROPHILS NFR BLD AUTO: 62 % (ref 42.7–76)
NRBC BLD AUTO-RTO: 0 /100 WBC (ref 0–0.2)
NT-PROBNP SERPL-MCNC: 175 PG/ML (ref 0–900)
PLATELET # BLD AUTO: 161 10*3/MM3 (ref 140–450)
PMV BLD AUTO: 12.8 FL (ref 6–12)
POTASSIUM BLDA-SCNC: 4 MMOL/L (ref 3.5–4.9)
POTASSIUM SERPL-SCNC: 4.1 MMOL/L (ref 3.5–5.2)
PROTHROMBIN TIME: 13.3 SECONDS (ref 11.7–14.2)
QT INTERVAL: 378 MS
QTC INTERVAL: 408 MS
RBC # BLD AUTO: 5.17 10*6/MM3 (ref 3.77–5.28)
SMALL PLATELETS BLD QL SMEAR: ADEQUATE
SODIUM BLD-SCNC: 141 MMOL/L (ref 138–146)
SODIUM SERPL-SCNC: 142 MMOL/L (ref 136–145)
TOXIC GRANULATION: NORMAL
TROPONIN T NUMERIC DELTA: 0 NG/L
TROPONIN T SERPL HS-MCNC: 10 NG/L
WBC NRBC COR # BLD AUTO: 9.18 10*3/MM3 (ref 3.4–10.8)
WHOLE BLOOD HOLD SPECIMEN: NORMAL

## 2025-01-14 PROCEDURE — G0378 HOSPITAL OBSERVATION PER HR: HCPCS

## 2025-01-14 PROCEDURE — 93010 ELECTROCARDIOGRAM REPORT: CPT | Performed by: INTERNAL MEDICINE

## 2025-01-14 PROCEDURE — 85610 PROTHROMBIN TIME: CPT | Performed by: EMERGENCY MEDICINE

## 2025-01-14 PROCEDURE — 71045 X-RAY EXAM CHEST 1 VIEW: CPT

## 2025-01-14 PROCEDURE — 80048 BASIC METABOLIC PNL TOTAL CA: CPT | Performed by: EMERGENCY MEDICINE

## 2025-01-14 PROCEDURE — 93005 ELECTROCARDIOGRAM TRACING: CPT | Performed by: EMERGENCY MEDICINE

## 2025-01-14 PROCEDURE — 83880 ASSAY OF NATRIURETIC PEPTIDE: CPT | Performed by: EMERGENCY MEDICINE

## 2025-01-14 PROCEDURE — 85014 HEMATOCRIT: CPT

## 2025-01-14 PROCEDURE — 99285 EMERGENCY DEPT VISIT HI MDM: CPT

## 2025-01-14 PROCEDURE — 85007 BL SMEAR W/DIFF WBC COUNT: CPT | Performed by: EMERGENCY MEDICINE

## 2025-01-14 PROCEDURE — 85025 COMPLETE CBC W/AUTO DIFF WBC: CPT | Performed by: EMERGENCY MEDICINE

## 2025-01-14 PROCEDURE — 80047 BASIC METABLC PNL IONIZED CA: CPT

## 2025-01-14 PROCEDURE — 85730 THROMBOPLASTIN TIME PARTIAL: CPT | Performed by: EMERGENCY MEDICINE

## 2025-01-14 PROCEDURE — 36415 COLL VENOUS BLD VENIPUNCTURE: CPT

## 2025-01-14 PROCEDURE — 84484 ASSAY OF TROPONIN QUANT: CPT | Performed by: EMERGENCY MEDICINE

## 2025-01-14 RX ORDER — SODIUM CHLORIDE 0.9 % (FLUSH) 0.9 %
10 SYRINGE (ML) INJECTION AS NEEDED
Status: DISCONTINUED | OUTPATIENT
Start: 2025-01-14 | End: 2025-01-17 | Stop reason: HOSPADM

## 2025-01-14 RX ORDER — NITROGLYCERIN 0.4 MG/1
0.4 TABLET SUBLINGUAL
Status: DISCONTINUED | OUTPATIENT
Start: 2025-01-14 | End: 2025-01-17 | Stop reason: HOSPADM

## 2025-01-14 RX ORDER — BISACODYL 5 MG/1
5 TABLET, DELAYED RELEASE ORAL DAILY PRN
Status: DISCONTINUED | OUTPATIENT
Start: 2025-01-14 | End: 2025-01-17 | Stop reason: HOSPADM

## 2025-01-14 RX ORDER — NALOXONE HCL 0.4 MG/ML
0.4 VIAL (ML) INJECTION
Status: DISCONTINUED | OUTPATIENT
Start: 2025-01-14 | End: 2025-01-17 | Stop reason: HOSPADM

## 2025-01-14 RX ORDER — ONDANSETRON 2 MG/ML
4 INJECTION INTRAMUSCULAR; INTRAVENOUS EVERY 6 HOURS PRN
Status: DISCONTINUED | OUTPATIENT
Start: 2025-01-14 | End: 2025-01-17 | Stop reason: HOSPADM

## 2025-01-14 RX ORDER — FLUTICASONE PROPIONATE 50 MCG
2 SPRAY, SUSPENSION (ML) NASAL AS NEEDED
Status: ON HOLD | COMMUNITY

## 2025-01-14 RX ORDER — AMOXICILLIN 250 MG
2 CAPSULE ORAL 2 TIMES DAILY PRN
Status: DISCONTINUED | OUTPATIENT
Start: 2025-01-14 | End: 2025-01-17 | Stop reason: HOSPADM

## 2025-01-14 RX ORDER — BISACODYL 10 MG
10 SUPPOSITORY, RECTAL RECTAL DAILY PRN
Status: DISCONTINUED | OUTPATIENT
Start: 2025-01-14 | End: 2025-01-17 | Stop reason: HOSPADM

## 2025-01-14 RX ORDER — POLYETHYLENE GLYCOL 3350 17 G/17G
17 POWDER, FOR SOLUTION ORAL DAILY PRN
Status: DISCONTINUED | OUTPATIENT
Start: 2025-01-14 | End: 2025-01-17 | Stop reason: HOSPADM

## 2025-01-14 RX ORDER — ASPIRIN 81 MG/1
81 TABLET, CHEWABLE ORAL ONCE
Status: COMPLETED | OUTPATIENT
Start: 2025-01-14 | End: 2025-01-14

## 2025-01-14 RX ADMIN — Medication 12.5 MG: at 22:37

## 2025-01-14 RX ADMIN — ASPIRIN 81 MG CHEWABLE TABLET 81 MG: 81 TABLET CHEWABLE at 22:37

## 2025-01-14 NOTE — Clinical Note
The left DP pulse is +2. The right DP pulse is +2. The left PT pulse is +1. The right PT pulse is +2.

## 2025-01-14 NOTE — ED NOTES
Nursing report ED to floor  Lulu Graves  69 y.o.  female    HPI:   Chief Complaint   Patient presents with    Palpitations     Palpitations, states out of breath when walking, Pt states started around Thanksgiving since then off and on. Left arm feeling tingling.           Admitting doctor:   Lee Eden MD    Admitting diagnosis:   The encounter diagnosis was Chest pain, unspecified type.    Code status:   Current Code Status       Date Active Code Status Order ID Comments User Context       Not on file            Allergies:   Patient has no known allergies.    Isolation:  No active isolations     Fall Risk:  Fall Risk Assessment was completed, and patient is at low risk for falls.   Predictive Model Details         11 (Low) Factor Value    Calculated 1/14/2025 17:43 Age 69    Risk of Fall Model Active Peripheral IV Present     Imaging order in this encounter Present     Respiratory Rate 18     Magnesium not on file     Diastolic BP 61     Fausto Scale not on file     Cardiac Assessment X     Albumin 4 g/dL     Chloride 107 mmol/L     Total Bilirubin 0.5 mg/dL     Days after Admission 0.204     Creatinine 0.66 mg/dL     Potassium 4.1 mmol/L     ALT 16 U/L     Calcium 9.7 mg/dL         Weight:       01/14/25  1249   Weight: 100 kg (220 lb 7.4 oz)       Intake and Output  No intake or output data in the 24 hours ending 01/14/25 1746    Diet:   Dietary Orders (From admission, onward)       Start     Ordered    01/15/25 0001  NPO Diet NPO Type: Strict NPO  Diet Effective Midnight        Question:  NPO Type  Answer:  Strict NPO    01/14/25 1727    01/15/25 0001  NPO Diet NPO Type: Sips with Meds  (Procedure Panel)  Diet Effective Midnight        Question:  NPO Type  Answer:  Sips with Meds    01/14/25 1727                     Most recent vitals:   Vitals:    01/14/25 1631 01/14/25 1701 01/14/25 1717 01/14/25 1732   BP: 155/72 122/69 153/75 125/61   BP Location:       Patient Position:       Pulse: 68 75 74 73   Resp:        Temp:       TempSrc:       SpO2: 91% 93% 96% 93%   Weight:       Height:           Active LDAs/IV Access:   Lines, Drains & Airways       Active LDAs       Name Placement date Placement time Site Days    Peripheral IV 01/14/25 1335 Right Antecubital 01/14/25  1335  Antecubital  less than 1                    Skin Condition:   Skin Assessments (last day)       None             Labs (abnormal labs have a star):   Labs Reviewed   BASIC METABOLIC PANEL - Abnormal; Notable for the following components:       Result Value    BUN/Creatinine Ratio 25.8 (*)     All other components within normal limits    Narrative:     GFR Categories in Chronic Kidney Disease (CKD)      GFR Category          GFR (mL/min/1.73)    Interpretation  G1                     90 or greater         Normal or high (1)  G2                      60-89                Mild decrease (1)  G3a                   45-59                Mild to moderate decrease  G3b                   30-44                Moderate to severe decrease  G4                    15-29                Severe decrease  G5                    14 or less           Kidney failure          (1)In the absence of evidence of kidney disease, neither GFR category G1 or G2 fulfill the criteria for CKD.    eGFR calculation 2021 CKD-EPI creatinine equation, which does not include race as a factor   CBC WITH AUTO DIFFERENTIAL - Abnormal; Notable for the following components:    MPV 12.8 (*)     Monocytes, Absolute 0.94 (*)     All other components within normal limits   PROTIME-INR - Normal   APTT - Normal   TROPONIN - Normal    Narrative:     High Sensitive Troponin T Reference Range:  <14.0 ng/L- Negative Female for AMI  <22.0 ng/L- Negative Male for AMI  >=14 - Abnormal Female indicating possible myocardial injury.  >=22 - Abnormal Male indicating possible myocardial injury.   Clinicians would have to utilize clinical acumen, EKG, Troponin, and serial changes to determine if it is an Acute  Myocardial Infarction or myocardial injury due to an underlying chronic condition.        BNP (IN-HOUSE) - Normal    Narrative:     This assay is used as an aid in the diagnosis of individuals suspected of having heart failure. It can be used as an aid in the diagnosis of acute decompensated heart failure (ADHF) in patients presenting with signs and symptoms of ADHF to the emergency department (ED). In addition, NT-proBNP of <300 pg/mL indicates ADHF is not likely.    Age Range Result Interpretation  NT-proBNP Concentration (pg/mL:      <50             Positive            >450                   Gray                 300-450                    Negative             <300    50-75           Positive            >900                  Gray                300-900                  Negative            <300      >75             Positive            >1800                  Gray                300-1800                  Negative            <300   HIGH SENSITIVITIY TROPONIN T 1HR - Normal    Narrative:     High Sensitive Troponin T Reference Range:  <14.0 ng/L- Negative Female for AMI  <22.0 ng/L- Negative Male for AMI  >=14 - Abnormal Female indicating possible myocardial injury.  >=22 - Abnormal Male indicating possible myocardial injury.   Clinicians would have to utilize clinical acumen, EKG, Troponin, and serial changes to determine if it is an Acute Myocardial Infarction or myocardial injury due to an underlying chronic condition.        POCT CHEM 8 - Normal   SCAN SLIDE   RAINBOW DRAW    Narrative:     The following orders were created for panel order [place] Draw.  Procedure                               Abnormality         Status                     ---------                               -----------         ------                     Green Top (Gel)[[id number]]                                  Final result               Lavender Top[[id number]]                                     Final result                 Please view  results for these tests on the individual orders.   CBC AND DIFFERENTIAL    Narrative:     The following orders were created for panel order CBC & Differential.  Procedure                               Abnormality         Status                     ---------                               -----------         ------                     CBC Auto Differential[[id number]]        Abnormal            Final result               Scan Slide[[id number]]                                       Final result                 Please view results for these tests on the individual orders.   EXTRA TUBES    Narrative:     The following orders were created for panel order Extra Tubes.  Procedure                               Abnormality         Status                     ---------                               -----------         ------                     Gold Top - SST[[id number]]                                   Final result                 Please view results for these tests on the individual orders.   GOLD TOP - [facility]   GREEN TOP   LAVENDER TOP       LOC: Person, Place, Time, and Situation    Telemetry:  Observation Unit    Cardiac Monitoring Ordered: yes    EKG:   ECG 12 Lead Chest Pain   Preliminary Result   HEART RATE=70  bpm   RR Cobbfhau=577  ms   MS Vtfpbwyt=443  ms   P Horizontal Axis=-8  deg   P Front Axis=15  deg   QRSD Interval=83  ms   QT Ltpdhdmm=320  ms   DKxE=037  ms   QRS Axis=4  deg   T Wave Axis=154  deg   - ABNORMAL ECG -   Sinus rhythm   Probable LVH with secondary repol abnrm   Anterior Q waves, possibly due to LVH   Date and Time of Study:2025-01-14 13:11:31          Medications Given in the ED:   Medications   sodium chloride 0.9 % flush 10 mL (has no administration in time range)   ondansetron (ZOFRAN) injection 4 mg (has no administration in time range)   melatonin tablet 5 mg (has no administration in time range)   nitroglycerin (NITROSTAT) SL tablet 0.4 mg (has no administration in time range)   nitroglycerin  (NITROSTAT) SL tablet 0.4 mg (has no administration in time range)   morphine injection 1 mg (has no administration in time range)     And   naloxone (NARCAN) injection 0.4 mg (has no administration in time range)   sennosides-docusate (PERICOLACE) 8.6-50 MG per tablet 2 tablet (has no administration in time range)     And   polyethylene glycol (MIRALAX) packet 17 g (has no administration in time range)     And   bisacodyl (DULCOLAX) EC tablet 5 mg (has no administration in time range)     And   bisacodyl (DULCOLAX) suppository 10 mg (has no administration in time range)       Imaging results:  XR Chest 1 View    Result Date: 1/14/2025  Impression: No active disease. Electronically Signed: Edward Day MD  1/14/2025 2:00 PM EST  Workstation ID: [id number]     Social issues:   Social History     Socioeconomic History    Marital status:    Tobacco Use    Smoking status: Never     Passive exposure: Past    Smokeless tobacco: Never   Vaping Use    Vaping status: Never Used   Substance and Sexual Activity    Alcohol use: Never    Drug use: No    Sexual activity: Yes     Partners: Male     Birth control/protection: None, Tubal ligation       NIH Stroke Scale:  Interval: (not recorded)  1a. Level of Consciousness: (not recorded)  1b. LOC Questions: (not recorded)  1c. LOC Commands: (not recorded)  2. Best Gaze: (not recorded)  3. Visual: (not recorded)  4. Facial Palsy: (not recorded)  5a. Motor Arm, Left: (not recorded)  5b. Motor Arm, Right: (not recorded)  6a. Motor Leg, Left: (not recorded)  6b. Motor Leg, Right: (not recorded)  7. Limb Ataxia: (not recorded)  8. Sensory: (not recorded)  9. Best Language: (not recorded)  10. Dysarthria: (not recorded)  11. Extinction and Inattention (formerly Neglect): (not recorded)    Total (NIH Stroke Scale): (not recorded)     Additional notable assessment information:     Nursing report ED to floor:  Ev Styles RN   01/14/25 17:46 EST

## 2025-01-14 NOTE — ED PROVIDER NOTES
Subjective   History of Present Illness  Chief complaint: Shortness of breath    69-year-old female presents with shortness of breath and palpitations.  Patient states symptoms have been intermittent since [place].  She states symptoms are worse with exertion.  She denies any actual chest pain but reports some pressure on her chest.  She states she talk to her primary doctor and they did an outpatient EKG yesterday.  She was called today and told to come to the emergency room because the EKG was abnormal.    History provided by:  Patient      Review of Systems   Constitutional:  Negative for fever.   HENT:  Negative for congestion.    Respiratory:  Positive for shortness of breath. Negative for cough.    Cardiovascular:  Positive for chest pain and palpitations.   Gastrointestinal:  Negative for abdominal pain and vomiting.   Musculoskeletal:  Negative for back pain.   Neurological:  Negative for headaches.   Psychiatric/Behavioral:  Negative for confusion.        Past Medical History:   Diagnosis Date    Allergic     Aortic stenosis     Aortic valve replaced 2013    Cataract     GERD (gastroesophageal reflux disease)     Glaucoma 2023    Just started    Heart murmur     Hyperlipidemia     Hypertension     Hypothyroidism     Liver disease     Due to medication    MRSA (methicillin resistant Staphylococcus aureus)     left arm abscess    Obesity     Osteopenia     PAT (paroxysmal atrial tachycardia)     Sleep apnea        No Known Allergies    Past Surgical History:   Procedure Laterality Date    ABLATION OF DYSRHYTHMIC FOCUS   or     AORTIC VALVE REPAIR/REPLACEMENT      AORTIC VALVE SURGERY  2013    CARDIAC CATHETERIZATION      Before open heart surgery    CARDIAC VALVE REPLACEMENT  2013     SECTION  1982    COLONOSCOPY  2016    INCISION AND DRAINAGE ABSCESS Left 2023    Procedure: INCISION AND DRAINAGE ABSCESS, left arm;  Surgeon: Addy Torres  "MD Herve;  Location: [facility] MAIN OR;  Service: General;  Laterality: Left;    TUBAL ABDOMINAL LIGATION         Family History   Problem Relation Age of Onset    Arthritis Mother     Diabetes Brother     Hearing loss Brother     Stroke Paternal Grandmother     Sleep apnea Neg Hx        Social History     Socioeconomic History    Marital status:    Tobacco Use    Smoking status: Never     Passive exposure: Past    Smokeless tobacco: Never   Vaping Use    Vaping status: Never Used   Substance and Sexual Activity    Alcohol use: Never    Drug use: No    Sexual activity: Yes     Partners: Male     Birth control/protection: None, Tubal ligation       /75   Pulse 74   Temp 97.9 °F (36.6 °C) (Oral)   Resp 18   Ht 149.9 cm (59\")   Wt 100 kg (220 lb 7.4 oz)   LMP  (LMP Unknown)   SpO2 96%   BMI 44.53 kg/m²       Objective   Physical Exam  Vitals and nursing note reviewed.   Constitutional:       Appearance: Normal appearance.   HENT:      Head: Normocephalic and atraumatic.      Mouth/Throat:      Mouth: Mucous membranes are moist.   Cardiovascular:      Rate and Rhythm: Normal rate and regular rhythm.      Heart sounds: Normal heart sounds.   Pulmonary:      Effort: Pulmonary effort is normal. No respiratory distress.      Breath sounds: Normal breath sounds.   Abdominal:      Palpations: Abdomen is soft.      Tenderness: There is no abdominal tenderness.   Musculoskeletal:      Right lower leg: No edema.      Left lower leg: No edema.   Skin:     General: Skin is warm and dry.   Neurological:      Mental Status: She is alert and oriented to person, place, and time.         Procedures           ED Course      Results for orders placed or performed during the hospital encounter of 01/14/25   ECG 12 Lead Chest Pain    Collection Time: 01/14/25  1:11 PM   Result Value Ref Range    QT Interval 378 ms    QTC Interval 408 ms   Protime-INR    Collection Time: 01/14/25  1:22 PM    Specimen: Arm, Left; Blood "   Result Value Ref Range    Protime 13.3 11.7 - 14.2 Seconds    INR 1.00 0.90 - 1.10   aPTT    Collection Time: 01/14/25  1:22 PM    Specimen: Arm, Left; Blood   Result Value Ref Range    PTT 27.3 22.7 - 35.4 seconds   BNP    Collection Time: 01/14/25  1:22 PM    Specimen: Arm, Left; Blood   Result Value Ref Range    proBNP 175.0 0.0 - 900.0 pg/mL   CBC Auto Differential    Collection Time: 01/14/25  1:22 PM    Specimen: Arm, Left; Blood   Result Value Ref Range    WBC 9.18 3.40 - 10.80 10*3/mm3    RBC 5.17 3.77 - 5.28 10*6/mm3    Hemoglobin 15.3 12.0 - 15.9 g/dL    Hematocrit 46.4 34.0 - 46.6 %    MCV 89.7 79.0 - 97.0 fL    MCH 29.6 26.6 - 33.0 pg    MCHC 33.0 31.5 - 35.7 g/dL    RDW 13.2 12.3 - 15.4 %    RDW-SD 44.0 37.0 - 54.0 fl    MPV 12.8 (H) 6.0 - 12.0 fL    Platelets 161 140 - 450 10*3/mm3    Neutrophil % 62.0 42.7 - 76.0 %    Lymphocyte % 23.2 19.6 - 45.3 %    Monocyte % 10.2 5.0 - 12.0 %    Eosinophil % 3.8 0.3 - 6.2 %    Basophil % 0.5 0.0 - 1.5 %    Immature Grans % 0.3 0.0 - 0.5 %    Neutrophils, Absolute 5.68 1.70 - 7.00 10*3/mm3    Lymphocytes, Absolute 2.13 0.70 - 3.10 10*3/mm3    Monocytes, Absolute 0.94 (H) 0.10 - 0.90 10*3/mm3    Eosinophils, Absolute 0.35 0.00 - 0.40 10*3/mm3    Basophils, Absolute 0.05 0.00 - 0.20 10*3/mm3    Immature Grans, Absolute 0.03 0.00 - 0.05 10*3/mm3    nRBC 0.0 0.0 - 0.2 /100 WBC   Scan Slide    Collection Time: 01/14/25  1:22 PM    Specimen: Arm, Left; Blood   Result Value Ref Range    Anisocytosis Slight/1+ None Seen    Toxic Granulation Slight/1+ None Seen    Platelet Estimate Adequate Normal    Large Platelets Slight/1+ None Seen   Gold Top - SST    Collection Time: 01/14/25  1:22 PM   Result Value Ref Range    Extra Tube Hold for add-ons.    POC CHEM 8    Collection Time: 01/14/25  1:40 PM    Specimen: Blood   Result Value Ref Range    Glucose 89 70 - 105 mg/dL    BUN 19 8 - 26 mg/dL    Creatinine 0.80 0.60 - 1.30 mg/dL    Sodium 141 138 - 146 mmol/L    POC  Potassium 4.0 3.5 - 4.9 mmol/L    Chloride 104 98 - 109 mmol/L    Total CO2 26 24 - 29 mmol/L    Anion Gap 16.0 10.0 - 20.0 mmol/L    Hemoglobin 15.3 12.0 - 17.0 g/dL    Hematocrit 45 38 - 51 %    Ionized Calcium 1.12 1.12 - 1.32 mmol/L    eGFR 80.4 >60.0 mL/min/1.73   Green Top (Gel)    Collection Time: 01/14/25  1:45 PM   Result Value Ref Range    Extra Tube Hold for add-ons.    Lavender Top    Collection Time: 01/14/25  1:45 PM   Result Value Ref Range    Extra Tube hold for add-on    Basic Metabolic Panel    Collection Time: 01/14/25  2:29 PM    Specimen: Hand, Right; Blood   Result Value Ref Range    Glucose 87 65 - 99 mg/dL    BUN 17 8 - 23 mg/dL    Creatinine 0.66 0.57 - 1.00 mg/dL    Sodium 142 136 - 145 mmol/L    Potassium 4.1 3.5 - 5.2 mmol/L    Chloride 107 98 - 107 mmol/L    CO2 25.8 22.0 - 29.0 mmol/L    Calcium 9.7 8.6 - 10.5 mg/dL    BUN/Creatinine Ratio 25.8 (H) 7.0 - 25.0    Anion Gap 9.2 5.0 - 15.0 mmol/L    eGFR 95.7 >60.0 mL/min/1.73   High Sensitivity Troponin T    Collection Time: 01/14/25  2:29 PM    Specimen: Hand, Right; Blood   Result Value Ref Range    HS Troponin T 10 <14 ng/L   High Sensitivity Troponin T 1Hr    Collection Time: 01/14/25  3:59 PM    Specimen: Blood   Result Value Ref Range    HS Troponin T 10 <14 ng/L    Troponin T Numeric Delta 0 Abnormal if >/=3 ng/L     XR Chest 1 View    Result Date: 1/14/2025  Impression: No active disease. Electronically Signed: Edward Day MD  1/14/2025 2:00 PM EST  Workstation ID: [id number]               HEART Score: 5                          My interpretation of EKG shows normal sinus rhythm, rate of 70, no ST elevation            Medical Decision Making    Patient had the above evaluation.  Results were discussed with the patient.  My interpretation of chest x-ray shows no filtrate or effusion.  EKG shows no acute ischemia.  Troponin is negative x 2.  White blood cell count is normal.  BMP is unremarkable.  Patient will be placed in observation  for further cardiac monitoring and stress test in the morning.  Patient is agreeable with this plan.      Final diagnoses:   Chest pain, unspecified type       ED Disposition  ED Disposition       ED Disposition   Decision to Admit    Condition   --    Comment   --               No follow-up provider specified.       Medication List      No changes were made to your prescriptions during this visit.            Lee Eden MD  01/14/25 [id number]

## 2025-01-15 ENCOUNTER — APPOINTMENT (OUTPATIENT)
Dept: CARDIOLOGY | Facility: HOSPITAL | Age: 70
DRG: 287 | End: 2025-01-15
Payer: MEDICARE

## 2025-01-15 ENCOUNTER — APPOINTMENT (OUTPATIENT)
Dept: NUCLEAR MEDICINE | Facility: HOSPITAL | Age: 70
DRG: 287 | End: 2025-01-15
Payer: MEDICARE

## 2025-01-15 PROBLEM — I35.0 AORTIC STENOSIS, SEVERE: Status: ACTIVE | Noted: 2025-01-14

## 2025-01-15 PROBLEM — T82.857A PROSTHETIC AORTIC VALVE STENOSIS: Status: ACTIVE | Noted: 2025-01-15

## 2025-01-15 PROBLEM — T82.857A PROSTHETIC MITRAL VALVE STENOSIS: Status: ACTIVE | Noted: 2025-01-15

## 2025-01-15 LAB
ANION GAP SERPL CALCULATED.3IONS-SCNC: 8.3 MMOL/L (ref 5–15)
AORTIC DIMENSIONLESS INDEX: 0.23 (DI)
AV MEAN PRESS GRAD SYS DOP V1V2: 44 MMHG
AV VMAX SYS DOP: 403 CM/SEC
BASOPHILS # BLD AUTO: 0.04 10*3/MM3 (ref 0–0.2)
BASOPHILS NFR BLD AUTO: 0.5 % (ref 0–1.5)
BH CV ECHO LEFT VENTRICLE GLOBAL LONGITUDINAL STRAIN: -13.4 %
BH CV ECHO MEAS - ACS: 1.8 CM
BH CV ECHO MEAS - AO MAX PG: 65 MMHG
BH CV ECHO MEAS - AO V2 VTI: 90.2 CM
BH CV ECHO MEAS - AVA(I,D): 0.73 CM2
BH CV ECHO MEAS - EDV(CUBED): 91.1 ML
BH CV ECHO MEAS - EDV(MOD-SP4): 80.4 ML
BH CV ECHO MEAS - EF(MOD-SP4): 53.5 %
BH CV ECHO MEAS - ESV(CUBED): 35.9 ML
BH CV ECHO MEAS - ESV(MOD-SP4): 37.4 ML
BH CV ECHO MEAS - FS: 26.7 %
BH CV ECHO MEAS - IVS/LVPW: 1.11 CM
BH CV ECHO MEAS - IVSD: 1 CM
BH CV ECHO MEAS - LA DIMENSION: 3.2 CM
BH CV ECHO MEAS - LAT PEAK E' VEL: 8.2 CM/SEC
BH CV ECHO MEAS - LV DIASTOLIC VOL/BSA (35-75): 41.9 CM2
BH CV ECHO MEAS - LV MASS(C)D: 142.9 GRAMS
BH CV ECHO MEAS - LV MAX PG: 3.3 MMHG
BH CV ECHO MEAS - LV MEAN PG: 2 MMHG
BH CV ECHO MEAS - LV SYSTOLIC VOL/BSA (12-30): 19.5 CM2
BH CV ECHO MEAS - LV V1 MAX: 91.2 CM/SEC
BH CV ECHO MEAS - LV V1 VTI: 19.1 CM
BH CV ECHO MEAS - LVIDD: 4.5 CM
BH CV ECHO MEAS - LVIDS: 3.3 CM
BH CV ECHO MEAS - LVOT AREA: 3.5 CM2
BH CV ECHO MEAS - LVOT DIAM: 2.1 CM
BH CV ECHO MEAS - LVPWD: 0.9 CM
BH CV ECHO MEAS - MED PEAK E' VEL: 5.3 CM/SEC
BH CV ECHO MEAS - MV A MAX VEL: 118 CM/SEC
BH CV ECHO MEAS - MV DEC SLOPE: 297 CM/SEC2
BH CV ECHO MEAS - MV DEC TIME: 0.31 SEC
BH CV ECHO MEAS - MV E MAX VEL: 69.7 CM/SEC
BH CV ECHO MEAS - MV E/A: 0.59
BH CV ECHO MEAS - MV MAX PG: 7 MMHG
BH CV ECHO MEAS - MV MEAN PG: 2 MMHG
BH CV ECHO MEAS - MV P1/2T: 85.8 MSEC
BH CV ECHO MEAS - MV V2 VTI: 31.9 CM
BH CV ECHO MEAS - MVA(P1/2T): 2.6 CM2
BH CV ECHO MEAS - MVA(VTI): 2.07 CM2
BH CV ECHO MEAS - PA ACC TIME: 0.11 SEC
BH CV ECHO MEAS - PA V2 MAX: 104 CM/SEC
BH CV ECHO MEAS - PULM A REVS DUR: 0.09 SEC
BH CV ECHO MEAS - PULM A REVS VEL: 33.3 CM/SEC
BH CV ECHO MEAS - PULM DIAS VEL: 31.9 CM/SEC
BH CV ECHO MEAS - PULM S/D: 1.47
BH CV ECHO MEAS - PULM SYS VEL: 47 CM/SEC
BH CV ECHO MEAS - RV MAX PG: 2.35 MMHG
BH CV ECHO MEAS - RV V1 MAX: 76.6 CM/SEC
BH CV ECHO MEAS - RV V1 VTI: 12.9 CM
BH CV ECHO MEAS - RVDD: 2.7 CM
BH CV ECHO MEAS - SV(LVOT): 66.2 ML
BH CV ECHO MEAS - SV(MOD-SP4): 43 ML
BH CV ECHO MEAS - SVI(LVOT): 34.5 ML/M2
BH CV ECHO MEAS - SVI(MOD-SP4): 22.4 ML/M2
BH CV ECHO MEAS - TAPSE (>1.6): 1.78 CM
BH CV ECHO MEASUREMENTS AVERAGE E/E' RATIO: 10.33
BH CV NUCLEAR PRIOR STUDY: 3
BH CV REST NUCLEAR ISOTOPE DOSE: 10.7 MCI
BH CV STRESS BP STAGE 1: NORMAL
BH CV STRESS BP STAGE 2: NORMAL
BH CV STRESS COMMENTS STAGE 1: NORMAL
BH CV STRESS DOSE REGADENOSON STAGE 1: 0.4
BH CV STRESS DURATION MIN STAGE 1: 3
BH CV STRESS DURATION MIN STAGE 2: 3
BH CV STRESS DURATION SEC STAGE 1: 0
BH CV STRESS DURATION SEC STAGE 2: 0
BH CV STRESS GRADE STAGE 1: 10
BH CV STRESS GRADE STAGE 2: 12
BH CV STRESS HR STAGE 1: 118
BH CV STRESS HR STAGE 2: 136
BH CV STRESS METS STAGE 1: 5
BH CV STRESS METS STAGE 2: 7.5
BH CV STRESS NUCLEAR ISOTOPE DOSE: 33 MCI
BH CV STRESS PROTOCOL 1: NORMAL
BH CV STRESS RECOVERY BP: NORMAL MMHG
BH CV STRESS RECOVERY HR: 97 BPM
BH CV STRESS SPEED STAGE 1: 1.7
BH CV STRESS SPEED STAGE 2: 2.5
BH CV STRESS STAGE 1: 1
BH CV STRESS STAGE 2: 2
BH CV XLRA - TDI S': 9.8 CM/SEC
BUN SERPL-MCNC: 18 MG/DL (ref 8–23)
BUN/CREAT SERPL: 22.5 (ref 7–25)
CALCIUM SPEC-SCNC: 9 MG/DL (ref 8.6–10.5)
CHLORIDE SERPL-SCNC: 105 MMOL/L (ref 98–107)
CHOLEST SERPL-MCNC: 154 MG/DL (ref 0–200)
CO2 SERPL-SCNC: 27.7 MMOL/L (ref 22–29)
CREAT SERPL-MCNC: 0.8 MG/DL (ref 0.57–1)
DEPRECATED RDW RBC AUTO: 45.5 FL (ref 37–54)
EGFRCR SERPLBLD CKD-EPI 2021: 80.4 ML/MIN/1.73
EOSINOPHIL # BLD AUTO: 0.37 10*3/MM3 (ref 0–0.4)
EOSINOPHIL NFR BLD AUTO: 4.5 % (ref 0.3–6.2)
ERYTHROCYTE [DISTWIDTH] IN BLOOD BY AUTOMATED COUNT: 13.3 % (ref 12.3–15.4)
GLUCOSE SERPL-MCNC: 98 MG/DL (ref 65–99)
HCT VFR BLD AUTO: 48.9 % (ref 34–46.6)
HDLC SERPL-MCNC: 34 MG/DL (ref 40–60)
HGB BLD-MCNC: 15 G/DL (ref 12–15.9)
IMM GRANULOCYTES # BLD AUTO: 0.02 10*3/MM3 (ref 0–0.05)
IMM GRANULOCYTES NFR BLD AUTO: 0.2 % (ref 0–0.5)
LDLC SERPL CALC-MCNC: 87 MG/DL (ref 0–100)
LDLC/HDLC SERPL: 2.4 {RATIO}
LV EF BIPLANE MOD: 54 %
LYMPHOCYTES # BLD AUTO: 2.42 10*3/MM3 (ref 0.7–3.1)
LYMPHOCYTES NFR BLD AUTO: 29.3 % (ref 19.6–45.3)
MAXIMAL PREDICTED HEART RATE: 152 BPM
MCH RBC QN AUTO: 28.1 PG (ref 26.6–33)
MCHC RBC AUTO-ENTMCNC: 30.7 G/DL (ref 31.5–35.7)
MCV RBC AUTO: 91.7 FL (ref 79–97)
MONOCYTES # BLD AUTO: 0.83 10*3/MM3 (ref 0.1–0.9)
MONOCYTES NFR BLD AUTO: 10 % (ref 5–12)
NEUTROPHILS NFR BLD AUTO: 4.59 10*3/MM3 (ref 1.7–7)
NEUTROPHILS NFR BLD AUTO: 55.5 % (ref 42.7–76)
NRBC BLD AUTO-RTO: 0 /100 WBC (ref 0–0.2)
PERCENT MAX PREDICTED HR: 89.47 %
PLATELET # BLD AUTO: 171 10*3/MM3 (ref 140–450)
PMV BLD AUTO: 12.1 FL (ref 6–12)
POTASSIUM SERPL-SCNC: 3.9 MMOL/L (ref 3.5–5.2)
RBC # BLD AUTO: 5.33 10*6/MM3 (ref 3.77–5.28)
SINUS: 3.2 CM
SODIUM SERPL-SCNC: 141 MMOL/L (ref 136–145)
SPECT HRT GATED+EF W RNC IV: 62 %
STJ: 2.9 CM
STRESS BASELINE BP: NORMAL MMHG
STRESS BASELINE HR: 85 BPM
STRESS PERCENT HR: 105 %
STRESS POST ESTIMATED WORKLOAD: 5.9 METS
STRESS POST EXERCISE DUR MIN: 6 MIN
STRESS POST EXERCISE DUR SEC: 1 SEC
STRESS POST PEAK BP: NORMAL MMHG
STRESS POST PEAK HR: 136 BPM
STRESS TARGET HR: 129 BPM
TRIGL SERPL-MCNC: 192 MG/DL (ref 0–150)
VLDLC SERPL-MCNC: 33 MG/DL (ref 5–40)
WBC NRBC COR # BLD AUTO: 8.27 10*3/MM3 (ref 3.4–10.8)

## 2025-01-15 PROCEDURE — A9502 TC99M TETROFOSMIN: HCPCS | Performed by: EMERGENCY MEDICINE

## 2025-01-15 PROCEDURE — 80048 BASIC METABOLIC PNL TOTAL CA: CPT | Performed by: EMERGENCY MEDICINE

## 2025-01-15 PROCEDURE — 25010000002 MIDAZOLAM PER 1 MG: Performed by: INTERNAL MEDICINE

## 2025-01-15 PROCEDURE — 93454 CORONARY ARTERY ANGIO S&I: CPT | Performed by: INTERNAL MEDICINE

## 2025-01-15 PROCEDURE — 93356 MYOCRD STRAIN IMG SPCKL TRCK: CPT

## 2025-01-15 PROCEDURE — 25510000001 IOPAMIDOL PER 1 ML: Performed by: INTERNAL MEDICINE

## 2025-01-15 PROCEDURE — C1760 CLOSURE DEV, VASC: HCPCS | Performed by: INTERNAL MEDICINE

## 2025-01-15 PROCEDURE — 85025 COMPLETE CBC W/AUTO DIFF WBC: CPT | Performed by: EMERGENCY MEDICINE

## 2025-01-15 PROCEDURE — 93306 TTE W/DOPPLER COMPLETE: CPT

## 2025-01-15 PROCEDURE — 78452 HT MUSCLE IMAGE SPECT MULT: CPT | Performed by: INTERNAL MEDICINE

## 2025-01-15 PROCEDURE — C1769 GUIDE WIRE: HCPCS | Performed by: INTERNAL MEDICINE

## 2025-01-15 PROCEDURE — 93018 CV STRESS TEST I&R ONLY: CPT | Performed by: INTERNAL MEDICINE

## 2025-01-15 PROCEDURE — 78452 HT MUSCLE IMAGE SPECT MULT: CPT

## 2025-01-15 PROCEDURE — 93306 TTE W/DOPPLER COMPLETE: CPT | Performed by: INTERNAL MEDICINE

## 2025-01-15 PROCEDURE — 99222 1ST HOSP IP/OBS MODERATE 55: CPT | Performed by: INTERNAL MEDICINE

## 2025-01-15 PROCEDURE — 80061 LIPID PANEL: CPT | Performed by: PHYSICIAN ASSISTANT

## 2025-01-15 PROCEDURE — 25010000002 FENTANYL CITRATE (PF) 100 MCG/2ML SOLUTION: Performed by: INTERNAL MEDICINE

## 2025-01-15 PROCEDURE — 34310000005 TECHNETIUM TETROFOSMIN KIT: Performed by: EMERGENCY MEDICINE

## 2025-01-15 PROCEDURE — 93017 CV STRESS TEST TRACING ONLY: CPT

## 2025-01-15 PROCEDURE — 93356 MYOCRD STRAIN IMG SPCKL TRCK: CPT | Performed by: INTERNAL MEDICINE

## 2025-01-15 PROCEDURE — 25010000002 LIDOCAINE 2% SOLUTION: Performed by: INTERNAL MEDICINE

## 2025-01-15 PROCEDURE — C1894 INTRO/SHEATH, NON-LASER: HCPCS | Performed by: INTERNAL MEDICINE

## 2025-01-15 PROCEDURE — B2111ZZ FLUOROSCOPY OF MULTIPLE CORONARY ARTERIES USING LOW OSMOLAR CONTRAST: ICD-10-PCS | Performed by: INTERNAL MEDICINE

## 2025-01-15 RX ORDER — SODIUM CHLORIDE 9 MG/ML
75 INJECTION, SOLUTION INTRAVENOUS CONTINUOUS
Status: DISCONTINUED | OUTPATIENT
Start: 2025-01-15 | End: 2025-01-16

## 2025-01-15 RX ORDER — ASPIRIN 81 MG/1
81 TABLET ORAL DAILY
Status: DISCONTINUED | OUTPATIENT
Start: 2025-01-15 | End: 2025-01-17 | Stop reason: HOSPADM

## 2025-01-15 RX ORDER — LEVOTHYROXINE SODIUM 112 UG/1
112 TABLET ORAL DAILY
Status: DISCONTINUED | OUTPATIENT
Start: 2025-01-15 | End: 2025-01-17 | Stop reason: HOSPADM

## 2025-01-15 RX ORDER — ACETAMINOPHEN 325 MG/1
650 TABLET ORAL EVERY 4 HOURS PRN
Status: DISCONTINUED | OUTPATIENT
Start: 2025-01-15 | End: 2025-01-17 | Stop reason: HOSPADM

## 2025-01-15 RX ORDER — MIDAZOLAM HYDROCHLORIDE 1 MG/ML
INJECTION, SOLUTION INTRAMUSCULAR; INTRAVENOUS
Status: DISCONTINUED | OUTPATIENT
Start: 2025-01-15 | End: 2025-01-15 | Stop reason: HOSPADM

## 2025-01-15 RX ORDER — IOPAMIDOL 755 MG/ML
INJECTION, SOLUTION INTRAVASCULAR
Status: DISCONTINUED | OUTPATIENT
Start: 2025-01-15 | End: 2025-01-15 | Stop reason: HOSPADM

## 2025-01-15 RX ORDER — LIDOCAINE HYDROCHLORIDE 20 MG/ML
INJECTION, SOLUTION INFILTRATION; PERINEURAL
Status: DISCONTINUED | OUTPATIENT
Start: 2025-01-15 | End: 2025-01-15 | Stop reason: HOSPADM

## 2025-01-15 RX ORDER — MIDAZOLAM HYDROCHLORIDE 1 MG/ML
1 INJECTION, SOLUTION INTRAMUSCULAR; INTRAVENOUS ONCE
Status: DISCONTINUED | OUTPATIENT
Start: 2025-01-15 | End: 2025-01-15 | Stop reason: HOSPADM

## 2025-01-15 RX ORDER — FENTANYL CITRATE 50 UG/ML
25 INJECTION, SOLUTION INTRAMUSCULAR; INTRAVENOUS ONCE
Status: DISCONTINUED | OUTPATIENT
Start: 2025-01-15 | End: 2025-01-15 | Stop reason: HOSPADM

## 2025-01-15 RX ORDER — ATORVASTATIN CALCIUM 10 MG/1
10 TABLET, FILM COATED ORAL DAILY
Status: DISCONTINUED | OUTPATIENT
Start: 2025-01-15 | End: 2025-01-17 | Stop reason: HOSPADM

## 2025-01-15 RX ORDER — FENTANYL CITRATE 50 UG/ML
INJECTION, SOLUTION INTRAMUSCULAR; INTRAVENOUS
Status: DISCONTINUED | OUTPATIENT
Start: 2025-01-15 | End: 2025-01-15 | Stop reason: HOSPADM

## 2025-01-15 RX ADMIN — SENNOSIDES AND DOCUSATE SODIUM 2 TABLET: 50; 8.6 TABLET ORAL at 15:23

## 2025-01-15 RX ADMIN — TETROFOSMIN 1 DOSE: 1.38 INJECTION, POWDER, LYOPHILIZED, FOR SOLUTION INTRAVENOUS at 08:50

## 2025-01-15 RX ADMIN — TETROFOSMIN 1 DOSE: 1.38 INJECTION, POWDER, LYOPHILIZED, FOR SOLUTION INTRAVENOUS at 06:59

## 2025-01-15 RX ADMIN — Medication 12.5 MG: at 20:56

## 2025-01-15 RX ADMIN — Medication 12.5 MG: at 11:18

## 2025-01-15 RX ADMIN — LEVOTHYROXINE SODIUM 112 MCG: 112 TABLET ORAL at 11:18

## 2025-01-15 NOTE — H&P
[facility] Medicine Services  History & Physical    Patient Name: Lulu Graves  : 1955  MRN: [id number]  Primary Care Physician:  Sushma Hall APRN  Date of admission: 2025  Date and Time of Service: 2025 at 1453    Subjective      Chief Complaint: Chest pain, palpitations    History of Present Illness: Lulu Graves is a 69 y.o. female with a CMH of history of aortic stenosis status post TAVR, HTN, HLD, LUÍS, GERD who initially presented to [facility] on 2025 with chest pain, HOLLAND and palpitations.  On initial ED evaluation, labs were grossly unremarkable.  Troponin trend remained flat.  EKG was sinus rhythm, rate of 62 with no evidence of acute ischemia but was notable for T wave inversions in lead I and aVL.  CXR with no acute findings.  Patient was on to ED observation under [facility].  Echocardiogram was done this morning with cardiology which revealed severe bioprosthetic valve stenosis.  Due to this finding, hospitalist service was consulted for admission.    Patient currently denies chest pain, shortness of breath, palpitations but states symptoms have been ongoing since 2024.  Patient reports she has completed stress test with no recurrence of chest pressure or palpitations.  She is currently awaiting heart cath with Dr. Oswald.  Patient reports that she had bioprosthetic valve in  with Dr. Richter.     Review of Systems   Constitutional:  Negative for chills and fever.   Respiratory:  Negative for shortness of breath.    Cardiovascular:  Negative for chest pain and palpitations.       Personal History     Past Medical History:   Diagnosis Date    Allergic     Aortic stenosis     Aortic valve replaced 2013    Cataract     GERD (gastroesophageal reflux disease)     Glaucoma 2023    Just started    Heart murmur     Hyperlipidemia     Hypertension     Hypothyroidism     Liver disease     Due to medication    MRSA (methicillin resistant  Staphylococcus aureus)     left arm abscess    Obesity     Osteopenia     PAT (paroxysmal atrial tachycardia)     Sleep apnea        Past Surgical History:   Procedure Laterality Date    ABLATION OF DYSRHYTHMIC FOCUS   or     AORTIC VALVE REPAIR/REPLACEMENT      AORTIC VALVE SURGERY  2013    CARDIAC CATHETERIZATION      Before open heart surgery    CARDIAC VALVE REPLACEMENT  2013     SECTION  1982    COLONOSCOPY  2016    INCISION AND DRAINAGE ABSCESS Left 2023    Procedure: INCISION AND DRAINAGE ABSCESS, left arm;  Surgeon: Addy Torres MD;  Location: [facility] OR;  Service: General;  Laterality: Left;    TUBAL ABDOMINAL LIGATION         Family History: family history includes Arthritis in her mother; Diabetes in her brother; Hearing loss in her brother; Stroke in her paternal grandmother. Otherwise pertinent FHx was reviewed and not pertinent to current issue.    Social History:  reports that she has never smoked. She has been exposed to tobacco smoke. She has never used smokeless tobacco. She reports that she does not drink alcohol and does not use drugs.    Home Medications:  Prior to Admission Medications       Prescriptions Last Dose Informant Patient Reported? Taking?    aspirin 81 MG tablet 2025  Yes Yes    Take 1 tablet by mouth Every Night.    Elderberry-Vitamin C-Zinc ([employer] GUMMY PO)   Yes No    Take 500 mg by mouth Daily.    fluticasone (FLONASE) 50 MCG/ACT nasal spray   Yes No    Administer 2 sprays into the nostril(s) as directed by provider As Needed for Rhinitis.    levothyroxine (SYNTHROID, LEVOTHROID) 112 MCG tablet 2025  No Yes    Take 1 tablet by mouth Daily.    loratadine (CLARITIN) 10 MG tablet 2025  No Yes    TAKE 1 TABLET BY MOUTH EVERY DAY    metoprolol tartrate (LOPRESSOR) 25 MG tablet 2025  No Yes    Take 0.5 tablets by mouth 2 (Two) Times a Day.    multivitamin with minerals (MULTIVITAMIN ADULT  PO) 1/14/2025  Yes Yes    Take 1 tablet by mouth Daily.    NON FORMULARY  Self Yes No    Ponca, Cinnamon, Apple Cider Vinegar, Tumeric, Curmemic, berberine, and ginseng supplement    PROBIOTIC PRODUCT PO   Yes No    Take 1 capsule by mouth Daily.    simvastatin (ZOCOR) 20 MG tablet 1/13/2025  No Yes    Take 1 tablet by mouth Every Evening.              Allergies:  No Known Allergies    Objective      Vitals:   Temp:  [97.5 °F (36.4 °C)-98.8 °F (37.1 °C)] 97.5 °F (36.4 °C)  Heart Rate:  [62-99] 63  Resp:  [15-21] 18  BP: (122-156)/(60-81) 124/70  Body mass index is 44.43 kg/m².    Physical Exam  General: 70 yo WF, Alert and oriented, obese, no acute distress.  HENT: Normocephalic, normal hearing, moist oral mucosa, no scleral icterus.  Neck: Supple, nontender, no carotid bruits, no JVD, no LAD.  Lungs: Clear to auscultation, nonlabored respiration.  Heart: RRR, murmur noted, no gallop or edema.  Abdomen: Soft, nontender, nondistended, + bowel sounds.  Musculoskeletal: Normal range of motion and strength, no tenderness or swelling.  Skin: Skin is warm, dry and pink, no rashes or lesions.  Psychiatric: Cooperative, appropriate mood and affect.      Diagnostic Data:  Lab Results (last 24 hours)       Procedure Component Value Units Date/Time    Lipid Panel [[id number]]  (Abnormal) Collected: 01/15/25 0412    Specimen: Blood Updated: 01/15/25 1136     Total Cholesterol 154 mg/dL      Triglycerides 192 mg/dL      HDL Cholesterol 34 mg/dL      LDL Cholesterol  87 mg/dL      VLDL Cholesterol 33 mg/dL      LDL/HDL Ratio 2.40    Narrative:      Cholesterol Reference Ranges  (U.S. Department of Health and Human Services ATP III Classifications)    Desirable          <200 mg/dL  Borderline High    200-239 mg/dL  High Risk          >240 mg/dL      Triglyceride Reference Ranges  (U.S. Department of Health and Human Services ATP III Classifications)    Normal           <150 mg/dL  Borderline High  150-199 mg/dL  High              200-499 mg/dL  Very High        >500 mg/dL    HDL Reference Ranges  (U.S. Department of Health and Human Services ATP III Classifications)    Low     <40 mg/dl (major risk factor for CHD)  High    >60 mg/dl ('negative' risk factor for CHD)        LDL Reference Ranges  (U.S. Department of Health and Human Services ATP III Classifications)    Optimal          <100 mg/dL  Near Optimal     100-129 mg/dL  Borderline High  130-159 mg/dL  High             160-189 mg/dL  Very High        >189 mg/dL    Basic Metabolic Panel [[id number]]  (Normal) Collected: 01/15/25 0412    Specimen: Blood Updated: 01/15/25 0542     Glucose 98 mg/dL      BUN 18 mg/dL      Creatinine 0.80 mg/dL      Sodium 141 mmol/L      Potassium 3.9 mmol/L      Chloride 105 mmol/L      CO2 27.7 mmol/L      Calcium 9.0 mg/dL      BUN/Creatinine Ratio 22.5     Anion Gap 8.3 mmol/L      eGFR 80.4 mL/min/1.73     Narrative:      GFR Categories in Chronic Kidney Disease (CKD)      GFR Category          GFR (mL/min/1.73)    Interpretation  G1                     90 or greater         Normal or high (1)  G2                      60-89                Mild decrease (1)  G3a                   45-59                Mild to moderate decrease  G3b                   30-44                Moderate to severe decrease  G4                    15-29                Severe decrease  G5                    14 or less           Kidney failure          (1)In the absence of evidence of kidney disease, neither GFR category G1 or G2 fulfill the criteria for CKD.    eGFR calculation 2021 CKD-EPI creatinine equation, which does not include race as a factor    CBC & Differential [[id number]]  (Abnormal) Collected: 01/15/25 0412    Specimen: Blood Updated: 01/15/25 0523    Narrative:      The following orders were created for panel order CBC & Differential.  Procedure                               Abnormality         Status                     ---------                                -----------         ------                     CBC Auto Differential[[id number]]        Abnormal            Final result                 Please view results for these tests on the individual orders.    CBC Auto Differential [[id number]]  (Abnormal) Collected: 01/15/25 0412    Specimen: Blood Updated: 01/15/25 0523     WBC 8.27 10*3/mm3      RBC 5.33 10*6/mm3      Hemoglobin 15.0 g/dL      Hematocrit 48.9 %      MCV 91.7 fL      MCH 28.1 pg      MCHC 30.7 g/dL      RDW 13.3 %      RDW-SD 45.5 fl      MPV 12.1 fL      Platelets 171 10*3/mm3      Neutrophil % 55.5 %      Lymphocyte % 29.3 %      Monocyte % 10.0 %      Eosinophil % 4.5 %      Basophil % 0.5 %      Immature Grans % 0.2 %      Neutrophils, Absolute 4.59 10*3/mm3      Lymphocytes, Absolute 2.42 10*3/mm3      Monocytes, Absolute 0.83 10*3/mm3      Eosinophils, Absolute 0.37 10*3/mm3      Basophils, Absolute 0.04 10*3/mm3      Immature Grans, Absolute 0.02 10*3/mm3      nRBC 0.0 /100 WBC     High Sensitivity Troponin T 1Hr [[id number]]  (Normal) Collected: 01/14/25 1559    Specimen: Blood Updated: 01/14/25 1638     HS Troponin T 10 ng/L      Troponin T Numeric Delta 0 ng/L     Narrative:      High Sensitive Troponin T Reference Range:  <14.0 ng/L- Negative Female for AMI  <22.0 ng/L- Negative Male for AMI  >=14 - Abnormal Female indicating possible myocardial injury.  >=22 - Abnormal Male indicating possible myocardial injury.   Clinicians would have to utilize clinical acumen, EKG, Troponin, and serial changes to determine if it is an Acute Myocardial Infarction or myocardial injury due to an underlying chronic condition.         Basic Metabolic Panel [[id number]]  (Abnormal) Collected: 01/14/25 1429    Specimen: Blood from Hand, Right Updated: 01/14/25 1503     Glucose 87 mg/dL      BUN 17 mg/dL      Creatinine 0.66 mg/dL      Sodium 142 mmol/L      Potassium 4.1 mmol/L      Chloride 107 mmol/L      CO2 25.8 mmol/L      Calcium 9.7 mg/dL      BUN/Creatinine  Ratio 25.8     Anion Gap 9.2 mmol/L      eGFR 95.7 mL/min/1.73     Narrative:      GFR Categories in Chronic Kidney Disease (CKD)      GFR Category          GFR (mL/min/1.73)    Interpretation  G1                     90 or greater         Normal or high (1)  G2                      60-89                Mild decrease (1)  G3a                   45-59                Mild to moderate decrease  G3b                   30-44                Moderate to severe decrease  G4                    15-29                Severe decrease  G5                    14 or less           Kidney failure          (1)In the absence of evidence of kidney disease, neither GFR category G1 or G2 fulfill the criteria for CKD.    eGFR calculation 2021 CKD-EPI creatinine equation, which does not include race as a factor    High Sensitivity Troponin T [[id number]]  (Normal) Collected: 01/14/25 1429    Specimen: Blood from Hand, Right Updated: 01/14/25 1503     HS Troponin T 10 ng/L     Narrative:      High Sensitive Troponin T Reference Range:  <14.0 ng/L- Negative Female for AMI  <22.0 ng/L- Negative Male for AMI  >=14 - Abnormal Female indicating possible myocardial injury.  >=22 - Abnormal Male indicating possible myocardial injury.   Clinicians would have to utilize clinical acumen, EKG, Troponin, and serial changes to determine if it is an Acute Myocardial Infarction or myocardial injury due to an underlying chronic condition.                  Imaging Results (Last 24 Hours)       ** No results found for the last 24 hours. **              Assessment & Plan        This is a 69 y.o. 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     Chest pain  Prosthetic  aortic valve stenosis  Prelim Echocardiogram results with severe bioprosthetic valve stenosis  Completed stress test, pending heart cath with Dr. Oswald  CXR with no acute findings  Troponin trend remains flat  EKG: sinus rhythm, rate of 62 with no evidence of acute ischemia but was notable for T wave inversions in lead I and aVL  Telemetry   Cardiology following  CT surgery consulted     Hypertension-BP controlled, continue metoprolol  Hyperlipidemia-continue ASA, statin  Hypothyroidism-continue levothyroxine  GERD-continue PPI      VTE Prophylaxis:  Mechanical VTE prophylaxis orders are present.        The patient desires to be as follows:    CODE STATUS:    Code Status (Patient has no pulse and is not breathing): CPR (Attempt to Resuscitate)  Medical Interventions (Patient has pulse or is breathing): Full Support        Clayton Graves, who can be contacted at [phone], is the designated person to make medical decisions on the patient's behalf if She is incapable of doing so. This was clarified with patient and/or next of kin on 1/14/2025 during the course of this H&P.    Admission Status:  I believe this patient meets inpatient status.    Expected Length of Stay: 3 to 4 days    PDMP and Medication Dispenses via Sidebar reviewed and consistent with patient reported medications.    I discussed the patient's findings and my recommendations with patient.      Signature:     This document has been electronically signed by Liana Mariano PA-C on January 15, 2025 14:54 [facility] Hospitalist Team

## 2025-01-15 NOTE — H&P
[facility] Observation Unit H&P    Patient Name: Lulu Graves  : 1955  MRN: [id number]  Primary Care Physician: Sushma Hall APRN  Date of admission: 2025     Patient Care Team:  Sushma Hall APRN as PCP - General (Nurse Practitioner)  Jayson Edmondson MD as Consulting Physician (Cardiology)  Addy Torres MD as Surgeon (General Surgery)  Anabelle De La Torre RD as Dietitian (Nutrition)  Angelique Wylie MD as Consulting Physician (Sleep Medicine)          Subjective   History Present Illness     Chief Complaint:   Chief Complaint   Patient presents with    Palpitations     Palpitations, states out of breath when walking, Pt states started around Thanksgiving since then off and on. Left arm feeling tingling.             History of Present Illness  Obtained from pending physical HPI on 2025:  69-year-old female presents with shortness of breath and palpitations.  Patient states symptoms have been intermittent since .  She states symptoms are worse with exertion.  She denies any actual chest pain but reports some pressure on her chest.  She states she talk to her primary doctor and they did an outpatient EKG yesterday.  She was called today and told to come to the emergency room because the EKG was abnormal.     01/15/25:  Patient confirms the HPI noted above reporting that she has had intermittent episodes of palpitations with dyspnea and chest discomfort for approximate the past 2-3 months.  When present symptoms will last generally 1-2 minutes before resolving spontaneously.  They generally require moderate amounts of exertion such as walking around the grocery store.  She denies any fever, cough, nausea, vomiting or known sick contacts.  Patient does have a history of valve replacement        ROS  Review of Systems   Constitutional: Negative.   HENT: Negative.     Eyes: Negative.    Cardiovascular:  Positive for chest pain, dyspnea on exertion and palpitations.    Respiratory: Negative.     Skin: Negative.    Musculoskeletal: Negative.    Gastrointestinal: Negative.    Genitourinary: Negative.    Neurological: Negative.    Psychiatric/Behavioral: Negative.           Personal History     Past Medical History:   Past Medical History:   Diagnosis Date    Allergic     Aortic stenosis     Aortic valve replaced 2013    Cataract     GERD (gastroesophageal reflux disease)     Glaucoma 2023    Just started    Heart murmur     Hyperlipidemia     Hypertension     Hypothyroidism     Liver disease     Due to medication    MRSA (methicillin resistant Staphylococcus aureus)     left arm abscess    Obesity     Osteopenia     PAT (paroxysmal atrial tachycardia)     Sleep apnea        Surgical History:      Past Surgical History:   Procedure Laterality Date    ABLATION OF DYSRHYTHMIC FOCUS   or     AORTIC VALVE REPAIR/REPLACEMENT      AORTIC VALVE SURGERY  2013    CARDIAC CATHETERIZATION      Before open heart surgery    CARDIAC VALVE REPLACEMENT  2013     SECTION  1982    COLONOSCOPY  2016    INCISION AND DRAINAGE ABSCESS Left 2023    Procedure: INCISION AND DRAINAGE ABSCESS, left arm;  Surgeon: Addy Torres MD;  Location: [facility] OR;  Service: General;  Laterality: Left;    TUBAL ABDOMINAL LIGATION             Family History: family history includes Arthritis in her mother; Diabetes in her brother; Hearing loss in her brother; Stroke in her paternal grandmother. Otherwise pertinent FHx was reviewed and unremarkable.     Social History:  reports that she has never smoked. She has been exposed to tobacco smoke. She has never used smokeless tobacco. She reports that she does not drink alcohol and does not use drugs.      Medications:  Prior to Admission medications    Medication Sig Start Date End Date Taking? Authorizing Provider   aspirin 81 MG tablet Take 1 tablet by mouth Every Night. 12  Yes Provider,  MD Cait   levothyroxine (SYNTHROID, LEVOTHROID) 112 MCG tablet Take 1 tablet by mouth Daily. 1/13/25  Yes Sushma Hall APRN   loratadine (CLARITIN) 10 MG tablet TAKE 1 TABLET BY MOUTH EVERY DAY 2/10/24  Yes Sushma Hall APRN   metoprolol tartrate (LOPRESSOR) 25 MG tablet Take 0.5 tablets by mouth 2 (Two) Times a Day. 9/18/24  Yes Sushma Hall APRN   multivitamin with minerals (MULTIVITAMIN ADULT PO) Take 1 tablet by mouth Daily.   Yes Cait Osuna MD   simvastatin (ZOCOR) 20 MG tablet Take 1 tablet by mouth Every Evening. 7/29/24  Yes Sushma Hall APRN   Elderberry-Vitamin C-Zinc (ELDERBERRY IMMUNE HEALTH GUMMY PO) Take 500 mg by mouth Daily.    ProviderCait MD   fluticasone (FLONASE) 50 MCG/ACT nasal spray Administer 2 sprays into the nostril(s) as directed by provider As Needed for Rhinitis.    Cait Osuna MD   NON FORMULARY [place], Cinnamon, Apple Cider Vinegar, Tumeric, Curmemic, berberine, and ginseng supplement    Ciat Osuna MD   PROBIOTIC PRODUCT PO Take 1 capsule by mouth Daily. 9/8/16   Cait Osuna MD       Allergies:  No Known Allergies    Objective   Objective     Vital Signs  Temp:  [97.5 °F (36.4 °C)-98.8 °F (37.1 °C)] 97.5 °F (36.4 °C)  Heart Rate:  [62-99] 63  Resp:  [15-21] 18  BP: (122-157)/(60-81) 124/70  SpO2:  [91 %-97 %] 94 %  on   ;   Device (Oxygen Therapy): room air  Body mass index is 44.43 kg/m².    Physical Exam  Vitals reviewed.   Constitutional:       General: She is not in acute distress.     Appearance: Normal appearance. She is obese. She is not ill-appearing, toxic-appearing or diaphoretic.   HENT:      Head: Normocephalic.      Right Ear: External ear normal.      Left Ear: External ear normal.      Nose: Nose normal.      Mouth/Throat:      Mouth: Mucous membranes are moist.   Eyes:      Extraocular Movements: Extraocular movements intact.   Cardiovascular:      Rate and Rhythm: Normal rate and regular rhythm.       Pulses: Normal pulses.      Heart sounds: Murmur heard.   Pulmonary:      Effort: Pulmonary effort is normal.      Breath sounds: Normal breath sounds.   Abdominal:      General: Bowel sounds are normal.      Palpations: Abdomen is soft.   Musculoskeletal:      Cervical back: Normal range of motion.      Right lower leg: No edema.      Left lower leg: No edema.   Skin:     General: Skin is warm and dry.      Capillary Refill: Capillary refill takes less than 2 seconds.   Neurological:      General: No focal deficit present.      Mental Status: She is alert.   Psychiatric:         Mood and Affect: Mood normal.         Behavior: Behavior normal.         Thought Content: Thought content normal.         Judgment: Judgment normal.     Results Review:  I have personally reviewed most recent cardiac tracings, lab results, and radiology images and interpretations and agree with findings, most notably: Troponin, CBC, CMP, chest x-ray, EKG, stress test and echocardiogram.    Results from last 7 days   Lab Units 01/15/25  0412 01/14/25  1340 01/14/25  1322   WBC 10*3/mm3 8.27  --  9.18   HEMOGLOBIN g/dL 15.0  --  15.3   HEMOGLOBIN, POC   --    < >  --    HEMATOCRIT % 48.9*  --  46.4   HEMATOCRIT POC   --    < >  --    PLATELETS 10*3/mm3 171  --  161   INR   --   --  1.00    < > = values in this interval not displayed.     Results from last 7 days   Lab Units 01/15/25  0412 01/14/25  1559 01/14/25  1429 01/14/25  1340 01/14/25  1322 01/13/25  1149   SODIUM mmol/L 141  --  142  --   --  141   POTASSIUM mmol/L 3.9  --  4.1  --   --  4.1   CHLORIDE mmol/L 105  --  107  --   --  104   CO2 mmol/L 27.7  --  25.8  --   --  27.1   BUN mg/dL 18  --  17  --   --  16   CREATININE mg/dL 0.80  --  0.66   < >  --  0.79   GLUCOSE mg/dL 98  --  87  --   --  87   CALCIUM mg/dL 9.0  --  9.7  --   --  9.9   ALK PHOS U/L  --   --   --   --   --  104   ALT (SGPT) U/L  --   --   --   --   --  16   AST (SGOT) U/L  --   --   --   --   --  26   HSTROP  T ng/L  --  10 10  --   --   --    PROBNP pg/mL  --   --   --   --  175.0  --     < > = values in this interval not displayed.     Estimated Creatinine Clearance: 73.5 mL/min (by C-G formula based on SCr of 0.8 mg/dL).  Brief Urine Lab Results       None            Microbiology Results (last 10 days)       ** No results found for the last 240 hours. **            ECG/EMG Results (most recent)       Procedure Component Value Units Date/Time    ECG 12 Lead Chest Pain [[id number]] Collected: 01/14/25 1311     Updated: 01/14/25 2042     QT Interval 378 ms      QTC Interval 408 ms     Narrative:      HEART RATE=70  bpm  RR Kxelyhbq=614  ms  FL Zfnzxhap=948  ms  P Horizontal Axis=-8  deg  P Front Axis=15  deg  QRSD Interval=83  ms  QT Wlhdvcvq=826  ms  CAvP=789  ms  QRS Axis=4  deg  T Wave Axis=154  deg  - ABNORMAL ECG -  Sinus rhythm  Probable  LVH with secondary repol abnrm  Anterior  Q waves, possibly due to LVH  When compared with ECG of 13-Jan-2025 11:54:01,  No significant change  Electronically Signed By: Alexander Steve ([facility]) 2025-01-14 20:41:38  Date and Time of Study:2025-01-14 13:11:31    Adult Transthoracic Echo Complete W/ Cont if Necessary Per Protocol [[id number]] Resulted: 01/15/25 1023     Updated: 01/15/25 1023     LV GLOBAL STRAIN  -13.4 %      LVIDd 4.5 cm      LVIDs 3.3 cm      IVSd 1.00 cm      LVPWd 0.90 cm      FS 26.7 %      IVS/LVPW 1.11 cm      ESV(cubed) 35.9 ml      LV Sys Vol (BSA corrected) 19.5 cm2      EDV(cubed) 91.1 ml      LV [place] Vol (BSA corrected) 41.9 cm2      LV mass(C)d 142.9 grams      LVOT area 3.5 cm2      LVOT diam 2.10 cm      EDV(MOD-sp4) 80.4 ml      ESV(MOD-sp4) 37.4 ml      SV(MOD-sp4) 43.0 ml      SVi(MOD-SP4) 22.4 ml/m2      SVi (LVOT) 34.5 ml/m2      EF(MOD-sp4) 53.5 %      MV E max [place] 69.7 cm/sec      MV A max [place] 118.0 cm/sec      MV dec time 0.31 sec      MV E/A 0.59     Pulm A Revs Dur 0.09 sec      Med Peak E' [place] 5.3 cm/sec      Lat Peak E' [place] 8.2 cm/sec       Avg E/e' ratio 10.33     SV(LVOT) 66.2 ml      RVIDd 2.7 cm      TAPSE (>1.6) 1.78 cm      RV S' 9.8 cm/sec      LA dimension (2D)  3.2 cm      Pulm Sys [place] 47.0 cm/sec      Pulm [place] 31.9 cm/sec      Pulm S/D 1.47     Pulm A Revs [place] 33.3 cm/sec      LV V1 max 91.2 cm/sec      LV V1 max PG 3.3 mmHg      LV V1 mean PG 2.00 mmHg      LV V1 VTI 19.1 cm      Ao pk [place] 403.0 cm/sec      Ao max PG 65.0 mmHg      Ao mean PG 44.0 mmHg      Ao V2 VTI 90.2 cm      ALEKSEY(I,D) 0.73 cm2      MV max PG 7.0 mmHg      MV mean PG 2.00 mmHg      MV V2 VTI 31.9 cm      MV P1/2t 85.8 msec      MVA(P1/2t) 2.6 cm2      MVA(VTI) 2.07 cm2      MV dec slope 297.0 cm/sec2      RV V1 max PG 2.35 mmHg      RV V1 max 76.6 cm/sec      RV V1 VTI 12.9 cm      PA V2 max 104.0 cm/sec      PA acc time 0.11 sec      ACS 1.80 cm      Sinus 3.2 cm      STJ 2.9 cm      EF(MOD-bp) 54.0 %      Dimensionless Index 0.23 (DI)                 Results for orders placed during the hospital encounter of 04/29/24    Adult Transthoracic Echo Complete W/ Cont if Necessary Per Protocol    Interpretation Summary    Left ventricular ejection fraction appears to be 56 - 60%.    Left ventricular diastolic function is consistent with (grade I) impaired relaxation.    Mild to moderate aortic valve stenosis is present.    There is a prosthetic aortic valve present.    Estimated right ventricular systolic pressure from tricuspid regurgitation is normal (<35 mmHg).      XR Chest 1 View    Result Date: 1/14/2025  Impression: No active disease. Electronically Signed: Edward Day MD  1/14/2025 2:00 PM EST  Workstation ID: [id number]       Estimated Creatinine Clearance: 73.5 mL/min (by C-G formula based on SCr of 0.8 mg/dL).    Assessment & Plan   Assessment/Plan       Active Hospital Problems    Diagnosis  POA    **Chest pain [R07.9]  Unknown      Resolved Hospital Problems   No resolved problems to display.       Palpitations with [place] and chest discomfort with a history  of bioprosthetic valve replacement        Lab Results   Component Value Date     TROPONINT 10 01/14/2025     TROPONINT 10 01/14/2025   -Lipid panel total cholesterol of 154 with an LDL of 87 and an HDL of 34  -Chest X-ray: No active disease  -EKG: Sinus rhythm at 70 without obvious acute changes with T wave inversion noted in lead I and aVL and a QTc of 408 ms  -Stress Test   -Echocardiogram performed with cardiology reporting severe bioprosthetic valve stenosis and recommended further workup and possible evaluation by CT surgery  -Telemetry  -NPO  -Continue aspirin, statin, beta-blocker  -Hospitalist consult     Hypothyroidism  -Levothyroxine     Hyperlipidemia  -Statin          VTE Prophylaxis - Active VTE Prophylaxis  Mechanical:        Start        01/14/25 1727  Maintain Sequential Compression Device  Continuous                          Select Pharmacologic VTE Prophylaxis if Desired & Appropriate      CODE STATUS:    Code Status and Medical Interventions: CPR (Attempt to Resuscitate); Full Support   Ordered at: 01/15/25 0640     Code Status (Patient has no pulse and is not breathing):    CPR (Attempt to Resuscitate)     Medical Interventions (Patient has pulse or is breathing):    Full Support       This patient has been examined wearing personal protective equipment.     I discussed the patient's findings and my recommendations with patient and nursing staff.      Signature:Electronically signed by Sherwin Cummings PA-C, 01/15/25, 2:23 PM EST.

## 2025-01-15 NOTE — CONSULTS
Patient Care Team:  Sushma Hall APRN as PCP - General (Nurse Practitioner)  Jayson Edmondson MD as Consulting Physician (Cardiology)  Addy Torres MD as Surgeon (General Surgery)  Anabelle De La Torre RD as Dietitian (Nutrition)  Angelique Wylie MD as Consulting Physician (Sleep Medicine)  Referring Provider:  ***  Reason for consultation:  ***    Chief complaint:  chest pain    Subjective     History of Present Illness:  Lulu Graves 69 yr old female with pmhx AS s/p TAVR (, [place]), paroxysmal atrial tachycardia, HTN, HLD, LUÍS, hypothyroidism, osteopenia, GERD presented to [facility]/15/25 with complaints of chest pressure, dyspnea on exertion, and palpitations. Patient states symptoms have been ongoing since 2024, lasting 102 minutes before resolving spontaneously. Symptoms occur when exerting herself with activity such as walking around the grocery store. ECHO revealed severe bioprosthetic valve stenosis. Patient was taken to cardiac cath lab by Dr. Oswald which showed ***    Patient does not smoke, denies ETOH or IDU    Review of Systems     Past Medical History:   Diagnosis Date    Allergic     Aortic stenosis     Aortic valve replaced 2013    Cataract     GERD (gastroesophageal reflux disease)     Glaucoma 2023    Just started    Heart murmur     Hyperlipidemia     Hypertension     Hypothyroidism     Liver disease     Due to medication    MRSA (methicillin resistant Staphylococcus aureus)     left arm abscess    Obesity     Osteopenia     PAT (paroxysmal atrial tachycardia)     Sleep apnea      Past Surgical History:   Procedure Laterality Date    ABLATION OF DYSRHYTHMIC FOCUS   or     AORTIC VALVE REPAIR/REPLACEMENT      AORTIC VALVE SURGERY  2013    CARDIAC CATHETERIZATION      Before open heart surgery    CARDIAC VALVE REPLACEMENT  2013     SECTION  1982    COLONOSCOPY  2016    INCISION AND DRAINAGE ABSCESS Left  12/8/2023    Procedure: INCISION AND DRAINAGE ABSCESS, left arm;  Surgeon: Addy Torres MD;  Location: [facility] MAIN OR;  Service: General;  Laterality: Left;    TUBAL ABDOMINAL LIGATION       Family History   Problem Relation Age of Onset    Arthritis Mother     Diabetes Brother     Hearing loss Brother     Stroke Paternal Grandmother     Sleep apnea Neg Hx      Social History     Tobacco Use    Smoking status: Never     Passive exposure: Past    Smokeless tobacco: Never   Vaping Use    Vaping status: Never Used   Substance Use Topics    Alcohol use: Never    Drug use: No     Medications Prior to Admission   Medication Sig Dispense Refill Last Dose/Taking    aspirin 81 MG tablet Take 1 tablet by mouth Every Night.   1/13/2025 Evening    levothyroxine (SYNTHROID, LEVOTHROID) 112 MCG tablet Take 1 tablet by mouth Daily. 90 tablet 0 1/14/2025    loratadine (CLARITIN) 10 MG tablet TAKE 1 TABLET BY MOUTH EVERY DAY 30 tablet 11 1/14/2025    metoprolol tartrate (LOPRESSOR) 25 MG tablet Take 0.5 tablets by mouth 2 (Two) Times a Day. 90 tablet 1 1/14/2025    multivitamin with minerals (MULTIVITAMIN ADULT PO) Take 1 tablet by mouth Daily.   1/14/2025    simvastatin (ZOCOR) 20 MG tablet Take 1 tablet by mouth Every Evening. 90 tablet 1 1/13/2025    Elderberry-Vitamin C-Zinc (Andro DiagnosticsMY PO) Take 500 mg by mouth Daily.       fluticasone (FLONASE) 50 MCG/ACT nasal spray Administer 2 sprays into the nostril(s) as directed by provider As Needed for Rhinitis.       NON FORMULARY [place], Cinnamon, Apple Cider Vinegar, Tumeric, Curmemic, berberine, and ginseng supplement       PROBIOTIC PRODUCT PO Take 1 capsule by mouth Daily.        aspirin, 81 mg, Oral, Daily  atorvastatin, 10 mg, Oral, Daily  fentaNYL citrate (PF), 25 mcg, Intravenous, Once  levothyroxine, 112 mcg, Oral, Daily  metoprolol tartrate, 12.5 mg, Oral, BID  midazolam, 1 mg, Intravenous, Once      Allergies:  Patient has no known  "allergies.    Objective      Vital Signs  Temp:  [97.5 °F (36.4 °C)-98.8 °F (37.1 °C)] 98.1 °F (36.7 °C)  Heart Rate:  [62-99] 68  Resp:  [15-21] 19  BP: (122-155)/(60-81) 143/79    Flowsheet Rows      Flowsheet Row First Filed Value   Admission Height 149.9 cm (59\") Documented at 01/14/2025 1249   Admission Weight 100 kg (220 lb 7.4 oz) Documented at 01/14/2025 1249          149.9 cm (59\")    Physical Exam    Results Review:   Lab Results (last 24 hours)       Procedure Component Value Units Date/Time    Lipid Panel [[id number]]  (Abnormal) Collected: 01/15/25 0412    Specimen: Blood Updated: 01/15/25 1136     Total Cholesterol 154 mg/dL      Triglycerides 192 mg/dL      HDL Cholesterol 34 mg/dL      LDL Cholesterol  87 mg/dL      VLDL Cholesterol 33 mg/dL      LDL/HDL Ratio 2.40    Narrative:      Cholesterol Reference Ranges  (U.S. Department of Health and Human Services ATP III Classifications)    Desirable          <200 mg/dL  Borderline High    200-239 mg/dL  High Risk          >240 mg/dL      Triglyceride Reference Ranges  (U.S. Department of Health and Human Services ATP III Classifications)    Normal           <150 mg/dL  Borderline High  150-199 mg/dL  High             200-499 mg/dL  Very High        >500 mg/dL    HDL Reference Ranges  (U.S. Department of Health and Human Services ATP III Classifications)    Low     <40 mg/dl (major risk factor for CHD)  High    >60 mg/dl ('negative' risk factor for CHD)        LDL Reference Ranges  (U.S. Department of Health and Human Services ATP III Classifications)    Optimal          <100 mg/dL  Near Optimal     100-129 mg/dL  Borderline High  130-159 mg/dL  High             160-189 mg/dL  Very High        >189 mg/dL    Basic Metabolic Panel [[id number]]  (Normal) Collected: 01/15/25 0412    Specimen: Blood Updated: 01/15/25 0542     Glucose 98 mg/dL      BUN 18 mg/dL      Creatinine 0.80 mg/dL      Sodium 141 mmol/L      Potassium 3.9 mmol/L      Chloride 105 mmol/L  "     CO2 27.7 mmol/L      Calcium 9.0 mg/dL      BUN/Creatinine Ratio 22.5     Anion Gap 8.3 mmol/L      eGFR 80.4 mL/min/1.73     Narrative:      GFR Categories in Chronic Kidney Disease (CKD)      GFR Category          GFR (mL/min/1.73)    Interpretation  G1                     90 or greater         Normal or high (1)  G2                      60-89                Mild decrease (1)  G3a                   45-59                Mild to moderate decrease  G3b                   30-44                Moderate to severe decrease  G4                    15-29                Severe decrease  G5                    14 or less           Kidney failure          (1)In the absence of evidence of kidney disease, neither GFR category G1 or G2 fulfill the criteria for CKD.    eGFR calculation 2021 CKD-EPI creatinine equation, which does not include race as a factor    CBC & Differential [[id number]]  (Abnormal) Collected: 01/15/25 0412    Specimen: Blood Updated: 01/15/25 0523    Narrative:      The following orders were created for panel order CBC & Differential.  Procedure                               Abnormality         Status                     ---------                               -----------         ------                     CBC Auto Differential[[id number]]        Abnormal            Final result                 Please view results for these tests on the individual orders.    CBC Auto Differential [[id number]]  (Abnormal) Collected: 01/15/25 0412    Specimen: Blood Updated: 01/15/25 0523     WBC 8.27 10*3/mm3      RBC 5.33 10*6/mm3      Hemoglobin 15.0 g/dL      Hematocrit 48.9 %      MCV 91.7 fL      MCH 28.1 pg      MCHC 30.7 g/dL      RDW 13.3 %      RDW-SD 45.5 fl      MPV 12.1 fL      Platelets 171 10*3/mm3      Neutrophil % 55.5 %      Lymphocyte % 29.3 %      Monocyte % 10.0 %      Eosinophil % 4.5 %      Basophil % 0.5 %      Immature Grans % 0.2 %      Neutrophils, Absolute 4.59 10*3/mm3      Lymphocytes, Absolute  2.42 10*3/mm3      Monocytes, Absolute 0.83 10*3/mm3      Eosinophils, Absolute 0.37 10*3/mm3      Basophils, Absolute 0.04 10*3/mm3      Immature Grans, Absolute 0.02 10*3/mm3      nRBC 0.0 /100 WBC     High Sensitivity Troponin T 1Hr [[id number]]  (Normal) Collected: 01/14/25 1559    Specimen: Blood Updated: 01/14/25 1638     HS Troponin T 10 ng/L      Troponin T Numeric Delta 0 ng/L     Narrative:      High Sensitive Troponin T Reference Range:  <14.0 ng/L- Negative Female for AMI  <22.0 ng/L- Negative Male for AMI  >=14 - Abnormal Female indicating possible myocardial injury.  >=22 - Abnormal Male indicating possible myocardial injury.   Clinicians would have to utilize clinical acumen, EKG, Troponin, and serial changes to determine if it is an Acute Myocardial Infarction or myocardial injury due to an underlying chronic condition.                 Cardiac cath per  *** ***      Assessment & Plan       Prosthetic aortic valve stenosis    Status post aortic valve replacement    Chest pain    Aortic stenosis, severe      Assessment & Plan    Aortic valve stenosis s/p     PLAN      Thank you for allowing us to participate in the care of this patient.      Nuria Means, APRN  01/15/25  16:19 EST    **all problems new to this examiner  **EKG and CXR independently reviewed and interpreted

## 2025-01-15 NOTE — CASE MANAGEMENT/SOCIAL WORK
Discharge Planning Assessment  [facility]     Patient Name: Lulu Graves  MRN: [id number]  Today's Date: 1/15/2025    Admit Date: 1/14/2025    Plan: Routine home   Discharge Needs Assessment       Row Name 01/15/25 1348       Living Environment    People in Home spouse    Name(s) of People in Home  Clayton    Current Living Arrangements home    Potentially Unsafe Housing Conditions none    In the past 12 months has the electric, gas, oil, or water company threatened to shut off services in your home? No    Primary Care Provided by self    Provides Primary Care For no one    Family Caregiver if Needed spouse    Family Caregiver Names Clayton    Quality of Family Relationships helpful;involved;supportive    Able to Return to Prior Arrangements yes       Resource/Environmental Concerns    Resource/Environmental Concerns none    Transportation Concerns none       Transportation Needs    In the past 12 months, has lack of transportation kept you from medical appointments or from getting medications? no    In the past 12 months, has lack of transportation kept you from meetings, work, or from getting things needed for daily living? No       Food Insecurity    Within the past 12 months, you worried that your food would run out before you got the money to buy more. Never true    Within the past 12 months, the food you bought just didn't last and you didn't have money to get more. Never true       Transition Planning    Patient/Family Anticipates Transition to home with family    Patient/Family Anticipated Services at Transition none    Transportation Anticipated car, drives self;family or friend will provide       Discharge Needs Assessment    Readmission Within the Last 30 Days no previous admission in last 30 days    Equipment Currently Used at Home cpap    Concerns to be Addressed denies needs/concerns at this time    Anticipated Changes Related to Illness none    Equipment Needed After Discharge none                    Discharge Plan       Row Name 01/15/25 1348       Plan    Plan Routine home    Plan Comments CM met with patient and spouse Clayton at the bedside. Confirmed PCP, insurance, and pharmacy. Patient agreeable in M2B. Patient denies any difficulty affording medications. Patient is not current with any HHC/OPPT/OT services. Patient lives at home with her  spouse, is independent with ADLS/IADLS, and drives.  Clayton able to provide DC transport. DC Barriers: myoview results pending.                  Continued Care and Services - Admitted Since 1/14/2025    No active coordination exists for this encounter.       Expected Discharge Date and Time       Expected Discharge Date Expected Discharge Time    Sulaiman 15, 2025            Demographic Summary       Row Name 01/15/25 1333       General Information    Admission Type observation    Arrived From emergency department    Required Notices Provided Observation Status Notice    Referral Source admission list    Reason for Consult discharge planning    Preferred Language English                   Functional Status       Row Name 01/15/25 1347       Functional Status    Usual Activity Tolerance good    Current Activity Tolerance good       Functional Status, IADL    Medications independent    Meal Preparation independent    Housekeeping independent    Laundry independent    Shopping independent           Humberto Hassan RN     Cell number [phone]  Office number [phone]

## 2025-01-15 NOTE — CONSULTS
Cardiology Consult Note    Patient Identification:  Name: Lulu Graves  Age: 69 y.o.  Sex: female  :  1955  MRN: [id number]             Requesting Physician : Sherwin Cummings    Reason for Consultation / Chief Complaint :   Chest pain, shortness of breath, palpitations    History of Present Illness:      Ms. Lulu Graves has PMH of    Valvular heart disease, bioprosthetic aortic valve replacement   Hypertension  Dyslipidemia  Obesity with BMI over 40  LUÍS  Hypothyroidism    Presented with complaint of shortness of breath, palpitations, chest pain.  Patient presented through emergency room 2025 with shortness of breath palpitations.  Patient has been noticing progressively worsening shortness of breath and dyspnea on exertion and chest tightness since Thanks.    Data:  Labs here revealed normal HS troponin at 10 and normal proBNP of 175.  CMP was normal.  Hemoglobin A1c was 5.95.  TSH elevated at 6.7.  Lipid profile with cholesterol 154, triglycerides 192, HDL 34, LDL 87.  EKG done 2025 revealed sinus rhythm at the rate of 70 bpm with ST segment depression T wave inversion in 1 and aVL      Assessment:  :    Chest pain  Abnormal EKG with lateral ST segment depression  Valvular heart disease, bioprosthetic aortic valve  Hypertension  Dyslipidemia  Obesity  LUÍS      Recommendations / Plan:        Patient admitted with shortness of breath palpitations and chest pain  EKG is abnormal  Troponins are normal  Will schedule for stress test, given abnormal EKG will add perfusion.  Will check an echo.  Will follow and consider further evaluation and treatment.    Addendum:  Patient underwent stress test which was low risk abnormal.  Patient underwent echocardiogram which is revealing high velocity across aortic valve prosthesis consistent with severe aortic stenosis.  Will schedule for coronary arteriogram and consult CT VS and structural heart team.  Discussed with patient and her  .  Continue statins as tolerated for dyslipidemia.  Continue metoprolol for blood pressure control.  Continue levothyroxine for hypothyroidism.  Reviewed BMI or 44 counseled on weight loss diet and exercise.  Will follow-up and consider further evaluation and treatment.         Diagnosis Plan   1. Chest pain, unspecified type  Case Request Cath Lab: Left Heart Cath and coronary angiogram    Case Request Cath Lab: Left Heart Cath and coronary angiogram    Cardiac Catheterization/Vascular Study    Cardiac Catheterization/Vascular Study      2. Aortic stenosis, severe  Case Request Cath Lab: Left Heart Cath and coronary angiogram    Case Request Cath Lab: Left Heart Cath and coronary angiogram    Cardiac Catheterization/Vascular Study    Cardiac Catheterization/Vascular Study      3. Status post aortic valve replacement  Case Request Cath Lab: Left Heart Cath and coronary angiogram    Case Request Cath Lab: Left Heart Cath and coronary angiogram    Cardiac Catheterization/Vascular Study    Cardiac Catheterization/Vascular Study                 Past Medical History:  Past Medical History:   Diagnosis Date    Allergic     Aortic stenosis     Aortic valve replaced 2013    Cataract     GERD (gastroesophageal reflux disease)     Glaucoma 2023    Just started    Heart murmur     Hyperlipidemia     Hypertension     Hypothyroidism     Liver disease     Due to medication    MRSA (methicillin resistant Staphylococcus aureus)     left arm abscess    Obesity     Osteopenia     PAT (paroxysmal atrial tachycardia)     Sleep apnea      Past Surgical History:  Past Surgical History:   Procedure Laterality Date    ABLATION OF DYSRHYTHMIC FOCUS   or     AORTIC VALVE REPAIR/REPLACEMENT      AORTIC VALVE SURGERY  2013    CARDIAC CATHETERIZATION      Before open heart surgery    CARDIAC VALVE REPLACEMENT  2013     SECTION  1982    COLONOSCOPY  2016    INCISION AND DRAINAGE  ABSCESS Left 12/8/2023    Procedure: INCISION AND DRAINAGE ABSCESS, left arm;  Surgeon: Addy Torres MD;  Location: [facility] MAIN OR;  Service: General;  Laterality: Left;    TUBAL ABDOMINAL LIGATION        Allergies:  No Known Allergies  Home Meds:  Medications Prior to Admission   Medication Sig Dispense Refill Last Dose/Taking    aspirin 81 MG tablet Take 1 tablet by mouth Every Night.   1/13/2025 Evening    levothyroxine (SYNTHROID, LEVOTHROID) 112 MCG tablet Take 1 tablet by mouth Daily. 90 tablet 0 1/14/2025    loratadine (CLARITIN) 10 MG tablet TAKE 1 TABLET BY MOUTH EVERY DAY 30 tablet 11 1/14/2025    metoprolol tartrate (LOPRESSOR) 25 MG tablet Take 0.5 tablets by mouth 2 (Two) Times a Day. 90 tablet 1 1/14/2025    multivitamin with minerals (MULTIVITAMIN ADULT PO) Take 1 tablet by mouth Daily.   1/14/2025    simvastatin (ZOCOR) 20 MG tablet Take 1 tablet by mouth Every Evening. 90 tablet 1 1/13/2025    Elderberry-Vitamin C-Zinc (ELDERBERRY IMMUNE HEALTH GUMMY PO) Take 500 mg by mouth Daily.       fluticasone (FLONASE) 50 MCG/ACT nasal spray Administer 2 sprays into the nostril(s) as directed by provider As Needed for Rhinitis.       NON FORMULARY Miles, Cinnamon, Apple Cider Vinegar, Tumeric, Curmemic, berberine, and ginseng supplement       PROBIOTIC PRODUCT PO Take 1 capsule by mouth Daily.        Current Meds:     Current Facility-Administered Medications:     aspirin EC tablet 81 mg, 81 mg, Oral, Daily, Sherwin Cummings PA-C    atorvastatin (LIPITOR) tablet 10 mg, 10 mg, Oral, Daily, Sherwin Cummings PA-C    [Transfer Hold] sennosides-docusate (PERICOLACE) 8.6-50 MG per tablet 2 tablet, 2 tablet, Oral, BID PRN, 2 tablet at 01/15/25 1523 **AND** [Transfer Hold] polyethylene glycol (MIRALAX) packet 17 g, 17 g, Oral, Daily PRN **AND** [Transfer Hold] bisacodyl (DULCOLAX) EC tablet 5 mg, 5 mg, Oral, Daily PRN **AND** [Transfer Hold] bisacodyl (DULCOLAX) suppository 10 mg, 10 mg, Rectal,  Daily PRN, Lee Eden MD    fentaNYL citrate (PF) (SUBLIMAZE) injection 25 mcg, 25 mcg, Intravenous, Once, Amaris Resendiz APRN    levothyroxine (SYNTHROID, LEVOTHROID) tablet 112 mcg, 112 mcg, Oral, Daily, Sherwin Cummings PA-C, 112 mcg at 01/15/25 1118    [Transfer Hold] melatonin tablet 5 mg, 5 mg, Oral, Nightly PRN, Lee Eden MD    metoprolol tartrate (LOPRESSOR) half tablet 12.5 mg, 12.5 mg, Oral, BID, Sherwin Cummings PA-C, 12.5 mg at 01/15/25 1118    midazolam (VERSED) injection 1 mg, 1 mg, Intravenous, Once, Amaris Resendiz APRN    [Transfer Hold] morphine injection 1 mg, 1 mg, Intravenous, Q4H PRN **AND** [Transfer Hold] naloxone (NARCAN) injection 0.4 mg, 0.4 mg, Intravenous, Q5 Min PRN, Lee Eden MD    [Transfer Hold] nitroglycerin (NITROSTAT) SL tablet 0.4 mg, 0.4 mg, Sublingual, Q5 Min PRN, Lee Eden MD    [Transfer Hold] nitroglycerin (NITROSTAT) SL tablet 0.4 mg, 0.4 mg, Sublingual, Q5 Min PRN, Lee Eden MD    [Transfer Hold] ondansetron (ZOFRAN) injection 4 mg, 4 mg, Intravenous, Q6H PRN, Lee Eden MD    [COMPLETED] Insert Peripheral IV, , , Once **AND** [Transfer Hold] sodium chloride 0.9 % flush 10 mL, 10 mL, Intravenous, PRN, Lee Eden MD  Social History:   Social History     Tobacco Use    Smoking status: Never     Passive exposure: Past    Smokeless tobacco: Never   Substance Use Topics    Alcohol use: Never      Family History:  Family History   Problem Relation Age of Onset    Arthritis Mother     Diabetes Brother     Hearing loss Brother     Stroke Paternal Grandmother     Sleep apnea Neg Hx         Review of Systems : Review of Systems   All other systems reviewed and are negative.                 Constitutional:  Temp:  [97.5 °F (36.4 °C)-98.8 °F (37.1 °C)] 98.1 °F (36.7 °C)  Heart Rate:  [62-99] 68  Resp:  [15-21] 19  BP: (124-155)/(60-81) 143/79    Physical Exam   /79 (BP Location: Left arm, Patient Position: Lying)   Pulse 68   Temp  "98.1 °F (36.7 °C) (Oral)   Resp 19   Ht 149.9 cm (59\")   Wt 99.8 kg (220 lb)   LMP  (LMP Unknown)   SpO2 98%   BMI 44.43 kg/m²   Physical Exam  General:  Appears in no acute distress  Eyes: Sclerae are anicteric,  conjunctivae are clear   HEENT:  No JVD. Thyroid not visibly enlarged. No mucosal pallor or cyanosis  Respiratory: Respirations regular and unlabored at rest.  Bilaterally good breath sounds with good air entry in all fields. No crackles, rubs or wheezes auscultated  Cardiovascular: S1,S2 Regular rate and rhythm.  2/6 systolic ejection murmur  Gastrointestinal: Abdomen soft, flat, nontender. Bowel sounds present.   Musculoskeletal:  No abnormal movements  Extremities: No digital clubbing or cyanosis  Skin: Color pink. Skin warm and dry to touch. No rashes  No xanthoma  Neuro: Alert and awake, no lateralizing deficits appreciated    Cardiographics  ECG: EKG tracing was  personally reviewed/interpreted by me  ECG 12 Lead Chest Pain   Final Result   HEART RATE=70  bpm   RR Eqiguyaz=573  ms   IL Wxrejpoy=447  ms   P Horizontal Axis=-8  deg   P Front Axis=15  deg   QRSD Interval=83  ms   QT Hnetjrpx=219  ms   IAaW=384  ms   QRS Axis=4  deg   T Wave Axis=154  deg   - ABNORMAL ECG -   Sinus rhythm   Probable  LVH with secondary repol abnrm   Anterior  Q waves, possibly due to LVH   When compared with ECG of 13-Jan-2025 11:54:01,   No significant change   Electronically Signed By: Alexander Steve ([facility]) 2025-01-14 20:41:38   Date and Time of Study:2025-01-14 13:11:31          Telemetry: Sinus    Echocardiogram:   Results for orders placed during the hospital encounter of 01/14/25    Adult Transthoracic Echo Complete W/ Cont if Necessary Per Protocol    Interpretation Summary    Left ventricular systolic function is normal. Calculated left ventricular EF = 54% Left ventricular ejection fraction appears to be 51 - 55%.    Left ventricular diastolic function is consistent with (grade I) impaired " relaxation.    Severe aortic valve stenosis is present.    There is a bioprosthetic aortic valve present.    Mild dilation of the aortic root is present.      Imaging  Chest X-ray:   Imaging Results (Last 24 Hours)       ** No results found for the last 24 hours. **            Lab Review: I have reviewed the labs  Results from last 7 days   Lab Units 01/14/25  1559 01/14/25  1429   HSTROP T ng/L 10 10         Results from last 7 days   Lab Units 01/15/25  0412   SODIUM mmol/L 141   POTASSIUM mmol/L 3.9   BUN mg/dL 18   CREATININE mg/dL 0.80   CALCIUM mg/dL 9.0         Results from last 7 days   Lab Units 01/14/25  1322   PROBNP pg/mL 175.0     Results from last 7 days   Lab Units 01/15/25  0412 01/14/25  1340 01/14/25  1322 01/13/25  1149   WBC 10*3/mm3 8.27  --  9.18 7.74   HEMOGLOBIN g/dL 15.0  --  15.3 15.5   HEMOGLOBIN, POC g/dL  --  15.3  --   --    HEMATOCRIT % 48.9*  --  46.4 47.5*   HEMATOCRIT POC %  --  45  --   --    PLATELETS 10*3/mm3 171  --  161 170     Results from last 7 days   Lab Units 01/14/25  1322   INR  1.00   APTT seconds 27.3             Nba Oswald MD  1/15/2025, 17:47 EST      EMR Dragon/Transcription:   Dictated utilizing Dragon dictation

## 2025-01-15 NOTE — PLAN OF CARE
Goal Outcome Evaluation:   No issues overnight, CPAP from home.

## 2025-01-15 NOTE — CONSULTS
Addendum  Patient Care Team:  Sushma Hall APRN as PCP - General (Nurse Practitioner)  Jayson Edmondson MD as Consulting Physician (Cardiology)  Addy Torres MD as Surgeon (General Surgery)  Anabelle De La Torre RD as Dietitian (Nutrition)  Agnelique Wylie MD as Consulting Physician (Sleep Medicine)  Referring Provider:  Dr. Oswald  Reason for consultation:  Prosthetic aortic valve stenosis    Chief complaint:  SOA/ palpitations    Subjective     History of Present Illness:  70 y/o woman presented to [facility] ED after her PCP told her to go d/t an abnormal EKG.  Pt reports progressive SOA/ HOLLAND with palpitations since Thanksgiving.  She reports occasional chest pressure.  She underwent minimally invasive tissue AVR (21 mm Magna valve) by Dr. Richter in Dec 2013 for severe aortic stenosis/ moderate aortic insufficiency.  She has HLD, hypothyroidism, hx SVT, LUÍS with CPAP, and morbid obesity.  Labs in ED were unremarkable.  Echo showed severe prosthetic aortic valve stenosis with ALEKSEY .73 cm2, peak velocity 403 cm/sec, max peak gradient 65 mmHg, mean peak gradient 44 mmHg, EF 50-55%, and grade 1 diastolic dysfunction.  Cardiology was consulted.  Cardiac cath revealed non-obstructive CAD.  Cardiac surgery was consulted for SAVR vs TAVR evaluation.     Review of Systems   Respiratory:  Positive for shortness of breath.    Cardiovascular:  Positive for chest pain and palpitations.   Neurological:  Negative for dizziness and light-headedness.        Past Medical History:   Diagnosis Date    Allergic     Aortic stenosis     Aortic valve replaced 12/04/2013    Cataract     GERD (gastroesophageal reflux disease)     Glaucoma 02/16/2023    Just started    Heart murmur 1996    Hyperlipidemia     Hypertension     Hypothyroidism     Liver disease     Due to medication    MRSA (methicillin resistant Staphylococcus aureus)     left arm abscess    Obesity     Osteopenia     PAT (paroxysmal atrial tachycardia)      Sleep apnea      Past Surgical History:   Procedure Laterality Date    ABLATION OF DYSRHYTHMIC FOCUS   or     AORTIC VALVE REPAIR/REPLACEMENT      AORTIC VALVE SURGERY  2013    CARDIAC CATHETERIZATION      Before open heart surgery    CARDIAC VALVE REPLACEMENT  2013     SECTION  1982    COLONOSCOPY  2016    INCISION AND DRAINAGE ABSCESS Left 2023    Procedure: INCISION AND DRAINAGE ABSCESS, left arm;  Surgeon: Addy Torres MD;  Location: [facility] MAIN OR;  Service: General;  Laterality: Left;    TUBAL ABDOMINAL LIGATION       Family History   Problem Relation Age of Onset    Arthritis Mother     Diabetes Brother     Hearing loss Brother     Stroke Paternal Grandmother     Sleep apnea Neg Hx      Social History     Tobacco Use    Smoking status: Never     Passive exposure: Past    Smokeless tobacco: Never   Vaping Use    Vaping status: Never Used   Substance Use Topics    Alcohol use: Never    Drug use: No     Medications Prior to Admission   Medication Sig Dispense Refill Last Dose/Taking    aspirin 81 MG tablet Take 1 tablet by mouth Every Night.   2025 Evening    levothyroxine (SYNTHROID, LEVOTHROID) 112 MCG tablet Take 1 tablet by mouth Daily. 90 tablet 0 2025    loratadine (CLARITIN) 10 MG tablet TAKE 1 TABLET BY MOUTH EVERY DAY 30 tablet 11 2025    metoprolol tartrate (LOPRESSOR) 25 MG tablet Take 0.5 tablets by mouth 2 (Two) Times a Day. 90 tablet 1 2025    multivitamin with minerals (MULTIVITAMIN ADULT PO) Take 1 tablet by mouth Daily.   2025    simvastatin (ZOCOR) 20 MG tablet Take 1 tablet by mouth Every Evening. 90 tablet 1 2025    Elderberry-Vitamin C-Zinc (ELDERBERRY IMMUNE HEALTH GUMMY PO) Take 500 mg by mouth Daily.       fluticasone (FLONASE) 50 MCG/ACT nasal spray Administer 2 sprays into the nostril(s) as directed by provider As Needed for Rhinitis.       NON FORMULARY [place], Cinnamon, Apple Cider Vinegar, Tumeric,  "Curmemic, berberine, and ginseng supplement       PROBIOTIC PRODUCT PO Take 1 capsule by mouth Daily.        aspirin, 81 mg, Oral, Daily  atorvastatin, 10 mg, Oral, Daily  fentaNYL citrate (PF), 25 mcg, Intravenous, Once  levothyroxine, 112 mcg, Oral, Daily  metoprolol tartrate, 12.5 mg, Oral, BID  midazolam, 1 mg, Intravenous, Once      Allergies:  Patient has no known allergies.    Objective      Vital Signs  Temp:  [97.5 °F (36.4 °C)-98.8 °F (37.1 °C)] 98.1 °F (36.7 °C)  Heart Rate:  [62-99] 68  Resp:  [15-21] 19  BP: (122-155)/(60-81) 143/79    Flowsheet Rows      Flowsheet Row First Filed Value   Admission Height 149.9 cm (59\") Documented at 01/14/2025 1249   Admission Weight 100 kg (220 lb 7.4 oz) Documented at 01/14/2025 1249          149.9 cm (59\")    Physical Exam  Vitals and nursing note reviewed.   Constitutional:       General: She is awake.      Appearance: Normal appearance. She is well-developed and well-groomed. She is morbidly obese.      Comments: Seen in CVU with family at bedside   HENT:      Head: Normocephalic and atraumatic.      Nose: Nose normal.      Mouth/Throat:      Lips: Pink.      Mouth: Mucous membranes are moist.      Pharynx: Uvula midline.   Eyes:      General: Lids are normal. No scleral icterus.     Extraocular Movements: Extraocular movements intact.      Conjunctiva/sclera: Conjunctivae normal.      Pupils: Pupils are equal, round, and reactive to light.   Neck:      Thyroid: No thyroid mass or thyromegaly.      Vascular: Normal carotid pulses. No carotid bruit, hepatojugular reflux or JVD.      Trachea: Trachea normal.   Cardiovascular:      Rate and Rhythm: Normal rate and regular rhythm.      Pulses:           Carotid pulses are 2+ on the right side and 2+ on the left side.       Radial pulses are 2+ on the right side and 2+ on the left side.        Femoral pulses are 2+ on the right side and 2+ on the left side.       Popliteal pulses are 2+ on the right side and 2+ on the " left side.        Dorsalis pedis pulses are 2+ on the right side and 2+ on the left side.        Posterior tibial pulses are 2+ on the right side and 2+ on the left side.      Heart sounds: Normal heart sounds.      Systolic murmur is present with a grade of 2/6.   Pulmonary:      Effort: Pulmonary effort is normal.      Breath sounds: Normal breath sounds.   Abdominal:      General: Bowel sounds are normal. There is no distension.      Palpations: Abdomen is soft.      Tenderness: There is no abdominal tenderness.   Musculoskeletal:      Cervical back: Neck supple.      Comments: Gait steady and strong without use of assistive devices   Lymphadenopathy:      Cervical: No cervical adenopathy.      Upper Body:      Right upper body: No supraclavicular adenopathy.      Left upper body: No supraclavicular adenopathy.   Skin:     General: Skin is warm and dry.      Capillary Refill: Capillary refill takes less than 2 seconds.      Findings: No erythema or rash.      Nails: There is no clubbing.             Comments: Sternotomy well healed.   Neurological:      Mental Status: She is alert and oriented to person, place, and time.      GCS: GCS eye subscore is 4. GCS verbal subscore is 5. GCS motor subscore is 6.   Psychiatric:         Attention and Perception: Attention and perception normal.         Mood and Affect: Mood and affect normal.         Speech: Speech normal.         Behavior: Behavior normal. Behavior is cooperative.         Thought Content: Thought content normal.         Cognition and Memory: Cognition and memory normal.         Judgment: Judgment normal.         Results Review:   Lab Results (last 24 hours)       Procedure Component Value Units Date/Time    Lipid Panel [[id number]]  (Abnormal) Collected: 01/15/25 0412    Specimen: Blood Updated: 01/15/25 1136     Total Cholesterol 154 mg/dL      Triglycerides 192 mg/dL      HDL Cholesterol 34 mg/dL      LDL Cholesterol  87 mg/dL      VLDL Cholesterol 33  mg/dL      LDL/HDL Ratio 2.40    Narrative:      Cholesterol Reference Ranges  (U.S. Department of Health and Human Services ATP III Classifications)    Desirable          <200 mg/dL  Borderline High    200-239 mg/dL  High Risk          >240 mg/dL      Triglyceride Reference Ranges  (U.S. Department of Health and Human Services ATP III Classifications)    Normal           <150 mg/dL  Borderline High  150-199 mg/dL  High             200-499 mg/dL  Very High        >500 mg/dL    HDL Reference Ranges  (U.S. Department of Health and Human Services ATP III Classifications)    Low     <40 mg/dl (major risk factor for CHD)  High    >60 mg/dl ('negative' risk factor for CHD)        LDL Reference Ranges  (U.S. Department of Health and Human Services ATP III Classifications)    Optimal          <100 mg/dL  Near Optimal     100-129 mg/dL  Borderline High  130-159 mg/dL  High             160-189 mg/dL  Very High        >189 mg/dL    Basic Metabolic Panel [[id number]]  (Normal) Collected: 01/15/25 0412    Specimen: Blood Updated: 01/15/25 0542     Glucose 98 mg/dL      BUN 18 mg/dL      Creatinine 0.80 mg/dL      Sodium 141 mmol/L      Potassium 3.9 mmol/L      Chloride 105 mmol/L      CO2 27.7 mmol/L      Calcium 9.0 mg/dL      BUN/Creatinine Ratio 22.5     Anion Gap 8.3 mmol/L      eGFR 80.4 mL/min/1.73     Narrative:      GFR Categories in Chronic Kidney Disease (CKD)      GFR Category          GFR (mL/min/1.73)    Interpretation  G1                     90 or greater         Normal or high (1)  G2                      60-89                Mild decrease (1)  G3a                   45-59                Mild to moderate decrease  G3b                   30-44                Moderate to severe decrease  G4                    15-29                Severe decrease  G5                    14 or less           Kidney failure          (1)In the absence of evidence of kidney disease, neither GFR category G1 or G2 fulfill the criteria for  CKD.    eGFR calculation 2021 CKD-EPI creatinine equation, which does not include race as a factor    CBC & Differential [[id number]]  (Abnormal) Collected: 01/15/25 0412    Specimen: Blood Updated: 01/15/25 0523    Narrative:      The following orders were created for panel order CBC & Differential.  Procedure                               Abnormality         Status                     ---------                               -----------         ------                     CBC Auto Differential[[id number]]        Abnormal            Final result                 Please view results for these tests on the individual orders.    CBC Auto Differential [[id number]]  (Abnormal) Collected: 01/15/25 0412    Specimen: Blood Updated: 01/15/25 0523     WBC 8.27 10*3/mm3      RBC 5.33 10*6/mm3      Hemoglobin 15.0 g/dL      Hematocrit 48.9 %      MCV 91.7 fL      MCH 28.1 pg      MCHC 30.7 g/dL      RDW 13.3 %      RDW-SD 45.5 fl      MPV 12.1 fL      Platelets 171 10*3/mm3      Neutrophil % 55.5 %      Lymphocyte % 29.3 %      Monocyte % 10.0 %      Eosinophil % 4.5 %      Basophil % 0.5 %      Immature Grans % 0.2 %      Neutrophils, Absolute 4.59 10*3/mm3      Lymphocytes, Absolute 2.42 10*3/mm3      Monocytes, Absolute 0.83 10*3/mm3      Eosinophils, Absolute 0.37 10*3/mm3      Basophils, Absolute 0.04 10*3/mm3      Immature Grans, Absolute 0.02 10*3/mm3      nRBC 0.0 /100 WBC     High Sensitivity Troponin T 1Hr [[id number]]  (Normal) Collected: 01/14/25 1559    Specimen: Blood Updated: 01/14/25 1638     HS Troponin T 10 ng/L      Troponin T Numeric Delta 0 ng/L     Narrative:      High Sensitive Troponin T Reference Range:  <14.0 ng/L- Negative Female for AMI  <22.0 ng/L- Negative Male for AMI  >=14 - Abnormal Female indicating possible myocardial injury.  >=22 - Abnormal Male indicating possible myocardial injury.   Clinicians would have to utilize clinical acumen, EKG, Troponin, and serial changes to determine if it is an  Acute Myocardial Infarction or myocardial injury due to an underlying chronic condition.                 Cardiac cath per Dr. Oswald 1/15/2025  Hemodynamics      Pressures     Ao:                                           127/65 mmHg                    Coronary Angiography      Left Main :  The left main   without disease     Left Anterior Descending : Gives rise to large diagonal which divides into 2.  Mid LAD has intramyocardial course.  Distal LAD has 30% luminal irregularities for        Left Circumflex : Is dominant vessel.  Gives rise to large mid marginal and distal marginal without disease continues in the AV groove.  PDA is calcified 40% narrowing.     Right Coronary Artery :  The right coronary artery   is non dominant vessel                 Dominance:  [x]  Left  [x]  Right  []  Co-Dominant       Left Ventriculography:       Was not done because of aortic stenosis and prosthetic aortic valve     Impression:      No obstructive CAD  Severe prosthetic valve aortic stenosis     Recommendations:      Structural heart team consult evaluate for SAVR versus TAVR    Echocardiogram 1/15/2025    Left ventricular systolic function is normal. Calculated left ventricular EF = 54% Left ventricular ejection fraction appears to be 51 - 55%.    Left ventricular diastolic function is consistent with (grade I) impaired relaxation.    Severe aortic valve stenosis is present.    There is a bioprosthetic aortic valve present.    Mild dilation of the aortic root is present.    Left Ventricle Left ventricular systolic function is normal. Calculated left ventricular EF = 54% Left ventricular ejection fraction appears to be 51 - 55%.   Global longitudinal LV strain (GLS) = -13.4%. Left ventricle strain data was reviewed by the physician and found to be inaccurate. Normal left ventricular cavity size and wall thickness noted. All left ventricular wall segments contract normally. Left ventricular diastolic function is consistent  with (grade I) impaired relaxation.   Right Ventricle Normal right ventricular cavity size, wall thickness, systolic function and septal motion noted.   Left Atrium Normal left atrial size and volume noted.   Right Atrium Normal right atrial cavity size noted.   Aortic Valve Severe aortic valve stenosis is present. There is a bioprosthetic aortic valve of unknown size present.   Mitral Valve The mitral valve is structurally normal with no regurgitation or significant stenosis present.   Tricuspid Valve The tricuspid valve is structurally normal with no significant regurgitation or significant stenosis present.   Pulmonic Valve The pulmonic valve is not well visualized.   Greater Vessels Mild dilation of the aortic root is present.   Pericardium The pericardium is normal. There is no evidence of pericardial effusion.        Assessment & Plan       Prosthetic aortic valve stenosis    Status post aortic valve replacement    Chest pain    Aortic stenosis, severe      Assessment & Plan    - Severe prosthetic aortic valve stenosis (ALEKSEY .73 cm2, peak velocity 403 cm/sec, mean peak gradient 44 mmHg), EF 50-55% (echo), s/p minimally invasive tissue AVR 21 mm Magna (Neelam, 12/2013)--op note under media tab, surgical evaluation in progress  - Non-obstructive CAD--cath 1/15/2025  - HLD with hypertriglyceridemia--statin  - LUÍS, CPAP compliance  - Morbid obesity, stage 3--BMI 44.2  - Hyperglycemia--A1c 5.95  - Hypothyroidism--levothyroxine    Surgeon to evaluate films/studies.  She had echo in April 2024 showing mod AS (ALEKSEY 1.2 cm2).  Full recs to follow.    Thank you for allowing us to participate in the care of this patient.      Latanya Tubbs, GUILLERMO  01/15/25  15:55 EST    **all problems new to this examiner  **EKG and CXR independently reviewed and interpreted  Addendum  Patient was seen examined by me, agree with the findings above.  I have reviewed interpreted myself the echocardiogram and cardiac cath.  She has  prosthetic aortic valve stenosis with progression of symptoms.  The mean gradient is over 40 mmHg.  She has dyspnea.  I discussed with the patient the natural history of aortic stenosis and the guidelines for intervention.  Given his age and low level of comorbidities I recommend reoperative aortic valve replacement hopefully with enlargement of the root to a 23 mm or placement of a porcine root to allow a larger valve if failure occurs within 10 to 20 years.  I discussed the risks, benefits and terms of surgery and she wishes to proceed.  She will have surgery during this hospitalization and then my partners will do the surgery if she wants to wait then I will open her after coming back in 2 weeks to time.  He verbalized understanding and they will decide the timing of surgery  Ronen Richter MD

## 2025-01-16 ENCOUNTER — PREP FOR SURGERY (OUTPATIENT)
Dept: OTHER | Facility: HOSPITAL | Age: 70
End: 2025-01-16
Payer: MEDICARE

## 2025-01-16 ENCOUNTER — APPOINTMENT (OUTPATIENT)
Dept: CT IMAGING | Facility: HOSPITAL | Age: 70
DRG: 287 | End: 2025-01-16
Payer: MEDICARE

## 2025-01-16 DIAGNOSIS — I38 PRE-OPERATIVE CARDIOVASCULAR EXAMINATION, VALVULAR HEART DISEASE: ICD-10-CM

## 2025-01-16 DIAGNOSIS — R07.9 CHEST PAIN, UNSPECIFIED TYPE: ICD-10-CM

## 2025-01-16 DIAGNOSIS — T82.857A STENOSIS OF PROSTHETIC AORTIC VALVE, INITIAL ENCOUNTER: Primary | ICD-10-CM

## 2025-01-16 DIAGNOSIS — Z01.818 PREOP TESTING: Primary | ICD-10-CM

## 2025-01-16 DIAGNOSIS — Z01.810 PRE-OPERATIVE CARDIOVASCULAR EXAMINATION, VALVULAR HEART DISEASE: ICD-10-CM

## 2025-01-16 LAB
ANION GAP SERPL CALCULATED.3IONS-SCNC: 7.6 MMOL/L (ref 5–15)
BUN SERPL-MCNC: 18 MG/DL (ref 8–23)
BUN/CREAT SERPL: 25.7 (ref 7–25)
CALCIUM SPEC-SCNC: 9 MG/DL (ref 8.6–10.5)
CHLORIDE SERPL-SCNC: 105 MMOL/L (ref 98–107)
CO2 SERPL-SCNC: 24.4 MMOL/L (ref 22–29)
CREAT SERPL-MCNC: 0.7 MG/DL (ref 0.57–1)
DEPRECATED RDW RBC AUTO: 45.3 FL (ref 37–54)
EGFRCR SERPLBLD CKD-EPI 2021: 94.3 ML/MIN/1.73
ERYTHROCYTE [DISTWIDTH] IN BLOOD BY AUTOMATED COUNT: 13.4 % (ref 12.3–15.4)
GLUCOSE SERPL-MCNC: 113 MG/DL (ref 65–99)
HCT VFR BLD AUTO: 46.3 % (ref 34–46.6)
HGB BLD-MCNC: 14.5 G/DL (ref 12–15.9)
MCH RBC QN AUTO: 28.8 PG (ref 26.6–33)
MCHC RBC AUTO-ENTMCNC: 31.3 G/DL (ref 31.5–35.7)
MCV RBC AUTO: 92 FL (ref 79–97)
PLATELET # BLD AUTO: 153 10*3/MM3 (ref 140–450)
PMV BLD AUTO: 12.2 FL (ref 6–12)
POTASSIUM SERPL-SCNC: 4.1 MMOL/L (ref 3.5–5.2)
RBC # BLD AUTO: 5.03 10*6/MM3 (ref 3.77–5.28)
SODIUM SERPL-SCNC: 137 MMOL/L (ref 136–145)
WBC NRBC COR # BLD AUTO: 8.39 10*3/MM3 (ref 3.4–10.8)

## 2025-01-16 PROCEDURE — 93005 ELECTROCARDIOGRAM TRACING: CPT | Performed by: INTERNAL MEDICINE

## 2025-01-16 PROCEDURE — 80048 BASIC METABOLIC PNL TOTAL CA: CPT | Performed by: INTERNAL MEDICINE

## 2025-01-16 PROCEDURE — 25810000003 SODIUM CHLORIDE 0.9 % SOLUTION: Performed by: INTERNAL MEDICINE

## 2025-01-16 PROCEDURE — 99222 1ST HOSP IP/OBS MODERATE 55: CPT | Performed by: THORACIC SURGERY (CARDIOTHORACIC VASCULAR SURGERY)

## 2025-01-16 PROCEDURE — 93010 ELECTROCARDIOGRAM REPORT: CPT | Performed by: STUDENT IN AN ORGANIZED HEALTH CARE EDUCATION/TRAINING PROGRAM

## 2025-01-16 PROCEDURE — 85027 COMPLETE CBC AUTOMATED: CPT | Performed by: INTERNAL MEDICINE

## 2025-01-16 PROCEDURE — 71250 CT THORAX DX C-: CPT

## 2025-01-16 PROCEDURE — 99222 1ST HOSP IP/OBS MODERATE 55: CPT | Performed by: INTERNAL MEDICINE

## 2025-01-16 RX ORDER — IBUPROFEN 600 MG/1
1 TABLET ORAL
OUTPATIENT
Start: 2025-01-16

## 2025-01-16 RX ORDER — ACETAMINOPHEN 325 MG/1
650 TABLET ORAL EVERY 4 HOURS PRN
OUTPATIENT
Start: 2025-01-16

## 2025-01-16 RX ORDER — NICOTINE POLACRILEX 4 MG
15 LOZENGE BUCCAL
OUTPATIENT
Start: 2025-01-16

## 2025-01-16 RX ORDER — SODIUM CHLORIDE 0.9 % (FLUSH) 0.9 %
30 SYRINGE (ML) INJECTION ONCE AS NEEDED
OUTPATIENT
Start: 2025-01-16

## 2025-01-16 RX ORDER — AMLODIPINE BESYLATE 5 MG/1
5 TABLET ORAL
Status: DISCONTINUED | OUTPATIENT
Start: 2025-01-16 | End: 2025-01-17 | Stop reason: HOSPADM

## 2025-01-16 RX ORDER — CHLORHEXIDINE GLUCONATE ORAL RINSE 1.2 MG/ML
15 SOLUTION DENTAL EVERY 12 HOURS
OUTPATIENT
Start: 2025-01-16 | End: 2025-01-17

## 2025-01-16 RX ORDER — CHLORHEXIDINE GLUCONATE 500 MG/1
CLOTH TOPICAL EVERY 12 HOURS PRN
OUTPATIENT
Start: 2025-01-16

## 2025-01-16 RX ORDER — SODIUM CHLORIDE 9 MG/ML
30 INJECTION, SOLUTION INTRAVENOUS CONTINUOUS PRN
OUTPATIENT
Start: 2025-01-16 | End: 2025-01-17

## 2025-01-16 RX ORDER — ALPRAZOLAM 0.25 MG/1
0.25 TABLET ORAL ONCE
OUTPATIENT
Start: 2025-01-16 | End: 2025-01-16

## 2025-01-16 RX ORDER — CHLORHEXIDINE GLUCONATE ORAL RINSE 1.2 MG/ML
15 SOLUTION DENTAL ONCE
OUTPATIENT
Start: 2025-01-16 | End: 2025-01-16

## 2025-01-16 RX ORDER — SODIUM CHLORIDE 9 MG/ML
40 INJECTION, SOLUTION INTRAVENOUS AS NEEDED
OUTPATIENT
Start: 2025-01-16

## 2025-01-16 RX ORDER — DEXTROSE MONOHYDRATE 25 G/50ML
10-50 INJECTION, SOLUTION INTRAVENOUS
OUTPATIENT
Start: 2025-01-16

## 2025-01-16 RX ORDER — METOPROLOL TARTRATE 25 MG/1
12.5 TABLET, FILM COATED ORAL
OUTPATIENT
Start: 2025-01-17 | End: 2025-01-18

## 2025-01-16 RX ORDER — NITROGLYCERIN 0.4 MG/1
0.4 TABLET SUBLINGUAL
OUTPATIENT
Start: 2025-01-16

## 2025-01-16 RX ORDER — SODIUM CHLORIDE 0.9 % (FLUSH) 0.9 %
3-10 SYRINGE (ML) INJECTION AS NEEDED
OUTPATIENT
Start: 2025-01-16

## 2025-01-16 RX ORDER — SODIUM CHLORIDE 0.9 % (FLUSH) 0.9 %
3 SYRINGE (ML) INJECTION EVERY 12 HOURS SCHEDULED
OUTPATIENT
Start: 2025-01-16

## 2025-01-16 RX ADMIN — BISACODYL 5 MG: 5 TABLET, COATED ORAL at 04:06

## 2025-01-16 RX ADMIN — ASPIRIN 81 MG: 81 TABLET, COATED ORAL at 08:09

## 2025-01-16 RX ADMIN — Medication 12.5 MG: at 08:09

## 2025-01-16 RX ADMIN — AMLODIPINE BESYLATE 5 MG: 5 TABLET ORAL at 12:44

## 2025-01-16 RX ADMIN — SODIUM CHLORIDE 75 ML/HR: 9 INJECTION, SOLUTION INTRAVENOUS at 08:10

## 2025-01-16 RX ADMIN — Medication 12.5 MG: at 20:30

## 2025-01-16 RX ADMIN — LEVOTHYROXINE SODIUM 112 MCG: 112 TABLET ORAL at 08:09

## 2025-01-16 NOTE — CASE MANAGEMENT/SOCIAL WORK
Continued Stay Note  LADARIUS Perez     Patient Name: Lulu Graves  MRN: [id number]  Today's Date: 1/16/2025    Admit Date: 1/14/2025    Plan: DC Plan: Pending clinical course and PT/OT evals. From home with family. CVS workup.   Discharge Plan       Row Name 01/16/25 1249       Plan    Plan DC Plan: Pending clinical course and PT/OT evals. From home with family. CVS workup.    Patient/Family in Agreement with Plan yes    Provided Post Acute Provider List? N/A    Provided Post Acute Provider Quality & Resource List? N/A    Plan Comments CM spoke with Hospitalist and nursing for morning rounds and reviewed chart for clinical updates. CV surgery evaluating for open Aortic Valve surgery vs. TAVR procedure. MD anticipates either will take place same admission.CM will continue to follow for any additional needs and adjust DC plan accordingly. DC Barriers: Pending CVS determination of treatment plan, Cardiac monitoring, and monitor labs.                 Expected Discharge Date and Time       Expected Discharge Date Expected Discharge Time    Jan 20, 2025               Chantell Vallejo RN    Office Phone: [phone]  Office Cell:     [phone]

## 2025-01-16 NOTE — PROGRESS NOTES
Cardiology Progress Note    Patient Identification:  Name: Lulu Graves  Age: 69 y.o.  Sex: female  :  1955  MRN: [id number]                 Follow Up / Chief Complaint:   Chief Complaint   Patient presents with    Palpitations     Palpitations, states out of breath when walking, Pt states started around  since then off and on. Left arm feeling tingling.           Interval History: Patient with valvular heart disease and bioprosthetic AVR in remote past presented with chest pain shortness of breath.  Was found to have severe aortic stenosis.       Subjective: Patient seen and examined.  Chart reviewed.  Labs reviewed.  Discussed with RN taking care of patient.  Patient has severe aortic stenosis with she is symptomatic.      Objective:  2025: BMP with a glucose of 113, CBC with normal hemoglobin.  EKG done 2025 reviewed/interpreted by me reveals sinus bradycardia with Q waves in V1 V2.  Patient is hypothyroid.    History of present illness:      Ms. Lulu Graves has PMH of     Valvular heart disease, bioprosthetic aortic valve replacement   Hypertension  Dyslipidemia  Obesity with BMI over 40  LUÍS  Hypothyroidism     Presented with complaint of shortness of breath, palpitations, chest pain.  Patient presented through emergency room 2025 with shortness of breath palpitations.  Patient has been noticing progressively worsening shortness of breath and dyspnea on exertion and chest tightness since .     Data:  Labs here revealed normal HS troponin at 10 and normal proBNP of 175.  CMP was normal.  Hemoglobin A1c was 5.95.  TSH elevated at 6.7.  Lipid profile with cholesterol 154, triglycerides 192, HDL 34, LDL 87.  EKG done 2025 revealed sinus rhythm at the rate of 70 bpm with ST segment depression T wave inversion in 1 and aVL        Assessment:  :     Chest pain  Abnormal EKG with lateral ST segment depression  Valvular heart disease, bioprosthetic aortic  valve  Hypertension  Dyslipidemia  Obesity  LUÍS        Recommendations / Plan:        Patient presented with shortness of breath palpitations and chest pain.  Troponin was normal.  EKG was abnormal.  Patient underwent cardiac cath which revealed noncritical disease.  Patient underwent echocardiogram which revealed severe prosthetic valve aortic stenosis.  CT surgery has been consulted.  Continue statins as tolerated for dyslipidemia.  Continue metoprolol, amlodipine for blood pressure.  Continue levothyroxine for hypothyroidism.  Continue medical management with aspirin, amlodipine, atorvastatin, levothyroxine, metoprolol as tolerated.  Monitor hemodynamics closely.  Monitor rhythm closely.  Consulted CT surgery and structural heart team to decide between SAVR and TAVR.  Discussed with .  Discussed with Dr. Umair Rosario.  Will follow-up and consider further evaluation and treatment.         Copied text in this portion of the note has been reviewed and is accurate as of 2025    Past Medical History:  Past Medical History:   Diagnosis Date    Allergic     Aortic stenosis     Aortic valve replaced 2013    Cataract     GERD (gastroesophageal reflux disease)     Glaucoma 2023    Just started    Heart murmur     Hyperlipidemia     Hypertension     Hypothyroidism     Liver disease     Due to medication    MRSA (methicillin resistant Staphylococcus aureus)     left arm abscess    Obesity     Osteopenia     PAT (paroxysmal atrial tachycardia)     Sleep apnea      Past Surgical History:  Past Surgical History:   Procedure Laterality Date    ABLATION OF DYSRHYTHMIC FOCUS   or     AORTIC VALVE REPAIR/REPLACEMENT      AORTIC VALVE SURGERY  2013    CARDIAC CATHETERIZATION      Before open heart surgery    CARDIAC VALVE REPLACEMENT  2013     SECTION  1982    COLONOSCOPY  2016    INCISION AND DRAINAGE ABSCESS Left 2023    Procedure: INCISION AND DRAINAGE  "ABSCESS, left arm;  Surgeon: Addy Torres MD;  Location: [facility] MAIN OR;  Service: General;  Laterality: Left;    TUBAL ABDOMINAL LIGATION          Social History:   Social History     Tobacco Use    Smoking status: Never     Passive exposure: Past    Smokeless tobacco: Never   Substance Use Topics    Alcohol use: Never      Family History:  Family History   Problem Relation Age of Onset    Arthritis Mother     Diabetes Brother     Hearing loss Brother     Stroke Paternal Grandmother     Sleep apnea Neg Hx           Allergies:  No Known Allergies  Scheduled Meds:  aspirin, 81 mg, Daily  atorvastatin, 10 mg, Daily  levothyroxine, 112 mcg, Daily  metoprolol tartrate, 12.5 mg, BID          Review of Systems:   ROS  Review of Systems   Constitution: Negative for chills and fever.   Cardiovascular: Positive for chest pain.  Respiratory: Has dyspnea on exertion.  Gastrointestinal: Negative for nausea.        Constitutional:  Temp:  [97.7 °F (36.5 °C)-98.4 °F (36.9 °C)] 98.4 °F (36.9 °C)  Heart Rate:  [53-80] 69  Resp:  [15-21] 15  BP: (121-164)/(52-88) 164/88    Physical Exam   /88   Pulse 69   Temp 98.4 °F (36.9 °C) (Oral)   Resp 15   Ht 149.9 cm (59\")   Wt 99.3 kg (218 lb 14.7 oz)   LMP  (LMP Unknown)   SpO2 94%   BMI 44.22 kg/m²   General:  Appears in no acute distress  Eyes: Sclera is anicteric,  conjunctiva is clear   HEENT:  No JVD. Thyroid not visibly enlarged. No mucosal pallor or cyanosis  Respiratory: Respirations regular and unlabored at rest.  Clear to auscultation  Cardiovascular: S1,S2 Regular rate and rhythm.  2/6 systolic ejection murmur  Gastrointestinal: Abdomen nondistended.  Musculoskeletal:  No abnormal movements  Extremities: No digital clubbing or cyanosis  Skin: Color pink.   Neuro: Alert and awake.    INTAKE AND OUTPUT:    Intake/Output Summary (Last 24 hours) at 1/16/2025 0847  Last data filed at 1/16/2025 0600  Gross per 24 hour   Intake 1086 ml   Output 550 ml   Net " 536 ml       Cardiographics  Telemetry: Reviewed and interpreted by me reveals sinus rhythm    ECG:   ECG 12 Lead Rhythm Change   Preliminary Result   HEART RATE=58  bpm   RR Vzybsnoz=9569  ms   WI Ohdkzebi=099  ms   P Horizontal Axis=-20  deg   P Front Axis=25  deg   QRSD Interval=85  ms   QT Zxcxuufx=730  ms   MShR=827  ms   QRS Axis=6  deg   T Wave Axis=169  deg   - ABNORMAL ECG -   Sinus bradycardia   Abnormal T, consider ischemia, lateral leads   Date and Time of Study:2025-01-16 04:39:15      ECG 12 Lead Chest Pain   Final Result   HEART RATE=70  bpm   RR Spgcvbyr=301  ms   WI Noetzgpp=847  ms   P Horizontal Axis=-8  deg   P Front Axis=15  deg   QRSD Interval=83  ms   QT Qfpgpzyp=301  ms   WRvG=293  ms   QRS Axis=4  deg   T Wave Axis=154  deg   - ABNORMAL ECG -   Sinus rhythm   Probable  LVH with secondary repol abnrm   Anterior  Q waves, possibly due to LVH   When compared with ECG of 13-Jan-2025 11:54:01,   No significant change   Electronically Signed By: Alexander Steve ([facility]) 2025-01-14 20:41:38   Date and Time of Study:2025-01-14 13:11:31        I have personally reviewed EKG    Echocardiogram: Results for orders placed during the hospital encounter of 01/14/25    Adult Transthoracic Echo Complete W/ Cont if Necessary Per Protocol    Interpretation Summary    Left ventricular systolic function is normal. Calculated left ventricular EF = 54% Left ventricular ejection fraction appears to be 51 - 55%.    Left ventricular diastolic function is consistent with (grade I) impaired relaxation.    Severe aortic valve stenosis is present.    There is a bioprosthetic aortic valve present.    Mild dilation of the aortic root is present.      Lab Review   I have reviewed the labs  Results from last 7 days   Lab Units 01/14/25  1559 01/14/25  1429   HSTROP T ng/L 10 10         Results from last 7 days   Lab Units 01/16/25  0346   SODIUM mmol/L 137   POTASSIUM mmol/L 4.1   BUN mg/dL 18   CREATININE mg/dL 0.70  "  CALCIUM mg/dL 9.0         Results from last 7 days   Lab Units 01/16/25  0346 01/15/25  0412 01/14/25  1340 01/14/25  1322   WBC 10*3/mm3 8.39 8.27  --  9.18   HEMOGLOBIN g/dL 14.5 15.0  --  15.3   HEMOGLOBIN, POC g/dL  --   --  15.3  --    HEMATOCRIT % 46.3 48.9*  --  46.4   HEMATOCRIT POC %  --   --  45  --    PLATELETS 10*3/mm3 153 171  --  161     Results from last 7 days   Lab Units 01/14/25  1322   INR  1.00   APTT seconds 27.3       RADIOLOGY:  Imaging Results (Last 24 Hours)       ** No results found for the last 24 hours. **                  )1/16/2025  MD KIM Ramirez/Transcription:   \"Dictated utilizing Dragon dictation\".   "

## 2025-01-16 NOTE — CONSULTS
Referring Provider: Rena Fonseca,*    Reason for Consultation: Structural heart consultation for severe bioprosthetic aortic valve stenosis.      Patient Care Team:  Sushma Hall APRN as PCP - General (Nurse Practitioner)  Jayson Edmondson MD as Consulting Physician (Cardiology)  Addy Torres MD as Surgeon (General Surgery)  Anabelle De La Torre RD as Dietitian (Nutrition)  Angelique Wylie MD as Consulting Physician (Sleep Medicine)      SUBJECTIVE     Chief Complaint: Shortness of breath and palpitations    History of present illness:  Lulu Graves is a 69 y.o. female with known history of aortic stenosis status post AVR with 21 mm magna valve in December 2013, hyperlipidemia, history of SVT, obstructive sleep apnea, morbid obesity who has been found to have severe symptomatic prosthetic aortic valve stenosis.  Cardiac catheterization has ruled out any obstructive coronary disease.  Structural heart team has been consulted to evaluate her for therapeutic options.  Cardiac surgery team has also been consulted.      Review of systems:    Constitutional: No weakness, fatigue, fever, rigors, chills   Eyes: No vision changes, eye pain   ENT/oropharynx: No difficulty swallowing, sore throat, epistaxis, changes in hearing   Cardiovascular: +chest pain, chest tightness, palpitations, paroxysmal nocturnal dyspnea, orthopnea, diaphoresis, dizziness / syncopal episode   Respiratory: + shortness of breath, dyspnea on exertion, cough, wheezing, hemoptysis   Gastrointestinal: No abdominal pain, nausea, vomiting, diarrhea, bloody stools   Genitourinary: No hematuria, dysuria   Neurological: No headache, tremors, numbness, one-sided weakness    Musculoskeletal: No cramps, myalgias, joint pain, joint swelling   Integument: No rash, edema        Personal History:      Past Medical History:   Diagnosis Date    Allergic     Aortic stenosis     Aortic valve replaced 12/04/2013    Cataract     GERD  (gastroesophageal reflux disease)     Glaucoma 2023    Just started    Heart murmur     Hyperlipidemia     Hypertension     Hypothyroidism     Liver disease     Due to medication    MRSA (methicillin resistant Staphylococcus aureus)     left arm abscess    Obesity     Osteopenia     PAT (paroxysmal atrial tachycardia)     Sleep apnea        Past Surgical History:   Procedure Laterality Date    ABLATION OF DYSRHYTHMIC FOCUS   or     AORTIC VALVE REPAIR/REPLACEMENT      AORTIC VALVE SURGERY  2013    CARDIAC CATHETERIZATION      Before open heart surgery    CARDIAC CATHETERIZATION N/A 1/15/2025    Procedure: Left Heart Cath and coronary angiogram;  Surgeon: Nba Oswald MD;  Location: [facility] CATH INVASIVE LOCATION;  Service: Cardiovascular;  Laterality: N/A;    CARDIAC VALVE REPLACEMENT  2013     SECTION  1982    COLONOSCOPY  2016    INCISION AND DRAINAGE ABSCESS Left 2023    Procedure: INCISION AND DRAINAGE ABSCESS, left arm;  Surgeon: Addy Torres MD;  Location: [facility] MAIN OR;  Service: General;  Laterality: Left;    TUBAL ABDOMINAL LIGATION         Family History   Problem Relation Age of Onset    Arthritis Mother     Diabetes Brother     Hearing loss Brother     Stroke Paternal Grandmother     Sleep apnea Neg Hx        Social History     Tobacco Use    Smoking status: Never     Passive exposure: Past    Smokeless tobacco: Never   Vaping Use    Vaping status: Never Used   Substance Use Topics    Alcohol use: Never    Drug use: No        Home meds:  Prior to Admission medications    Medication Sig Start Date End Date Taking? Authorizing Provider   aspirin 81 MG tablet Take 1 tablet by mouth Every Night. 12  Yes Provider, MD Cait   levothyroxine (SYNTHROID, LEVOTHROID) 112 MCG tablet Take 1 tablet by mouth Daily. 25  Yes Sushma Hall APRN   loratadine (CLARITIN) 10 MG tablet TAKE 1 TABLET BY MOUTH EVERY DAY 2/10/24  Yes  "Sushma Hall APRN   metoprolol tartrate (LOPRESSOR) 25 MG tablet Take 0.5 tablets by mouth 2 (Two) Times a Day. 9/18/24  Yes Sushma Hall APRN   multivitamin with minerals (MULTIVITAMIN ADULT PO) Take 1 tablet by mouth Daily.   Yes ProviderCait MD   simvastatin (ZOCOR) 20 MG tablet Take 1 tablet by mouth Every Evening. 7/29/24  Yes Sushma Hall APRN   Elderberry-Vitamin C-Zinc (ELDERBERRY IMMUNE HEALTH GUMMY PO) Take 500 mg by mouth Daily.    ProviderCait MD   fluticasone (FLONASE) 50 MCG/ACT nasal spray Administer 2 sprays into the nostril(s) as directed by provider As Needed for Rhinitis.    ProviderCait MD   NON FORMULARY [place], Cinnamon, Apple Cider Vinegar, Tumeric, Curmemic, berberine, and ginseng supplement    ProviderCait MD   PROBIOTIC PRODUCT PO Take 1 capsule by mouth Daily. 9/8/16   ProviderCait MD       Allergies:     Patient has no known allergies.    Scheduled Meds:amLODIPine, 5 mg, Oral, Q24H  aspirin, 81 mg, Oral, Daily  atorvastatin, 10 mg, Oral, Daily  levothyroxine, 112 mcg, Oral, Daily  metoprolol tartrate, 12.5 mg, Oral, BID      Continuous Infusions:   PRN Meds:  acetaminophen    senna-docusate sodium **AND** polyethylene glycol **AND** bisacodyl **AND** bisacodyl    melatonin    Morphine **AND** naloxone    nitroglycerin    nitroglycerin    ondansetron    [COMPLETED] Insert Peripheral IV **AND** sodium chloride      OBJECTIVE    Vital Signs  Vitals:    01/16/25 0500 01/16/25 0600 01/16/25 0700 01/16/25 0800   BP: 143/73  164/80 164/88   BP Location:   Left arm    Patient Position:   Lying    Pulse: 56 73 67 69   Resp:   15    Temp:   98.4 °F (36.9 °C)    TempSrc:   Oral    SpO2: 93% 94% 95% 94%   Weight:       Height:           Flowsheet Rows      Flowsheet Row First Filed Value   Admission Height 149.9 cm (59\") Documented at 01/14/2025 1249   Admission Weight 100 kg (220 lb 7.4 oz) Documented at 01/14/2025 1249              Intake/Output " Summary (Last 24 hours) at 1/16/2025 1222  Last data filed at 1/16/2025 1200  Gross per 24 hour   Intake 1474 ml   Output 550 ml   Net 924 ml        Telemetry: Sinus rhythm/sinus bradycardia    Physical Exam:  The patient is alert, oriented and in no distress.  Vital signs as noted above.  Head and neck revealed no carotid bruits or jugular venous distention.  No thyromegaly or lymphadenopathy is present  Lungs clear.  No wheezing.  Breath sounds are normal bilaterally.  Heart: Normal first and second heart sounds. No murmur.  No precordial rub is present.  No gallop is present.  Abdomen: Soft and nontender.  No organomegaly is present.  Extremities with good peripheral pulses without any pedal edema.  Skin: Warm and dry.  Musculoskeletal system is grossly normal.  CNS grossly normal.       Results Review:  I have personally reviewed the results from the time of this admission to 1/16/2025 12:22 EST and agree with these findings:  []  Laboratory  []  Microbiology  []  Radiology  []  EKG/Telemetry   []  Cardiology/Vascular   []  Pathology  []  Old records  []  Other:    Most notable findings include:     Lab Results (last 24 hours)       Procedure Component Value Units Date/Time    Basic Metabolic Panel [[id number]]  (Abnormal) Collected: 01/16/25 0346    Specimen: Blood Updated: 01/16/25 0435     Glucose 113 mg/dL      BUN 18 mg/dL      Creatinine 0.70 mg/dL      Sodium 137 mmol/L      Potassium 4.1 mmol/L      Comment: Specimen hemolyzed.  Result may be falsely elevated.        Chloride 105 mmol/L      CO2 24.4 mmol/L      Calcium 9.0 mg/dL      BUN/Creatinine Ratio 25.7     Anion Gap 7.6 mmol/L      eGFR 94.3 mL/min/1.73     Narrative:      GFR Categories in Chronic Kidney Disease (CKD)      GFR Category          GFR (mL/min/1.73)    Interpretation  G1                     90 or greater         Normal or high (1)  G2                      60-89                Mild decrease (1)  G3a                   45-59                 Mild to moderate decrease  G3b                   30-44                Moderate to severe decrease  G4                    15-29                Severe decrease  G5                    14 or less           Kidney failure          (1)In the absence of evidence of kidney disease, neither GFR category G1 or G2 fulfill the criteria for CKD.    eGFR calculation 2021 CKD-EPI creatinine equation, which does not include race as a factor    CBC (No Diff) [[id number]]  (Abnormal) Collected: 01/16/25 0346    Specimen: Blood Updated: 01/16/25 0350     WBC 8.39 10*3/mm3      RBC 5.03 10*6/mm3      Hemoglobin 14.5 g/dL      Hematocrit 46.3 %      MCV 92.0 fL      MCH 28.8 pg      MCHC 31.3 g/dL      RDW 13.4 %      RDW-SD 45.3 fl      MPV 12.2 fL      Platelets 153 10*3/mm3             Imaging Results (Last 24 Hours)       ** No results found for the last 24 hours. **            LAB RESULTS (LAST 7 DAYS)    CBC  Results from last 7 days   Lab Units 01/16/25  0346 01/15/25  0412 01/14/25  1340 01/14/25  1322 01/13/25  1149   WBC 10*3/mm3 8.39 8.27  --  9.18 7.74   RBC 10*6/mm3 5.03 5.33*  --  5.17 5.31*   HEMOGLOBIN g/dL 14.5 15.0  --  15.3 15.5   HEMOGLOBIN, POC g/dL  --   --  15.3  --   --    HEMATOCRIT % 46.3 48.9*  --  46.4 47.5*   HEMATOCRIT POC %  --   --  45  --   --    MCV fL 92.0 91.7  --  89.7 89.5   PLATELETS 10*3/mm3 153 171  --  161 170       BMP  Results from last 7 days   Lab Units 01/16/25  0346 01/15/25  0412 01/14/25  1429 01/14/25  1340 01/13/25  1149   SODIUM mmol/L 137 141 142  --  141   POTASSIUM mmol/L 4.1 3.9 4.1  --  4.1   CHLORIDE mmol/L 105 105 107  --  104   CO2 mmol/L 24.4 27.7 25.8  --  27.1   BUN mg/dL 18 18 17  --  16   CREATININE mg/dL 0.70 0.80 0.66 0.80 0.79   GLUCOSE mg/dL 113* 98 87  --  87       CMP   Results from last 7 days   Lab Units 01/16/25  0346 01/15/25  0412 01/14/25  1429 01/14/25  1340 01/13/25  1149   SODIUM mmol/L 137 141 142  --  141   POTASSIUM mmol/L 4.1 3.9 4.1  --  4.1    CHLORIDE mmol/L 105 105 107  --  104   CO2 mmol/L 24.4 27.7 25.8  --  27.1   BUN mg/dL 18 18 17  --  16   CREATININE mg/dL 0.70 0.80 0.66 0.80 0.79   GLUCOSE mg/dL 113* 98 87  --  87   ALBUMIN g/dL  --   --   --   --  4.0   BILIRUBIN mg/dL  --   --   --   --  0.5   ALK PHOS U/L  --   --   --   --  104   AST (SGOT) U/L  --   --   --   --  26   ALT (SGPT) U/L  --   --   --   --  16       BNP        TROPONIN  Results from last 7 days   Lab Units 01/14/25  1559   HSTROP T ng/L 10       CoAg  Results from last 7 days   Lab Units 01/14/25  1322   INR  1.00   APTT seconds 27.3       Creatinine Clearance  Estimated Creatinine Clearance: 83.8 mL/min (by C-G formula based on SCr of 0.7 mg/dL).    ABG          Radiology  XR Chest 1 View    Result Date: 1/14/2025  Impression: No active disease. Electronically Signed: Edward Day MD  1/14/2025 2:00 PM EST  Workstation ID: [id number]       EKG  I personally viewed and interpreted the patient's EKG/Telemetry data:  ECG 12 Lead Rhythm Change   Preliminary Result   HEART RATE=58  bpm   RR Prfybolb=0613  ms   CT Wfizaovt=758  ms   P Horizontal Axis=-20  deg   P Front Axis=25  deg   QRSD Interval=85  ms   QT Xqazsyhn=770  ms   TFsO=016  ms   QRS Axis=6  deg   T Wave Axis=169  deg   - ABNORMAL ECG -   Sinus bradycardia   Abnormal T, consider ischemia, lateral leads   Date and Time of Study:2025-01-16 04:39:15      ECG 12 Lead Chest Pain   Final Result   HEART RATE=70  bpm   RR Teamuisk=375  ms   CT Aefrzkqs=350  ms   P Horizontal Axis=-8  deg   P Front Axis=15  deg   QRSD Interval=83  ms   QT Zsgmegqw=910  ms   JDlL=715  ms   QRS Axis=4  deg   T Wave Axis=154  deg   - ABNORMAL ECG -   Sinus rhythm   Probable  LVH with secondary repol abnrm   Anterior  Q waves, possibly due to LVH   When compared with ECG of 13-Jan-2025 11:54:01,   No significant change   Electronically Signed By: Alexander [facility]) 2025-01-14 20:41:38   Date and Time of Study:2025-01-14 13:11:31      Telemetry  Scan   Final Result      Telemetry Scan   Final Result            Echocardiogram:    Results for orders placed during the hospital encounter of 25    Adult Transthoracic Echo Complete W/ Cont if Necessary Per Protocol    Interpretation Summary    Left ventricular systolic function is normal. Calculated left ventricular EF = 54% Left ventricular ejection fraction appears to be 51 - 55%.    Left ventricular diastolic function is consistent with (grade I) impaired relaxation.    Severe aortic valve stenosis is present.    There is a bioprosthetic aortic valve present.    Mild dilation of the aortic root is present.        Stress Test:  Results for orders placed during the hospital encounter of 25    Stress Test With Myocardial Perfusion One Day    Interpretation Summary    Left ventricular ejection fraction is normal (Calculated EF = 62%).    Low risk for ischemic heart disease.    STRESS CARDIOLITE REPORT    DATE OF PROCEDURE:   January 15, 2025    INDICATION FOR PROCEDURE:  Chest pain,, dyspnea on exertion, aortic stenosis    PROCEDURE PERFORMED: Stress Cardiolite    DESCRIPTION OF PROCEDURE:    After informed consent was obtained.. Patient's resting heart rate was 69 bpm, resting blood pressure was 128/92, resting EKG revealed sinus rhythm with a rate of 69 bpm with Q waves in V1 V2.  Patient exercised on standard Lane protocol for total of 6.01 minutes, patient achieved a heart rate of 136 beats per minute, which is 89% of age-predicted maximum, without chest pain or EKG changes.Patient was given Cardiolite at rest and images obtained and post stress Cardiolite was given and images obtained . Patient tolerated procedure well.  Complications were none.    NUCLEAR IMAGIN.  There was small area of inferolateral reversible defect consistent with inferolateral ischemia seen.  2.  Gated images reveal normal LV size and contractility, LVEF of 62%.    CONCLUSION:  1.  Stress Cardiolite with abnormal  perfusion, inferolateral ischemia.  2.  Normal wall motion, LVEF of 62%.    RECOMMENDATIONS:    Clinical correlation recommended.    Nba Oswald MD  01/15/25  15:47 EST        Cardiac Catheterization:  Results for orders placed during the hospital encounter of 25    Cardiac Catheterization/Vascular Study    Conclusion  Table formatting from the original result was not included.  January 15, 2025      Heart Cath Report    NAME:              Lulu Graves  :                1955  AGE/SEX:        69 y.o. female  MRN:                [id number]        Procedures Performed    Bilateral coronary arteriogram    :   bNa Oswald MD    Vascular Access Site: Femoral    Indication for procedure: Chest pain, dyspnea on exertion, abnormal stress test, valvular heart disease, severe aortic stenosis      Procedure Note    After discussing the risks, benefits, and alternatives of the procedure, informed consent was obtained.  Timeout was done before the procedure.  Moderate conscious sedation was given utilizing IV Versed and fentanyl administered by RN with continuous EKG oximetry and hemodynamic monitoring supervised by me throughout the entire case.  I was present with the patient for the duration of moderate sedation and supervised staff who had no other duties and monitored the patient for the entire procedure patient had De 2-3 sedation scale. the vascular access site was prepped and draped in the usual sterile fashion.  2% lidocaine was used for local anesthesia. Appropriate landmarks were assessed.  A 6 English short sheath was inserted in the artery using the modified Seldinger technique.    Selective coronary angiography was performed with JL4 and JR4 diagnostic catheters. Left ventriculogram  was not performed because patient has prosthetic aortic valve..  All exchanges were performed over the wire.  No specimens were removed.  There were no apparent acute or early  complications.  The patient tolerated the procedure well and was transferred to the recovery area in stable condition.    Closure device: Mynx device was deployed successfully after right iliofemoral angiogram was performed. Good hemostasis was achieved.    Complications:  None  Blood Loss: minimal    Hemodynamics    Pressures    Ao:    127/65 mmHg      Coronary Angiography    Left Main :  The left main   without disease    Left Anterior Descending : Gives rise to large diagonal which divides into 2.  Mid LAD has intramyocardial course.  Distal LAD has 30% luminal irregularities for      Left Circumflex : Is dominant vessel.  Gives rise to large mid marginal and distal marginal without disease continues in the AV groove.  PDA is calcified 40% narrowing.    Right Coronary Artery :  The right coronary artery   is non dominant vessel    Dominance:  [x]  Left  [x]  Right  []  Co-Dominant    Left Ventriculography:    Was not done because of aortic stenosis and prosthetic aortic valve    Impression:    No obstructive CAD  Severe prosthetic valve aortic stenosis    Recommendations:    Structural heart team consult evaluate for SAVR versus TAVR        I sincerely appreciate the opportunity to participate in your patient's care. Please feel free to contact me anytime if I can be of assistance in this or any other way.      Pertinent History    Past Medical History:  Diagnosis Date   Allergic   Aortic stenosis   Aortic valve replaced 12/04/2013   Cataract   GERD (gastroesophageal reflux disease)   Glaucoma 02/16/2023  Just started   Heart murmur 1996   Hyperlipidemia   Hypertension   Hypothyroidism   Liver disease  Due to medication   MRSA (methicillin resistant Staphylococcus aureus)  left arm abscess   Obesity   Osteopenia   PAT (paroxysmal atrial tachycardia)   Sleep apnea 2011    Past Surgical History:  Procedure Laterality Date   ABLATION OF DYSRHYTHMIC FOCUS  2012 or 2013   AORTIC VALVE REPAIR/REPLACEMENT   AORTIC VALVE  SURGERY  2013   CARDIAC CATHETERIZATION  Before open heart surgery   CARDIAC VALVE REPLACEMENT  2013    SECTION  1982   COLONOSCOPY  2016   INCISION AND DRAINAGE ABSCESS Left 2023  Procedure: INCISION AND DRAINAGE ABSCESS, left arm;  Surgeon: Addy Torres MD;  Location: [facility] MAIN OR;  Service: General;  Laterality: Left;   TUBAL ABDOMINAL LIGATION    Prior to Admission medications  Medication Sig Start Date End Date Taking? Authorizing Provider  aspirin 81 MG tablet Take 1 tablet by mouth Every Night. 12  Yes Cait Osuna MD  levothyroxine (SYNTHROID, LEVOTHROID) 112 MCG tablet Take 1 tablet by mouth Daily. 25  Yes Sushma Hall APRN  loratadine (CLARITIN) 10 MG tablet TAKE 1 TABLET BY MOUTH EVERY DAY 2/10/24  Yes Sushma Hall APRN  metoprolol tartrate (LOPRESSOR) 25 MG tablet Take 0.5 tablets by mouth 2 (Two) Times a Day. 24  Yes Sushma Hall APRN  multivitamin with minerals (MULTIVITAMIN ADULT PO) Take 1 tablet by mouth Daily.   Yes Cait Osuna MD  simvastatin (ZOCOR) 20 MG tablet Take 1 tablet by mouth Every Evening. 24  Yes Sushma Hall APRN  Elderberry-Vitamin C-Zinc (ELDERBERRY IMMUNE HEALTH GUMMY PO) Take 500 mg by mouth Daily.    Cait Osuna MD  fluticasone (FLONASE) 50 MCG/ACT nasal spray Administer 2 sprays into the nostril(s) as directed by provider As Needed for Rhinitis.    Cait Osuna MD  NON FORMULARY [place], Cinnamon, Apple Cider Vinegar, Tumeric, Curmemic, berberine, and ginseng supplement    Cait Osuna MD  PROBIOTIC PRODUCT PO Take 1 capsule by mouth Daily. 16   Cait Osuna MD      Pre-Procedure Notes  H&P Performed  [x]  Yes []  No       []  N/A    Indications:  []  ACS <= 24 HRS  []  ACS >24 HRS  []  New Onset Angina <= 2 mos  []  Worsening Angina  []  Resuscitated Cardiac Arrest  []  Angina on Exertion:  []  Suspected CAD  []  Valvular Disease  []   Pericardial Disease  []  Cardiac Arrythmia  []  Cardiomyopathy  []  LV Dysfunction  []  Syncope  []  Post Cardiac Transplant  []  Eval. For Exercise Clearance  []  Other  []  Pre-Operative Evaluation  If Pre-Op Eval:  Evaluation for Surgery Type:  []  Cardiac Surgery   []  Non-Cardiac Surgery  Functional Capacity:  []  <4 METS  []  >=4 METS w/o symptoms  []  >= 4 METS with symptoms  []  Unknown  Surgical Risk:  []  Low  []  Intermediate  []  High Risk: Vascular  []  High Risk Non-Vascular    Risks, Benefits, & Complications Discussed:  [x]  Yes  []  No  []  N/A    Questions Answered:  [x]  Yes  []  No  []  N/A    Consent Obtained:  [x]  Yes  []  No  []  N/A    CHF: []  Yes  [x]  No  If Yes:  Newly Diagnosed?  []  Yes  []  No  If Yes:  HF Type:  []  Diastolic  []  Systolic  []  Unknown      Nba Oswald MD  1/15/2025  18:02 EST  Electronically signed by Nba Oswald MD, 01/15/25, 6:02 PM EST.        Other:      ASSESSMENT & PLAN:    Principal Problem:    Prosthetic aortic valve stenosis  Active Problems:    Status post aortic valve replacement    Chest pain    Aortic stenosis, severe    Severe bioprosthetic aortic valve stenosis  Patient has 21 mm Magna Ease bioprosthetic valve in place  Echocardiogram shows mean/peak gradient of 44/65 mmHg with ALEKSEY of 0.7 cm²  Cardiac catheterization has ruled out any obstructive coronary disease  Her options are root enlargement and redo AVR with a larger prosthesis versus valve in valve supra annular TAVR.  Morbid obesity puts her at a risk for patient prosthesis mismatch.    Isolated AVR   Operative Mortality 7.55%   Morbidity & Mortality 21.2%   Stroke 1.33%   Renal Failure 2.99%   Reoperation 6.03%   Prolonged Ventilation 16.4%   Deep Sternal Wound Infection 0.441%   Long Hospital Stay (>14 days) 9.17%   Short Hospital Stay (<6 days)* 19.8%     TAVR will only be offered if she is not a candidate for redo AVR.    Hypertension  Currently on amlodipine and  metoprolol  Uptitrate as needed    Hyperlipidemia  Continue statin.  LDL 87, HDL 34, triglycerides 192 and total cholesterol 154.  A1c is 5.95    Obesity  BMI is 44.  She weighs 218 pounds.  Lifestyle modifications discussed with the patient  Encouraged compliance with CPAP  Will benefit from weight loss    Hypothyroidism  Continue Synthroid  TSH is 6.7, free T4 is normal      Umair Rosario MD  01/16/25  12:22 EST

## 2025-01-16 NOTE — PROGRESS NOTES
[facility] MEDICINE SERVICE  DAILY PROGRESS NOTE    NAME: Lulu Graves  : 1955  MRN: [id number]      LOS: 1 day     PROVIDER OF SERVICE: Rena Fonseca MD    Chief Complaint: Prosthetic aortic valve stenosis    Subjective:     Interval History:  History taken from: patient    Patient seen and examined at bedside this morning.  No acute complaints overnight        Review of Systems: Negative except as described above  Review of Systems    Objective:     Vital Signs  Temp:  [97.7 °F (36.5 °C)-98.4 °F (36.9 °C)] 98.4 °F (36.9 °C)  Heart Rate:  [53-80] 69  Resp:  [15-21] 15  BP: (121-164)/(52-88) 164/88  Flow (L/min) (Oxygen Therapy):  [2] 2   Body mass index is 44.22 kg/m².    Physical Exam  Physical Exam  Constitutional:       Comments: NAD    Cardiovascular:      Comments:  RRR, S1 & S2   Pulmonary:      Comments:  Lungs CTA   Abdominal:      Comments:  ABD soft, NT            Diagnostic Data    Results from last 7 days   Lab Units 25  0346 25  1322 25  1149   WBC 10*3/mm3 8.39   < > 7.74   HEMOGLOBIN g/dL 14.5   < > 15.5   HEMOGLOBIN, POC   --    < >  --    HEMATOCRIT % 46.3   < > 47.5*   HEMATOCRIT POC   --    < >  --    PLATELETS 10*3/mm3 153   < > 170   GLUCOSE mg/dL 113*   < > 87   CREATININE mg/dL 0.70   < > 0.79   BUN mg/dL 18   < > 16   SODIUM mmol/L 137   < > 141   POTASSIUM mmol/L 4.1   < > 4.1   AST (SGOT) U/L  --   --  26   ALT (SGPT) U/L  --   --  16   ALK PHOS U/L  --   --  104   BILIRUBIN mg/dL  --   --  0.5   POC ANION GAP ISTAT   --    < >  --    ANION GAP mmol/L 7.6   < > 9.9    < > = values in this interval not displayed.       XR Chest 1 View    Result Date: 2025  Impression: No active disease. Electronically Signed: Edward Day MD  2025 2:00 PM EST  Workstation ID: [id number]       I reviewed the patient's new clinical results.    Assessment/Plan:     Active and Resolved Problems  Active Hospital Problems    Diagnosis  POA    **Prosthetic aortic  valve stenosis [T82.857A]  Yes    Chest pain [R07.9]  Unknown    Aortic stenosis, severe [I35.0]  Unknown    Status post aortic valve replacement [Z95.2]  Not Applicable      Resolved Hospital Problems   No resolved problems to display.       Chest pain  Prosthetic aortic valve stenosis  Echocardiogram results with severe bioprosthetic valve stenosis  Completed stress test, underwent heart cath on 1/15 which did not show any obstructive disease  CXR with no acute findings  Troponin trend remains flat  EKG: sinus rhythm, rate of 62 with no evidence of acute ischemia but was notable for T wave inversions in lead I and aVL  Telemetry   Cardiology following  CT surgery consulted for her severe aortic valve stenosis for possible SAVR versus TAVR     Hypertension-BP continue metoprolol, will add amlodipine  Hyperlipidemia-continue ASA, statin  Hypothyroidism-continue levothyroxine  GERD-continue PPI    VTE Prophylaxis:  Mechanical VTE prophylaxis orders are present.             Disposition Planning:        Anticipated Date of Discharge: 1/19/2025         Time: 35 minutes     Code Status and Medical Interventions: CPR (Attempt to Resuscitate); Full Support   Ordered at: 01/15/25 0640     Code Status (Patient has no pulse and is not breathing):    CPR (Attempt to Resuscitate)     Medical Interventions (Patient has pulse or is breathing):    Full Support       Signature: Electronically signed by Rena Fonseca MD, 01/16/25, 11:44 EST.  [facility] Hospitalist Team

## 2025-01-17 ENCOUNTER — APPOINTMENT (OUTPATIENT)
Dept: CARDIOLOGY | Facility: HOSPITAL | Age: 70
DRG: 287 | End: 2025-01-17
Payer: MEDICARE

## 2025-01-17 ENCOUNTER — PREP FOR SURGERY (OUTPATIENT)
Dept: OTHER | Facility: HOSPITAL | Age: 70
End: 2025-01-17
Payer: MEDICARE

## 2025-01-17 ENCOUNTER — TELEPHONE (OUTPATIENT)
Dept: CARDIAC SURGERY | Facility: CLINIC | Age: 70
End: 2025-01-17
Payer: MEDICARE

## 2025-01-17 ENCOUNTER — READMISSION MANAGEMENT (OUTPATIENT)
Dept: CALL CENTER | Facility: HOSPITAL | Age: 70
End: 2025-01-17
Payer: MEDICARE

## 2025-01-17 VITALS
OXYGEN SATURATION: 93 % | RESPIRATION RATE: 16 BRPM | DIASTOLIC BLOOD PRESSURE: 88 MMHG | HEIGHT: 59 IN | HEART RATE: 94 BPM | SYSTOLIC BLOOD PRESSURE: 144 MMHG | TEMPERATURE: 97.7 F | WEIGHT: 216.8 LBS | BODY MASS INDEX: 43.71 KG/M2

## 2025-01-17 DIAGNOSIS — T82.857D STENOSIS OF PROSTHETIC AORTIC VALVE, SUBSEQUENT ENCOUNTER: Primary | ICD-10-CM

## 2025-01-17 LAB
ALBUMIN SERPL-MCNC: 4.1 G/DL (ref 3.5–5.2)
ALBUMIN/GLOB SERPL: 1.2 G/DL
ALP SERPL-CCNC: 99 U/L (ref 39–117)
ALT SERPL W P-5'-P-CCNC: 16 U/L (ref 1–33)
ANION GAP SERPL CALCULATED.3IONS-SCNC: 10.2 MMOL/L (ref 5–15)
AST SERPL-CCNC: 26 U/L (ref 1–32)
BH CV XLRA MEAS LEFT CAROTID BULB EDV: -11.4 CM/SEC
BH CV XLRA MEAS LEFT CAROTID BULB PSV: -49.5 CM/SEC
BH CV XLRA MEAS LEFT DIST CCA EDV: -16.1 CM/SEC
BH CV XLRA MEAS LEFT DIST CCA PSV: -63.1 CM/SEC
BH CV XLRA MEAS LEFT DIST ICA EDV: -21.7 CM/SEC
BH CV XLRA MEAS LEFT DIST ICA PSV: -65.2 CM/SEC
BH CV XLRA MEAS LEFT ICA/CCA RATIO: 0.76
BH CV XLRA MEAS LEFT PROX CCA EDV: 17.6 CM/SEC
BH CV XLRA MEAS LEFT PROX CCA PSV: 85.6 CM/SEC
BH CV XLRA MEAS LEFT PROX ECA EDV: -12.5 CM/SEC
BH CV XLRA MEAS LEFT PROX ECA PSV: -68.3 CM/SEC
BH CV XLRA MEAS LEFT PROX ICA EDV: -13.7 CM/SEC
BH CV XLRA MEAS LEFT PROX ICA PSV: -51.6 CM/SEC
BH CV XLRA MEAS LEFT PROX SCLA PSV: -147 CM/SEC
BH CV XLRA MEAS LEFT VERTEBRAL A EDV: -14.7 CM/SEC
BH CV XLRA MEAS LEFT VERTEBRAL A PSV: -55.3 CM/SEC
BH CV XLRA MEAS RIGHT CAROTID BULB EDV: -10.4 CM/SEC
BH CV XLRA MEAS RIGHT CAROTID BULB PSV: -45.9 CM/SEC
BH CV XLRA MEAS RIGHT DIST CCA EDV: -14.7 CM/SEC
BH CV XLRA MEAS RIGHT DIST CCA PSV: -60.4 CM/SEC
BH CV XLRA MEAS RIGHT DIST ICA EDV: -23.6 CM/SEC
BH CV XLRA MEAS RIGHT DIST ICA PSV: -63.9 CM/SEC
BH CV XLRA MEAS RIGHT ICA/CCA RATIO: 0.77
BH CV XLRA MEAS RIGHT PROX CCA EDV: 14.9 CM/SEC
BH CV XLRA MEAS RIGHT PROX CCA PSV: 83.2 CM/SEC
BH CV XLRA MEAS RIGHT PROX ECA PSV: -76.2 CM/SEC
BH CV XLRA MEAS RIGHT PROX ICA EDV: -13.8 CM/SEC
BH CV XLRA MEAS RIGHT PROX ICA PSV: -37.3 CM/SEC
BH CV XLRA MEAS RIGHT PROX SCLA PSV: -69.6 CM/SEC
BH CV XLRA MEAS RIGHT VERTEBRAL A EDV: 7.7 CM/SEC
BH CV XLRA MEAS RIGHT VERTEBRAL A PSV: -31.5 CM/SEC
BILIRUB SERPL-MCNC: 0.6 MG/DL (ref 0–1.2)
BUN SERPL-MCNC: 17 MG/DL (ref 8–23)
BUN/CREAT SERPL: 21.5 (ref 7–25)
CALCIUM SPEC-SCNC: 9.3 MG/DL (ref 8.6–10.5)
CHLORIDE SERPL-SCNC: 104 MMOL/L (ref 98–107)
CO2 SERPL-SCNC: 23.8 MMOL/L (ref 22–29)
CREAT SERPL-MCNC: 0.79 MG/DL (ref 0.57–1)
DEPRECATED RDW RBC AUTO: 43.9 FL (ref 37–54)
EGFRCR SERPLBLD CKD-EPI 2021: 81.6 ML/MIN/1.73
ERYTHROCYTE [DISTWIDTH] IN BLOOD BY AUTOMATED COUNT: 13.4 % (ref 12.3–15.4)
GLOBULIN UR ELPH-MCNC: 3.5 GM/DL
GLUCOSE SERPL-MCNC: 118 MG/DL (ref 65–99)
HCT VFR BLD AUTO: 47.9 % (ref 34–46.6)
HGB BLD-MCNC: 15.3 G/DL (ref 12–15.9)
MCH RBC QN AUTO: 28.5 PG (ref 26.6–33)
MCHC RBC AUTO-ENTMCNC: 31.9 G/DL (ref 31.5–35.7)
MCV RBC AUTO: 89.4 FL (ref 79–97)
PLATELET # BLD AUTO: 174 10*3/MM3 (ref 140–450)
PMV BLD AUTO: 12 FL (ref 6–12)
POTASSIUM SERPL-SCNC: 4 MMOL/L (ref 3.5–5.2)
PROT SERPL-MCNC: 7.6 G/DL (ref 6–8.5)
QT INTERVAL: 422 MS
QTC INTERVAL: 416 MS
RBC # BLD AUTO: 5.36 10*6/MM3 (ref 3.77–5.28)
SODIUM SERPL-SCNC: 138 MMOL/L (ref 136–145)
WBC NRBC COR # BLD AUTO: 9.95 10*3/MM3 (ref 3.4–10.8)

## 2025-01-17 PROCEDURE — 93880 EXTRACRANIAL BILAT STUDY: CPT | Performed by: SURGERY

## 2025-01-17 PROCEDURE — 85027 COMPLETE CBC AUTOMATED: CPT

## 2025-01-17 PROCEDURE — 80053 COMPREHEN METABOLIC PANEL: CPT

## 2025-01-17 PROCEDURE — 99232 SBSQ HOSP IP/OBS MODERATE 35: CPT | Performed by: INTERNAL MEDICINE

## 2025-01-17 PROCEDURE — 93880 EXTRACRANIAL BILAT STUDY: CPT

## 2025-01-17 RX ORDER — AMOXICILLIN 250 MG/1
1000 CAPSULE ORAL ONCE
Status: DISCONTINUED | OUTPATIENT
Start: 2025-01-17 | End: 2025-01-17

## 2025-01-17 RX ORDER — AMOXICILLIN 250 MG/1
500 CAPSULE ORAL EVERY 8 HOURS SCHEDULED
Status: DISCONTINUED | OUTPATIENT
Start: 2025-01-17 | End: 2025-01-17 | Stop reason: HOSPADM

## 2025-01-17 RX ORDER — AMLODIPINE BESYLATE 5 MG/1
5 TABLET ORAL
Qty: 30 TABLET | Refills: 0 | Status: ON HOLD | OUTPATIENT
Start: 2025-01-17 | End: 2025-02-16

## 2025-01-17 RX ORDER — AMOXICILLIN 500 MG/1
500 CAPSULE ORAL EVERY 8 HOURS SCHEDULED
Qty: 20 CAPSULE | Refills: 0 | Status: ON HOLD | OUTPATIENT
Start: 2025-01-17 | End: 2025-01-24

## 2025-01-17 RX ORDER — VANCOMYCIN/0.9 % SOD CHLORIDE 1.5G/250ML
15 PLASTIC BAG, INJECTION (ML) INTRAVENOUS ONCE
OUTPATIENT
Start: 2025-01-24

## 2025-01-17 RX ADMIN — Medication 12.5 MG: at 09:14

## 2025-01-17 RX ADMIN — ASPIRIN 81 MG: 81 TABLET, COATED ORAL at 09:14

## 2025-01-17 RX ADMIN — AMOXICILLIN 500 MG: 250 CAPSULE ORAL at 11:40

## 2025-01-17 RX ADMIN — AMLODIPINE BESYLATE 5 MG: 5 TABLET ORAL at 09:14

## 2025-01-17 RX ADMIN — LEVOTHYROXINE SODIUM 112 MCG: 112 TABLET ORAL at 05:51

## 2025-01-17 NOTE — CASE MANAGEMENT/SOCIAL WORK
Case Management Discharge Note      Final Note: CM reviewed chart documentation for clinical updates. Plan for patient to DC home todya with a planned return in a couple of weeks for Open Heart Surgery. DC orders in place. No additional service needs identified. Patient will transport home via private vehicle with spouse. No barriers.        Transportation Services  Private: Car (with spouse)    Final Discharge Disposition Code: 81 - home or self care w/planned readmission    Chantell Vallejo, RN    Office Phone: [phone]  Office Cell:     [phone]

## 2025-01-17 NOTE — PAT
Patient seen while inpatient. Gave her instructions regarding upcoming PAT visit. Told her to arrive at [facility] on 1/22/25 @ 8am. Patient disclosed that she does possibly have an active infection in one of her lower teeth as told by her dentist in November 2024. Also stated that she has a history of MRSA infection to arm several years ago. Findings reported to [facility]. Subsequently, patient given 5 packs of CHG cloths and given instructions on how to use those starting 5 days prior to surgery. Patient also given cotton swabs and instructions on how to use Bactroban twice a day starting 5 days prior to surgery. Patient was able to repeat instructions back and verbalized understanding of all instructions given. Pharmacy given request for Bactroban to be filled and said they would deliver it to her at bedside before discharge.

## 2025-01-17 NOTE — PROGRESS NOTES
Cardiology Progress Note    Patient Identification:  Name: Lulu Graves  Age: 69 y.o.  Sex: female  :  1955  MRN: [id number]                 Follow Up / Chief Complaint:   Chief Complaint   Patient presents with    Palpitations     Palpitations, states out of breath when walking, Pt states started around  since then off and on. Left arm feeling tingling.           Interval History: Patient with valvular heart disease and bioprosthetic AVR in remote past presented with chest pain shortness of breath.  Was found to have severe aortic stenosis.       Subjective: Patient seen and examined.  Chart reviewed.  Labs reviewed.  Discussed with RN taking care of patient.  Patient has severe aortic stenosis with she is symptomatic.      Objective:  2025: BMP with a glucose of 113, CBC with normal hemoglobin.  EKG done 2025 reviewed/interpreted by me reveals sinus bradycardia with Q waves in V1 V2.  Patient is hypothyroid.  2025: CMP with a glucose of 118.  CBC with hematocrit of 47.9.    History of present illness:      Ms. Lulu Graves has PMH of     Valvular heart disease, bioprosthetic aortic valve replacement   Hypertension  Dyslipidemia  Obesity with BMI over 40  LUÍS  Hypothyroidism     Presented with complaint of shortness of breath, palpitations, chest pain.  Patient presented through emergency room 2025 with shortness of breath palpitations.  Patient has been noticing progressively worsening shortness of breath and dyspnea on exertion and chest tightness since .     Data:  Labs here revealed normal HS troponin at 10 and normal proBNP of 175.  CMP was normal.  Hemoglobin A1c was 5.95.  TSH elevated at 6.7.  Lipid profile with cholesterol 154, triglycerides 192, HDL 34, LDL 87.  EKG done 2025 revealed sinus rhythm at the rate of 70 bpm with ST segment depression T wave inversion in 1 and aVL        Assessment:  :     Chest pain  Abnormal EKG with lateral ST  segment depression  Valvular heart disease, bioprosthetic aortic valve  Hypertension  Dyslipidemia  Obesity  LUÍS        Recommendations / Plan:        Patient presented with shortness of breath palpitations and chest pain.  Troponin was normal.  EKG was abnormal.  Patient underwent cardiac cath which revealed noncritical disease.  Patient underwent echocardiogram which revealed severe prosthetic valve aortic stenosis.  CT surgery has been consulted.  Continue statins as tolerated for dyslipidemia.  Continue metoprolol, amlodipine for blood pressure.  Continue levothyroxine for hypothyroidism.  Continue medical management with aspirin, amlodipine, atorvastatin, levothyroxine, metoprolol as tolerated.  Monitor hemodynamics closely.  Monitor rhythm closely.  Consulted CT surgery and structural heart team to decide between SAVR and TAVR.  Discussed with , Dr. Rosario.  Patient is going to have surgical AVR.  Patient blood pressure was elevated.  Medications were adjusted.  Patient was monitored and blood pressures have improved.  Will defer timing of surgery to CTS team.  Patient is going to go home and come back next Friday for surgery with Dr. MARTIN.  Discussed with patient and her  and answered all the questions.         Copied text in this portion of the note has been reviewed and is accurate as of 1/17/2025    Past Medical History:  Past Medical History:   Diagnosis Date    Allergic     Aortic stenosis     Aortic valve replaced 12/04/2013    Cataract     GERD (gastroesophageal reflux disease)     Glaucoma 02/16/2023    Just started    Heart murmur 1996    Hyperlipidemia     Hypertension     Hypothyroidism     Liver disease     Due to medication    MRSA (methicillin resistant Staphylococcus aureus)     left arm abscess    Obesity     Osteopenia     PAT (paroxysmal atrial tachycardia)     Sleep apnea 2011     Past Surgical History:  Past Surgical History:   Procedure Laterality Date    ABLATION OF  "DYSRHYTHMIC FOCUS   or     AORTIC VALVE REPAIR/REPLACEMENT      AORTIC VALVE SURGERY  2013    CARDIAC CATHETERIZATION      Before open heart surgery    CARDIAC CATHETERIZATION N/A 1/15/2025    Procedure: Left Heart Cath and coronary angiogram;  Surgeon: Nba Oswald MD;  Location: [facility] CATH INVASIVE LOCATION;  Service: Cardiovascular;  Laterality: N/A;    CARDIAC VALVE REPLACEMENT  2013     SECTION  1982    COLONOSCOPY  2016    INCISION AND DRAINAGE ABSCESS Left 2023    Procedure: INCISION AND DRAINAGE ABSCESS, left arm;  Surgeon: Addy Torres MD;  Location: [facility] MAIN OR;  Service: General;  Laterality: Left;    TUBAL ABDOMINAL LIGATION          Social History:   Social History     Tobacco Use    Smoking status: Never     Passive exposure: Past    Smokeless tobacco: Never   Substance Use Topics    Alcohol use: Never      Family History:  Family History   Problem Relation Age of Onset    Arthritis Mother     Diabetes Brother     Hearing loss Brother     Stroke Paternal Grandmother     Sleep apnea Neg Hx           Allergies:  No Known Allergies  Scheduled Meds:  amLODIPine, 5 mg, Q24H  aspirin, 81 mg, Daily  atorvastatin, 10 mg, Daily  levothyroxine, 112 mcg, Daily  metoprolol tartrate, 12.5 mg, BID          Review of Systems:   ROS  Review of Systems   Constitution: Negative for chills and fever.   Cardiovascular: Positive for chest pain.  Respiratory: Has dyspnea on exertion.  Gastrointestinal: Negative for nausea.        Constitutional:  Temp:  [97.7 °F (36.5 °C)-98.4 °F (36.9 °C)] 97.7 °F (36.5 °C)  Heart Rate:  [56-87] 67  Resp:  [15-21] 16  BP: (118-160)/(52-91) 154/78    Physical Exam   /78 (BP Location: Left arm, Patient Position: Lying)   Pulse 67   Temp 97.7 °F (36.5 °C) (Oral)   Resp 16   Ht 149.9 cm (59\")   Wt 98.3 kg (216 lb 12.8 oz)   LMP  (LMP Unknown)   SpO2 98%   BMI 43.79 kg/m²   General:  Appears in no acute " distress  Eyes: Sclera is anicteric,  conjunctiva is clear   HEENT:  No JVD. Thyroid not visibly enlarged. No mucosal pallor or cyanosis  Respiratory: Respirations regular and unlabored at rest.  Clear to auscultation  Cardiovascular: S1,S2 Regular rate and rhythm.  2/6 systolic ejection murmur  Gastrointestinal: Abdomen nondistended.  Musculoskeletal:  No abnormal movements  Extremities: No digital clubbing or cyanosis  Skin: Color pink.   Neuro: Alert and awake.    INTAKE AND OUTPUT:    Intake/Output Summary (Last 24 hours) at 1/17/2025 0843  Last data filed at 1/17/2025 0600  Gross per 24 hour   Intake 978 ml   Output 500 ml   Net 478 ml       Cardiographics  Telemetry: Reviewed and interpreted by me reveals sinus rhythm    ECG:   ECG 12 Lead Rhythm Change   Preliminary Result   HEART RATE=58  bpm   RR Jrkltpqi=7996  ms   PA Njzhiynt=357  ms   P Horizontal Axis=-20  deg   P Front Axis=25  deg   QRSD Interval=85  ms   QT Brqzodtj=391  ms   MBiO=551  ms   QRS Axis=6  deg   T Wave Axis=169  deg   - ABNORMAL ECG -   Sinus bradycardia   Abnormal T, consider ischemia, lateral leads   Date and Time of Study:2025-01-16 04:39:15      ECG 12 Lead Chest Pain   Final Result   HEART RATE=70  bpm   RR Faljchmg=973  ms   PA Fxbkuuyz=364  ms   P Horizontal Axis=-8  deg   P Front Axis=15  deg   QRSD Interval=83  ms   QT Ybyxfliv=760  ms   YYnD=627  ms   QRS Axis=4  deg   T Wave Axis=154  deg   - ABNORMAL ECG -   Sinus rhythm   Probable  LVH with secondary repol abnrm   Anterior  Q waves, possibly due to LVH   When compared with ECG of 13-Jan-2025 11:54:01,   No significant change   Electronically Signed By: Alexander Steve ([facility]) 2025-01-14 20:41:38   Date and Time of Study:2025-01-14 13:11:31      Telemetry Scan   Final Result      Telemetry Scan   Final Result      Telemetry Scan   Final Result        I have personally reviewed EKG    Echocardiogram: Results for orders placed during the hospital encounter of  01/14/25    Adult Transthoracic Echo Complete W/ Cont if Necessary Per Protocol    Interpretation Summary    Left ventricular systolic function is normal. Calculated left ventricular EF = 54% Left ventricular ejection fraction appears to be 51 - 55%.    Left ventricular diastolic function is consistent with (grade I) impaired relaxation.    Severe aortic valve stenosis is present.    There is a bioprosthetic aortic valve present.    Mild dilation of the aortic root is present.      Lab Review   I have reviewed the labs  Results from last 7 days   Lab Units 01/14/25  1559 01/14/25  1429   HSTROP T ng/L 10 10         Results from last 7 days   Lab Units 01/17/25  0323   SODIUM mmol/L 138   POTASSIUM mmol/L 4.0   BUN mg/dL 17   CREATININE mg/dL 0.79   CALCIUM mg/dL 9.3         Results from last 7 days   Lab Units 01/17/25  0323 01/16/25  0346 01/15/25  0412   WBC 10*3/mm3 9.95 8.39 8.27   HEMOGLOBIN g/dL 15.3 14.5 15.0   HEMATOCRIT % 47.9* 46.3 48.9*   PLATELETS 10*3/mm3 174 153 171     Results from last 7 days   Lab Units 01/14/25  1322   INR  1.00   APTT seconds 27.3       RADIOLOGY:  Imaging Results (Last 24 Hours)       Procedure Component Value Units Date/Time    CT Chest Without Contrast Diagnostic [[id number]] Collected: 01/16/25 2229     Updated: 01/16/25 2236    Narrative:      CT CHEST WO CONTRAST DIAGNOSTIC    Date of Exam: 1/16/2025 8:16 PM EST    Indication: previous sternotomy, pre-op reop cardiac surgery.    Comparison: Chest radiograph performed on January 14, 2025    Technique: Axial CT images were obtained of the chest without contrast administration.  Sagittal and coronal reconstructions were performed.  Automated exposure control and iterative reconstruction methods were used.      Findings:  Thyroid: The visualized portion of the thyroid is unremarkable.    Hilum, Mediastinum, Heart, and Great vessels: No pathologically enlarged lymph nodes. Normal heart size. No pericardial effusion. The patient is  "post sternotomy and aortic valve replacement. Ascending thoracic aorta is dilated measuring 4.2 cm. Thoracic   aortic arch and descending thoracic aorta are normal in caliber. Small scattered calcified plaques are visualized throughout the thoracic aorta and great vessels.    Lung Parenchyma and Pleura: No focal consolidation or effusion. Small granuloma is visualized in the right lung base. No suspicious pulmonary nodules. No evidence of pneumothorax. Central airways are patent.     Upper Abdomen: No acute process.     Soft tissues: Unremarkable.    Osseous structures: No acute fracture or aggressive lesions.      Impression:      Impression:    Prior sternotomy and aortic valve replacement. Dilated ascending thoracic aorta measuring 4.2 cm. Small calcified plaques visualized at the thoracic aorta and great vessels.    Lung parenchyma is unremarkable aside from small granuloma in the right lung base.      Electronically Signed: Maurice Dan MD    1/16/2025 10:33 PM EST    Workstation ID: [id number]                  )1/17/2025  Nba Oswald MD      [facility]/Transcription:   \"Dictated utilizing Dragon dictation\".   "

## 2025-01-17 NOTE — PROGRESS NOTES
CC:  SOA/ palpitations     F/U:  Prosthetic aortic valve stenosis--[place]  EF 50-55% (echo)    Subjective:  no c/o's ready for dc home    No events overnight  CT scan chest completed last night      PREOP studies:  Carotid duplex:  normal  A1c:  5.95   Plt agg:  pending  MRSA screen:  hx MRSA last year  UA:  pending      Intake/Output Summary (Last 24 hours) at 1/17/2025 0900  Last data filed at 1/17/2025 0600  Gross per 24 hour   Intake 978 ml   Output 500 ml   Net 478 ml     Temp:  [97.7 °F (36.5 °C)-98.4 °F (36.9 °C)] 97.7 °F (36.5 °C)  Heart Rate:  [56-87] 67  Resp:  [15-21] 16  BP: (118-160)/(52-91) 154/78      Results from last 7 days   Lab Units 01/17/25  0323 01/16/25  0346 01/14/25  1340 01/14/25  1322   WBC 10*3/mm3 9.95 8.39   < > 9.18   HEMOGLOBIN g/dL 15.3 14.5   < > 15.3   HEMOGLOBIN, POC   --   --    < >  --    HEMATOCRIT % 47.9* 46.3   < > 46.4   HEMATOCRIT POC   --   --    < >  --    PLATELETS 10*3/mm3 174 153   < > 161   INR   --   --   --  1.00    < > = values in this interval not displayed.     Results from last 7 days   Lab Units 01/17/25  0323   CREATININE mg/dL 0.79   POTASSIUM mmol/L 4.0   SODIUM mmol/L 138       Physical Exam:  Neuro intact, nad, sitting on edge of bed  Tele:  SR 60s, +BLAYNE  CTA, 98% RA  Benign abd, + BM  No edema    Assessment/Plan:  Principal Problem:    Prosthetic aortic valve stenosis  Active Problems:    Status post aortic valve replacement    Chest pain    Aortic stenosis, severe    - Severe prosthetic aortic valve stenosis (ALEKSEY .73 cm2, peak velocity 403 cm/sec, mean peak gradient 44 mmHg), EF 50-55% (echo), s/p minimally invasive tissue AVR 21 mm Magna ([place], 12/2013)--op note under media tab, surgical evaluation in progress  - Non-obstructive CAD--cath 1/15/2025  - HLD with hypertriglyceridemia--statin  - LUÍS, CPAP compliance  - Morbid obesity, stage 3--BMI 44.2  - Hyperglycemia--A1c 5.95  - Hypothyroidism--levothyroxine  - Dental issue--seeing dentist 1/18,  started on Amoxil  - hx MRSA in 2025--add vanc to preop antibxs    Plans for reop AVR on Friday, 1/24/2025.  Preop orders placed already.  Amoxicillin ordered for dental issue.  She is seeing dentist tomorrow and we will call her on Monday to see what the plan is for her tooth.  D/w Dr. Moreno.    Latanya Tubbs, APRN  1/17/2025  09:00 EST

## 2025-01-17 NOTE — PLAN OF CARE
Goal Outcome Evaluation:  Plan of Care Reviewed With: patient        Progress: no change                Patient started on blood pressure medications. Surgery scheduled jan 24.

## 2025-01-17 NOTE — PLAN OF CARE
Goal Outcome Evaluation:      Patient ready to go home. She was sent home with the orange wipes and will see her dentist about the dental issues tomorrow.

## 2025-01-17 NOTE — DISCHARGE SUMMARY
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         Hospital Course     HPI:    \"Lulu Graves is a 69 y.o. female with a CMH of history of aortic stenosis status post TAVR, HTN, HLD, LUÍS, GERD who initially presented to [facility] on 2025 with chest pain, HOLLAND and palpitations.  On initial ED evaluation, labs were grossly unremarkable.  Troponin trend remained flat.  EKG was sinus rhythm, rate of 62 with no evidence of acute ischemia but was notable for T wave inversions in lead I and aVL.  CXR with no acute findings.  Patient was on to ED observation under [facility].  Echocardiogram was done this morning with cardiology which revealed severe bioprosthetic valve stenosis.  Due to this finding, hospitalist service was consulted for admission.     Patient currently denies chest pain, shortness of breath, palpitations but states symptoms have been ongoing since 2024.  Patient reports she has completed stress test with no recurrence of chest pressure or palpitations.  She is currently awaiting heart cath with Dr. Oswald.  Patient reports that she had bioprosthetic valve in  with Dr. Richter. \"    Hospital Course:  Chest " pain  Prosthetic aortic valve stenosis  Echocardiogram results with severe bioprosthetic valve stenosis  Completed stress test, underwent heart cath on 1/15 which did not show any obstructive disease  CXR with no acute findings  Troponin trend remains flat  EKG: sinus rhythm, rate of 62 with no evidence of acute ischemia but was notable for T wave inversions in lead I and aVL  CT surgery consulted for her severe aortic valve stenosis for possible SAVR versus TAVR, stated that they will redo AVR with enlargement of the root to 23 mm.  Scheduled for surgery on 1/24.  Ordered duplex carotid bilateral ultrasound which showed normal flow in left and right ICA  Intervention cardiology was also consulted for possible TAVR assessment and stated that they would only do TAVR if she fails redo AVR     Hypertension-BP continue metoprolol, added amlodipine  Hyperlipidemia-continue ASA, statin  Hypothyroidism-continue levothyroxine  GERD-continue PPI        DISCHARGE Follow Up Recommendations for labs and diagnostics: Follow-up with PCP in 1 week        Day of Discharge     Vital Signs:  Temp:  [97.7 °F (36.5 °C)-98.4 °F (36.9 °C)] 97.7 °F (36.5 °C)  Heart Rate:  [56-87] 67  Resp:  [15-21] 16  BP: (118-160)/(52-91) 154/78    Physical Exam:  Physical Exam  Constitutional:       Comments: NAD    Cardiovascular:      Comments:  RRR, S1 & S2   Pulmonary:      Comments:  Lungs CTA   Abdominal:      Comments:  ABD soft, NT             Pertinent  and/or Most Recent Results     LAB RESULTS:      Lab 01/17/25  0323 01/16/25  0346 01/15/25  0412 01/14/25  1340 01/14/25  1322 01/13/25  1149   WBC 9.95 8.39 8.27  --  9.18 7.74   HEMOGLOBIN 15.3 14.5 15.0  --  15.3 15.5   HEMOGLOBIN, POC  --   --   --  15.3  --   --    HEMATOCRIT 47.9* 46.3 48.9*  --  46.4 47.5*   HEMATOCRIT POC  --   --   --  45  --   --    PLATELETS 174 153 171  --  161 170   NEUTROS ABS  --   --  4.59  --  5.68 4.64   IMMATURE GRANS (ABS)  --   --  0.02  --  0.03 0.03    LYMPHS ABS  --   --  2.42  --  2.13 2.11   MONOS ABS  --   --  0.83  --  0.94* 0.65   EOS ABS  --   --  0.37  --  0.35 0.26   MCV 89.4 92.0 91.7  --  89.7 89.5   PROTIME  --   --   --   --  13.3  --    APTT  --   --   --   --  27.3  --          Lab 01/17/25  0323 01/16/25  0346 01/15/25  0412 01/14/25  1429 01/14/25  1340 01/13/25  1149   SODIUM 138 137 141 142  --  141   POTASSIUM 4.0 4.1 3.9 4.1  --  4.1   CHLORIDE 104 105 105 107  --  104   CO2 23.8 24.4 27.7 25.8  --  27.1   POC ANION GAP ISTAT  --   --   --   --  16.0  --    ANION GAP 10.2 7.6 8.3 9.2  --  9.9   BUN 17 18 18 17  --  16   CREATININE 0.79 0.70 0.80 0.66 0.80 0.79   EGFR 81.6 94.3 80.4 95.7 80.4 81.6   GLUCOSE 118* 113* 98 87  --  87   CALCIUM 9.3 9.0 9.0 9.7  --  9.9   HEMOGLOBIN A1C  --   --   --   --   --  5.95*   TSH  --   --   --   --   --  6.710*         Lab 01/17/25  0323 01/13/25  1149   TOTAL PROTEIN 7.6 7.5   ALBUMIN 4.1 4.0   GLOBULIN 3.5 3.5   ALT (SGPT) 16 16   AST (SGOT) 26 26   BILIRUBIN 0.6 0.5   ALK PHOS 99 104         Lab 01/14/25  1559 01/14/25  1429 01/14/25  1322   PROBNP  --   --  175.0   HSTROP T 10 10  --    PROTIME  --   --  13.3   INR  --   --  1.00         Lab 01/15/25  0412   CHOLESTEROL 154   LDL CHOL 87   HDL CHOL 34*   TRIGLYCERIDES 192*             Brief Urine Lab Results       None          Microbiology Results (last 10 days)       ** No results found for the last 240 hours. **            CT Chest Without Contrast Diagnostic    Result Date: 1/16/2025  Impression: Impression: Prior sternotomy and aortic valve replacement. Dilated ascending thoracic aorta measuring 4.2 cm. Small calcified plaques visualized at the thoracic aorta and great vessels. Lung parenchyma is unremarkable aside from small granuloma in the right lung base. Electronically Signed: Maurice Dan MD  1/16/2025 10:33 PM EST  Workstation ID: [id number]    XR Chest 1 View    Result Date: 1/14/2025  Impression: Impression: No active disease.  Electronically Signed: Edward Day MD  1/14/2025 2:00 PM EST  Workstation ID: [id number]             Results for orders placed during the hospital encounter of 01/14/25    Adult Transthoracic Echo Complete W/ Cont if Necessary Per Protocol    Interpretation Summary    Left ventricular systolic function is normal. Calculated left ventricular EF = 54% Left ventricular ejection fraction appears to be 51 - 55%.    Left ventricular diastolic function is consistent with (grade I) impaired relaxation.    Severe aortic valve stenosis is present.    There is a bioprosthetic aortic valve present.    Mild dilation of the aortic root is present.      Labs Pending at Discharge:  Pending Results       None            Procedures Performed  Procedure(s):  Left Heart Cath and coronary angiogram         Consults:   Consults       Date and Time Order Name Status Description    1/15/2025  6:48 PM Inpatient Cardiothoracic Surgery Consult      1/15/2025  3:02 PM Inpatient Cardiothoracic Surgery Consult      1/15/2025  2:22 PM Inpatient Hospitalist Consult      1/15/2025  2:09 PM Inpatient Cardiology Consult Completed               Discharge Details        Discharge Medications        New Medications        Instructions Start Date   amLODIPine 5 MG tablet  Commonly known as: NORVASC   5 mg, Oral, Every 24 Hours Scheduled             Continue These Medications        Instructions Start Date   aspirin 81 MG tablet   1 tablet, Nightly      CICCWORLD GUMMY PO   500 mg, Daily      fluticasone 50 MCG/ACT nasal spray  Commonly known as: FLONASE   2 sprays, Nasal, As Needed      levothyroxine 112 MCG tablet  Commonly known as: SYNTHROID, LEVOTHROID   112 mcg, Oral, Daily      loratadine 10 MG tablet  Commonly known as: CLARITIN   10 mg, Oral, Daily      metoprolol tartrate 25 MG tablet  Commonly known as: LOPRESSOR   12.5 mg, Oral, 2 Times Daily      multivitamin with minerals tablet tablet   1 tablet, Daily      NON FORMULARY    [place], Cinnamon, Apple Cider Vinegar, Tumeric, Curmemic, berberine, and ginseng supplement      PROBIOTIC PRODUCT PO   1 capsule, Daily      simvastatin 20 MG tablet  Commonly known as: ZOCOR   20 mg, Oral, Every Evening               No Known Allergies      Discharge Disposition:    Home or Self Care    Diet:  Hospital:  Diet Order   Procedures    Diet: Cardiac, Diabetic; Healthy Heart (2-3 Na+); Consistent Carbohydrate; Fluid Consistency: Thin (IDDSI 0)         Discharge Activity:         CODE STATUS:  Code Status and Medical Interventions: CPR (Attempt to Resuscitate); Full Support   Ordered at: 01/15/25 0640     Code Status (Patient has no pulse and is not breathing):    CPR (Attempt to Resuscitate)     Medical Interventions (Patient has pulse or is breathing):    Full Support         Future Appointments   Date Time Provider Department Center   1/22/2025  7:55 AM C19 PRE SCREEN YUNIOR  [facility]IOR LAB YUNIOR   1/22/2025  8:00 AM HEART 1 BH YUNIOR PAT BH YUNIOR PAT YUNIOR   4/28/2025  1:50 PM Jayson Edmondson MD [facility] CARD [facility] NA   7/28/2025 10:45 AM Sushma Hall APRN [facility] STATE YUNIOR   7/29/2025  9:00 AM Angelique Wylie MD [facility] YUNIOR YUNIOR           Time spent on Discharge including face to face service:  35 minutes    Signature: Electronically signed by Rena Fonseca MD, 01/17/25, 08:58 EST.  [facility], [place], operated by [facility] Hospitalist Team

## 2025-01-17 NOTE — PLAN OF CARE
Goal Outcome Evaluation:  Plan of Care Reviewed With: patient        Progress: no change  Outcome Evaluation: Patients BP was monitored overnight staying within normal range. Pt gets up adlib in room, pt did not complain of any discomfort overnight.

## 2025-01-18 NOTE — OUTREACH NOTE
Prep Survey      Flowsheet Row Responses   [facility] patient discharged from? [place]   Is LACE score < 7 ? No   Eligibility [facility]   Date of Admission 01/14/25   Date of Discharge 01/17/25   Discharge Disposition Home or Self Care   Discharge diagnosis Prosthetic aortic valve stenosis, heart cath - no obstructive disease   Does the patient have one of the following disease processes/diagnoses(primary or secondary)? Other   Does the patient have Home health ordered? No   Is there a DME ordered? No   Comments regarding appointments planned return in a couple of weeks for Open Heart Surgery   Prep survey completed? Yes            Shalini DING - Registered Nurse

## 2025-01-20 ENCOUNTER — TELEPHONE (OUTPATIENT)
Dept: CARDIAC SURGERY | Facility: CLINIC | Age: 70
End: 2025-01-20
Payer: MEDICARE

## 2025-01-20 ENCOUNTER — TRANSITIONAL CARE MANAGEMENT TELEPHONE ENCOUNTER (OUTPATIENT)
Dept: CALL CENTER | Facility: HOSPITAL | Age: 70
End: 2025-01-20
Payer: MEDICARE

## 2025-01-20 NOTE — OUTREACH NOTE
Call Center TCM Note      Flowsheet Row Responses   [facility] patient discharged from? [place]   Does the patient have one of the following disease processes/diagnoses(primary or secondary)? Other   TCM attempt successful? No   Unsuccessful attempts Attempt 1   Call Status Left message            Mechelle Pacheco LPN    1/20/2025, 09:22 EST

## 2025-01-20 NOTE — OUTREACH NOTE
"Call Center TCM Note      Flowsheet Row Responses   [facility] patient discharged from? [place]   Does the patient have one of the following disease processes/diagnoses(primary or secondary)? Other   TCM attempt successful? Yes   Call start time 1517   Call end time 1521   Discharge diagnosis Prosthetic aortic valve stenosis, heart cath - no obstructive disease   Meds reviewed with patient/caregiver? Yes   Is the patient having any side effects they believe may be caused by any medication additions or changes? No   Does the patient have all medications ordered at discharge? Yes   Is the patient taking all medications as directed (includes completed medication regime)? Yes   Does the patient have an appointment with their PCP within 7-14 days of discharge? No   Nursing Interventions Patient desires to follow up with specialty only   Psychosocial issues? No   Did the patient receive a copy of their discharge instructions? Yes   Nursing interventions Reviewed instructions with patient   What is the patient's perception of their health status since discharge? Improving   Is the patient/caregiver able to teach back signs and symptoms related to disease process for when to call PCP? Yes   Is the patient/caregiver able to teach back signs and symptoms related to disease process for when to call 911? Yes   Is the patient/caregiver able to teach back the hierarchy of who to call/visit for symptoms/problems? PCP, Specialist, Home health nurse, Urgent Care, ED, 911 Yes   If the patient is a current smoker, are they able to teach back resources for cessation? Not a smoker   Additional teach back comments She  is waiting to have surgery on Friday.  \"I'm glad she pushed me into going to the hospital\".   TCM call completed? Yes   Wrap up additional comments No questions or needs at this time and surgery is scheduled for Friday.   Call end time 1521            Mechelle Pacheco LPN    1/20/2025, 15:22 EST        "

## 2025-01-20 NOTE — TELEPHONE ENCOUNTER
I called pt she stated that she saw [facility] dental on Saturday 01/18/2025 and that she was cleared for heart surgery. Advised pt surgery was scheduled for 01/24/2025 arriving at 5am. Pt stated that LADARIUS Perez has already contacted her regarding PAT.     Called [facility] dental spoke with Kezia, they will fill out dental clearance and send it back.

## 2025-01-21 ENCOUNTER — TELEPHONE (OUTPATIENT)
Dept: CARDIAC SURGERY | Facility: CLINIC | Age: 70
End: 2025-01-21
Payer: MEDICARE

## 2025-01-21 NOTE — TELEPHONE ENCOUNTER
Notified patient that she can continue aspirin. Advised her to not take any medications the morning of surgery. Patient verbalized understanding.

## 2025-01-21 NOTE — TELEPHONE ENCOUNTER
"  Caller: Lulu Graves \"Tresa\"    Relationship: Self    Best call back number: [phone]    What is the best time to reach you: ANY    Who are you requesting to speak with (clinical staff, provider,  specific staff member): NIYAH    Do you know the name of the person who called: PT    What was the call regarding: PT STATES SHE WAS PRESCRIBED A BABY ASPRIN WHEN SHE WAS IN THE HOSPITAL. SHE TOOK IT BUT THEN HASN'T TAKEN IT THE LAST 3 DAYS. SHE WANTS A CALL BACK TO LET HER KNOW IF SHE SHOULD BE TAKING IT OR NOT PRIOR TO SURGERY. THANK YOU    Is it okay if the provider responds through MyChart: NO        "

## 2025-01-22 ENCOUNTER — PRE-ADMISSION TESTING (OUTPATIENT)
Dept: PREADMISSION TESTING | Facility: HOSPITAL | Age: 70
DRG: 220 | End: 2025-01-22
Payer: MEDICARE

## 2025-01-22 ENCOUNTER — HOSPITAL ENCOUNTER (OUTPATIENT)
Dept: GENERAL RADIOLOGY | Facility: HOSPITAL | Age: 70
Discharge: HOME | End: 2025-01-22
Payer: MEDICARE

## 2025-01-22 ENCOUNTER — APPOINTMENT (OUTPATIENT)
Dept: RESPIRATORY THERAPY | Facility: HOSPITAL | Age: 70
DRG: 220 | End: 2025-01-22
Payer: MEDICARE

## 2025-01-22 ENCOUNTER — HOSPITAL ENCOUNTER (OUTPATIENT)
Dept: CARDIOLOGY | Facility: HOSPITAL | Age: 70
Discharge: HOME | End: 2025-01-22
Payer: MEDICARE

## 2025-01-22 ENCOUNTER — LAB (OUTPATIENT)
Dept: LAB | Facility: HOSPITAL | Age: 70
End: 2025-01-22
Payer: MEDICARE

## 2025-01-22 VITALS
TEMPERATURE: 97.9 F | BODY MASS INDEX: 43.95 KG/M2 | RESPIRATION RATE: 16 BRPM | SYSTOLIC BLOOD PRESSURE: 116 MMHG | OXYGEN SATURATION: 97 % | HEART RATE: 75 BPM | DIASTOLIC BLOOD PRESSURE: 82 MMHG | WEIGHT: 218 LBS | HEIGHT: 59 IN

## 2025-01-22 DIAGNOSIS — Z01.810 PRE-OPERATIVE CARDIOVASCULAR EXAMINATION, VALVULAR HEART DISEASE: ICD-10-CM

## 2025-01-22 DIAGNOSIS — T82.857A STENOSIS OF PROSTHETIC AORTIC VALVE, INITIAL ENCOUNTER: ICD-10-CM

## 2025-01-22 DIAGNOSIS — I38 PRE-OPERATIVE CARDIOVASCULAR EXAMINATION, VALVULAR HEART DISEASE: ICD-10-CM

## 2025-01-22 LAB
ABO GROUP BLD: NORMAL
ALBUMIN SERPL-MCNC: 4.1 G/DL (ref 3.5–5.2)
ALBUMIN/GLOB SERPL: 1.1 G/DL
ALP SERPL-CCNC: 95 U/L (ref 39–117)
ALT SERPL W P-5'-P-CCNC: 16 U/L (ref 1–33)
ANION GAP SERPL CALCULATED.3IONS-SCNC: 7.9 MMOL/L (ref 5–15)
APTT PPP: 27.3 SECONDS (ref 22.7–35.4)
ARTERIAL PATENCY WRIST A: POSITIVE
AST SERPL-CCNC: 27 U/L (ref 1–32)
ATMOSPHERIC PRESS: NORMAL MM[HG]
BACTERIA UR QL AUTO: NORMAL /HPF
BASE EXCESS BLDA CALC-SCNC: 0.2 MMOL/L (ref 0–3)
BASOPHILS # BLD AUTO: 0.03 10*3/MM3 (ref 0–0.2)
BASOPHILS NFR BLD AUTO: 0.4 % (ref 0–1.5)
BDY SITE: NORMAL
BILIRUB SERPL-MCNC: 0.6 MG/DL (ref 0–1.2)
BILIRUB UR QL STRIP: NEGATIVE
BLD GP AB SCN SERPL QL: NEGATIVE
BUN SERPL-MCNC: 18 MG/DL (ref 8–23)
BUN/CREAT SERPL: 26.9 (ref 7–25)
CALCIUM SPEC-SCNC: 9.5 MG/DL (ref 8.6–10.5)
CHLORIDE SERPL-SCNC: 108 MMOL/L (ref 98–107)
CLARITY UR: CLEAR
CLOSE TME COLL+ADP + EPINEP PNL BLD: 97 % (ref 86–100)
CO2 BLDA-SCNC: 26.3 MMOL/L (ref 22–29)
CO2 SERPL-SCNC: 24.1 MMOL/L (ref 22–29)
COLOR UR: YELLOW
CREAT SERPL-MCNC: 0.67 MG/DL (ref 0.57–1)
DEPRECATED RDW RBC AUTO: 43.3 FL (ref 37–54)
EGFRCR SERPLBLD CKD-EPI 2021: 95.3 ML/MIN/1.73
EOSINOPHIL # BLD AUTO: 0.21 10*3/MM3 (ref 0–0.4)
EOSINOPHIL NFR BLD AUTO: 2.8 % (ref 0.3–6.2)
ERYTHROCYTE [DISTWIDTH] IN BLOOD BY AUTOMATED COUNT: 13.1 % (ref 12.3–15.4)
GLOBULIN UR ELPH-MCNC: 3.6 GM/DL
GLUCOSE SERPL-MCNC: 109 MG/DL (ref 65–99)
GLUCOSE UR STRIP-MCNC: NEGATIVE MG/DL
HCO3 BLDA-SCNC: 25 MMOL/L (ref 21–28)
HCT VFR BLD AUTO: 46.3 % (ref 34–46.6)
HEMODILUTION: NO
HGB BLD-MCNC: 14.7 G/DL (ref 12–15.9)
HGB UR QL STRIP.AUTO: ABNORMAL
HYALINE CASTS UR QL AUTO: NORMAL /LPF
IMM GRANULOCYTES # BLD AUTO: 0.03 10*3/MM3 (ref 0–0.05)
IMM GRANULOCYTES NFR BLD AUTO: 0.4 % (ref 0–0.5)
INHALED O2 CONCENTRATION: 21 %
INR PPP: 1.03 (ref 0.9–1.1)
KETONES UR QL STRIP: NEGATIVE
LEUKOCYTE ESTERASE UR QL STRIP.AUTO: NEGATIVE
LYMPHOCYTES # BLD AUTO: 1.35 10*3/MM3 (ref 0.7–3.1)
LYMPHOCYTES NFR BLD AUTO: 18.2 % (ref 19.6–45.3)
MCH RBC QN AUTO: 28.6 PG (ref 26.6–33)
MCHC RBC AUTO-ENTMCNC: 31.7 G/DL (ref 31.5–35.7)
MCV RBC AUTO: 90.1 FL (ref 79–97)
MODALITY: NORMAL
MONOCYTES # BLD AUTO: 0.53 10*3/MM3 (ref 0.1–0.9)
MONOCYTES NFR BLD AUTO: 7.2 % (ref 5–12)
MRSA DNA SPEC QL NAA+PROBE: NORMAL
NEUTROPHILS NFR BLD AUTO: 5.25 10*3/MM3 (ref 1.7–7)
NEUTROPHILS NFR BLD AUTO: 71 % (ref 42.7–76)
NITRITE UR QL STRIP: NEGATIVE
NRBC BLD AUTO-RTO: 0 /100 WBC (ref 0–0.2)
PCO2 BLDA: 40.2 MM HG (ref 35–48)
PH BLDA: 7.4 PH UNITS (ref 7.35–7.45)
PH UR STRIP.AUTO: 7 [PH] (ref 5–8)
PLATELET # BLD AUTO: 189 10*3/MM3 (ref 140–450)
PMV BLD AUTO: 11.9 FL (ref 6–12)
PO2 BLD: 404 MM[HG] (ref 0–500)
PO2 BLDA: 84.8 MM HG (ref 83–108)
POTASSIUM SERPL-SCNC: 3.9 MMOL/L (ref 3.5–5.2)
PROT SERPL-MCNC: 7.7 G/DL (ref 6–8.5)
PROT UR QL STRIP: NEGATIVE
PROTHROMBIN TIME: 13.5 SECONDS (ref 11.7–14.2)
QT INTERVAL: 391 MS
QTC INTERVAL: 420 MS
RBC # BLD AUTO: 5.14 10*6/MM3 (ref 3.77–5.28)
RBC # UR STRIP: NORMAL /HPF
REF LAB TEST METHOD: NORMAL
RH BLD: POSITIVE
SAO2 % BLDCOA: 96.4 % (ref 94–98)
SODIUM SERPL-SCNC: 140 MMOL/L (ref 136–145)
SP GR UR STRIP: <=1.005 (ref 1–1.03)
SQUAMOUS #/AREA URNS HPF: NORMAL /HPF
T&S EXPIRATION DATE: NORMAL
UROBILINOGEN UR QL STRIP: ABNORMAL
WBC # UR STRIP: NORMAL /HPF
WBC NRBC COR # BLD AUTO: 7.4 10*3/MM3 (ref 3.4–10.8)

## 2025-01-22 PROCEDURE — 87641 MR-STAPH DNA AMP PROBE: CPT

## 2025-01-22 PROCEDURE — 86901 BLOOD TYPING SEROLOGIC RH(D): CPT

## 2025-01-22 PROCEDURE — 86900 BLOOD TYPING SEROLOGIC ABO: CPT

## 2025-01-22 PROCEDURE — 86923 COMPATIBILITY TEST ELECTRIC: CPT

## 2025-01-22 PROCEDURE — 85576 BLOOD PLATELET AGGREGATION: CPT

## 2025-01-22 PROCEDURE — 93005 ELECTROCARDIOGRAM TRACING: CPT | Performed by: PHYSICIAN ASSISTANT

## 2025-01-22 PROCEDURE — 81001 URINALYSIS AUTO W/SCOPE: CPT

## 2025-01-22 PROCEDURE — 71046 X-RAY EXAM CHEST 2 VIEWS: CPT

## 2025-01-22 PROCEDURE — 80053 COMPREHEN METABOLIC PANEL: CPT

## 2025-01-22 PROCEDURE — 36600 WITHDRAWAL OF ARTERIAL BLOOD: CPT

## 2025-01-22 PROCEDURE — 85610 PROTHROMBIN TIME: CPT

## 2025-01-22 PROCEDURE — 36415 COLL VENOUS BLD VENIPUNCTURE: CPT

## 2025-01-22 PROCEDURE — 82803 BLOOD GASES ANY COMBINATION: CPT

## 2025-01-22 PROCEDURE — 86850 RBC ANTIBODY SCREEN: CPT

## 2025-01-22 PROCEDURE — 85025 COMPLETE CBC W/AUTO DIFF WBC: CPT

## 2025-01-22 PROCEDURE — 85730 THROMBOPLASTIN TIME PARTIAL: CPT

## 2025-01-22 RX ORDER — CHLORHEXIDINE GLUCONATE ORAL RINSE 1.2 MG/ML
15 SOLUTION DENTAL EVERY 12 HOURS
Status: ACTIVE | OUTPATIENT
Start: 2025-01-22 | End: 2025-01-23

## 2025-01-22 RX ORDER — CHLORHEXIDINE GLUCONATE 500 MG/1
CLOTH TOPICAL EVERY 12 HOURS PRN
Status: ACTIVE | OUTPATIENT
Start: 2025-01-22

## 2025-01-22 NOTE — H&P
ADDENDUM     Addendum  Patient Care Team:  Sushma Hall APRN as PCP - General (Nurse Practitioner)  Jayson Edmondson MD as Consulting Physician (Cardiology)  Addy Torres MD as Surgeon (General Surgery)  Anabelle De La Torre RD as Dietitian (Nutrition)  Angelique Wylie MD as Consulting Physician (Sleep Medicine)  Referring Provider:  Dr. Oswald  Reason for consultation:  Prosthetic aortic valve stenosis     Chief complaint:  SOA/ palpitations        Subjective  History of Present Illness:  70 y/o woman presented to [facility] ED after her PCP told her to go d/t an abnormal EKG.  Pt reports progressive SOA/ HOLLAND with palpitations since Thanksgiving.  She reports occasional chest pressure.  She underwent minimally invasive tissue AVR (21 mm Magna valve) by Dr. Richter in Dec 2013 for severe aortic stenosis/ moderate aortic insufficiency.  She has HLD, hypothyroidism, hx SVT, LUÍS with CPAP, and morbid obesity.  Labs in ED were unremarkable.  Echo showed severe prosthetic aortic valve stenosis with ALEKSEY .73 cm2, peak velocity 403 cm/sec, max peak gradient 65 mmHg, mean peak gradient 44 mmHg, EF 50-55%, and grade 1 diastolic dysfunction.  Cardiology was consulted.  Cardiac cath revealed non-obstructive CAD.  Cardiac surgery was consulted for SAVR vs TAVR evaluation.      Review of Systems   Respiratory:  Positive for shortness of breath.    Cardiovascular:  Positive for chest pain and palpitations.   Neurological:  Negative for dizziness and light-headedness.         Medical History        Past Medical History:   Diagnosis Date    Allergic      Aortic stenosis      Aortic valve replaced 12/04/2013    Cataract      GERD (gastroesophageal reflux disease)      Glaucoma 02/16/2023     Just started    Heart murmur 1996    Hyperlipidemia      Hypertension      Hypothyroidism      Liver disease       Due to medication    MRSA (methicillin resistant Staphylococcus aureus)       left arm abscess    Obesity       Osteopenia      PAT (paroxysmal atrial tachycardia)      Sleep apnea          Surgical History         Past Surgical History:   Procedure Laterality Date    ABLATION OF DYSRHYTHMIC FOCUS    or     AORTIC VALVE REPAIR/REPLACEMENT        AORTIC VALVE SURGERY   2013    CARDIAC CATHETERIZATION         Before open heart surgery    CARDIAC VALVE REPLACEMENT   2013     SECTION   1982    COLONOSCOPY   2016    INCISION AND DRAINAGE ABSCESS Left 2023     Procedure: INCISION AND DRAINAGE ABSCESS, left arm;  Surgeon: Addy Torres MD;  Location: [facility] MAIN OR;  Service: General;  Laterality: Left;    TUBAL ABDOMINAL LIGATION                   Family History   Problem Relation Age of Onset    Arthritis Mother      Diabetes Brother      Hearing loss Brother      Stroke Paternal Grandmother      Sleep apnea Neg Hx        Social History   Social History            Tobacco Use    Smoking status: Never       Passive exposure: Past    Smokeless tobacco: Never   Vaping Use    Vaping status: Never Used   Substance Use Topics    Alcohol use: Never    Drug use: No         Prescriptions Prior to Admission           Medications Prior to Admission   Medication Sig Dispense Refill Last Dose/Taking    aspirin 81 MG tablet Take 1 tablet by mouth Every Night.     2025 Evening    levothyroxine (SYNTHROID, LEVOTHROID) 112 MCG tablet Take 1 tablet by mouth Daily. 90 tablet 0 2025    loratadine (CLARITIN) 10 MG tablet TAKE 1 TABLET BY MOUTH EVERY DAY 30 tablet 11 2025    metoprolol tartrate (LOPRESSOR) 25 MG tablet Take 0.5 tablets by mouth 2 (Two) Times a Day. 90 tablet 1 2025    multivitamin with minerals (MULTIVITAMIN ADULT PO) Take 1 tablet by mouth Daily.     2025    simvastatin (ZOCOR) 20 MG tablet Take 1 tablet by mouth Every Evening. 90 tablet 1 2025    Elderberry-Vitamin C-Zinc (ELDERBERRY IMMUNE HEALTH GUMMY PO) Take 500 mg by mouth Daily.           "fluticasone (FLONASE) 50 MCG/ACT nasal spray Administer 2 sprays into the nostril(s) as directed by provider As Needed for Rhinitis.          NON FORMULARY [place], Cinnamon, Apple Cider Vinegar, Tumeric, Curmemic, berberine, and ginseng supplement          PROBIOTIC PRODUCT PO Take 1 capsule by mouth Daily.                 Scheduled Medication   aspirin, 81 mg, Oral, Daily  atorvastatin, 10 mg, Oral, Daily  fentaNYL citrate (PF), 25 mcg, Intravenous, Once  levothyroxine, 112 mcg, Oral, Daily  metoprolol tartrate, 12.5 mg, Oral, BID  midazolam, 1 mg, Intravenous, Once         Allergies:  Patient has no known allergies.        Objective  Vital Signs  Temp:  [97.5 °F (36.4 °C)-98.8 °F (37.1 °C)] 98.1 °F (36.7 °C)  Heart Rate:  [62-99] 68  Resp:  [15-21] 19  BP: (122-155)/(60-81) 143/79     Flowsheet Rows       Flowsheet Row First Filed Value   Admission Height 149.9 cm (59\") Documented at 01/14/2025 1249   Admission Weight 100 kg (220 lb 7.4 oz) Documented at 01/14/2025 1249             149.9 cm (59\")     Physical Exam  Vitals and nursing note reviewed.   Constitutional:       General: She is awake.      Appearance: Normal appearance. She is well-developed and well-groomed. She is morbidly obese.      Comments: Seen in CVU with family at bedside   HENT:      Head: Normocephalic and atraumatic.      Nose: Nose normal.      Mouth/Throat:      Lips: Pink.      Mouth: Mucous membranes are moist.      Pharynx: Uvula midline.   Eyes:      General: Lids are normal. No scleral icterus.     Extraocular Movements: Extraocular movements intact.      Conjunctiva/sclera: Conjunctivae normal.      Pupils: Pupils are equal, round, and reactive to light.   Neck:      Thyroid: No thyroid mass or thyromegaly.      Vascular: Normal carotid pulses. No carotid bruit, hepatojugular reflux or JVD.      Trachea: Trachea normal.   Cardiovascular:      Rate and Rhythm: Normal rate and regular rhythm.      Pulses:           Carotid pulses are 2+ " DISPLAY PLAN FREE TEXT on the right side and 2+ on the left side.       Radial pulses are 2+ on the right side and 2+ on the left side.        Femoral pulses are 2+ on the right side and 2+ on the left side.       Popliteal pulses are 2+ on the right side and 2+ on the left side.        Dorsalis pedis pulses are 2+ on the right side and 2+ on the left side.        Posterior tibial pulses are 2+ on the right side and 2+ on the left side.      Heart sounds: Normal heart sounds.      Systolic murmur is present with a grade of 2/6.   Pulmonary:      Effort: Pulmonary effort is normal.      Breath sounds: Normal breath sounds.   Abdominal:      General: Bowel sounds are normal. There is no distension.      Palpations: Abdomen is soft.      Tenderness: There is no abdominal tenderness.   Musculoskeletal:      Cervical back: Neck supple.      Comments: Gait steady and strong without use of assistive devices   Lymphadenopathy:      Cervical: No cervical adenopathy.      Upper Body:      Right upper body: No supraclavicular adenopathy.      Left upper body: No supraclavicular adenopathy.   Skin:     General: Skin is warm and dry.      Capillary Refill: Capillary refill takes less than 2 seconds.      Findings: No erythema or rash.      Nails: There is no clubbing.              Comments: Sternotomy well healed.   Neurological:      Mental Status: She is alert and oriented to person, place, and time.      GCS: GCS eye subscore is 4. GCS verbal subscore is 5. GCS motor subscore is 6.   Psychiatric:         Attention and Perception: Attention and perception normal.         Mood and Affect: Mood and affect normal.         Speech: Speech normal.         Behavior: Behavior normal. Behavior is cooperative.         Thought Content: Thought content normal.         Cognition and Memory: Cognition and memory normal.         Judgment: Judgment normal.            Results Review:   Lab Results (last 24 hours)         Procedure Component Value Units Date/Time      DISPLAY PLAN FREE TEXT Lipid Panel [[id number]]  (Abnormal) Collected: 01/15/25 0412     Specimen: Blood Updated: 01/15/25 1136       Total Cholesterol 154 mg/dL         Triglycerides 192 mg/dL         HDL Cholesterol 34 mg/dL         LDL Cholesterol  87 mg/dL         VLDL Cholesterol 33 mg/dL         LDL/HDL Ratio 2.40     Narrative:       Cholesterol Reference Ranges  (U.S. Department of Health and Human Services ATP III Classifications)     Desirable          <200 mg/dL  Borderline High    200-239 mg/dL  High Risk          >240 mg/dL        Triglyceride Reference Ranges  (U.S. Department of Health and Human Services ATP III Classifications)     Normal           <150 mg/dL  Borderline High  150-199 mg/dL  High             200-499 mg/dL  Very High        >500 mg/dL     HDL Reference Ranges  (U.S. Department of Health and Human Services ATP III Classifications)     Low     <40 mg/dl (major risk factor for CHD)  High    >60 mg/dl ('negative' risk factor for CHD)           LDL Reference Ranges  (U.S. Department of Health and Human Services ATP III Classifications)     Optimal          <100 mg/dL  Near Optimal     100-129 mg/dL  Borderline High  130-159 mg/dL  High             160-189 mg/dL  Very High        >189 mg/dL     Basic Metabolic Panel [[id number]]  (Normal) Collected: 01/15/25 0412     Specimen: Blood Updated: 01/15/25 0542       Glucose 98 mg/dL         BUN 18 mg/dL         Creatinine 0.80 mg/dL         Sodium 141 mmol/L         Potassium 3.9 mmol/L         Chloride 105 mmol/L         CO2 27.7 mmol/L         Calcium 9.0 mg/dL         BUN/Creatinine Ratio 22.5       Anion Gap 8.3 mmol/L         eGFR 80.4 mL/min/1.73       Narrative:       GFR Categories in Chronic Kidney Disease (CKD)                GFR Category          GFR (mL/min/1.73)    Interpretation  G1                       90 or greater          Normal or high (1)  G2                              60-89                Mild decrease (1)  G3a                   45-59                 Mild to moderate decrease  G3b                   30-44                Moderate to severe decrease  G4                    15-29                Severe decrease  G5                    14 or less           Kidney failure                                                 (1)In the absence of evidence of kidney disease, neither GFR category G1 or G2 fulfill the criteria for CKD.     eGFR calculation 2021 CKD-EPI creatinine equation, which does not include race as a factor     CBC & Differential [[id number]]  (Abnormal) Collected: 01/15/25 0412     Specimen: Blood Updated: 01/15/25 0523     Narrative:       The following orders were created for panel order CBC & Differential.  Procedure                               Abnormality         Status                     ---------                               -----------         ------                     CBC Auto Differential[[id number]]        Abnormal            Final result                  Please view results for these tests on the individual orders.     CBC Auto Differential [[id number]]  (Abnormal) Collected: 01/15/25 0412     Specimen: Blood Updated: 01/15/25 0523       WBC 8.27 10*3/mm3         RBC 5.33 10*6/mm3         Hemoglobin 15.0 g/dL         Hematocrit 48.9 %         MCV 91.7 fL         MCH 28.1 pg         MCHC 30.7 g/dL         RDW 13.3 %         RDW-SD 45.5 fl         MPV 12.1 fL         Platelets 171 10*3/mm3         Neutrophil % 55.5 %         Lymphocyte % 29.3 %         Monocyte % 10.0 %         Eosinophil % 4.5 %         Basophil % 0.5 %         Immature Grans % 0.2 %         Neutrophils, Absolute 4.59 10*3/mm3         Lymphocytes, Absolute 2.42 10*3/mm3         Monocytes, Absolute 0.83 10*3/mm3         Eosinophils, Absolute 0.37 10*3/mm3         Basophils, Absolute 0.04 10*3/mm3         Immature Grans, Absolute 0.02 10*3/mm3         nRBC 0.0 /100 WBC       High Sensitivity Troponin T 1Hr [[id number]]  (Normal) Collected: 01/14/25 7652     Specimen: Blood  Updated: 01/14/25 1638       HS Troponin T 10 ng/L         Troponin T Numeric Delta 0 ng/L       Narrative:       High Sensitive Troponin T Reference Range:  <14.0 ng/L- Negative Female for AMI  <22.0 ng/L- Negative Male for AMI  >=14 - Abnormal Female indicating possible myocardial injury.  >=22 - Abnormal Male indicating possible myocardial injury.   Clinicians would have to utilize clinical acumen, EKG, Troponin, and serial changes to determine if it is an Acute Myocardial Infarction or myocardial injury due to an underlying chronic condition.                      Cardiac cath per Dr. Oswald 1/15/2025  Hemodynamics      Pressures     Ao:                                           127/65 mmHg                    Coronary Angiography      Left Main :  The left main   without disease     Left Anterior Descending : Gives rise to large diagonal which divides into 2.  Mid LAD has intramyocardial course.  Distal LAD has 30% luminal irregularities for        Left Circumflex : Is dominant vessel.  Gives rise to large mid marginal and distal marginal without disease continues in the AV groove.  PDA is calcified 40% narrowing.     Right Coronary Artery :  The right coronary artery   is non dominant vessel                 Dominance:  [x]  Left  [x]  Right  []  Co-Dominant       Left Ventriculography:       Was not done because of aortic stenosis and prosthetic aortic valve     Impression:      No obstructive CAD  Severe prosthetic valve aortic stenosis     Recommendations:      Structural heart team consult evaluate for SAVR versus TAVR     Echocardiogram 1/15/2025    Left ventricular systolic function is normal. Calculated left ventricular EF = 54% Left ventricular ejection fraction appears to be 51 - 55%.    Left ventricular diastolic function is consistent with (grade I) impaired relaxation.    Severe aortic valve stenosis is present.    There is a bioprosthetic aortic valve present.    Mild dilation of the aortic root is  present.     Left Ventricle Left ventricular systolic function is normal. Calculated left ventricular EF = 54% Left ventricular ejection fraction appears to be 51 - 55%.   Global longitudinal LV strain (GLS) = -13.4%. Left ventricle strain data was reviewed by the physician and found to be inaccurate. Normal left ventricular cavity size and wall thickness noted. All left ventricular wall segments contract normally. Left ventricular diastolic function is consistent with (grade I) impaired relaxation.   Right Ventricle Normal right ventricular cavity size, wall thickness, systolic function and septal motion noted.   Left Atrium Normal left atrial size and volume noted.   Right Atrium Normal right atrial cavity size noted.   Aortic Valve Severe aortic valve stenosis is present. There is a bioprosthetic aortic valve of unknown size present.   Mitral Valve The mitral valve is structurally normal with no regurgitation or significant stenosis present.   Tricuspid Valve The tricuspid valve is structurally normal with no significant regurgitation or significant stenosis present.   Pulmonic Valve The pulmonic valve is not well visualized.   Greater Vessels Mild dilation of the aortic root is present.   Pericardium The pericardium is normal. There is no evidence of pericardial effusion.             Assessment & Plan    Prosthetic aortic valve stenosis    Status post aortic valve replacement    Chest pain    Aortic stenosis, severe        Assessment & Plan     - Severe prosthetic aortic valve stenosis (ALEKSEY .73 cm2, peak velocity 403 cm/sec, mean peak gradient 44 mmHg), EF 50-55% (echo), s/p minimally invasive tissue AVR 21 mm Magna ([place], 12/2013)--op note under media tab, surgical evaluation in progress  - Non-obstructive CAD--cath 1/15/2025  - HLD with hypertriglyceridemia--statin  - LUÍS, CPAP compliance  - Morbid obesity, stage 3--BMI 44.2  - Hyperglycemia--A1c 5.95  - Hypothyroidism--levothyroxine     Surgeon to evaluate  films/studies.  She had echo in April 2024 showing mod AS (ALEKSEY 1.2 cm2).  Full recs to follow.     Thank you for allowing us to participate in the care of this patient.       Latanya GoodmanBernardoLaila, APRN  01/15/25  15:55 EST     **all problems new to this examiner  **EKG and CXR independently reviewed and interpreted  Addendum  Patient was seen examined by me, agree with the findings above.  I have reviewed interpreted myself the echocardiogram and cardiac cath.  She has prosthetic aortic valve stenosis with progression of symptoms.  The mean gradient is over 40 mmHg.  She has dyspnea.  I discussed with the patient the natural history of aortic stenosis and the guidelines for intervention.  Given his age and low level of comorbidities I recommend reoperative aortic valve replacement hopefully with enlargement of the root to a 23 mm or placement of a porcine root to allow a larger valve if failure occurs within 10 to 20 years.  I discussed the risks, benefits and terms of surgery and she wishes to proceed.  She will have surgery during this hospitalization and then my partners will do the surgery if she wants to wait then I will open her after coming back in 2 weeks to time.  He verbalized understanding and they will decide the timing of surgery  Ronen Richter MD    ADDENDUM     Saw patient for the first time today in PAT, H&P unchanged from above. Patient denies any dizziness or syncopal episode. Patient denies recent illness/fever, rash, or lesion. Patient has since got dental clearance. Patient denies any burning, urgency, or frequency with urination. Patient does not take anticoagulant at home. Patient instructed to not eat/drink/take medications after midnight the day of surgery. All questions answered with verbalized understanding. Pre-operative studies reviewed and listed below.     Pre-op studies:     - Hemoglobin A1c -- 5.95  - Platelet aggregation -- 97%  - PT/INR -- WNL   - MRSA screening -- negative    - CXR -- without acute process  - Urinalysis -- negative for UTI  - Carotid duplex US -- normal     Plan for re-operative sternotomy, aortic valve replacement, FRAN with Dr. Moreno 1/24/25    Elle Hamilton PA-C   1/22/25

## 2025-01-23 ENCOUNTER — ANESTHESIA EVENT (OUTPATIENT)
Dept: PERIOP | Facility: HOSPITAL | Age: 70
End: 2025-01-23
Payer: MEDICARE

## 2025-01-23 NOTE — ANESTHESIA PREPROCEDURE EVALUATION
Anesthesia Evaluation     Patient summary reviewed and Nursing notes reviewed   NPO Solid Status: > 8 hours  NPO Liquid Status: > 8 hours           Airway   Mallampati: II  TM distance: >3 FB  Neck ROM: full  No difficulty expected  Dental - normal exam     Pulmonary - normal exam   (+) ,sleep apnea    ROS comment: She has prosthetic aortic valve stenosis with progression of symptoms.  The mean gradient is over 40 mmHg.  She has dyspnea.  I discussed with the patient the natural history of aortic stenosis and the guidelines for intervention.  Given his age and low level of comorbidities I recommend reoperative aortic valve replacement hopefully with enlargement of the root to a 23 mm or placement of a porcine root to allow a larg  DR BURNHAM  Cardiovascular - normal exam    ECG reviewed    (+) hypertension, valvular problems/murmurs, hyperlipidemia      Neuro/Psych  GI/Hepatic/Renal/Endo    (+) obesity, morbid obesity, GERD, liver disease, thyroid problem     Musculoskeletal     Abdominal  - normal exam    Bowel sounds: normal.   Substance History      OB/GYN          Other        ROS/Med Hx Other: Sinus bradycardia  Abnormal T, consider ischemia, lateral leads      1. No obstructive CAD  2. Severe prosthetic valve aortic stenosis      ·  Left ventricular systolic function is normal. Calculated left ventricular EF = 54% Left ventricular ejection fraction appears to be 51 - 55%.  ·  Left ventricular diastolic function is consistent with (grade I) impaired relaxation.  ·  Severe aortic valve stenosis is present.  ·  There is a bioprosthetic aortic valve present.  ·  Mild dilation of the aortic root is present.         1.  There was small area of inferolateral reversible defect consistent with inferolateral ischemia seen.  2.  Gated images reveal normal LV size and contractility, LVEF of 62%.     CONCLUSION:  1.  Stress Cardiolite with abnormal perfusion, inferolateral ischemia.  2.  Normal wall motion, LVEF of  62%.        Findings:  Median sternotomy and aortic valve replacement changes are present. Heart size is normal. Pulmonary vascular distribution is normal. No pleural effusion, pneumothorax or acute osseous abnormality. Mild thoracic aortic calcific atherosclerosis.     IMPRESSION:  Impression:  No acute chest finding    IMPRESSION:  Impression:     Prior sternotomy and aortic valve replacement. Dilated ascending thoracic aorta measuring 4.2 cm. Small calcified plaques visualized at the thoracic aorta and great vessels.     Lung parenchyma is unremarkable aside from small granuloma in the right lung base.                      Anesthesia Plan    ASA 4     general, [place], CVL and PAC     intravenous induction   Postoperative Plan: Expected vent after surgery  Anesthetic plan, risks, benefits, and alternatives have been provided, discussed and informed consent has been obtained with: patient.      CODE STATUS:

## 2025-01-24 ENCOUNTER — HOSPITAL ENCOUNTER (INPATIENT)
Facility: HOSPITAL | Age: 70
LOS: 5 days | Discharge: HOME | DRG: 220 | End: 2025-01-29
Payer: MEDICARE

## 2025-01-24 ENCOUNTER — READMISSION MANAGEMENT (OUTPATIENT)
Dept: CALL CENTER | Facility: HOSPITAL | Age: 70
End: 2025-01-24
Payer: MEDICARE

## 2025-01-24 ENCOUNTER — APPOINTMENT (OUTPATIENT)
Dept: GENERAL RADIOLOGY | Facility: HOSPITAL | Age: 70
DRG: 220 | End: 2025-01-24
Payer: MEDICARE

## 2025-01-24 ENCOUNTER — ANESTHESIA (OUTPATIENT)
Dept: PERIOP | Facility: HOSPITAL | Age: 70
End: 2025-01-24
Payer: MEDICARE

## 2025-01-24 ENCOUNTER — OFFICE VISIT (OUTPATIENT)
Dept: PERIOP | Facility: HOSPITAL | Age: 70
DRG: 220 | End: 2025-01-24
Payer: MEDICARE

## 2025-01-24 DIAGNOSIS — T82.857A STENOSIS OF PROSTHETIC AORTIC VALVE, INITIAL ENCOUNTER: ICD-10-CM

## 2025-01-24 DIAGNOSIS — Z95.2 S/P AVR: ICD-10-CM

## 2025-01-24 DIAGNOSIS — T82.857D STENOSIS OF PROSTHETIC AORTIC VALVE, SUBSEQUENT ENCOUNTER: Primary | ICD-10-CM

## 2025-01-24 LAB
ACT BLD: 106 SECONDS (ref 89–137)
ACT BLD: 129 SECONDS (ref 89–137)
ACT BLD: 371 SECONDS (ref 89–137)
ACT BLD: 389 SECONDS (ref 89–137)
ACT BLD: 464 SECONDS (ref 89–137)
ACT BLD: 579 SECONDS (ref 89–137)
ALBUMIN SERPL-MCNC: 4.3 G/DL (ref 3.5–5.2)
ANION GAP SERPL CALCULATED.3IONS-SCNC: 10.8 MMOL/L (ref 5–15)
APTT PPP: 32.5 SECONDS (ref 22.7–35.4)
APTT PPP: 33.7 SECONDS (ref 22.7–35.4)
ARTERIAL PATENCY WRIST A: ABNORMAL
ATMOSPHERIC PRESS: ABNORMAL MM[HG]
BASE DEFICIT: ABNORMAL
BASE EXCESS BLDA CALC-SCNC: 0 MMOL/L (ref 0–3)
BASE EXCESS BLDA CALC-SCNC: 1 MMOL/L (ref 0–3)
BASE EXCESS BLDA CALC-SCNC: 2.3 MMOL/L (ref 0–3)
BASE EXCESS BLDA CALC-SCNC: 2.4 MMOL/L (ref 0–3)
BASE EXCESS BLDA CALC-SCNC: 3.1 MMOL/L (ref 0–3)
BASE EXCESS BLDA CALC-SCNC: 3.3 MMOL/L (ref 0–3)
BASE EXCESS BLDA CALC-SCNC: 3.7 MMOL/L (ref 0–3)
BASE EXCESS BLDA CALC-SCNC: 4.1 MMOL/L (ref 0–3)
BASE EXCESS BLDV CALC-SCNC: <0 MMOL/L (ref 0–3)
BASOPHILS # BLD AUTO: 0.03 10*3/MM3 (ref 0–0.2)
BASOPHILS NFR BLD AUTO: 0.2 % (ref 0–1.5)
BDY SITE: ABNORMAL
BH BB BLOOD EXPIRATION DATE: NORMAL
BH BB BLOOD EXPIRATION DATE: NORMAL
BH BB BLOOD TYPE BARCODE: 5100
BH BB BLOOD TYPE BARCODE: 5100
BH BB DISPENSE STATUS: NORMAL
BH BB DISPENSE STATUS: NORMAL
BH BB PRODUCT CODE: NORMAL
BH BB PRODUCT CODE: NORMAL
BH BB UNIT NUMBER: NORMAL
BH BB UNIT NUMBER: NORMAL
BUN SERPL-MCNC: 14 MG/DL (ref 8–23)
BUN/CREAT SERPL: 15.7 (ref 7–25)
CA-I BLDA-SCNC: 1.03 MMOL/L (ref 1.12–1.32)
CA-I BLDA-SCNC: 1.06 MMOL/L (ref 1.12–1.32)
CA-I BLDA-SCNC: 1.09 MMOL/L (ref 1.12–1.32)
CA-I BLDA-SCNC: 1.1 MMOL/L (ref 1.12–1.32)
CA-I BLDA-SCNC: 1.17 MMOL/L (ref 1.15–1.33)
CA-I BLDA-SCNC: 1.19 MMOL/L (ref 1.12–1.32)
CA-I BLDA-SCNC: 1.2 MMOL/L (ref 1.15–1.33)
CA-I BLDA-SCNC: 1.25 MMOL/L (ref 1.15–1.33)
CA-I BLDA-SCNC: 1.27 MMOL/L (ref 1.15–1.33)
CA-I BLDA-SCNC: 1.27 MMOL/L (ref 1.15–1.33)
CA-I BLDA-SCNC: 1.52 MMOL/L (ref 1.12–1.32)
CA-I SERPL ISE-MCNC: 1.15 MMOL/L (ref 1.15–1.3)
CALCIUM SPEC-SCNC: 9.5 MG/DL (ref 8.6–10.5)
CHLORIDE SERPL-SCNC: 107 MMOL/L (ref 98–107)
CLOSE TME COLL+ADP + EPINEP PNL BLD: 95 % (ref 86–100)
CO2 BLDA-SCNC: 26 MMOL/L (ref 23–27)
CO2 BLDA-SCNC: 27 MMOL/L (ref 23–27)
CO2 BLDA-SCNC: 28 MMOL/L (ref 23–27)
CO2 BLDA-SCNC: 29 MMOL/L (ref 23–27)
CO2 BLDA-SCNC: 30 MMOL/L (ref 23–27)
CO2 BLDA-SCNC: 30.2 MMOL/L (ref 22–29)
CO2 CONTENT VENOUS: 27 MMOL/L (ref 24–29)
CO2 SERPL-SCNC: 25.2 MMOL/L (ref 22–29)
CREAT BLDA-MCNC: 0.88 MG/DL (ref 0.6–1.3)
CREAT BLDA-MCNC: 0.98 MG/DL (ref 0.6–1.3)
CREAT SERPL-MCNC: 0.89 MG/DL (ref 0.57–1)
CROSSMATCH INTERPRETATION: NORMAL
CROSSMATCH INTERPRETATION: NORMAL
D-LACTATE SERPL-SCNC: 1.1 MMOL/L (ref 0.2–2)
DEPRECATED RDW RBC AUTO: 42.1 FL (ref 37–54)
DEPRECATED RDW RBC AUTO: 42.5 FL (ref 37–54)
DEPRECATED RDW RBC AUTO: 42.5 FL (ref 37–54)
EGFRCR SERPLBLD CKD-EPI 2021: 63 ML/MIN/1.73
EGFRCR SERPLBLD CKD-EPI 2021: 70.7 ML/MIN/1.73
EGFRCR SERPLBLD CKD-EPI 2021: 71.7 ML/MIN/1.73
EOSINOPHIL # BLD AUTO: 0.2 10*3/MM3 (ref 0–0.4)
EOSINOPHIL NFR BLD AUTO: 1.6 % (ref 0.3–6.2)
ERYTHROCYTE [DISTWIDTH] IN BLOOD BY AUTOMATED COUNT: 12.8 % (ref 12.3–15.4)
ERYTHROCYTE [DISTWIDTH] IN BLOOD BY AUTOMATED COUNT: 12.8 % (ref 12.3–15.4)
ERYTHROCYTE [DISTWIDTH] IN BLOOD BY AUTOMATED COUNT: 13 % (ref 12.3–15.4)
FIBRINOGEN PPP-MCNC: 277 MG/DL (ref 219–464)
GLUCOSE BLDC GLUCOMTR-MCNC: 119 MG/DL (ref 70–105)
GLUCOSE BLDC GLUCOMTR-MCNC: 119 MG/DL (ref 70–105)
GLUCOSE BLDC GLUCOMTR-MCNC: 128 MG/DL (ref 74–100)
GLUCOSE BLDC GLUCOMTR-MCNC: 128 MG/DL (ref 74–100)
GLUCOSE BLDC GLUCOMTR-MCNC: 131 MG/DL (ref 70–105)
GLUCOSE BLDC GLUCOMTR-MCNC: 133 MG/DL (ref 74–100)
GLUCOSE BLDC GLUCOMTR-MCNC: 133 MG/DL (ref 74–100)
GLUCOSE BLDC GLUCOMTR-MCNC: 136 MG/DL (ref 70–105)
GLUCOSE BLDC GLUCOMTR-MCNC: 141 MG/DL (ref 70–105)
GLUCOSE BLDC GLUCOMTR-MCNC: 141 MG/DL (ref 74–100)
GLUCOSE BLDC GLUCOMTR-MCNC: 141 MG/DL (ref 74–100)
GLUCOSE BLDC GLUCOMTR-MCNC: 142 MG/DL (ref 74–100)
GLUCOSE BLDC GLUCOMTR-MCNC: 142 MG/DL (ref 74–100)
GLUCOSE BLDC GLUCOMTR-MCNC: 149 MG/DL (ref 70–105)
GLUCOSE BLDC GLUCOMTR-MCNC: 151 MG/DL (ref 70–105)
GLUCOSE BLDC GLUCOMTR-MCNC: 160 MG/DL (ref 70–105)
GLUCOSE BLDC GLUCOMTR-MCNC: 168 MG/DL (ref 74–100)
GLUCOSE BLDC GLUCOMTR-MCNC: 168 MG/DL (ref 74–100)
GLUCOSE SERPL-MCNC: 120 MG/DL (ref 65–99)
HCO3 BLDA-SCNC: 24.8 MMOL/L (ref 22–26)
HCO3 BLDA-SCNC: 26 MMOL/L (ref 22–26)
HCO3 BLDA-SCNC: 26.4 MMOL/L (ref 22–26)
HCO3 BLDA-SCNC: 26.5 MMOL/L (ref 21–28)
HCO3 BLDA-SCNC: 26.8 MMOL/L (ref 22–26)
HCO3 BLDA-SCNC: 27.2 MMOL/L (ref 21–28)
HCO3 BLDA-SCNC: 27.7 MMOL/L (ref 21–28)
HCO3 BLDA-SCNC: 27.7 MMOL/L (ref 22–26)
HCO3 BLDA-SCNC: 28.1 MMOL/L (ref 21–28)
HCO3 BLDA-SCNC: 28.6 MMOL/L (ref 21–28)
HCO3 BLDA-SCNC: 29.6 MMOL/L (ref 21–28)
HCO3 BLDV-SCNC: 25.5 MMOL/L (ref 23–28)
HCT VFR BLD AUTO: 24.8 % (ref 34–46.6)
HCT VFR BLD AUTO: 28.1 % (ref 34–46.6)
HCT VFR BLD AUTO: 28.7 % (ref 34–46.6)
HCT VFR BLD AUTO: 28.8 % (ref 34–46.6)
HCT VFR BLDA CALC: 24 % (ref 38–51)
HCT VFR BLDA CALC: 24 % (ref 38–51)
HCT VFR BLDA CALC: 25 % (ref 38–51)
HCT VFR BLDA CALC: 26 % (ref 38–51)
HCT VFR BLDA CALC: 28 % (ref 38–51)
HCT VFR BLDA CALC: 28 % (ref 38–51)
HCT VFR BLDA CALC: 30 % (ref 38–51)
HCT VFR BLDA CALC: 31 % (ref 38–51)
HCT VFR BLDA CALC: 40 % (ref 38–51)
HEMODILUTION: NO
HEMODILUTION: YES
HGB BLD-MCNC: 8.1 G/DL (ref 12–15.9)
HGB BLD-MCNC: 9.1 G/DL (ref 12–15.9)
HGB BLD-MCNC: 9.2 G/DL (ref 12–15.9)
HGB BLD-MCNC: 9.3 G/DL (ref 12–15.9)
HGB BLDA-MCNC: 10.2 G/DL (ref 12–17)
HGB BLDA-MCNC: 10.5 G/DL (ref 12–17)
HGB BLDA-MCNC: 13.6 G/DL (ref 12–17)
HGB BLDA-MCNC: 8.2 G/DL (ref 12–17)
HGB BLDA-MCNC: 8.3 G/DL (ref 12–17)
HGB BLDA-MCNC: 8.4 G/DL (ref 12–17)
HGB BLDA-MCNC: 9 G/DL (ref 12–17)
HGB BLDA-MCNC: 9.4 G/DL (ref 12–17)
HGB BLDA-MCNC: 9.5 G/DL (ref 12–17)
IMM GRANULOCYTES # BLD AUTO: 0.06 10*3/MM3 (ref 0–0.05)
IMM GRANULOCYTES NFR BLD AUTO: 0.5 % (ref 0–0.5)
INHALED O2 CONCENTRATION: 36 %
INHALED O2 CONCENTRATION: 40 %
INHALED O2 CONCENTRATION: 40 %
INHALED O2 CONCENTRATION: 50 %
INHALED O2 CONCENTRATION: 50 %
INHALED O2 CONCENTRATION: 70 %
INR PPP: 1.57 (ref 0.9–1.1)
INR PPP: 1.95 (ref 0.9–1.1)
LYMPHOCYTES # BLD AUTO: 1.39 10*3/MM3 (ref 0.7–3.1)
LYMPHOCYTES NFR BLD AUTO: 10.9 % (ref 19.6–45.3)
MCH RBC QN AUTO: 28.8 PG (ref 26.6–33)
MCH RBC QN AUTO: 29.3 PG (ref 26.6–33)
MCH RBC QN AUTO: 29.5 PG (ref 26.6–33)
MCHC RBC AUTO-ENTMCNC: 31.9 G/DL (ref 31.5–35.7)
MCHC RBC AUTO-ENTMCNC: 32.7 G/DL (ref 31.5–35.7)
MCHC RBC AUTO-ENTMCNC: 33.1 G/DL (ref 31.5–35.7)
MCV RBC AUTO: 89.2 FL (ref 79–97)
MCV RBC AUTO: 89.9 FL (ref 79–97)
MCV RBC AUTO: 90 FL (ref 79–97)
MODALITY: ABNORMAL
MONOCYTES # BLD AUTO: 0.78 10*3/MM3 (ref 0.1–0.9)
MONOCYTES NFR BLD AUTO: 6.1 % (ref 5–12)
NEUTROPHILS NFR BLD AUTO: 10.32 10*3/MM3 (ref 1.7–7)
NEUTROPHILS NFR BLD AUTO: 80.7 % (ref 42.7–76)
NRBC BLD AUTO-RTO: 0 /100 WBC (ref 0–0.2)
PCO2 BLDA: 38.3 MM HG (ref 35–48)
PCO2 BLDA: 38.7 MM HG (ref 35–48)
PCO2 BLDA: 39 MM HG (ref 35–48)
PCO2 BLDA: 40.4 MM HG (ref 35–48)
PCO2 BLDA: 41.5 MM HG (ref 35–45)
PCO2 BLDA: 45.7 MM HG (ref 35–45)
PCO2 BLDA: 50.5 MM HG (ref 35–48)
PCO2 BLDA: 51.7 MM HG (ref 35–48)
PCO2 BLDA: 54.9 MM HG (ref 35–45)
PCO2 BLDA: 59.3 MM HG (ref 35–45)
PCO2 BLDA: 66.1 MM HG (ref 35–45)
PCO2 BLDV: 49.9 MM HG (ref 41–51)
PEEP RESPIRATORY: 10 CM[H2O]
PEEP RESPIRATORY: 12 CM[H2O]
PEEP RESPIRATORY: 5 CM[H2O]
PEEP RESPIRATORY: 5 CM[H2O]
PEEP RESPIRATORY: 8 CM[H2O]
PH BLDA: 7.23 PH UNITS (ref 7.35–7.45)
PH BLDA: 7.26 PH UNITS (ref 7.35–7.45)
PH BLDA: 7.29 PH UNITS (ref 7.35–7.45)
PH BLDA: 7.35 PH UNITS (ref 7.35–7.45)
PH BLDA: 7.36 PH UNITS (ref 7.35–7.45)
PH BLDA: 7.38 PH UNITS (ref 7.35–7.45)
PH BLDA: 7.38 PH UNITS (ref 7.35–7.45)
PH BLDA: 7.44 PH UNITS (ref 7.35–7.45)
PH BLDA: 7.45 PH UNITS (ref 7.35–7.45)
PH BLDA: 7.46 PH UNITS (ref 7.35–7.45)
PH BLDA: 7.46 PH UNITS (ref 7.35–7.45)
PH BLDV: 7.32 PH UNITS (ref 7.31–7.41)
PHOSPHATE SERPL-MCNC: 3.5 MG/DL (ref 2.5–4.5)
PLATELET # BLD AUTO: 101 10*3/MM3 (ref 140–450)
PLATELET # BLD AUTO: 108 10*3/MM3 (ref 140–450)
PLATELET # BLD AUTO: 119 10*3/MM3 (ref 140–450)
PMV BLD AUTO: 11.4 FL (ref 6–12)
PMV BLD AUTO: 11.5 FL (ref 6–12)
PMV BLD AUTO: 12.3 FL (ref 6–12)
PO2 BLD: 213 MM[HG] (ref 0–500)
PO2 BLD: 217 MM[HG] (ref 0–500)
PO2 BLD: 233 MM[HG] (ref 0–500)
PO2 BLD: 270 MM[HG] (ref 0–500)
PO2 BLD: 303 MM[HG] (ref 0–500)
PO2 BLD: 325 MM[HG] (ref 0–500)
PO2 BLDA: 106.4 MM HG (ref 83–108)
PO2 BLDA: 108 MM HG (ref 83–108)
PO2 BLDA: 129.9 MM HG (ref 83–108)
PO2 BLDA: 151.5 MM HG (ref 83–108)
PO2 BLDA: 151.8 MM HG (ref 83–108)
PO2 BLDA: 360 MM HG (ref 80–105)
PO2 BLDA: 362 MM HG (ref 80–105)
PO2 BLDA: 394 MM HG (ref 80–105)
PO2 BLDA: 438 MM HG (ref 80–105)
PO2 BLDA: 446 MM HG (ref 80–105)
PO2 BLDA: 83.9 MM HG (ref 83–108)
PO2 BLDV: 52 MM HG (ref 35–42)
POTASSIUM BLDA-SCNC: 3.5 MMOL/L (ref 3.5–4.5)
POTASSIUM BLDA-SCNC: 3.5 MMOL/L (ref 3.5–4.5)
POTASSIUM BLDA-SCNC: 3.6 MMOL/L (ref 3.5–4.5)
POTASSIUM BLDA-SCNC: 3.7 MMOL/L (ref 3.5–4.9)
POTASSIUM BLDA-SCNC: 3.8 MMOL/L (ref 3.5–4.5)
POTASSIUM BLDA-SCNC: 3.8 MMOL/L (ref 3.5–4.5)
POTASSIUM BLDA-SCNC: 4.3 MMOL/L (ref 3.5–4.9)
POTASSIUM BLDA-SCNC: 4.5 MMOL/L (ref 3.5–4.9)
POTASSIUM BLDA-SCNC: 4.6 MMOL/L (ref 3.5–4.9)
POTASSIUM BLDA-SCNC: 4.8 MMOL/L (ref 3.5–4.9)
POTASSIUM BLDA-SCNC: 5.4 MMOL/L (ref 3.5–4.9)
POTASSIUM SERPL-SCNC: 3.7 MMOL/L (ref 3.5–5.2)
POTASSIUM SERPL-SCNC: 3.8 MMOL/L (ref 3.5–5.2)
PROTHROMBIN TIME: 18.8 SECONDS (ref 11.7–14.2)
PROTHROMBIN TIME: 22.2 SECONDS (ref 11.7–14.2)
PSV: 11 CMH2O
RBC # BLD AUTO: 2.76 10*6/MM3 (ref 3.77–5.28)
RBC # BLD AUTO: 3.15 10*6/MM3 (ref 3.77–5.28)
RBC # BLD AUTO: 3.2 10*6/MM3 (ref 3.77–5.28)
RESPIRATORY RATE: 12
RESPIRATORY RATE: 12
RESPIRATORY RATE: 14
RESPIRATORY RATE: 14
SAO2 % BLDCOA: 100 % (ref 95–98)
SAO2 % BLDCOA: 95.5 % (ref 94–98)
SAO2 % BLDCOA: 98.3 % (ref 94–98)
SAO2 % BLDCOA: 98.5 % (ref 94–98)
SAO2 % BLDCOA: 98.8 % (ref 94–98)
SAO2 % BLDCOA: 99.4 % (ref 94–98)
SAO2 % BLDCOA: 99.4 % (ref 94–98)
SAO2 % BLDCOV: ABNORMAL %
SODIUM BLD-SCNC: 136 MMOL/L (ref 138–146)
SODIUM BLD-SCNC: 137 MMOL/L (ref 138–146)
SODIUM BLD-SCNC: 138 MMOL/L (ref 138–146)
SODIUM BLD-SCNC: 138 MMOL/L (ref 138–146)
SODIUM BLD-SCNC: 139 MMOL/L (ref 138–146)
SODIUM BLD-SCNC: 140 MMOL/L (ref 138–146)
SODIUM BLD-SCNC: 145 MMOL/L (ref 138–146)
SODIUM BLD-SCNC: 145 MMOL/L (ref 138–146)
SODIUM BLD-SCNC: 147 MMOL/L (ref 138–146)
SODIUM BLD-SCNC: 147 MMOL/L (ref 138–146)
SODIUM BLD-SCNC: 148 MMOL/L (ref 138–146)
SODIUM SERPL-SCNC: 143 MMOL/L (ref 136–145)
UNIT  ABO: NORMAL
UNIT  ABO: NORMAL
UNIT  RH: NORMAL
UNIT  RH: NORMAL
VENTILATOR MODE: ABNORMAL
VENTILATOR MODE: AC
VT ON VENT VENT: 600 ML
VT ON VENT VENT: 608 ML
WBC NRBC COR # BLD AUTO: 12.78 10*3/MM3 (ref 3.4–10.8)
WBC NRBC COR # BLD AUTO: 13.04 10*3/MM3 (ref 3.4–10.8)
WBC NRBC COR # BLD AUTO: 9.97 10*3/MM3 (ref 3.4–10.8)

## 2025-01-24 PROCEDURE — 86927 PLASMA FRESH FROZEN: CPT

## 2025-01-24 PROCEDURE — C1887 CATHETER, GUIDING: HCPCS

## 2025-01-24 PROCEDURE — C1729 CATH, DRAINAGE: HCPCS

## 2025-01-24 PROCEDURE — 02RF08Z REPLACEMENT OF AORTIC VALVE WITH ZOOPLASTIC TISSUE, OPEN APPROACH: ICD-10-PCS

## 2025-01-24 PROCEDURE — 85014 HEMATOCRIT: CPT

## 2025-01-24 PROCEDURE — 82330 ASSAY OF CALCIUM: CPT

## 2025-01-24 PROCEDURE — P9041 ALBUMIN (HUMAN),5%, 50ML: HCPCS | Performed by: ANESTHESIOLOGY

## 2025-01-24 PROCEDURE — C1751 CATH, INF, PER/CENT/MIDLINE: HCPCS

## 2025-01-24 PROCEDURE — C1889 IMPLANT/INSERT DEVICE, NOC: HCPCS

## 2025-01-24 PROCEDURE — P9016 RBC LEUKOCYTES REDUCED: HCPCS

## 2025-01-24 PROCEDURE — P9012 CRYOPRECIPITATE EACH UNIT: HCPCS

## 2025-01-24 PROCEDURE — C1713 ANCHOR/SCREW BN/BN,TIS/BN: HCPCS

## 2025-01-24 PROCEDURE — P9041 ALBUMIN (HUMAN),5%, 50ML: HCPCS | Performed by: NURSE PRACTITIONER

## 2025-01-24 PROCEDURE — 5A1221Z PERFORMANCE OF CARDIAC OUTPUT, CONTINUOUS: ICD-10-PCS

## 2025-01-24 PROCEDURE — 25010000002 FENTANYL CITRATE (PF) 250 MCG/5ML SOLUTION: Performed by: ANESTHESIOLOGY

## 2025-01-24 PROCEDURE — 25010000002 POTASSIUM CHLORIDE PER 2 MEQ

## 2025-01-24 PROCEDURE — 94799 UNLISTED PULMONARY SVC/PX: CPT

## 2025-01-24 PROCEDURE — 25010000002 CALCIUM GLUCONATE PER 10 ML: Performed by: ANESTHESIOLOGY

## 2025-01-24 PROCEDURE — P9059 PLASMA, FRZ BETWEEN 8-24HOUR: HCPCS

## 2025-01-24 PROCEDURE — 80051 ELECTROLYTE PANEL: CPT

## 2025-01-24 PROCEDURE — 25010000002 MORPHINE PER 10 MG: Performed by: NURSE PRACTITIONER

## 2025-01-24 PROCEDURE — 25010000002 MAGNESIUM SULFATE 2 GM/50ML SOLUTION: Performed by: ANESTHESIOLOGY

## 2025-01-24 PROCEDURE — 25010000002 ALBUMIN HUMAN 5% PER 50 ML: Performed by: NURSE PRACTITIONER

## 2025-01-24 PROCEDURE — 85610 PROTHROMBIN TIME: CPT | Performed by: NURSE PRACTITIONER

## 2025-01-24 PROCEDURE — 82948 REAGENT STRIP/BLOOD GLUCOSE: CPT

## 2025-01-24 PROCEDURE — 85610 PROTHROMBIN TIME: CPT

## 2025-01-24 PROCEDURE — 84132 ASSAY OF SERUM POTASSIUM: CPT

## 2025-01-24 PROCEDURE — 36430 TRANSFUSION BLD/BLD COMPNT: CPT

## 2025-01-24 PROCEDURE — 85025 COMPLETE CBC W/AUTO DIFF WBC: CPT | Performed by: NURSE PRACTITIONER

## 2025-01-24 PROCEDURE — 82565 ASSAY OF CREATININE: CPT

## 2025-01-24 PROCEDURE — B24BZZ4 ULTRASONOGRAPHY OF HEART WITH AORTA, TRANSESOPHAGEAL: ICD-10-PCS | Performed by: ANESTHESIOLOGY

## 2025-01-24 PROCEDURE — 25010000002 ALBUMIN HUMAN 5% PER 50 ML: Performed by: ANESTHESIOLOGY

## 2025-01-24 PROCEDURE — C1751 CATH, INF, PER/CENT/MIDLINE: HCPCS | Performed by: ANESTHESIOLOGY

## 2025-01-24 PROCEDURE — 33530 CORONARY ARTERY BYPASS/REOP: CPT

## 2025-01-24 PROCEDURE — 86900 BLOOD TYPING SEROLOGIC ABO: CPT

## 2025-01-24 PROCEDURE — 99223 1ST HOSP IP/OBS HIGH 75: CPT | Performed by: INTERNAL MEDICINE

## 2025-01-24 PROCEDURE — 85018 HEMOGLOBIN: CPT

## 2025-01-24 PROCEDURE — 93005 ELECTROCARDIOGRAM TRACING: CPT | Performed by: NURSE PRACTITIONER

## 2025-01-24 PROCEDURE — P9100 PATHOGEN TEST FOR PLATELETS: HCPCS

## 2025-01-24 PROCEDURE — 80069 RENAL FUNCTION PANEL: CPT | Performed by: NURSE PRACTITIONER

## 2025-01-24 PROCEDURE — 87070 CULTURE OTHR SPECIMN AEROBIC: CPT

## 2025-01-24 PROCEDURE — 25810000003 SODIUM CHLORIDE 0.9 % SOLUTION: Performed by: ANESTHESIOLOGY

## 2025-01-24 PROCEDURE — 85027 COMPLETE CBC AUTOMATED: CPT | Performed by: NURSE PRACTITIONER

## 2025-01-24 PROCEDURE — 85027 COMPLETE CBC AUTOMATED: CPT

## 2025-01-24 PROCEDURE — 94761 N-INVAS EAR/PLS OXIMETRY MLT: CPT

## 2025-01-24 PROCEDURE — 25010000002 HEPARIN (PORCINE) PER 1000 UNITS: Performed by: ANESTHESIOLOGY

## 2025-01-24 PROCEDURE — P9035 PLATELET PHERES LEUKOREDUCED: HCPCS

## 2025-01-24 PROCEDURE — 33405 REPLACEMENT AORTIC VALVE OPN: CPT

## 2025-01-24 PROCEDURE — 84295 ASSAY OF SERUM SODIUM: CPT

## 2025-01-24 PROCEDURE — 88300 SURGICAL PATH GROSS: CPT

## 2025-01-24 PROCEDURE — 85576 BLOOD PLATELET AGGREGATION: CPT

## 2025-01-24 PROCEDURE — 82803 BLOOD GASES ANY COMBINATION: CPT

## 2025-01-24 PROCEDURE — 25010000002 CEFAZOLIN PER 500 MG: Performed by: ANESTHESIOLOGY

## 2025-01-24 PROCEDURE — 25010000002 MIDAZOLAM PER 1 MG: Performed by: ANESTHESIOLOGY

## 2025-01-24 PROCEDURE — 25010000002 NICARDIPINE 2.5 MG/ML SOLUTION 10 ML VIAL: Performed by: NURSE PRACTITIONER

## 2025-01-24 PROCEDURE — 25010000002 ACETAMINOPHEN 10 MG/ML SOLUTION: Performed by: NURSE PRACTITIONER

## 2025-01-24 PROCEDURE — 71045 X-RAY EXAM CHEST 1 VIEW: CPT

## 2025-01-24 PROCEDURE — 82330 ASSAY OF CALCIUM: CPT | Performed by: NURSE PRACTITIONER

## 2025-01-24 PROCEDURE — 94002 VENT MGMT INPAT INIT DAY: CPT

## 2025-01-24 PROCEDURE — 25010000002 ACETAMINOPHEN 10 MG/ML SOLUTION: Performed by: ANESTHESIOLOGY

## 2025-01-24 PROCEDURE — 85384 FIBRINOGEN ACTIVITY: CPT

## 2025-01-24 PROCEDURE — 83605 ASSAY OF LACTIC ACID: CPT

## 2025-01-24 PROCEDURE — C1768 GRAFT, VASCULAR: HCPCS

## 2025-01-24 PROCEDURE — 85730 THROMBOPLASTIN TIME PARTIAL: CPT | Performed by: NURSE PRACTITIONER

## 2025-01-24 PROCEDURE — 25810000003 SODIUM CHLORIDE 0.9 % SOLUTION 250 ML FLEX CONT: Performed by: NURSE PRACTITIONER

## 2025-01-24 PROCEDURE — 25010000002 CEFAZOLIN PER 500 MG: Performed by: NURSE PRACTITIONER

## 2025-01-24 PROCEDURE — 93318 ECHO TRANSESOPHAGEAL INTRAOP: CPT | Performed by: ANESTHESIOLOGY

## 2025-01-24 PROCEDURE — 85730 THROMBOPLASTIN TIME PARTIAL: CPT

## 2025-01-24 PROCEDURE — 87205 SMEAR GRAM STAIN: CPT

## 2025-01-24 PROCEDURE — 87176 TISSUE HOMOGENIZATION CULTR: CPT

## 2025-01-24 PROCEDURE — 25010000002 VANCOMYCIN 1.5-0.9 GM/500ML-% SOLUTION: Performed by: NURSE PRACTITIONER

## 2025-01-24 PROCEDURE — 25010000002 PROTAMINE SULFATE PER 10 MG: Performed by: ANESTHESIOLOGY

## 2025-01-24 PROCEDURE — 25010000002 MAGNESIUM SULFATE IN D5W 1G/100ML (PREMIX) 1-5 GM/100ML-% SOLUTION: Performed by: NURSE PRACTITIONER

## 2025-01-24 PROCEDURE — 82803 BLOOD GASES ANY COMBINATION: CPT | Performed by: NURSE PRACTITIONER

## 2025-01-24 PROCEDURE — 82947 ASSAY GLUCOSE BLOOD QUANT: CPT

## 2025-01-24 PROCEDURE — 85347 COAGULATION TIME ACTIVATED: CPT

## 2025-01-24 RX ORDER — MAGNESIUM HYDROXIDE 1200 MG/15ML
LIQUID ORAL AS NEEDED
Status: DISCONTINUED | OUTPATIENT
Start: 2025-01-24 | End: 2025-01-24 | Stop reason: HOSPADM

## 2025-01-24 RX ORDER — NOREPINEPHRINE BITARTRATE 0.03 MG/ML
.02-.06 INJECTION, SOLUTION INTRAVENOUS CONTINUOUS PRN
Status: DISCONTINUED | OUTPATIENT
Start: 2025-01-24 | End: 2025-01-27

## 2025-01-24 RX ORDER — POTASSIUM CHLORIDE 29.8 MG/ML
20 INJECTION INTRAVENOUS ONCE
Status: COMPLETED | OUTPATIENT
Start: 2025-01-24 | End: 2025-01-24

## 2025-01-24 RX ORDER — POLYETHYLENE GLYCOL 3350 17 G/17G
17 POWDER, FOR SOLUTION ORAL 2 TIMES DAILY
Status: DISCONTINUED | OUTPATIENT
Start: 2025-01-24 | End: 2025-01-27

## 2025-01-24 RX ORDER — NICOTINE POLACRILEX 4 MG
15 LOZENGE BUCCAL
Status: DISCONTINUED | OUTPATIENT
Start: 2025-01-24 | End: 2025-01-26 | Stop reason: SDUPTHER

## 2025-01-24 RX ORDER — NICOTINE POLACRILEX 4 MG
15 LOZENGE BUCCAL
Status: DISCONTINUED | OUTPATIENT
Start: 2025-01-24 | End: 2025-01-24

## 2025-01-24 RX ORDER — MEPERIDINE HYDROCHLORIDE 25 MG/ML
25 INJECTION INTRAMUSCULAR; INTRAVENOUS; SUBCUTANEOUS ONCE AS NEEDED
Status: ACTIVE | OUTPATIENT
Start: 2025-01-24 | End: 2025-01-25

## 2025-01-24 RX ORDER — AMOXICILLIN 250 MG
2 CAPSULE ORAL 2 TIMES DAILY
Status: DISCONTINUED | OUTPATIENT
Start: 2025-01-24 | End: 2025-01-27

## 2025-01-24 RX ORDER — MAGNESIUM SULFATE HEPTAHYDRATE 40 MG/ML
INJECTION, SOLUTION INTRAVENOUS AS NEEDED
Status: DISCONTINUED | OUTPATIENT
Start: 2025-01-24 | End: 2025-01-24 | Stop reason: SURG

## 2025-01-24 RX ORDER — DEXMEDETOMIDINE HYDROCHLORIDE 4 UG/ML
INJECTION, SOLUTION INTRAVENOUS
Status: COMPLETED
Start: 2025-01-24 | End: 2025-01-24

## 2025-01-24 RX ORDER — ACETAMINOPHEN 10 MG/ML
1000 INJECTION, SOLUTION INTRAVENOUS EVERY 8 HOURS
Status: COMPLETED | OUTPATIENT
Start: 2025-01-24 | End: 2025-01-25

## 2025-01-24 RX ORDER — MAGNESIUM SULFATE 1 G/100ML
1 INJECTION INTRAVENOUS EVERY 8 HOURS
Status: COMPLETED | OUTPATIENT
Start: 2025-01-24 | End: 2025-01-25

## 2025-01-24 RX ORDER — PANTOPRAZOLE SODIUM 40 MG/10ML
40 INJECTION, POWDER, LYOPHILIZED, FOR SOLUTION INTRAVENOUS ONCE
Status: COMPLETED | OUTPATIENT
Start: 2025-01-24 | End: 2025-01-24

## 2025-01-24 RX ORDER — HEPARIN SODIUM 1000 [USP'U]/ML
INJECTION, SOLUTION INTRAVENOUS; SUBCUTANEOUS AS NEEDED
Status: DISCONTINUED | OUTPATIENT
Start: 2025-01-24 | End: 2025-01-24 | Stop reason: SURG

## 2025-01-24 RX ORDER — ETOMIDATE 2 MG/ML
INJECTION INTRAVENOUS AS NEEDED
Status: DISCONTINUED | OUTPATIENT
Start: 2025-01-24 | End: 2025-01-24 | Stop reason: SURG

## 2025-01-24 RX ORDER — ASPIRIN 325 MG
325 TABLET ORAL ONCE
Status: COMPLETED | OUTPATIENT
Start: 2025-01-24 | End: 2025-01-24

## 2025-01-24 RX ORDER — PANTOPRAZOLE SODIUM 40 MG/1
40 TABLET, DELAYED RELEASE ORAL
Status: DISCONTINUED | OUTPATIENT
Start: 2025-01-25 | End: 2025-01-29 | Stop reason: HOSPADM

## 2025-01-24 RX ORDER — NITROGLYCERIN 0.4 MG/1
0.4 TABLET SUBLINGUAL
Status: DISCONTINUED | OUTPATIENT
Start: 2025-01-24 | End: 2025-01-29 | Stop reason: HOSPADM

## 2025-01-24 RX ORDER — NALOXONE HCL 0.4 MG/ML
0.4 VIAL (ML) INJECTION
Status: DISCONTINUED | OUTPATIENT
Start: 2025-01-24 | End: 2025-01-29 | Stop reason: HOSPADM

## 2025-01-24 RX ORDER — AMINOCAPROIC ACID 250 MG/ML
INJECTION, SOLUTION INTRAVENOUS AS NEEDED
Status: DISCONTINUED | OUTPATIENT
Start: 2025-01-24 | End: 2025-01-24 | Stop reason: SURG

## 2025-01-24 RX ORDER — CEFAZOLIN 2 G/1
INJECTION, POWDER, FOR SOLUTION INTRAMUSCULAR; INTRAVENOUS AS NEEDED
Status: DISCONTINUED | OUTPATIENT
Start: 2025-01-24 | End: 2025-01-24 | Stop reason: SURG

## 2025-01-24 RX ORDER — SODIUM CHLORIDE 9 MG/ML
30 INJECTION, SOLUTION INTRAVENOUS CONTINUOUS PRN
Status: DISCONTINUED | OUTPATIENT
Start: 2025-01-24 | End: 2025-01-24

## 2025-01-24 RX ORDER — DEXTROSE MONOHYDRATE 25 G/50ML
10-50 INJECTION, SOLUTION INTRAVENOUS
Status: DISCONTINUED | OUTPATIENT
Start: 2025-01-24 | End: 2025-01-26 | Stop reason: SDUPTHER

## 2025-01-24 RX ORDER — ALBUMIN HUMAN 50 G/1000ML
SOLUTION INTRAVENOUS CONTINUOUS PRN
Status: DISCONTINUED | OUTPATIENT
Start: 2025-01-24 | End: 2025-01-24 | Stop reason: SURG

## 2025-01-24 RX ORDER — DEXTROSE MONOHYDRATE 25 G/50ML
10-50 INJECTION, SOLUTION INTRAVENOUS
Status: DISCONTINUED | OUTPATIENT
Start: 2025-01-24 | End: 2025-01-24

## 2025-01-24 RX ORDER — SODIUM CHLORIDE 9 MG/ML
INJECTION, SOLUTION INTRAVENOUS CONTINUOUS PRN
Status: DISCONTINUED | OUTPATIENT
Start: 2025-01-24 | End: 2025-01-24 | Stop reason: SURG

## 2025-01-24 RX ORDER — VANCOMYCIN/0.9 % SOD CHLORIDE 1.5G/250ML
15 PLASTIC BAG, INJECTION (ML) INTRAVENOUS ONCE
Status: COMPLETED | OUTPATIENT
Start: 2025-01-24 | End: 2025-01-24

## 2025-01-24 RX ORDER — POTASSIUM CHLORIDE 29.8 MG/ML
20 INJECTION INTRAVENOUS
Status: COMPLETED | OUTPATIENT
Start: 2025-01-24 | End: 2025-01-24

## 2025-01-24 RX ORDER — ALPRAZOLAM 0.25 MG/1
0.25 TABLET ORAL ONCE
Status: DISCONTINUED | OUTPATIENT
Start: 2025-01-24 | End: 2025-01-24

## 2025-01-24 RX ORDER — ASPIRIN 81 MG/1
81 TABLET ORAL DAILY
Status: DISCONTINUED | OUTPATIENT
Start: 2025-01-25 | End: 2025-01-29 | Stop reason: HOSPADM

## 2025-01-24 RX ORDER — VASOPRESSIN IN DEXTROSE 5 % 20/100 ML
.02-.1 PLASTIC BAG, INJECTION (ML) INTRAVENOUS AS NEEDED
Status: DISCONTINUED | OUTPATIENT
Start: 2025-01-24 | End: 2025-01-27

## 2025-01-24 RX ORDER — SODIUM CHLORIDE 0.9 % (FLUSH) 0.9 %
3 SYRINGE (ML) INJECTION EVERY 12 HOURS SCHEDULED
Status: DISCONTINUED | OUTPATIENT
Start: 2025-01-24 | End: 2025-01-24

## 2025-01-24 RX ORDER — ACETAMINOPHEN 325 MG/1
650 TABLET ORAL EVERY 4 HOURS PRN
Status: DISCONTINUED | OUTPATIENT
Start: 2025-01-24 | End: 2025-01-24

## 2025-01-24 RX ORDER — ALBUMIN HUMAN 50 G/1000ML
12.5 SOLUTION INTRAVENOUS AS NEEDED
Status: ACTIVE | OUTPATIENT
Start: 2025-01-24 | End: 2025-01-25

## 2025-01-24 RX ORDER — ACETAMINOPHEN 160 MG/5ML
650 SOLUTION ORAL EVERY 4 HOURS PRN
Status: DISCONTINUED | OUTPATIENT
Start: 2025-01-25 | End: 2025-01-29 | Stop reason: HOSPADM

## 2025-01-24 RX ORDER — SODIUM CHLORIDE 9 MG/ML
40 INJECTION, SOLUTION INTRAVENOUS AS NEEDED
Status: DISCONTINUED | OUTPATIENT
Start: 2025-01-24 | End: 2025-01-24

## 2025-01-24 RX ORDER — NITROGLYCERIN 0.4 MG/1
0.4 TABLET SUBLINGUAL
Status: DISCONTINUED | OUTPATIENT
Start: 2025-01-24 | End: 2025-01-24

## 2025-01-24 RX ORDER — ACETAMINOPHEN 325 MG/1
650 TABLET ORAL EVERY 4 HOURS PRN
Status: DISCONTINUED | OUTPATIENT
Start: 2025-01-25 | End: 2025-01-29 | Stop reason: HOSPADM

## 2025-01-24 RX ORDER — FENTANYL CITRATE 50 UG/ML
INJECTION, SOLUTION INTRAMUSCULAR; INTRAVENOUS AS NEEDED
Status: DISCONTINUED | OUTPATIENT
Start: 2025-01-24 | End: 2025-01-24 | Stop reason: SURG

## 2025-01-24 RX ORDER — CYCLOBENZAPRINE HCL 5 MG
5 TABLET ORAL EVERY 8 HOURS PRN
Status: DISCONTINUED | OUTPATIENT
Start: 2025-01-25 | End: 2025-01-29 | Stop reason: HOSPADM

## 2025-01-24 RX ORDER — CALCIUM GLUCONATE 94 MG/ML
INJECTION, SOLUTION INTRAVENOUS AS NEEDED
Status: DISCONTINUED | OUTPATIENT
Start: 2025-01-24 | End: 2025-01-24 | Stop reason: SURG

## 2025-01-24 RX ORDER — IBUPROFEN 600 MG/1
1 TABLET ORAL
Status: DISCONTINUED | OUTPATIENT
Start: 2025-01-24 | End: 2025-01-24

## 2025-01-24 RX ORDER — ACETAMINOPHEN 650 MG/1
650 SUPPOSITORY RECTAL EVERY 4 HOURS PRN
Status: DISCONTINUED | OUTPATIENT
Start: 2025-01-25 | End: 2025-01-29 | Stop reason: HOSPADM

## 2025-01-24 RX ORDER — SODIUM CHLORIDE 0.9 % (FLUSH) 0.9 %
30 SYRINGE (ML) INJECTION ONCE AS NEEDED
Status: DISCONTINUED | OUTPATIENT
Start: 2025-01-24 | End: 2025-01-24

## 2025-01-24 RX ORDER — DEXMEDETOMIDINE HYDROCHLORIDE 4 UG/ML
.2-1.5 INJECTION, SOLUTION INTRAVENOUS
Status: DISCONTINUED | OUTPATIENT
Start: 2025-01-24 | End: 2025-01-27

## 2025-01-24 RX ORDER — CHLORHEXIDINE GLUCONATE ORAL RINSE 1.2 MG/ML
15 SOLUTION DENTAL ONCE
Status: DISCONTINUED | OUTPATIENT
Start: 2025-01-24 | End: 2025-01-24

## 2025-01-24 RX ORDER — HYDROCODONE BITARTRATE AND ACETAMINOPHEN 5; 325 MG/1; MG/1
1 TABLET ORAL EVERY 4 HOURS PRN
Status: DISCONTINUED | OUTPATIENT
Start: 2025-01-24 | End: 2025-01-29 | Stop reason: HOSPADM

## 2025-01-24 RX ORDER — SODIUM CHLORIDE 9 MG/ML
30 INJECTION, SOLUTION INTRAVENOUS CONTINUOUS
Status: DISPENSED | OUTPATIENT
Start: 2025-01-24 | End: 2025-01-27

## 2025-01-24 RX ORDER — ATORVASTATIN CALCIUM 40 MG/1
40 TABLET, FILM COATED ORAL NIGHTLY
Status: DISCONTINUED | OUTPATIENT
Start: 2025-01-25 | End: 2025-01-29 | Stop reason: HOSPADM

## 2025-01-24 RX ORDER — SODIUM CHLORIDE 0.9 % (FLUSH) 0.9 %
3-10 SYRINGE (ML) INJECTION AS NEEDED
Status: DISCONTINUED | OUTPATIENT
Start: 2025-01-24 | End: 2025-01-24

## 2025-01-24 RX ORDER — LEVOTHYROXINE SODIUM 112 UG/1
112 TABLET ORAL DAILY
Status: DISCONTINUED | OUTPATIENT
Start: 2025-01-24 | End: 2025-01-29 | Stop reason: HOSPADM

## 2025-01-24 RX ORDER — MIDAZOLAM HYDROCHLORIDE 1 MG/ML
INJECTION, SOLUTION INTRAMUSCULAR; INTRAVENOUS AS NEEDED
Status: DISCONTINUED | OUTPATIENT
Start: 2025-01-24 | End: 2025-01-24 | Stop reason: SURG

## 2025-01-24 RX ORDER — ONDANSETRON 2 MG/ML
4 INJECTION INTRAMUSCULAR; INTRAVENOUS EVERY 6 HOURS PRN
Status: DISCONTINUED | OUTPATIENT
Start: 2025-01-24 | End: 2025-01-29 | Stop reason: HOSPADM

## 2025-01-24 RX ORDER — CHLORHEXIDINE GLUCONATE 500 MG/1
CLOTH TOPICAL EVERY 12 HOURS PRN
Status: DISCONTINUED | OUTPATIENT
Start: 2025-01-24 | End: 2025-01-24

## 2025-01-24 RX ORDER — IBUPROFEN 600 MG/1
1 TABLET ORAL
Status: DISCONTINUED | OUTPATIENT
Start: 2025-01-24 | End: 2025-01-26 | Stop reason: SDUPTHER

## 2025-01-24 RX ORDER — ENOXAPARIN SODIUM 100 MG/ML
40 INJECTION SUBCUTANEOUS EVERY 12 HOURS SCHEDULED
Status: DISCONTINUED | OUTPATIENT
Start: 2025-01-25 | End: 2025-01-29 | Stop reason: HOSPADM

## 2025-01-24 RX ORDER — ACETAMINOPHEN 10 MG/ML
INJECTION, SOLUTION INTRAVENOUS AS NEEDED
Status: DISCONTINUED | OUTPATIENT
Start: 2025-01-24 | End: 2025-01-24 | Stop reason: SURG

## 2025-01-24 RX ORDER — CHLORHEXIDINE GLUCONATE ORAL RINSE 1.2 MG/ML
15 SOLUTION DENTAL EVERY 12 HOURS
Status: DISCONTINUED | OUTPATIENT
Start: 2025-01-24 | End: 2025-01-28

## 2025-01-24 RX ORDER — VECURONIUM BROMIDE 1 MG/ML
INJECTION, POWDER, LYOPHILIZED, FOR SOLUTION INTRAVENOUS AS NEEDED
Status: DISCONTINUED | OUTPATIENT
Start: 2025-01-24 | End: 2025-01-24 | Stop reason: SURG

## 2025-01-24 RX ADMIN — POTASSIUM CHLORIDE 20 MEQ: 29.8 INJECTION INTRAVENOUS at 19:25

## 2025-01-24 RX ADMIN — POLYETHYLENE GLYCOL 3350 17 G: 17 POWDER, FOR SOLUTION ORAL at 20:29

## 2025-01-24 RX ADMIN — MIDAZOLAM HYDROCHLORIDE 2 MG: 1 INJECTION, SOLUTION INTRAMUSCULAR; INTRAVENOUS at 08:48

## 2025-01-24 RX ADMIN — MIDAZOLAM HYDROCHLORIDE 3 MG: 1 INJECTION, SOLUTION INTRAMUSCULAR; INTRAVENOUS at 06:40

## 2025-01-24 RX ADMIN — MAGNESIUM SULFATE HEPTAHYDRATE 1 G: 10 INJECTION, SOLUTION INTRAVENOUS at 17:00

## 2025-01-24 RX ADMIN — ACETAMINOPHEN 1000 MG: 10 INJECTION INTRAVENOUS at 17:00

## 2025-01-24 RX ADMIN — FENTANYL CITRATE 500 MCG: 50 INJECTION, SOLUTION INTRAMUSCULAR; INTRAVENOUS at 06:46

## 2025-01-24 RX ADMIN — POTASSIUM CHLORIDE 20 MEQ: 29.8 INJECTION INTRAVENOUS at 13:22

## 2025-01-24 RX ADMIN — CEFAZOLIN 2 G: 2 INJECTION, POWDER, FOR SOLUTION INTRAMUSCULAR; INTRAVENOUS at 10:24

## 2025-01-24 RX ADMIN — MUPIROCIN 1 APPLICATION: 20 OINTMENT TOPICAL at 20:29

## 2025-01-24 RX ADMIN — CHLORHEXIDINE GLUCONATE 15 ML: 1.2 RINSE ORAL at 20:29

## 2025-01-24 RX ADMIN — Medication 20 MG: at 06:46

## 2025-01-24 RX ADMIN — MORPHINE SULFATE 2 MG: 4 INJECTION, SOLUTION INTRAMUSCULAR; INTRAVENOUS at 12:42

## 2025-01-24 RX ADMIN — MIDAZOLAM HYDROCHLORIDE 2 MG: 1 INJECTION, SOLUTION INTRAMUSCULAR; INTRAVENOUS at 09:40

## 2025-01-24 RX ADMIN — PANTOPRAZOLE SODIUM 40 MG: 40 INJECTION, POWDER, FOR SOLUTION INTRAVENOUS at 14:45

## 2025-01-24 RX ADMIN — AMINOCAPROIC ACID 10 G: 250 INJECTION, SOLUTION INTRAVENOUS at 10:42

## 2025-01-24 RX ADMIN — ALBUMIN (HUMAN) 12.5 G: 12.5 INJECTION, SOLUTION INTRAVENOUS at 12:30

## 2025-01-24 RX ADMIN — MIDAZOLAM HYDROCHLORIDE 1 MG: 1 INJECTION, SOLUTION INTRAMUSCULAR; INTRAVENOUS at 09:55

## 2025-01-24 RX ADMIN — SENNOSIDES AND DOCUSATE SODIUM 2 TABLET: 50; 8.6 TABLET ORAL at 20:29

## 2025-01-24 RX ADMIN — AMINOCAPROIC ACID 10 G: 250 INJECTION, SOLUTION INTRAVENOUS at 07:07

## 2025-01-24 RX ADMIN — DEXMEDETOMIDINE HYDROCHLORIDE 1.5 MCG/KG/HR: 4 INJECTION, SOLUTION INTRAVENOUS at 12:45

## 2025-01-24 RX ADMIN — SODIUM CHLORIDE 2 MG/HR: 9 INJECTION, SOLUTION INTRAVENOUS at 21:50

## 2025-01-24 RX ADMIN — DEXMEDETOMIDINE HYDROCHLORIDE 0.5 MCG/KG/HR: 100 INJECTION, SOLUTION INTRAVENOUS at 06:50

## 2025-01-24 RX ADMIN — ALBUMIN HUMAN: 0.05 INJECTION, SOLUTION INTRAVENOUS at 10:25

## 2025-01-24 RX ADMIN — POTASSIUM CHLORIDE 20 MEQ: 29.8 INJECTION INTRAVENOUS at 20:35

## 2025-01-24 RX ADMIN — ACETAMINOPHEN 1000 MG: 10 INJECTION, SOLUTION INTRAVENOUS at 10:44

## 2025-01-24 RX ADMIN — MIDAZOLAM HYDROCHLORIDE 2 MG: 1 INJECTION, SOLUTION INTRAMUSCULAR; INTRAVENOUS at 06:46

## 2025-01-24 RX ADMIN — Medication 1.5 G: at 06:40

## 2025-01-24 RX ADMIN — VECURONIUM BROMIDE 5 MG: 10 INJECTION, POWDER, LYOPHILIZED, FOR SOLUTION INTRAVENOUS at 08:48

## 2025-01-24 RX ADMIN — SODIUM CHLORIDE: 9 INJECTION, SOLUTION INTRAVENOUS at 06:34

## 2025-01-24 RX ADMIN — HEPARIN SODIUM 30000 UNITS: 1000 INJECTION INTRAVENOUS; SUBCUTANEOUS at 08:26

## 2025-01-24 RX ADMIN — CEFAZOLIN 2 G: 2 INJECTION, POWDER, FOR SOLUTION INTRAMUSCULAR; INTRAVENOUS at 07:13

## 2025-01-24 RX ADMIN — MORPHINE SULFATE 2 MG: 4 INJECTION, SOLUTION INTRAMUSCULAR; INTRAVENOUS at 16:49

## 2025-01-24 RX ADMIN — FENTANYL CITRATE 250 MCG: 50 INJECTION, SOLUTION INTRAMUSCULAR; INTRAVENOUS at 09:55

## 2025-01-24 RX ADMIN — VECURONIUM BROMIDE 10 MG: 10 INJECTION, POWDER, LYOPHILIZED, FOR SOLUTION INTRAVENOUS at 06:46

## 2025-01-24 RX ADMIN — PROTAMINE SULFATE 350 MG: 10 INJECTION, SOLUTION INTRAVENOUS at 10:09

## 2025-01-24 RX ADMIN — VECURONIUM BROMIDE 5 MG: 10 INJECTION, POWDER, LYOPHILIZED, FOR SOLUTION INTRAVENOUS at 09:40

## 2025-01-24 RX ADMIN — MORPHINE SULFATE 2 MG: 4 INJECTION, SOLUTION INTRAMUSCULAR; INTRAVENOUS at 20:27

## 2025-01-24 RX ADMIN — HYDROCODONE BITARTRATE AND ACETAMINOPHEN 1 TABLET: 5; 325 TABLET ORAL at 19:21

## 2025-01-24 RX ADMIN — Medication 30 MG: at 06:40

## 2025-01-24 RX ADMIN — CEFAZOLIN 2 G: 2 INJECTION, POWDER, FOR SOLUTION INTRAMUSCULAR; INTRAVENOUS at 17:00

## 2025-01-24 RX ADMIN — ASPIRIN 325 MG ORAL TABLET 325 MG: 325 PILL ORAL at 16:06

## 2025-01-24 RX ADMIN — VECURONIUM BROMIDE 5 MG: 10 INJECTION, POWDER, LYOPHILIZED, FOR SOLUTION INTRAVENOUS at 07:30

## 2025-01-24 RX ADMIN — ALBUMIN (HUMAN) 12.5 G: 12.5 INJECTION, SOLUTION INTRAVENOUS at 15:41

## 2025-01-24 RX ADMIN — CALCIUM GLUCONATE 1 G: 98 INJECTION, SOLUTION INTRAVENOUS at 10:09

## 2025-01-24 RX ADMIN — MORPHINE SULFATE 2 MG: 4 INJECTION, SOLUTION INTRAMUSCULAR; INTRAVENOUS at 15:03

## 2025-01-24 RX ADMIN — INSULIN HUMAN 2 UNITS/HR: 1 INJECTION, SOLUTION INTRAVENOUS at 08:48

## 2025-01-24 RX ADMIN — FENTANYL CITRATE 250 MCG: 50 INJECTION, SOLUTION INTRAMUSCULAR; INTRAVENOUS at 07:15

## 2025-01-24 RX ADMIN — Medication 12.5 MG: at 06:15

## 2025-01-24 RX ADMIN — ALBUMIN (HUMAN) 12.5 G: 12.5 INJECTION, SOLUTION INTRAVENOUS at 14:42

## 2025-01-24 RX ADMIN — ETOMIDATE 20 MG: 2 INJECTION INTRAVENOUS at 06:46

## 2025-01-24 RX ADMIN — MAGNESIUM SULFATE HEPTAHYDRATE 2 G: 40 INJECTION, SOLUTION INTRAVENOUS at 09:58

## 2025-01-24 NOTE — ANESTHESIA PROCEDURE NOTES
Arterial Line      Patient location during procedure: OR  Start time: 1/24/2025 6:40 AM  Stop Time:1/24/2025 6:45 AM       Line placed for hemodynamic monitoring, ABGs/Labs/ISTAT and MD/Surgeon request.  Performed By   Anesthesiologist: Froilan Cummings MD   Preanesthetic Checklist  Completed: patient identified, IV checked, site marked, risks and benefits discussed, surgical consent, monitors and equipment checked, pre-op evaluation and timeout performed  Arterial Line Prep    Sterile Tech: cap, gloves, sterile barriers and mask  Prep: ChloraPrep  Patient monitoring: EKG, continuous pulse oximetry and blood pressure monitoring  Arterial Line Procedure   Laterality:right  Location:  radial artery  Catheter size: 20 G   Guidance: ultrasound guided  PROCEDURE NOTE/ULTRASOUND INTERPRETATION.  Using ultrasound guidance the potential vascular sites for insertion of the catheter were visualized to determine the patency of the vessel to be used for vascular access.  After selecting the appropriate site for insertion, the needle was visualized under ultrasound being inserted into the radial artery, followed by ultrasound confirmation of wire and catheter placement. There were no abnormalities seen on ultrasound; an image was taken; and the patient tolerated the procedure with no complications.   Number of attempts: 1  Successful placement: yes   Post Assessment   Dressing Type: wrist guard applied, secured with tape and occlusive dressing applied.   Complications no  Circ/Move/Sens Assessment: normal and unchanged.   Patient Tolerance: patient tolerated the procedure well with no apparent complications  Additional Notes  R RADIAL ART LINE VIA ASEPTIC SELDINGER TECH US GUIDANCE AFTER UNSUCCESSFUL L ATTEMPT X1. POS TIEN CARRILLO RNMD ASSIST

## 2025-01-24 NOTE — ANESTHESIA POSTPROCEDURE EVALUATION
Patient: Lulu Graves    Procedure Summary       Date: 01/24/25 Room / Location: [facility] CVOR 02 / [facility] CVOR    Anesthesia Start: 0634 Anesthesia Stop: 1150    Procedure: AORTIC VALVE REPLACEMENT REOP using 23mm Franks Magna Ease porcine valve. WITH TRANSESOPHAGEAL ECHOCARDIOGRAM. (Chest) Diagnosis:       Stenosis of prosthetic aortic valve, initial encounter      (Stenosis of prosthetic aortic valve, initial encounter [T86.346K])    Surgeons: Travis Moreno MD Provider: Froilan Cummings MD    Anesthesia Type: general, [place], CVL, PAC ASA Status: 4            Anesthesia Type: general, [place], CVL, PAC    Vitals  Vitals Value Taken Time   BP     Temp 96.8 °F (36 °C) 01/24/25 1201   Pulse 80 01/24/25 1201   Resp     SpO2 100 % 01/24/25 1201   Vitals shown include unfiled device data.        Post Anesthesia Care and Evaluation    Patient location during evaluation: ICU  Patient participation: complete - patient cannot participate  Level of consciousness: obtunded/minimal responses  Pain scale: See nurse's notes for pain score.  Pain management: adequate    Airway patency: patent  Anesthetic complications: No anesthetic complications  PONV Status: none  Cardiovascular status: acceptable  Respiratory status: acceptable, ventilator and ETT  Hydration status: acceptable    Comments: Patient seen and examined postoperatively; vital signs stable; SpO2 greater than or equal to 90%; cardiopulmonary status stable; nausea/vomiting adequately controlled; pain adequately controlled; no apparent anesthesia complications; patient discharged from anesthesia care when discharge criteria were met. Sedated on vent hemodynamically stable

## 2025-01-24 NOTE — SIGNIFICANT NOTE
01/24/25 1440   OTHER   Discipline occupational therapist   Rehab Time/Intention   Session Not Performed unable to evaluate, medical status change  (SAVR vs TAVR this afternoon)   Recommendation   PT - Next Appointment 01/25/25   Recommendation   OT - Next Appointment 01/25/25

## 2025-01-24 NOTE — CONSULTS
Cardiology Consult Note    Patient Identification:  Name: Lulu Graves  Age: 69 y.o.  Sex: female  :  1955  MRN: [id number]             Requesting Physician :     Reason for Consultation / Chief Complaint :   Valvular heart disease status post redo aortic valve replacement    History of Present Illness:        Ms. Lulu Graves has PMH of     Valvular heart disease, bioprosthetic aortic valve replacement   Hypertension  Dyslipidemia  Obesity with BMI over 40  LUÍS  Hypothyroidism     Patient was recently in the hospital with chest pain, shortness of breath, palpitations on 2025.  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  Peak velocity of 4.03 m/s with peak gradient of 65 mmHg and mean gradient of 44 mmHg, LVEF of 50 to 55%..  Cardiac cath was normal.  Patient was brought in 2025 for elective redo aortic valve replacement.     Review of previous data:  Labs here revealed normal HS troponin at 10 and normal proBNP of 175.  CMP was normal.  Hemoglobin A1c was 5.95.  TSH elevated at 6.7.  Lipid profile with cholesterol 154, triglycerides 192, HDL 34, LDL 87.  EKG done 2025 revealed sinus rhythm at the rate of 70 bpm with ST segment depression T wave inversion in 1 and aVL.    Labs  2025: Sodium 147, potassium 3.6, glucose elevated, hemoglobin 8.4.  EKG done 2025 reviewed/interpreted by me reveals ectopic atrial rhythm/sinus bradycardia at the rate of 45 bpm with left bundle branch block        Assessment:  :     Valvular heart disease status post redo aortic valve replacement  Abnormal EKG with lateral ST segment depression  Valvular heart disease, bioprosthetic aortic valve  Hypertension  Dyslipidemia  Obesity  LUÍS  Thrombocytopenia        Recommendations / Plan:        Patient recently was in the hospital with shortness of breath palpitations chest pain.  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  Patient underwent cardiac cath which  revealed noncritical disease.  CT surgery brought patient back for elective surgery 1/24/2025.  Patient currently intubated, sedated, mechanically ventilated.  Chest tube present.  Santana catheter present.  He is on IV Cardene and IV nitro.  Monitor for toxicities for monitoring hemodynamics, labs, rhythm closely.  Routine postsurgery care.  Wean off sedation and extubate as tolerated.  Monitor rhythm.  Currently AV paced.  Monitor underlying rhythm and remove pacer wires once stable.  Monitor hemoglobin for routine expected postsurgery blood loss anemia.  Chest tube present.  Discussed with RN taking care of patient to coordinate care               Diagnosis Plan   1. Stenosis of prosthetic aortic valve, initial encounter  sodium chloride 0.9 % flush 3 mL    sodium chloride 0.9 % flush 3-10 mL    sodium chloride 0.9 % infusion 40 mL    insulin regular 1 unit/mL in 0.9% sodium chloride (Glucommander)    sodium chloride 0.9 % flush 30 mL    sodium chloride 0.9 % infusion    dextrose (GLUTOSE) oral gel 15 g    dextrose (D50W) (25 g/50 mL) IV injection 10-50 mL    glucagon (GLUCAGEN) injection 1 mg    metoprolol tartrate (LOPRESSOR) half tablet 12.5 mg    chlorhexidine (PERIDEX) 0.12 % solution 15 mL    Chlorhexidine Gluconate Cloth 2 % pads    mupirocin (BACTROBAN) 2 % nasal ointment 1 Application    acetaminophen (TYLENOL) tablet 650 mg    nitroglycerin (NITROSTAT) SL tablet 0.4 mg    ALPRAZolam (XANAX) tablet 0.25 mg    ceFAZolin 2000 mg IVPB in 100 mL NS (MBP)    Tissue / Bone Culture - Tissue, Aortic valve    Tissue / Bone Culture - Tissue, Aortic valve    Tissue Pathology Exam    Tissue Pathology Exam    CBC (No Diff)    CBC (No Diff)    Fibrinogen    Fibrinogen    Protime-INR    Protime-INR    aPTT    aPTT    Platelet Function ADP    Platelet Function ADP      2. Stenosis of prosthetic aortic valve, subsequent encounter  vancomycin IVPB 1500 mg in 0.9% NaCl (Premix) 500 mL                     Cardiographics  ECG:  EKG tracing was  personally reviewed/interpreted by me  Telemetry Scan   Final Result          Telemetry: AV paced      Past Medical History:  Past Medical History:   Diagnosis Date    Allergic     Aortic stenosis     Aortic valve replaced 2013    Cataract     GERD (gastroesophageal reflux disease)     Glaucoma 2023    Just started    Heart murmur     Hyperlipidemia     Hypertension     Hypothyroidism     Liver disease     Due to medication    MRSA (methicillin resistant Staphylococcus aureus)     left arm abscess    Obesity     Osteopenia     PAT (paroxysmal atrial tachycardia)     Sleep apnea      Past Surgical History:  Past Surgical History:   Procedure Laterality Date    ABLATION OF DYSRHYTHMIC FOCUS   or     AORTIC VALVE REPAIR/REPLACEMENT      AORTIC VALVE SURGERY  2013    CARDIAC CATHETERIZATION      Before open heart surgery    CARDIAC CATHETERIZATION N/A 1/15/2025    Procedure: Left Heart Cath and coronary angiogram;  Surgeon: Nba Oswald MD;  Location: [facility] CATH INVASIVE LOCATION;  Service: Cardiovascular;  Laterality: N/A;    CARDIAC VALVE REPLACEMENT  2013     SECTION  1982    COLONOSCOPY  2016    INCISION AND DRAINAGE ABSCESS Left 2023    Procedure: INCISION AND DRAINAGE ABSCESS, left arm;  Surgeon: Addy Torres MD;  Location: [facility] MAIN OR;  Service: General;  Laterality: Left;    TUBAL ABDOMINAL LIGATION        Allergies:  No Known Allergies  Home Meds:  Medications Prior to Admission   Medication Sig Dispense Refill Last Dose/Taking    amLODIPine (NORVASC) 5 MG tablet Take 1 tablet by mouth Daily for 30 days. 30 tablet 0     amoxicillin (AMOXIL) 500 MG capsule Take 1 capsule by mouth Every 8 (Eight) Hours for 20 doses. Indications: Preventative Medication Therapy Used Around Surgery, suspected tooth infection 20 capsule 0     aspirin 81 MG tablet Take 1 tablet by mouth Every Night.       Elderberry-Vitamin C-Zinc  (Tangoe GUMMY PO) Take 500 mg by mouth Daily.       fluticasone (FLONASE) 50 MCG/ACT nasal spray Administer 2 sprays into the nostril(s) as directed by provider As Needed for Rhinitis.       levothyroxine (SYNTHROID, LEVOTHROID) 112 MCG tablet Take 1 tablet by mouth Daily. 90 tablet 0     loratadine (CLARITIN) 10 MG tablet TAKE 1 TABLET BY MOUTH EVERY DAY 30 tablet 11     metoprolol tartrate (LOPRESSOR) 25 MG tablet Take 0.5 tablets by mouth 2 (Two) Times a Day. 90 tablet 1     multivitamin with minerals (MULTIVITAMIN ADULT PO) Take 1 tablet by mouth Daily.       mupirocin (BACTROBAN) 2 % ointment Apply to nares (inside of each nostril) twice daily as directed for procedure 22 g 0     NON FORMULARY Butler, Cinnamon, Apple Cider Vinegar, Tumeric, Curmemic, berberine, and ginseng supplement       PROBIOTIC PRODUCT PO Take 1 capsule by mouth Daily.       simvastatin (ZOCOR) 20 MG tablet Take 1 tablet by mouth Every Evening. 90 tablet 1      Current Meds:     Current Facility-Administered Medications:     acetaminophen (TYLENOL) tablet 650 mg, 650 mg, Oral, Q4H PRN, Earline Benjamin PA    ALPRAZolam (XANAX) tablet 0.25 mg, 0.25 mg, Oral, Once, Earline Benjamin PA    ceFAZolin 2000 mg IVPB in 100 mL NS (MBP), 2,000 mg, Intravenous, On Call to OR, Earline Benjamin PA    chlorhexidine (PERIDEX) 0.12 % solution 15 mL, 15 mL, Mouth/Throat, Once, Earline Benjamin PA    Chlorhexidine Gluconate Cloth 2 % pads, , Topical, Q12H PRN, Earline Benjamin PA    dextrose (D50W) (25 g/50 mL) IV injection 10-50 mL, 10-50 mL, Intravenous, Q15 Min PRN, Earline Benjamin PA    dextrose (GLUTOSE) oral gel 15 g, 15 g, Oral, Q15 Min PRN, Earline Benjamin PA    gelatin absorbable (GELFOAM) sponge, , , PRN, Travis Moreno MD, 1 each at 01/24/25 0750    glucagon (GLUCAGEN) injection 1 mg, 1 mg, Intramuscular, Q15 Min PRN, Earline Benjamin PA    insulin  regular 1 unit/mL in 0.9% sodium chloride (Glucommander), 0-100 Units/hr, Intravenous, Titrated, Earline Benjamin PA    levothyroxine (SYNTHROID, LEVOTHROID) tablet 112 mcg, 112 mcg, Oral, Daily, Latanya Tubbs L, APRN    mupirocin (BACTROBAN) 2 % nasal ointment 1 Application, 1 Application, Each Nare, Q12H, Earline Benjamin PA    nitroglycerin (NITROSTAT) SL tablet 0.4 mg, 0.4 mg, Sublingual, Q5 Min PRN, Earline Benjamin PA    sodium chloride (NS) irrigation solution, , , PRN, Travis Moreno MD, 3,000 mL at 01/24/25 0750    sodium chloride (NS) irrigation solution, , , PRN, Travis Moreno MD, 2,000 mL at 01/24/25 0751    sodium chloride 0.9 % flush 3 mL, 3 mL, Intravenous, Q12H, Earline Benjamin PA    sodium chloride 0.9 % flush 3-10 mL, 3-10 mL, Intravenous, PRN, Earline Benjamin PA    sodium chloride 0.9 % flush 30 mL, 30 mL, Intravenous, Once PRN, Earline Benjamin PA    sodium chloride 0.9 % infusion 40 mL, 40 mL, Intravenous, PRN, Earline Benjamin PA    sodium chloride 0.9 % infusion, 30 mL/hr, Intravenous, Continuous PRN, Earline Benjamin PA    sterile water irrigation solution, , , PRN, Travis Moreno MD, 1,000 mL at 01/24/25 0750    Facility-Administered Medications Ordered in Other Encounters:     acetaminophen (OFIRMEV) injection, , Intravenous, PRN, Froilan Cummings MD, 1,000 mg at 01/24/25 1044    albumin human 5 % solution, , Intravenous, Continuous PRN, Froilan Cummings MD, New Bag at 01/24/25 1025    aminocaproic acid (AMICAR) injection, , Intravenous, PRN, Froilan Cummings MD, 10 g at 01/24/25 1042    calcium gluconate injection, , Intravenous, PRN, Froilan Cummings MD, 1 g at 01/24/25 1009    ceFAZolin Sodium (ANCEF) injection, , Intramuscular, PRN, Froilan Cummings MD, 2 g at 01/24/25 1024    Chlorhexidine Gluconate Cloth 2 % pads, , Topical, Q12H PRN, Earline Benjamin PA    dexmedetomidine  HCl (PRECEDEX) 400 mcg in sodium chloride 0.9 % 100 mL infusion, , Intravenous, Continuous PRN, Froilan Cummings MD, Last Rate: 12.125 mL/hr at 01/24/25 0650, 0.5 mcg/kg/hr at 01/24/25 0650    etomidate (AMIDATE) injection, , Intravenous, PRN, Froilan Cummings MD, 20 mg at 01/24/25 0646    fentaNYL citrate (PF) (SUBLIMAZE) injection, , Intravenous, PRN, Froilan Cummings MD, 250 mcg at 01/24/25 0955    heparin (porcine) injection, , Intravenous, PRN, Froilan Cummings MD, 30,000 Units at 01/24/25 0826    insulin regular 1 unit/mL in 0.9% sodium chloride, , Intravenous, Continuous PRN, Froilan Cummings MD, Stopped at 01/24/25 1005    ketamine hcl syringe solution prefilled syringe, , Intravenous, PRN, Froilan Cummings MD, 20 mg at 01/24/25 0646    magnesium sulfate 2g/50 mL (PREMIX) infusion, , Intravenous, PRN, Froilan Cummings MD, 2 g at 01/24/25 0958    midazolam (VERSED) injection, , Intravenous, PRN, Froilan Cummings MD, 1 mg at 01/24/25 0955    protamine 350 mg in dextrose (D5W) 5 % 50 mL IVPB, , Intravenous, Continuous PRN, Froilan Cummings MD, 350 mg at 01/24/25 1009    sodium chloride 0.9 % infusion, , Intravenous, Continuous PRNEmiliano Barry H, MD, New Bag at 01/24/25 0634    vecuronium (NORCURON) injection, , Intravenous, PRN, Froilan Cummings MD, 5 mg at 01/24/25 0940  Social History:   Social History     Tobacco Use    Smoking status: Never     Passive exposure: Past    Smokeless tobacco: Never   Substance Use Topics    Alcohol use: Never      Family History:  Family History   Problem Relation Age of Onset    Arthritis Mother     Diabetes Brother     Hearing loss Brother     Stroke Paternal Grandmother     Sleep apnea Neg Hx         Review of Systems : Review of Systems   Unable to perform ROS: Intubated            Constitutional:  Temp:  [97.7 °F (36.5 °C)] 97.7 °F (36.5 °C)  Heart Rate:  [70] 70  BP: (130)/(60) 130/60    Physical Exam   /60   Pulse 70   Temp 97.7 °F (36.5 °C) (Oral)   Ht 149.9  "cm (59\")   Wt 97 kg (213 lb 12.8 oz)   LMP  (LMP Unknown)   BMI 43.18 kg/m²   Physical Exam  General: Patient intubated, sedated  Eyes: Sclerae are anicteric,  conjunctivae are clear   HEENT:  No JVD. Thyroid not visibly enlarged. No mucosal pallor or cyanosis  Respiratory: Intubated, mechanically ventilated  Cardiovascular: Telemetry is revealing AV paced rhythm.  Gastrointestinal: Abdomen nondistended.  Santana catheter present.  Musculoskeletal:  No abnormal movements  Extremities: No digital clubbing or cyanosis  Skin: Color pink. Skin warm and dry to touch.   Neuro: Intubated, sedated        Echocardiogram:   Results for orders placed during the hospital encounter of 01/14/25    Adult Transthoracic Echo Complete W/ Cont if Necessary Per Protocol    Interpretation Summary    Left ventricular systolic function is normal. Calculated left ventricular EF = 54% Left ventricular ejection fraction appears to be 51 - 55%.    Left ventricular diastolic function is consistent with (grade I) impaired relaxation.    Severe aortic valve stenosis is present.    There is a bioprosthetic aortic valve present.    Mild dilation of the aortic root is present.      Imaging  Chest X-ray:   Imaging Results (Last 24 Hours)       ** No results found for the last 24 hours. **            Lab Review: I have reviewed the labs          Results from last 7 days   Lab Units 01/22/25  0843   SODIUM mmol/L 140   POTASSIUM mmol/L 3.9   BUN mg/dL 18   CREATININE mg/dL 0.67   CALCIUM mg/dL 9.5             Results from last 7 days   Lab Units 01/24/25  1019 01/24/25  1015 01/24/25  0940 01/24/25  0728 01/22/25  0843   WBC 10*3/mm3 13.04*  --   --   --  7.40   HEMOGLOBIN g/dL 9.3*  --   --   --  14.7   HEMOGLOBIN, POC g/dL  --  9.5* 10.5*   < >  --    HEMATOCRIT % 28.1*  --   --   --  46.3   HEMATOCRIT POC %  --  28* 31*   < >  --    PLATELETS 10*3/mm3 108*  --   --   --  189    < > = values in this interval not displayed.     Results from last 7 " days   Lab Units 01/24/25  1019 01/22/25  0843   INR  1.95* 1.03   APTT seconds 33.7 27.3             Nba Oswald MD  1/24/2025, 11:44 EST      KIM Pryor/Transcription:   Dictated utilizing Dragon dictation

## 2025-01-24 NOTE — ANESTHESIA PROCEDURE NOTES
Airway  Urgency: elective    Date/Time: 1/24/2025 6:48 AM  End Time:1/24/2025 6:48 AM  Airway not difficult    General Information and Staff    Patient location during procedure: OR  Anesthesiologist: Froilan Cummings MD    Indications and Patient Condition  Indications for airway management: airway protection    Preoxygenated: yes  MILS maintained throughout  Mask difficulty assessment: 1 - vent by mask    Final Airway Details  Final airway type: endotracheal airway      Successful airway: ETT  Cuffed: yes   Successful intubation technique: direct laryngoscopy  Endotracheal tube insertion site: oral  Blade: Orly  Blade size: 3  ETT size (mm): 7.5  Cormack-Lehane Classification: grade I - full view of glottis  Placement verified by: chest auscultation and capnometry   Measured from: teeth  ETT/EBT  to teeth (cm): 21  Number of attempts at approach: 1  Assessment: lips, teeth, and gum same as pre-op and atraumatic intubation    Additional Comments  X1 UDVC. ATRAUMATIC.  TEETH IN PREOP CONDITION.  CUFF TO MINIMUM OCCLUSIVE CUFF PRESSURE. POS ETCO2. BS=BS. ENDO BITE BLOCK

## 2025-01-24 NOTE — OP NOTE
Operative Note    Date of Dictation: 01/24/25    Date of Procedure: 01/24/25    Preoperative diagnosis: Severe aortic stenosis    Postoperative diagnosis:  Same    Procedure:   AVR with Magna ease bioprosthesis #23  Redo sternotomy    Surgeon: Travis Moreno MD     Assistants: PIPE Pascual was responsible for performing the following activities: Cardiac Surgery First assist, surgical wound closure and their skilled assistance was necessary for the success of this case.      Anesthesia: General endotracheal anesthesia and FRAN     Findings: Severe aortic valve stenosis.  Calcific trileaflet valve.     Estimated Blood Loss: 500 mL of Cell Saver returned.     STS Data: The STS Risk Score were discussed with the patient and family.  Counseling was done regarding abuse of tobacco, alcohol and drugs as needed. They understand and wish to proceed. The antibiotics and b blockers were given in the STS required window.    Description of the procedure:     The patient was placed supine on the operative table. Anesthesia was given and lines placed. The patient was prepped and draped using the usual sterile technique. A median Redo sternotomy was performed with a scalpel and the layers carried down to the sternum using the electrocautery. The sternum was split in the midline using a vertical oscillating saw. Hemostasis was achieved. 300 units/kg of IV heparin was given to an ACT over 400. A Favaloro retractor was placed and the mediastinum exposed, the pericardium was opened and the edges tacked to the wound. Cannulation sutures were placed in the ascending aorta and right atrium. Small cannulas were placed and aorto right atrial cardiopulmonary bypass was started. Cardioplegia cannulas were placed and then the ascending aorta was clamped. One liter of cold blood cardioplegia was given in an antegrade fashion to achieved diastolic arrest and further doses every 15 minutes thereafter. A transverse aortotomy was  performed and the diseased valve exposed. The leaflets were resected and the annulus debrided and then the area irrigated. The annulus was sized to a 23 mm Magna ease prosthesis that was washed as recommended by the . 2.0 Ethibond pledgeted sutures were placed in a supra-annular fashion circumferentially around the annulus and the sutures passed through the sewing ring. The valve was descended and the knots secured with Cor-Knot device. The aortotomy was closed with a double layer of 4.0 Prolene suture and de-aired before closure. Then the warm dose of cardioplegia was given and then the aortic clamp removed. The lungs ventilated. The heart was paced till regular atrial rhythm resumed. I allowed the heart to eject  and I de-aired the left heart chambers under FRAN guidance and once hemodynamics were acceptable, then the CPB was discontinued and the venous and cardioplegia cannulas removed. The matching dose of protamine was given and the aortic cannula removed as well. AV temporary wires and mediastinal chest tubes were placed and the wound sprayed with platelet rich plasma. The perioperative FRAN showed the valve well seated without perivalvular leaks. The sternum was closed with single and double wires and soft tissue in layers of reabsorbable material. The wounds were covered with sterile dressings.       Specimen removed:  Prosthetic aortic valve    CPB time:  80 minutes    Aortic clamp time:  67 minutes    Complications:  none           Disposition: Cardiovascular recovery room           Condition: Critical but stable.

## 2025-01-24 NOTE — ANESTHESIA PROCEDURE NOTES
Central Line      Patient location during procedure: OR  Start time: 1/24/2025 6:50 AM  Stop Time:1/24/2025 7:00 AM  Indications: central pressure monitoring, vascular access and MD/Surgeon request  Staff  Anesthesiologist: Froilan Cummings MD  Preanesthetic Checklist  Completed: patient identified, IV checked, site marked, risks and benefits discussed, surgical consent, monitors and equipment checked, pre-op evaluation and timeout performed  Central Line Prep  Sterile Tech:gloves, cap, gown, mask and sterile barriers  Prep: chloraprep  Patient monitoring: blood pressure monitoring, continuous pulse oximetry and EKG  Central Line Procedure  Laterality:right  Location:internal jugular  Catheter Type:double lumen and MAC  Catheter Size:9 Fr  Guidance:ultrasound guided  PROCEDURE NOTE/ULTRASOUND INTERPRETATION.  Using ultrasound guidance the potential vascular sites for insertion of the catheter were visualized to determine the patency of the vessel to be used for vascular access.  After selecting the appropriate site for insertion, the needle was visualized under ultrasound being inserted into the internal jugular vein, followed by ultrasound confirmation of wire and catheter placement. There were no abnormalities seen on ultrasound; an image was taken; and the patient tolerated the procedure with no complications. Images: still images obtained, printed/placed on chart  Assessment  Post procedure:biopatch applied, line sutured and occlusive dressing applied  Assessement:blood return through all ports, free fluid flow, chest x-ray ordered and Lc Test  Complications:no  Patient Tolerance:patient tolerated the procedure well with no apparent complications  Additional Notes  RIJ MAC VIA ASEPTIC SELDINGER TECH THRU ANTERIOR APPROACH US GUIDANCE X1

## 2025-01-24 NOTE — ANESTHESIA PROCEDURE NOTES
Intra-Op Anesthesia FRAN    Procedure Performed: Intra-Op Anesthesia FRAN       Start Time:  1/24/2025 7:10 AM       End Time:   1/24/2025 7:20 AM    Preanesthesia Checklist:  Patient identified, IV assessed, risks and benefits discussed, monitors and equipment assessed, procedure being performed at surgeon's request and anesthesia consent obtained.    General Procedure Information  FRAN Placed for monitoring purposes only -- This is not a diagnostic FRAN  Diagnostic Indications for Echo:  assessment of ascending aorta, assessment of surgical repair, defect repair evaluation and hemodynamic monitoring  Location performed:  OR  Intubated  Bite block placed  Heart visualized  Probe Insertion:  Easy  Probe Type:  Multiplane  Modalities:  Color flow mapping, continuous wave Doppler and pulse wave Doppler    Echocardiographic and Doppler Measurements    Ventricles    Right Ventricle:  Cavity size normal.  Hypertrophy not present.  Thrombus not present.  Global function normal.    Left Ventricle:  Cavity size normal.  Thrombus not present.  Global Function mildly impaired.          Valves    Aortic Valve:  Annulus bioprosthetic.  Stenosis severe.  Mean Gradient: 72/35 mmHg.  Pressure Half-time: 4 ms.  Regurgitation absent.  Leaflets calcified and thickened.  Leaflet motions restricted.      Mitral Valve:  Annulus normal.  Stenosis not present.  Regurgitation trace.  Leaflets normal.  Leaflet motions normal.      Tricuspid Valve:  Annulus normal.  Stenosis not present.  Regurgitation trace.  Leaflets normal.  Leaflet motions normal.    Pulmonic Valve:  Annulus normal.        Aorta    Ascending Aorta:  Size dilated.  Diameter 3.9 cm.  Dissection not present.  Plaque thickness less than 3 mm.  Mobile plaque not present.    Aortic Arch:  Size normal.  Diameter 2.5 cm.  Dissection not present.  Plaque thickness less than 3 mm.  Mobile plaque not present.    Descending Aorta:  Size normal.  Diameter 2.7 cm.  Dissection not present.   Plaque thickness less than 3 mm.  Mobile plaque not present.        Atria    Right Atrium:  Size normal.  Spontaneous echo contrast not present.  Thrombus not present.  Tumor not present.  Device not present.      Left Atrium:  Size dilated.  Spontaneous echo contrast not present.  Thrombus not present.  Tumor not present.  Device not present.    Left atrial appendage normal.              Other Findings  Pericardium:  normal  Pleural Effusion:  none  Pulmonary Arteries:  normal  Pulmonary Venous Flow:  normal    Anesthesia Information  Performed Personally  Anesthesiologist:  Froilan Cummings MD      Echocardiogram Comments:       BIOPROSTHETIC AV. R CUSP IMMOBILE. L RESTRICTED. SEVERE AS PK VEL4.24, 72/35  MV: TRACE MR  TV: TRACE TI  LA ENLARGEMENT 5.2CM  LVH   EF 45

## 2025-01-24 NOTE — ANESTHESIA PROCEDURE NOTES
Central Line      Patient location during procedure: OR  Start time: 1/24/2025 7:01 AM  Stop Time:1/24/2025 7:03 AM  Indications: central pressure monitoring, vascular access and MD/Surgeon request  Staff  Anesthesiologist: Froilan Cummings MD  Preanesthetic Checklist  Completed: patient identified, IV checked, site marked, risks and benefits discussed, surgical consent, monitors and equipment checked, pre-op evaluation and timeout performed  Central Line Prep  Sterile Tech:cap, gloves, gown, mask and sterile barriers  Prep: chloraprep  Patient monitoring: blood pressure monitoring, continuous pulse oximetry and EKG  Central Line Procedure  Laterality:right  Location:internal jugular  Catheter Type:Maben-Rip  Assessment  Complications:no  Patient Tolerance:patient tolerated the procedure well with no apparent complications  Additional Notes  THRU PREVIOUSLY PLACED MAC INTRODUCER NEW GLOVES/DRAPE TO PA X1 ATTEMPT 51CM NO WEDGE ATTEMPTED

## 2025-01-25 ENCOUNTER — APPOINTMENT (OUTPATIENT)
Dept: GENERAL RADIOLOGY | Facility: HOSPITAL | Age: 70
DRG: 220 | End: 2025-01-25
Payer: MEDICARE

## 2025-01-25 LAB
ALBUMIN SERPL-MCNC: 4.4 G/DL (ref 3.5–5.2)
ANION GAP SERPL CALCULATED.3IONS-SCNC: 9.4 MMOL/L (ref 5–15)
ARTERIAL PATENCY WRIST A: ABNORMAL
ATMOSPHERIC PRESS: ABNORMAL MM[HG]
BASE EXCESS BLDA CALC-SCNC: 0.7 MMOL/L (ref 0–3)
BASOPHILS # BLD AUTO: 0.01 10*3/MM3 (ref 0–0.2)
BASOPHILS NFR BLD AUTO: 0.1 % (ref 0–1.5)
BDY SITE: ABNORMAL
BH BB BLOOD EXPIRATION DATE: NORMAL
BH BB BLOOD TYPE BARCODE: 5100
BH BB BLOOD TYPE BARCODE: 6200
BH BB BLOOD TYPE BARCODE: 9500
BH BB DISPENSE STATUS: NORMAL
BH BB PRODUCT CODE: NORMAL
BH BB UNIT NUMBER: NORMAL
BUN SERPL-MCNC: 18 MG/DL (ref 8–23)
BUN/CREAT SERPL: 24 (ref 7–25)
CA-I SERPL ISE-MCNC: 1.14 MMOL/L (ref 1.15–1.3)
CALCIUM SPEC-SCNC: 8.9 MG/DL (ref 8.6–10.5)
CHLORIDE SERPL-SCNC: 109 MMOL/L (ref 98–107)
CO2 BLDA-SCNC: 27.3 MMOL/L (ref 22–29)
CO2 SERPL-SCNC: 23.6 MMOL/L (ref 22–29)
CREAT SERPL-MCNC: 0.75 MG/DL (ref 0.57–1)
CROSSMATCH INTERPRETATION: NORMAL
DEPRECATED RDW RBC AUTO: 46.1 FL (ref 37–54)
EGFRCR SERPLBLD CKD-EPI 2021: 86.8 ML/MIN/1.73
EOSINOPHIL # BLD AUTO: 0.01 10*3/MM3 (ref 0–0.4)
EOSINOPHIL NFR BLD AUTO: 0.1 % (ref 0.3–6.2)
ERYTHROCYTE [DISTWIDTH] IN BLOOD BY AUTOMATED COUNT: 14 % (ref 12.3–15.4)
GLUCOSE BLDC GLUCOMTR-MCNC: 121 MG/DL (ref 70–105)
GLUCOSE BLDC GLUCOMTR-MCNC: 123 MG/DL (ref 70–105)
GLUCOSE BLDC GLUCOMTR-MCNC: 127 MG/DL (ref 70–105)
GLUCOSE BLDC GLUCOMTR-MCNC: 130 MG/DL (ref 70–105)
GLUCOSE BLDC GLUCOMTR-MCNC: 134 MG/DL (ref 70–105)
GLUCOSE BLDC GLUCOMTR-MCNC: 135 MG/DL (ref 70–105)
GLUCOSE BLDC GLUCOMTR-MCNC: 135 MG/DL (ref 70–105)
GLUCOSE BLDC GLUCOMTR-MCNC: 140 MG/DL (ref 70–105)
GLUCOSE BLDC GLUCOMTR-MCNC: 146 MG/DL (ref 70–105)
GLUCOSE BLDC GLUCOMTR-MCNC: 154 MG/DL (ref 70–105)
GLUCOSE BLDC GLUCOMTR-MCNC: 163 MG/DL (ref 70–105)
GLUCOSE BLDC GLUCOMTR-MCNC: 166 MG/DL (ref 70–105)
GLUCOSE BLDC GLUCOMTR-MCNC: 172 MG/DL (ref 70–105)
GLUCOSE BLDC GLUCOMTR-MCNC: 185 MG/DL (ref 70–105)
GLUCOSE SERPL-MCNC: 129 MG/DL (ref 65–99)
HCO3 BLDA-SCNC: 25.9 MMOL/L (ref 21–28)
HCT VFR BLD AUTO: 29.5 % (ref 34–46.6)
HEMODILUTION: NO
HGB BLD-MCNC: 9.5 G/DL (ref 12–15.9)
IMM GRANULOCYTES # BLD AUTO: 0.03 10*3/MM3 (ref 0–0.05)
IMM GRANULOCYTES NFR BLD AUTO: 0.4 % (ref 0–0.5)
INHALED O2 CONCENTRATION: 36 %
INR PPP: 1.32 (ref 0.9–1.1)
LARGE PLATELETS: NORMAL
LYMPHOCYTES # BLD AUTO: 0.66 10*3/MM3 (ref 0.7–3.1)
LYMPHOCYTES NFR BLD AUTO: 7.9 % (ref 19.6–45.3)
MAGNESIUM SERPL-MCNC: 2.5 MG/DL (ref 1.6–2.4)
MCH RBC QN AUTO: 29.2 PG (ref 26.6–33)
MCHC RBC AUTO-ENTMCNC: 32.2 G/DL (ref 31.5–35.7)
MCV RBC AUTO: 90.8 FL (ref 79–97)
MODALITY: ABNORMAL
MONOCYTES # BLD AUTO: 0.68 10*3/MM3 (ref 0.1–0.9)
MONOCYTES NFR BLD AUTO: 8.2 % (ref 5–12)
NEUTROPHILS NFR BLD AUTO: 6.94 10*3/MM3 (ref 1.7–7)
NEUTROPHILS NFR BLD AUTO: 83.3 % (ref 42.7–76)
NRBC BLD AUTO-RTO: 0 /100 WBC (ref 0–0.2)
PCO2 BLDA: 43.3 MM HG (ref 35–48)
PH BLDA: 7.39 PH UNITS (ref 7.35–7.45)
PHOSPHATE SERPL-MCNC: 4.2 MG/DL (ref 2.5–4.5)
PLATELET # BLD AUTO: 110 10*3/MM3 (ref 140–450)
PMV BLD AUTO: 12.4 FL (ref 6–12)
PO2 BLD: 169 MM[HG] (ref 0–500)
PO2 BLDA: 61 MM HG (ref 83–108)
POTASSIUM SERPL-SCNC: 4.1 MMOL/L (ref 3.5–5.2)
PROTHROMBIN TIME: 16.3 SECONDS (ref 11.7–14.2)
RBC # BLD AUTO: 3.25 10*6/MM3 (ref 3.77–5.28)
RBC MORPH BLD: NORMAL
SAO2 % BLDCOA: 90.5 % (ref 94–98)
SODIUM SERPL-SCNC: 142 MMOL/L (ref 136–145)
UNIT  ABO: NORMAL
UNIT  RH: NORMAL
WBC MORPH BLD: NORMAL
WBC NRBC COR # BLD AUTO: 8.33 10*3/MM3 (ref 3.4–10.8)

## 2025-01-25 PROCEDURE — 71045 X-RAY EXAM CHEST 1 VIEW: CPT

## 2025-01-25 PROCEDURE — 85610 PROTHROMBIN TIME: CPT | Performed by: NURSE PRACTITIONER

## 2025-01-25 PROCEDURE — 85025 COMPLETE CBC W/AUTO DIFF WBC: CPT | Performed by: NURSE PRACTITIONER

## 2025-01-25 PROCEDURE — 82948 REAGENT STRIP/BLOOD GLUCOSE: CPT

## 2025-01-25 PROCEDURE — 25010000002 MORPHINE PER 10 MG: Performed by: NURSE PRACTITIONER

## 2025-01-25 PROCEDURE — 25010000002 NICARDIPINE 2.5 MG/ML SOLUTION 10 ML VIAL: Performed by: NURSE PRACTITIONER

## 2025-01-25 PROCEDURE — 25010000002 MAGNESIUM SULFATE IN D5W 1G/100ML (PREMIX) 1-5 GM/100ML-% SOLUTION: Performed by: NURSE PRACTITIONER

## 2025-01-25 PROCEDURE — 82330 ASSAY OF CALCIUM: CPT | Performed by: NURSE PRACTITIONER

## 2025-01-25 PROCEDURE — 97167 OT EVAL HIGH COMPLEX 60 MIN: CPT

## 2025-01-25 PROCEDURE — 83735 ASSAY OF MAGNESIUM: CPT | Performed by: NURSE PRACTITIONER

## 2025-01-25 PROCEDURE — 99232 SBSQ HOSP IP/OBS MODERATE 35: CPT | Performed by: INTERNAL MEDICINE

## 2025-01-25 PROCEDURE — 80069 RENAL FUNCTION PANEL: CPT | Performed by: NURSE PRACTITIONER

## 2025-01-25 PROCEDURE — 25010000002 FUROSEMIDE PER 20 MG: Performed by: THORACIC SURGERY (CARDIOTHORACIC VASCULAR SURGERY)

## 2025-01-25 PROCEDURE — 97163 PT EVAL HIGH COMPLEX 45 MIN: CPT

## 2025-01-25 PROCEDURE — 25010000002 CEFAZOLIN PER 500 MG: Performed by: NURSE PRACTITIONER

## 2025-01-25 PROCEDURE — 97530 THERAPEUTIC ACTIVITIES: CPT

## 2025-01-25 PROCEDURE — 25010000002 ACETAMINOPHEN 10 MG/ML SOLUTION: Performed by: NURSE PRACTITIONER

## 2025-01-25 PROCEDURE — 25010000002 ENOXAPARIN PER 10 MG: Performed by: NURSE PRACTITIONER

## 2025-01-25 PROCEDURE — 25810000003 SODIUM CHLORIDE 0.9 % SOLUTION 250 ML FLEX CONT: Performed by: NURSE PRACTITIONER

## 2025-01-25 PROCEDURE — 82803 BLOOD GASES ANY COMBINATION: CPT

## 2025-01-25 PROCEDURE — 93005 ELECTROCARDIOGRAM TRACING: CPT | Performed by: NURSE PRACTITIONER

## 2025-01-25 PROCEDURE — 85007 BL SMEAR W/DIFF WBC COUNT: CPT | Performed by: NURSE PRACTITIONER

## 2025-01-25 RX ORDER — FUROSEMIDE 10 MG/ML
40 INJECTION INTRAMUSCULAR; INTRAVENOUS EVERY 12 HOURS
Status: DISCONTINUED | OUTPATIENT
Start: 2025-01-25 | End: 2025-01-26

## 2025-01-25 RX ORDER — POTASSIUM CHLORIDE 1500 MG/1
20 TABLET, EXTENDED RELEASE ORAL 2 TIMES DAILY WITH MEALS
Status: DISCONTINUED | OUTPATIENT
Start: 2025-01-25 | End: 2025-01-29 | Stop reason: HOSPADM

## 2025-01-25 RX ORDER — AMLODIPINE BESYLATE 5 MG/1
5 TABLET ORAL
Status: DISCONTINUED | OUTPATIENT
Start: 2025-01-25 | End: 2025-01-29 | Stop reason: HOSPADM

## 2025-01-25 RX ADMIN — ASPIRIN 81 MG: 81 TABLET, COATED ORAL at 08:03

## 2025-01-25 RX ADMIN — FUROSEMIDE 40 MG: 10 INJECTION, SOLUTION INTRAMUSCULAR; INTRAVENOUS at 20:51

## 2025-01-25 RX ADMIN — CYCLOBENZAPRINE HYDROCHLORIDE 5 MG: 5 TABLET, FILM COATED ORAL at 09:34

## 2025-01-25 RX ADMIN — CEFAZOLIN 2 G: 2 INJECTION, POWDER, FOR SOLUTION INTRAMUSCULAR; INTRAVENOUS at 17:20

## 2025-01-25 RX ADMIN — MAGNESIUM SULFATE HEPTAHYDRATE 1 G: 10 INJECTION, SOLUTION INTRAVENOUS at 09:34

## 2025-01-25 RX ADMIN — SENNOSIDES AND DOCUSATE SODIUM 2 TABLET: 50; 8.6 TABLET ORAL at 20:51

## 2025-01-25 RX ADMIN — CHLORHEXIDINE GLUCONATE 15 ML: 1.2 RINSE ORAL at 08:03

## 2025-01-25 RX ADMIN — SENNOSIDES AND DOCUSATE SODIUM 2 TABLET: 50; 8.6 TABLET ORAL at 08:03

## 2025-01-25 RX ADMIN — HYDROCODONE BITARTRATE AND ACETAMINOPHEN 1 TABLET: 5; 325 TABLET ORAL at 20:52

## 2025-01-25 RX ADMIN — MORPHINE SULFATE 2 MG: 4 INJECTION, SOLUTION INTRAMUSCULAR; INTRAVENOUS at 05:45

## 2025-01-25 RX ADMIN — HYDROCODONE BITARTRATE AND ACETAMINOPHEN 1 TABLET: 5; 325 TABLET ORAL at 08:04

## 2025-01-25 RX ADMIN — ACETAMINOPHEN 1000 MG: 10 INJECTION INTRAVENOUS at 01:25

## 2025-01-25 RX ADMIN — FUROSEMIDE 40 MG: 10 INJECTION, SOLUTION INTRAMUSCULAR; INTRAVENOUS at 08:04

## 2025-01-25 RX ADMIN — CHLORHEXIDINE GLUCONATE 15 ML: 1.2 RINSE ORAL at 20:51

## 2025-01-25 RX ADMIN — HYDROCODONE BITARTRATE AND ACETAMINOPHEN 1 TABLET: 5; 325 TABLET ORAL at 16:06

## 2025-01-25 RX ADMIN — POTASSIUM CHLORIDE 20 MEQ: 1500 TABLET, EXTENDED RELEASE ORAL at 08:03

## 2025-01-25 RX ADMIN — ENOXAPARIN SODIUM 40 MG: 100 INJECTION SUBCUTANEOUS at 15:02

## 2025-01-25 RX ADMIN — POLYETHYLENE GLYCOL 3350 17 G: 17 POWDER, FOR SOLUTION ORAL at 20:52

## 2025-01-25 RX ADMIN — MORPHINE SULFATE 2 MG: 4 INJECTION, SOLUTION INTRAMUSCULAR; INTRAVENOUS at 15:02

## 2025-01-25 RX ADMIN — MORPHINE SULFATE 2 MG: 4 INJECTION, SOLUTION INTRAMUSCULAR; INTRAVENOUS at 01:34

## 2025-01-25 RX ADMIN — HYDROCODONE BITARTRATE AND ACETAMINOPHEN 1 TABLET: 5; 325 TABLET ORAL at 03:54

## 2025-01-25 RX ADMIN — SODIUM CHLORIDE 5 MG/HR: 9 INJECTION, SOLUTION INTRAVENOUS at 03:32

## 2025-01-25 RX ADMIN — MUPIROCIN 1 APPLICATION: 20 OINTMENT TOPICAL at 08:03

## 2025-01-25 RX ADMIN — ATORVASTATIN CALCIUM 40 MG: 40 TABLET, FILM COATED ORAL at 20:52

## 2025-01-25 RX ADMIN — POTASSIUM CHLORIDE 20 MEQ: 1500 TABLET, EXTENDED RELEASE ORAL at 17:21

## 2025-01-25 RX ADMIN — MUPIROCIN 1 APPLICATION: 20 OINTMENT TOPICAL at 20:52

## 2025-01-25 RX ADMIN — CYCLOBENZAPRINE HYDROCHLORIDE 5 MG: 5 TABLET, FILM COATED ORAL at 17:21

## 2025-01-25 RX ADMIN — PANTOPRAZOLE SODIUM 40 MG: 40 TABLET, DELAYED RELEASE ORAL at 05:46

## 2025-01-25 RX ADMIN — LEVOTHYROXINE SODIUM 112 MCG: 112 TABLET ORAL at 08:03

## 2025-01-25 RX ADMIN — MAGNESIUM SULFATE HEPTAHYDRATE 1 G: 10 INJECTION, SOLUTION INTRAVENOUS at 03:28

## 2025-01-25 RX ADMIN — CEFAZOLIN 2 G: 2 INJECTION, POWDER, FOR SOLUTION INTRAMUSCULAR; INTRAVENOUS at 09:34

## 2025-01-25 RX ADMIN — CEFAZOLIN 2 G: 2 INJECTION, POWDER, FOR SOLUTION INTRAMUSCULAR; INTRAVENOUS at 03:27

## 2025-01-25 RX ADMIN — AMLODIPINE BESYLATE 5 MG: 5 TABLET ORAL at 09:34

## 2025-01-25 RX ADMIN — HYDROCODONE BITARTRATE AND ACETAMINOPHEN 1 TABLET: 5; 325 TABLET ORAL at 12:21

## 2025-01-25 NOTE — THERAPY EVALUATION
Patient Name: Lulu Graves  : 1955    MRN: [id number]                              Today's Date: 2025       Admit Date: 2025    Visit Dx:     ICD-10-CM ICD-9-CM   1. Stenosis of prosthetic aortic valve, initial encounter  T82.857A 996.71   2. Stenosis of prosthetic aortic valve, subsequent encounter  T82.857D V58.89     996.71     Patient Active Problem List   Diagnosis    Status post aortic valve replacement    Steatosis of liver    LUÍS on CPAP    Hypothyroidism    Hypertension    Hyperlipidemia    Gastroesophageal reflux disease    Aortic stenosis    Class 3 severe obesity due to excess calories without serious comorbidity with body mass index (BMI) of 40.0 to 44.9 in adult    Environmental and seasonal allergies    Osteopenia of multiple sites    Prediabetes    Chest pain    Aortic stenosis, severe    Prosthetic aortic valve stenosis     Past Medical History:   Diagnosis Date    Allergic     Aortic stenosis     Aortic valve replaced 2013    Cataract     GERD (gastroesophageal reflux disease)     Glaucoma 2023    Just started    Heart murmur     Hyperlipidemia     Hypertension     Hypothyroidism     Liver disease     Due to medication    MRSA (methicillin resistant Staphylococcus aureus)     left arm abscess    Obesity     Osteopenia     PAT (paroxysmal atrial tachycardia)     Sleep apnea      Past Surgical History:   Procedure Laterality Date    ABLATION OF DYSRHYTHMIC FOCUS   or     AORTIC VALVE REPAIR/REPLACEMENT      AORTIC VALVE SURGERY  2013    CARDIAC CATHETERIZATION      Before open heart surgery    CARDIAC CATHETERIZATION N/A 1/15/2025    Procedure: Left Heart Cath and coronary angiogram;  Surgeon: Nba Oswald MD;  Location: [facility] INVASIVE LOCATION;  Service: Cardiovascular;  Laterality: N/A;    CARDIAC VALVE REPLACEMENT  2013     SECTION  1982    COLONOSCOPY  2016    INCISION AND DRAINAGE ABSCESS Left 2023     Procedure: INCISION AND DRAINAGE ABSCESS, left arm;  Surgeon: Addy Torres MD;  Location: [facility] MAIN OR;  Service: General;  Laterality: Left;    TUBAL ABDOMINAL LIGATION        General Information       Row Name 01/25/25 1741          Physical Therapy Time and Intention    Document Type evaluation  -     Mode of Treatment individual therapy;physical therapy  -       Row Name 01/25/25 1741          General Information    Prior Level of Function independent:;all household mobility;ADL's  -     Existing Precautions/Restrictions fall;sternal  -     Barriers to Rehab none identified  -       Row Name 01/25/25 1741          Living Environment    People in Home spouse  -       Row Name 01/25/25 1741          Home Main Entrance    Number of Stairs, Main Entrance two  -       Row Name 01/25/25 1741          Stairs Within Home, Primary    Number of Stairs, Within Home, Primary none  -       Row Name 01/25/25 1741          Cognition    Orientation Status (Cognition) oriented x 4  -       Row Name 01/25/25 1741          Safety Issues/Impairments Affecting Functional Mobility    Impairments Affecting Function (Mobility) endurance/activity tolerance;pain;shortness of breath  -               User Key  (r) = Recorded By, (t) = Taken By, (c) = Cosigned By      Initials Name Provider Type    EL Amadou Toro PT Physical Therapist                   Mobility       Row Name 01/25/25 1747          Bed Mobility    Bed Mobility sit-supine;scooting/bridging  -     Scooting/Bridging [place] (Bed Mobility) 2 person assist;minimum assist (75% patient effort)  -     Sit-Supine [place] (Bed Mobility) 2 person assist;maximum assist (25% patient effort)  -       Row Name 01/25/25 1747          Bed-Chair Transfer    Bed-Chair [place] (Transfers) 1 person to manage equipment;1 person assist;contact guard;verbal cues  -       Row Name 01/25/25 1747          Sit-Stand Transfer    Sit-Stand  [place] (Transfers) 1 person to manage equipment;1 person assist;contact guard;verbal cues  -EL       Row Name 01/25/25 1747          Gait/Stairs (Locomotion)    [place] Level (Gait) contact guard;1 person to manage equipment  -EL     Assistive Device (Gait) walker, front-wheeled  -EL     Patient was able to Ambulate yes  -EL     Distance in Feet (Gait) 40  -EL     Deviations/Abnormal Patterns (Gait) gait speed decreased;stride length decreased  -EL     Comment, (Gait/Stairs) cueing for pacing and AD managment  -EL               User Key  (r) = Recorded By, (t) = Taken By, (c) = Cosigned By      Initials Name Provider Type    Amadou Cagle, PT Physical Therapist                   Obj/Interventions       Row Name 01/25/25 1750          Range of Motion Comprehensive    General Range of Motion bilateral lower extremity ROM WFL  -       Row Name 01/25/25 1750          Strength Comprehensive (MMT)    General Manual Muscle Testing (MMT) Assessment lower extremity strength deficits identified  -EL     Comment, General Manual Muscle Testing (MMT) Assessment BLE 4/5 gross  -EL       Row Name 01/25/25 1750          Sensory Assessment (Somatosensory)    Sensory Assessment (Somatosensory) sensation intact  -EL               User Key  (r) = Recorded By, (t) = Taken By, (c) = Cosigned By      Initials Name Provider Type    Amadou Cagle, PT Physical Therapist                   Goals/Plan       Row Name 01/25/25 1756          Bed Mobility Goal 1 (PT)    Activity/Assistive Device (Bed Mobility Goal 1, PT) bed mobility activities, all  -EL     [place] Level/Cues Needed (Bed Mobility Goal 1, PT) modified independence  -EL     Time Frame (Bed Mobility Goal 1, PT) long term goal (LTG);2 weeks  -EL       Row Name 01/25/25 1756          Transfer Goal 1 (PT)    Activity/Assistive Device (Transfer Goal 1, PT) transfers, all;walker, rolling  -EL     [place] Level/Cues Needed (Transfer Goal 1, PT) modified independence   -EL     Time Frame (Transfer Goal 1, PT) long term goal (LTG);2 weeks  -EL       Row Name 01/25/25 1756          Gait Training Goal 1 (PT)    Activity/Assistive Device (Gait Training Goal 1, PT) gait (walking locomotion);walker, rolling  -EL     [place] Level (Gait Training Goal 1, PT) modified independence  -EL     Distance (Gait Training Goal 1, PT) 250  -EL     Time Frame (Gait Training Goal 1, PT) long term goal (LTG);2 weeks  -EL       Row Name 01/25/25 1756          Stairs Goal 1 (PT)    Activity/Assistive Device (Stairs Goal 1, PT) stairs, all skills  -EL     [place] Level/Cues Needed (Stairs Goal 1, PT) modified independence  -EL     Number of Stairs (Stairs Goal 1, PT) 2  -EL     Time Frame (Stairs Goal 1, PT) long term goal (LTG);2 weeks  -EL               User Key  (r) = Recorded By, (t) = Taken By, (c) = Cosigned By      Initials Name Provider Type    EL Amadou Toro, PT Physical Therapist                   Clinical Impression       Row Name 01/25/25 1750          Pain    Pretreatment Pain Rating 5/10  -EL     Posttreatment Pain Rating 5/10  -EL     Pain Location chest  -EL       Row Name 01/25/25 1750          Plan of Care Review    Plan of Care Reviewed With patient  -EL     Outcome Evaluation Pt is a 68 YO F admitted [facility] valvular heart disease POD 1  redo AVR. Pt lives at home [facility] spouse, both retired, and is generally independent with all ADLs, ambulation without AD, but states she has a RWx if needed. Pt has 2 steps to enter home and states she usually has no difficulty navigating. Pt this date nervous but willing to participate, educated on sternal precautions, and verbalizes understanding. Pt requiring CGA for transfers and ambulation with RWx, Ax2 for return to supine in bed. Pt likely to progress well and anticipate safe return home with family assist.  -EL       Row Name 01/25/25 1750          Therapy Assessment/Plan (PT)    Rehab Potential (PT) good  -EL     Criteria for Skilled  Interventions Met (PT) yes  -EL     Therapy Frequency (PT) 5 times/wk  -EL       Row Name 01/25/25 1750          Vital Signs    O2 Delivery Pre Treatment supplemental O2  -EL     O2 Delivery Intra Treatment supplemental O2  -EL     O2 Delivery Post Treatment supplemental O2  -EL     Pre Patient Position Supine  -EL     Intra Patient Position Standing  -EL     Post Patient Position Sitting  -EL       Row Name 01/25/25 1750          Positioning and Restraints    Pre-Treatment Position in bed  -EL     Post Treatment Position bed  -EL     In Bed notified nsg;supine;call light within reach;encouraged to call for assist;exit alarm on  -EL               User Key  (r) = Recorded By, (t) = Taken By, (c) = Cosigned By      Initials Name Provider Type    Amadou Cagle PT Physical Therapist                   Outcome Measures       Row Name 01/25/25 1757          How much help from another person do you currently need...    Turning from your back to your side while in flat bed without using bedrails? 3  -EL     Moving from lying on back to sitting on the side of a flat bed without bedrails? 2  -EL     Moving to and from a bed to a chair (including a wheelchair)? 3  -EL     Standing up from a chair using your arms (e.g., wheelchair, bedside chair)? 3  -EL     Climbing 3-5 steps with a railing? 1  -EL     To walk in hospital room? 3  -EL     AM-PAC 6 Clicks Score (PT) 15  -EL     Highest Level of Mobility Goal 4 --> Transfer to chair/commode  -EL       Row Name 01/25/25 1757          Functional Assessment    Outcome Measure Options AM-PAC 6 Clicks Basic Mobility (PT)  -EL               User Key  (r) = Recorded By, (t) = Taken By, (c) = Cosigned By      Initials Name Provider Type    Amadou Cagle PT Physical Therapist                                 Physical Therapy Education       Title: PT OT SLP Therapies (Done)       Topic: Physical Therapy (Done)       Point: Mobility training (Done)       Learning Progress Summary             Patient Acceptance, E,TB, VU by JEREMIAH at 1/25/2025 1757                      Point: Precautions (Done)       Learning Progress Summary            Patient Acceptance, E,TB, VU by JEREMIAH at 1/25/2025 1757                                      User Key       Initials Effective Dates Name Provider Type Discipline     06/23/20 -  Amadou Toro PT Physical Therapist PT                  PT Recommendation and Plan     Outcome Evaluation: Pt is a 68 YO F admitted [facility] valvular heart disease POD 1  redo AVR. Pt lives at home wiht spouse, both retired, and is generally independent with all ADLs, ambulation without AD, but states she has a RWx if needed. Pt has 2 steps to enter home and states she usually has no difficulty navigating. Pt this date nervous but willing to participate, educated on sternal precautions, and verbalizes understanding. Pt requiring CGA for transfers and ambulation with RWx, Ax2 for return to supine in bed. Pt likely to progress well and anticipate safe return home with family assist.     Time Calculation:   PT Evaluation Complexity  History, PT Evaluation Complexity: 3 or more personal factors and/or comorbidities  Examination of Body Systems (PT Eval Complexity): total of 4 or more elements  Clinical Presentation (PT Evaluation Complexity): unstable  Clinical Decision Making (PT Evaluation Complexity): high complexity  Overall Complexity (PT Evaluation Complexity): high complexity     PT Charges       Row Name 01/25/25 1757             Time Calculation    Start Time 1100  -EL      Stop Time 1127  -EL      Time Calculation (min) 27 min  -EL      PT Received On 01/25/25  -EL      PT - Next Appointment 01/27/25  -      PT Goal Re-Cert Due Date 02/08/25  -                User Key  (r) = Recorded By, (t) = Taken By, (c) = Cosigned By      Initials Name Provider Type    Amadou Cagle PT Physical Therapist                  Therapy Charges for Today       Code Description Service Date Service Provider  Modifiers Qty    51882061626 HC PT EVAL HIGH COMPLEXITY 4 1/25/2025 Amadou Toro, PT GP 1            PT G-Codes  Outcome Measure Options: AM-PAC 6 Clicks Basic Mobility (PT)  AM-PAC 6 Clicks Score (PT): 15  PT Discharge Summary  Anticipated Discharge Disposition (PT): home with assist    Amadou Toro PT  1/25/2025

## 2025-01-25 NOTE — PLAN OF CARE
Goal Outcome Evaluation:  Plan of Care Reviewed With: patient           Outcome Evaluation: Pt is a 68 YO F admitted [facility] valvular heart disease POD 1  redo AVR. Pt lives at home wiht spouse, both retired, and is generally independent with all ADLs, ambulation without AD, but states she has a RWx if needed. Pt has 2 steps to enter home and states she usually has no difficulty navigating. Pt this date nervous but willing to participate, educated on sternal precautions, and verbalizes understanding. Pt requiring CGA for transfers and ambulation with RWx, Ax2 for return to supine in bed. Pt likely to progress well and anticipate safe return home with family assist.    Anticipated Discharge Disposition (PT): home with assist

## 2025-01-25 NOTE — PROGRESS NOTES
She looks great this morning.  She was in sinus bradycardia in the 40s earlier but now in the 70s with normal sinus rhythm.    Restarted Norvasc.    She had some increased drainage early after surgery but now looks fine.  We may be able to get all tubes out today except for the soft drain.  Chest x-ray is clear.

## 2025-01-25 NOTE — PLAN OF CARE
Problem: Adult Inpatient Plan of Care  Goal: Plan of Care Review  Recent Flowsheet Documentation  Taken 1/25/2025 1329 by Casandra Cardona, OT  Outcome Evaluation: Pt is 70 y/o F with valvular heart disease status post redo aortic valve replacement yesterday. This is a re-op from years prior and pt is doing well this date. She (& her spouse) are retired, and they are independent in all I/ADL. Pt has short stature so she did require a lot of assistance getting into bed, but walked well and should advance past preparatory activities to functional tasks in standing & sitting. Recommend home at d/c. Pt has RW if needed. Lives in modular home with 2 RAMIN.  Taken 1/25/2025 1320 by Casandra Cardona, OT  Progress: improving

## 2025-01-25 NOTE — THERAPY EVALUATION
Patient Name: Lulu Graves  : 1955    MRN: [id number]                              Today's Date: 2025       Admit Date: 2025    Visit Dx:     ICD-10-CM ICD-9-CM   1. Stenosis of prosthetic aortic valve, initial encounter  T82.857A 996.71   2. Stenosis of prosthetic aortic valve, subsequent encounter  T82.857D V58.89     996.71     Patient Active Problem List   Diagnosis    Status post aortic valve replacement    Steatosis of liver    LUÍS on CPAP    Hypothyroidism    Hypertension    Hyperlipidemia    Gastroesophageal reflux disease    Aortic stenosis    Class 3 severe obesity due to excess calories without serious comorbidity with body mass index (BMI) of 40.0 to 44.9 in adult    Environmental and seasonal allergies    Osteopenia of multiple sites    Prediabetes    Chest pain    Aortic stenosis, severe    Prosthetic aortic valve stenosis     Past Medical History:   Diagnosis Date    Allergic     Aortic stenosis     Aortic valve replaced 2013    Cataract     GERD (gastroesophageal reflux disease)     Glaucoma 2023    Just started    Heart murmur     Hyperlipidemia     Hypertension     Hypothyroidism     Liver disease     Due to medication    MRSA (methicillin resistant Staphylococcus aureus)     left arm abscess    Obesity     Osteopenia     PAT (paroxysmal atrial tachycardia)     Sleep apnea      Past Surgical History:   Procedure Laterality Date    ABLATION OF DYSRHYTHMIC FOCUS   or     AORTIC VALVE REPAIR/REPLACEMENT      AORTIC VALVE SURGERY  2013    CARDIAC CATHETERIZATION      Before open heart surgery    CARDIAC CATHETERIZATION N/A 1/15/2025    Procedure: Left Heart Cath and coronary angiogram;  Surgeon: Nba Oswald MD;  Location: [facility] INVASIVE LOC[facility];  Service: Cardiovascular;  Laterality: N/A;    CARDIAC VALVE REPLACEMENT  2013     SECTION  1982    COLONOSCOPY  2016    INCISION AND DRAINAGE ABSCESS Left 2023     Procedure: INCISION AND DRAINAGE ABSCESS, left arm;  Surgeon: Addy Torres MD;  Location: [facility] MAIN OR;  Service: General;  Laterality: Left;    TUBAL ABDOMINAL LIGATION        General Information       Row Name 01/25/25 1315          OT Time and Intention    Subjective Information complains of;pain;weakness;fatigue;dyspnea  -     Patient Effort excellent  -       Row Name 01/25/25 1315          General Information    Prior Level of Function independent:  -     Existing Precautions/Restrictions fall  -     Barriers to Rehab none identified  -       Row Name 01/25/25 1315          Living Environment    People in Home spouse  both are retired  -       Row Name 01/25/25 1315          Home Main Entrance    Number of Stairs, Main Entrance two  -       Row Name 01/25/25 1315          Stairs Within Home, Primary    Number of Stairs, Within Home, Primary none  -       Row Name 01/25/25 1315          Cognition    Orientation Status (Cognition) oriented x 4  -       Row Name 01/25/25 1315          Safety Issues/Impairments Affecting Functional Mobility    Safety Issues Affecting Function (Mobility) problem-solving  -     Impairments Affecting Function (Mobility) endurance/activity tolerance;pain  -               User Key  (r) = Recorded By, (t) = Taken By, (c) = Cosigned By      Initials Name Provider Type     Casandra Cardona OT Occupational Therapist                     Mobility/ADL's       Row Name 01/25/25 1317          Bed Mobility    Bed Mobility sit-supine;scooting/bridging  -     Scooting/Bridging [place] (Bed Mobility) 2 person assist;minimum assist (75% patient effort)  -     Sit-Supine [place] (Bed Mobility) 2 person assist;maximum assist (25% patient effort)  -       Row Name 01/25/25 1317          Transfers    Transfers sit-stand transfer;bed-chair transfer  -       Row Name 01/25/25 1317          Bed-Chair Transfer    Bed-Chair [place] (Transfers) 1  person to manage equipment;1 person assist;contact guard;verbal cues  -       Row Name 01/25/25 1317          Sit-Stand Transfer    Sit-Stand [place] (Transfers) 1 person to manage equipment;1 person assist;contact guard;verbal cues  -       Row Name 01/25/25 1317          Functional Mobility    Functional Mobility- Ind. Level minimum assist (75% patient effort);1 person + 1 person to manage equipment;verbal cues required  -     Functional Mobility- Device walker, front-wheeled  -     Functional Mobility- Safety Issues step length decreased  -     Patient was able to Ambulate yes  -       Row Name 01/25/25 1317          Activities of Daily Living    BADL Assessment/Intervention lower body dressing;feeding;toileting;upper body dressing  -[facility] Name 01/25/25 1317          Lower Body Dressing Assessment/Training    [place] Level (Lower Body Dressing) lower body dressing skills;dependent (less than 25% patient effort)  -[facility] Name 01/25/25 1317          Self-Feeding Assessment/Training    [place] Level (Feeding) feeding skills;modified independence  -[facility] Name 01/25/25 1317          Toileting Assessment/Training    [place] Level (Toileting) toileting skills;dependent (less than 25% patient effort)  -[facility] Name 01/25/25 1317          Upper Body Dressing Assessment/Training    [place] Level (Upper Body Dressing) upper body dressing skills;maximum assist (25% patient effort)  -               User Key  (r) = Recorded By, (t) = Taken By, (c) = Cosigned By      Initials Name Provider Type     Casandra Cardona OT Occupational Therapist                   Obj/Interventions       Row Name 01/25/25 1320          Sensory Assessment (Somatosensory)    Sensory Assessment (Somatosensory) sensation intact  -[facility] Name 01/25/25 1320          Vision Assessment/Intervention    Visual Impairment/Limitations WFL  -[facility] Name 01/25/25 1320          Range of  Motion Comprehensive    General Range of Motion no range of motion deficits identified  -       Row Name 01/25/25 1320          Strength Comprehensive (MMT)    General Manual Muscle Testing (MMT) Assessment no strength deficits identified  -               User Key  (r) = Recorded By, (t) = Taken By, (c) = Cosigned By      Initials Name Provider Type     Casandra Cardona, OT Occupational Therapist                   Goals/Plan       Row Name 01/25/25 1327          Bed Mobility Goal 1 (OT)    Activity/Assistive Device (Bed Mobility Goal 1, OT) bed mobility activities, all  -MH     [place] Level/Cues Needed (Bed Mobility Goal 1, OT) minimum assist (75% or more patient effort)  -     Time Frame (Bed Mobility Goal 1, OT) 1 week  -       Row Name 01/25/25 1327          Transfer Goal 1 (OT)    Activity/Assistive Device (Transfer Goal 1, OT) transfers, all  -MH     [place] Level/Cues Needed (Transfer Goal 1, OT) independent  -     Time Frame (Transfer Goal 1, OT) 2 weeks  -       Row Name 01/25/25 1327          Toileting Goal 1 (OT)    Activity/Device (Toileting Goal 1, OT) toileting skills, all  -MH     [place] Level/Cues Needed (Toileting Goal 1, OT) modified independence  -     Time Frame (Toileting Goal 1, OT) 10 days  -       Row Name 01/25/25 1327          Grooming Goal 1 (OT)    Activity/Device (Grooming Goal 1, OT) grooming skills, all  -MH     [place] (Grooming Goal 1, OT) modified independence  -     Time Frame (Grooming Goal 1, OT) 10 days  -       Row Name 01/25/25 1327          Therapy Assessment/Plan (OT)    Planned Therapy Interventions (OT) activity tolerance training;adaptive equipment training;BADL retraining;functional balance retraining;patient/caregiver education/training;transfer/mobility retraining;occupation/activity based interventions;ROM/therapeutic exercise  -               User Key  (r) = Recorded By, (t) = Taken By, (c) = Cosigned By      Initials Name  Provider Type     Casandra Cardona, OT Occupational Therapist                   Clinical Impression       Row Name 01/25/25 1320          Pain Assessment    Pretreatment Pain Rating 5/10  -     Posttreatment Pain Rating 5/10  -     Pain Location chest  -     Pain Side/Orientation medial  -       Row Name 01/25/25 1329 01/25/25 1320       Plan of Care Review    Plan of Care Reviewed With -- patient  -MH    Progress -- improving  -    Outcome Evaluation Pt is 68 y/o F with valvular heart disease status post redo aortic valve replacement yesterday. This is a re-op from years prior and pt is doing well this date. She (& her spouse) are retired, and they are independent in all I/ADL. Pt has short stature so she did require a lot of assistance getting into bed, but walked well and should advance past preparatory activities to functional tasks in standing & sitting. Recommend home at d/c. Pt has RW if needed. Lives in [facility] home with 2 RAMIN.  - --      Row Name 01/25/25 1329 01/25/25 1320       Therapy Assessment/Plan (OT)    Rehab Potential (OT) good  - good  -    Criteria for Skilled Therapeutic Interventions Met (OT) yes  - skilled treatment is necessary  -    Therapy Frequency (OT) 5 times/wk  - 5 times/wk  -    Predicted Duration of Therapy Intervention (OT) until d/c  - --      Row Name 01/25/25 1320          Therapy Plan Review/Discharge Plan (OT)    Anticipated Discharge Disposition (OT) home  -       Row Name 01/25/25 1320          Vital Signs    O2 Delivery Pre Treatment supplemental O2  -     O2 Delivery Intra Treatment supplemental O2  -     O2 Delivery Post Treatment supplemental O2  -     Pre Patient Position Sitting  -     Intra Patient Position Standing  -     Post Patient Position Supine  -       Row Name 01/25/25 1320          Positioning and Restraints    Pre-Treatment Position sitting in chair/recliner  -     Post Treatment Position bed  -     In Bed notified  nsg;fowlers;encouraged to call for assist;call light within reach  -               User Key  (r) = Recorded By, (t) = Taken By, (c) = Cosigned By      Initials Name Provider Type     Casandra Cardona OT Occupational Therapist                   Outcome Measures    No documentation.                   Occupational Therapy Education       Title: PT OT SLP Therapies (Done)       Topic: Occupational Therapy (Done)       Point: ADL training (Done)       Description:   Instruct learner(s) on proper safety adaptation and remediation techniques during self care or transfers.   Instruct in proper use of assistive devices.                  Learning Progress Summary            Patient Acceptance, E,TB,D, VU,DU,NR by  at 1/25/2025 1329                      Point: Home exercise program (Done)       Description:   Instruct learner(s) on appropriate technique for monitoring, assisting and/or progressing therapeutic exercises/activities.                  Learning Progress Summary            Patient Acceptance, E,TB,D, VU,DU,NR by  at 1/25/2025 1329                      Point: Precautions (Done)       Description:   Instruct learner(s) on prescribed precautions during self-care and functional transfers.                  Learning Progress Summary            Patient Acceptance, E,TB,D, VU,DU,NR by  at 1/25/2025 1329                      Point: Body mechanics (Done)       Description:   Instruct learner(s) on proper positioning and spine alignment during self-care, functional mobility activities and/or exercises.                  Learning Progress Summary            Patient Acceptance, E,TB,D, VU,DU,NR by  at 1/25/2025 1329                                      User Key       Initials Effective Dates Name Provider Type [facility] 06/16/21 -  Casandra Cardona OT Occupational Therapist OT                  OT Recommendation and Plan  Planned Therapy Interventions (OT): activity tolerance training, adaptive equipment training,  BADL retraining, functional balance retraining, patient/caregiver education/training, transfer/mobility retraining, occupation/activity based interventions, ROM/therapeutic exercise  Therapy Frequency (OT): 5 times/wk  Plan of Care Review  Plan of Care Reviewed With: patient  Progress: improving  Outcome Evaluation: Pt is 70 y/o F with valvular heart disease status post redo aortic valve replacement yesterday. This is a re-op from years prior and pt is doing well this date. She (& her spouse) are retired, and they are independent in all I/ADL. Pt has short stature so she did require a lot of assistance getting into bed, but walked well and should advance past preparatory activities to functional tasks in standing & sitting. Recommend home at d/c. Pt has RW if needed. Lives in modular home with 2 RAMIN.     Time Calculation:         Time Calculation- OT       Row Name 01/25/25 1333             Time Calculation-     OT Start Time 1100  -      OT Stop Time 1126  -      OT Time Calculation (min) 26 min  -      Total Timed Code Minutes- OT 10 minute(s)  -      OT Received On 01/25/25  -      OT - Next Appointment 01/27/25  -      OT Goal Re-Cert Due Date 02/08/25  -                User Key  (r) = Recorded By, (t) = Taken By, (c) = Cosigned By      Initials Name Provider Type     Casandra Cardona OT Occupational Therapist                  Therapy Charges for Today       Code Description Service Date Service Provider Modifiers Qty    [id number]  OT THERAPEUTIC ACT EA 15 MIN 1/25/2025 Casandra Cardona OT GO 1    [id number]  OT EVAL HIGH COMPLEXITY 3 1/25/2025 Casandra Cardona OT GO 1                 Casandra Cardona OT  1/25/2025

## 2025-01-25 NOTE — PROGRESS NOTES
LOS: 1 day   Admitting Physician- Travis Moreno, *    Reason For Followup:    Valvular heart disease  Aortic stenosis  S/p aortic valve replacement  Hypertension  Hyperlipidemia  Obesity    Subjective     Patient is sitting up in chair.    Objective     Hemodynamics are stable    Review of Systems:   Review of Systems   Constitutional: Negative for chills and fever.   HENT:  Negative for ear discharge and nosebleeds.    Eyes:  Negative for discharge and redness.   Cardiovascular:  Negative for chest pain, orthopnea, palpitations, paroxysmal nocturnal dyspnea and syncope.   Respiratory:  Positive for shortness of breath. Negative for cough and wheezing.    Endocrine: Negative for heat intolerance.   Skin:  Negative for rash.   Musculoskeletal:  Negative for arthritis and myalgias.   Gastrointestinal:  Negative for abdominal pain, melena, nausea and vomiting.   Genitourinary:  Negative for dysuria and hematuria.   Neurological:  Negative for dizziness, light-headedness, numbness and tremors.   Psychiatric/Behavioral:  Negative for depression. The patient is not nervous/anxious.          Vital Signs  Vitals:    01/25/25 1100 01/25/25 1200 01/25/25 1300 01/25/25 1400   BP: 119/56 142/65 129/59 131/61   Patient Position:  Lying     Pulse: 77 76 69 75   Resp:  26     Temp:  99.2 °F (37.3 °C)     TempSrc:  Oral     SpO2: 93% 93% 92% 92%   Weight:       Height:         Wt Readings from Last 1 Encounters:   01/25/25 99.6 kg (219 lb 9.6 oz)       Intake/Output Summary (Last 24 hours) at 1/25/2025 1602  Last data filed at 1/25/2025 1350  Gross per 24 hour   Intake 5092 ml   Output 1705 ml   Net 3387 ml     Physical Exam:  Constitutional:       Appearance: Well-developed.   Eyes:      General: No scleral icterus.        Right eye: No discharge.   HENT:      Head: Normocephalic and atraumatic.   Neck:      Thyroid: No thyromegaly.      Lymphadenopathy: No cervical adenopathy.   Pulmonary:      Effort: Pulmonary  effort is normal. No respiratory distress.      Breath sounds: Normal breath sounds. No wheezing. No rales.   Cardiovascular:      Normal rate. Regular rhythm.      No gallop.    Edema:     Peripheral edema absent.   Abdominal:      Tenderness: There is no abdominal tenderness.   Skin:     Findings: No erythema or rash.   Neurological:      Mental Status: Alert and oriented to person, place, and time.         Results Review:   Lab Results (last 24 hours)       Procedure Component Value Units Date/Time    POC Glucose Once [[id number]]  (Abnormal) Collected: 01/25/25 1530    Specimen: Blood Updated: 01/25/25 1531     Glucose 135 mg/dL      Comment: Serial Number: [id number]:  [id number]       POC Glucose Once [[id number]]  (Abnormal) Collected: 01/25/25 1343    Specimen: Blood Updated: 01/25/25 1345     Glucose 166 mg/dL      Comment: Serial Number: [id number]:  [id number]       POC Glucose Once [[id number]]  (Abnormal) Collected: 01/25/25 1149    Specimen: Blood Updated: 01/25/25 1150     Glucose 146 mg/dL      Comment: Serial Number: [id number]:  [id number]       Tissue / Bone Culture - Tissue, Aortic valve [[id number]] Collected: 01/24/25 0903    Specimen: Tissue from Aortic valve Updated: 01/25/25 1015     Tissue Culture No growth     Gram Stain Rare (1+) WBCs seen      No organisms seen    POC Glucose Once [[id number]]  (Abnormal) Collected: 01/25/25 0945    Specimen: Blood Updated: 01/25/25 0947     Glucose 185 mg/dL      Comment: Serial Number: [id number]:  [id number]       POC Glucose Once [[id number]]  (Abnormal) Collected: 01/25/25 0810    Specimen: Blood Updated: 01/25/25 0811     Glucose 134 mg/dL      Comment: Serial Number: [id number]:  [id number]       POC Glucose Once [[id number]]  (Abnormal) Collected: 01/25/25 0622    Specimen: Blood Updated: 01/25/25 0628     Glucose 154 mg/dL      Comment: Serial Number: [id number]:  [id number]       POC Glucose Once [[id number]]   (Abnormal) Collected: 01/25/25 0340    Specimen: Blood Updated: 01/25/25 0604     Glucose 123 mg/dL      Comment: Serial Number: [id number]:  [id number]       POC Glucose Once [[id number]]  (Abnormal) Collected: 01/25/25 0122    Specimen: Blood Updated: 01/25/25 0604     Glucose 130 mg/dL      Comment: Serial Number: [id number]:  [id number]       CBC & Differential [[id number]]  (Abnormal) Collected: 01/25/25 0338    Specimen: Blood Updated: 01/25/25 0431    Narrative:      The following orders were created for panel order CBC & Differential.  Procedure                               Abnormality         Status                     ---------                               -----------         ------                     CBC Auto Differential[[id number]]        Abnormal            Final result               Scan Slide[[id number]]                                       Final result                 Please view results for these tests on the individual orders.    Scan Slide [[id number]] Collected: 01/25/25 0338    Specimen: Blood Updated: 01/25/25 0431     RBC Morphology Normal     WBC Morphology Normal     Large Platelets Slight/1+    CBC Auto Differential [[id number]]  (Abnormal) Collected: 01/25/25 0338    Specimen: Blood Updated: 01/25/25 0431     WBC 8.33 10*3/mm3      RBC 3.25 10*6/mm3      Hemoglobin 9.5 g/dL      Hematocrit 29.5 %      MCV 90.8 fL      MCH 29.2 pg      MCHC 32.2 g/dL      RDW 14.0 %      RDW-SD 46.1 fl      MPV 12.4 fL      Platelets 110 10*3/mm3      Comment: Result checked          Neutrophil % 83.3 %      Lymphocyte % 7.9 %      Monocyte % 8.2 %      Eosinophil % 0.1 %      Basophil % 0.1 %      Immature Grans % 0.4 %      Neutrophils, Absolute 6.94 10*3/mm3      Lymphocytes, Absolute 0.66 10*3/mm3      Monocytes, Absolute 0.68 10*3/mm3      Eosinophils, Absolute 0.01 10*3/mm3      Basophils, Absolute 0.01 10*3/mm3      Immature Grans, Absolute 0.03 10*3/mm3      nRBC 0.0 /100 WBC     Renal  Function Panel [[id number]]  (Abnormal) Collected: 01/25/25 0338    Specimen: Blood Updated: 01/25/25 0417     Glucose 129 mg/dL      BUN 18 mg/dL      Creatinine 0.75 mg/dL      Sodium 142 mmol/L      Potassium 4.1 mmol/L      Chloride 109 mmol/L      CO2 23.6 mmol/L      Calcium 8.9 mg/dL      Albumin 4.4 g/dL      Phosphorus 4.2 mg/dL      Anion Gap 9.4 mmol/L      BUN/Creatinine Ratio 24.0     eGFR 86.8 mL/min/1.73     Narrative:      GFR Categories in Chronic Kidney Disease (CKD)      GFR Category          GFR (mL/min/1.73)    Interpretation  G1                     90 or greater         Normal or high (1)  G2                      60-89                Mild decrease (1)  G3a                   45-59                Mild to moderate decrease  G3b                   30-44                Moderate to severe decrease  G4                    15-29                Severe decrease  G5                    14 or less           Kidney failure          (1)In the absence of evidence of kidney disease, neither GFR category G1 or G2 fulfill the criteria for CKD.    eGFR calculation 2021 CKD-EPI creatinine equation, which does not include race as a factor    Magnesium [[id number]]  (Abnormal) Collected: 01/25/25 0338    Specimen: Blood Updated: 01/25/25 0417     Magnesium 2.5 mg/dL     Protime-INR [[id number]]  (Abnormal) Collected: 01/25/25 0338    Specimen: Blood Updated: 01/25/25 0356     Protime 16.3 Seconds      INR 1.32    Calcium, Ionized [[id number]]  (Abnormal) Collected: 01/25/25 0338    Specimen: Blood Updated: 01/25/25 0355     Ionized Calcium 1.14 mmol/L     Blood Gas, Arterial - [[id number]]  (Abnormal) Collected: 01/25/25 0302    Specimen: Arterial Blood Updated: 01/25/25 0305     Site Arterial Line     Ariel's Test N/A     pH, Arterial 7.386 pH units      pCO2, Arterial 43.3 mm Hg      pO2, Arterial 61.0 mm Hg      HCO3, Arterial 25.9 mmol/L      Base Excess, Arterial 0.7 mmol/L      Comment: Serial Number: [id number]:   [id number]        O2 Saturation, Arterial 90.5 %      CO2 Content 27.3 mmol/L      Barometric Pressure for Blood Gas --     Comment: N/A        Modality Cannula     FIO2 36 %      Hemodilution No     PO2/FIO2 169    POC Glucose Once [[id number]]  (Abnormal) Collected: 01/24/25 2154    Specimen: Blood Updated: 01/25/25 0119     Glucose 127 mg/dL      Comment: Serial Number: [id number]:  [id number]       POC Glucose Once [[id number]]  (Abnormal) Collected: 01/24/25 2033    Specimen: Blood Updated: 01/25/25 0119     Glucose 121 mg/dL      Comment: Serial Number: [id number]:  [id number]       Potassium [[id number]]  (Normal) Collected: 01/24/25 1828    Specimen: Blood Updated: 01/24/25 1852     Potassium 3.7 mmol/L     Hemoglobin & Hematocrit, Blood [[id number]]  (Abnormal) Collected: 01/24/25 1828    Specimen: Blood Updated: 01/24/25 1837     Hemoglobin 9.1 g/dL      Hematocrit 28.7 %     Blood Gas, Arterial - [[id number]]  (Abnormal) Collected: 01/24/25 1832    Specimen: Arterial Blood Updated: 01/24/25 1835     Site Arterial Line     Ariel's Test N/A     pH, Arterial 7.351 pH units      pCO2, Arterial 51.7 mm Hg      pO2, Arterial 83.9 mm Hg      HCO3, Arterial 28.6 mmol/L      Base Excess, Arterial 2.4 mmol/L      Comment: Serial Number: [id number]:  [id number]        O2 Saturation, Arterial 95.5 %      CO2 Content 30.2 mmol/L      Barometric Pressure for Blood Gas --     Comment: N/A        Modality Cannula     FIO2 36 %      Hemodilution No     PO2/FIO2 233    POC Glucose Once [[id number]]  (Abnormal) Collected: 01/24/25 1824    Specimen: Blood Updated: 01/24/25 1827     Glucose 151 mg/dL      Comment: Serial Number: [id number]:  [id number]       Blood Gas, Arterial - [[id number]]  (Abnormal) Collected: 01/24/25 1724    Specimen: Arterial Blood Updated: 01/24/25 1728     Site Arterial Line     Ariel's Test N/A     pH, Arterial 7.376 pH units      pCO2, Arterial 50.5 mm Hg      pO2, Arterial 129.9 mm Hg       HCO3, Arterial 29.6 mmol/L      Base Excess, Arterial 3.7 mmol/L      Comment: Serial Number: [id number]:  [id number]        O2 Saturation, Arterial 98.8 %      Barometric Pressure for Blood Gas --     Comment: N/A        Modality Adult Vent     FIO2 40 %      Ventilator Mode CPAP/PS     PEEP 5     PSV 11 cmH2O      Hemodilution Yes     PO2/FIO2 325    POCT Electrolytes +HGB +HCT [[id number]]  (Abnormal) Collected: 01/24/25 1724    Specimen: Arterial Blood Updated: 01/24/25 1728     Sodium 148 mmol/L      POC Potassium 3.5 mmol/L      Ionized Calcium 1.27 mmol/L      Comment: Serial Number: [id number]:  [id number]        Glucose 128 mg/dL      Hematocrit 28 %      Hemoglobin 9.4 g/dL     POC Glucose Once [[id number]]  (Abnormal) Collected: 01/24/25 1724    Specimen: Arterial Blood Updated: 01/24/25 1728     Glucose 128 mg/dL      Comment: Serial Number: [id number]:  [id number]       Blood Gas, Arterial - [[id number]]  (Abnormal) Collected: 01/24/25 1610    Specimen: Arterial Blood Updated: 01/24/25 1613     Site Arterial Line     Ariel's Test N/A     pH, Arterial 7.444 pH units      pCO2, Arterial 40.4 mm Hg      pO2, Arterial 106.4 mm Hg      HCO3, Arterial 27.7 mmol/L      Base Excess, Arterial 3.3 mmol/L      Comment: Serial Number: [id number]:  [id number]        O2 Saturation, Arterial 98.3 %      Barometric Pressure for Blood Gas --     Comment: N/A        Modality Adult Vent     FIO2 50 %      Ventilator Mode AC     Set Tidal Volume 600     PEEP 5     Hemodilution No     Respiratory Rate 12     PO2/FIO2 213    POCT Electrolytes +HGB +HCT [[id number]]  (Abnormal) Collected: 01/24/25 1610    Specimen: Arterial Blood Updated: 01/24/25 1613     Sodium 147 mmol/L      POC Potassium 3.5 mmol/L      Ionized Calcium 1.27 mmol/L      Comment: Serial Number: [id number]:  [id number]        Glucose 133 mg/dL      Hematocrit 24 %      Hemoglobin 8.2 g/dL     POC Glucose Once [[id number]]  (Abnormal) Collected: 01/24/25 1610     Specimen: Arterial Blood Updated: 01/24/25 1613     Glucose 133 mg/dL      Comment: Serial Number: [id number]:  [id number]             Imaging Results (Last 72 Hours)       Procedure Component Value Units Date/Time    XR Chest 1 View [[id number]] Collected: 01/25/25 1502     Updated: 01/25/25 1506    Narrative:      XR CHEST 1 VW    Date of Exam: 1/25/2025 1:54 PM EST    Indication: s/p med ct removal    Comparison: 1/25/2025 at 0523 hours, 1/24/2025 at 1424 hours    Findings:  Postoperative changes are noted. Mediastinal drains remain in place. The endotracheal tube and nasogastric tube have been removed. The [place]Rip catheter has been removed. A right IJ venous sheath remains in place. Residual atelectasis is seen in the   lung bases. There may be small bilateral pleural effusions. No definitive pneumothorax is seen.      Impression:      Impression:  1.The endotracheal tube and nasogastric tube have been removed. The [place]Rip catheter has been removed. A right IJ venous sheath remains in place.  2.Mediastinal drains remain in place.  3.Residual atelectasis is seen in the lung bases. There may be small bilateral pleural effusions. No definitive pneumothorax is seen.        Electronically Signed: Jaswinder Jennings MD    1/25/2025 3:04 PM EST    Workstation ID: [id number]    XR Chest 1 View [[id number]] Collected: 01/25/25 0841     Updated: 01/25/25 0844    Narrative:      XR CHEST 1 VW    Date of Exam: 1/25/2025 5:20 AM EST    Indication: Post-Op Heart Surgery    Comparison: 1/24/2025    Findings:  Cardiomediastinal silhouette is unchanged. Removal of endotracheal tube and gastric tube. Removal of [place] catheter with persistent right IJ sheath. Mediastinal drain is similar. There is pulmonary vascular prominence. There is developing left basal   airspace disease, likely atelectasis. There is a small left effusion. No pneumothorax. No acute osseous abnormality identified.      Impression:       Impression:  1.Developing left basilar airspace disease with small left effusion.      Electronically Signed: Johnathan Lynn MD    1/25/2025 8:42 AM EST    Workstation ID: [id number]    XR Chest 1 View [[id number]] Collected: 01/24/25 1442     Updated: 01/24/25 1446    Narrative:      XR CHEST 1 VW    Date of Exam: 1/24/2025 2:41 PM EST    Indication: post op mediastinal hematoma?    Comparison: AP chest 1/24/2025 at 1205.    Findings:  Cardiomediastinal silhouette is within normal limits and appear stable compared to today's earlier chest x-ray. Median sternotomy and cardiac valve replacement changes are redemonstrated. ET tube tip is seen in the mid to lower thoracic trachea with the   tip 1.5 cm above the chronic. Mediastinal drains appear similarly positioned. No visible pneumothorax. Right IJ [place]Rip catheter extends to the main pulmonary outflow tract. There may be mild central vascular congestion without overt edema. No focal   lung consolidation is identified.      Impression:      Impression:    1. Support lines and tubes appear similarly positioned. No visible pneumothorax.  2. Cardiomediastinal silhouette is stable and appears within normal limits.  3. There may be mild central vascular congestive. No nyla edema or focal lung consolidation.      Electronically Signed: Perri Bobo MD    1/24/2025 2:43 PM EST    Workstation ID: [id number]    XR Chest 1 View [[id number]] Collected: 01/24/25 1226     Updated: 01/24/25 1229    Narrative:      XR CHEST 1 VW    Date of Exam: 1/24/2025 12:07 PM EST    Indication: Post-Op Check Line & Tube Placement    Comparison: PA and lateral chest 1/22/2025.    Findings: Normal heart size. No evidence of pulmonary edema. Cardiac valve replacement changes are present. ET tube is seen in the midthoracic trachea level. Enteric tube extends to the mid gastric body level, with the tip not included in the imaging   field-of-view. Right IJ [place] catheter tip extends to the  pulmonary outflow tract level. No pleural effusion, pneumothorax or acute osseous abnormality is identified. Mediastinal drain and right chest tube are noted      Impression:      Impression:    1. Support line and tube placements as described above. No visible pneumothorax.  2. No acute chest finding.      Electronically Signed: Perri Bobo MD    1/24/2025 12:27 PM EST    Workstation ID: [id number]          ECG/EMG Results (most recent)       Procedure Component Value Units Date/Time    Telemetry Scan [[id number]] Resulted: 01/24/25     Updated: 01/24/25 0939    Telemetry Scan [[id number]] Resulted: 01/24/25     Updated: 01/24/25 1309    ECG 12 Lead Other; s/p reop AVR [[id number]] Collected: 01/24/25 1525     Updated: 01/24/25 1526     QT Interval 509 ms      QTC Interval 442 ms     Narrative:      HEART RATE=45  bpm  RR Lswrlzdl=3904  ms  AL Qkwbgwzs=093  ms  P Horizontal Axis=-89  deg  P Front Axis=-60  deg  QRSD Jevxcwbq=866  ms  QT Qxvlzspf=490  ms  VVrF=466  ms  QRS Axis=-48  deg  T Wave Axis=132  deg  - ABNORMAL ECG -  Sinus or ectopic atrial bradycardia  Left bundle branch block  ST elevation secondary to IVCD  Date and Time of Study:2025-01-24 15:25:42    Telemetry Scan [[id number]] Resulted: 01/24/25     Updated: 01/24/25 1601    ECG 12 Lead Other; s/p reop AVR [[id number]] Collected: 01/25/25 0405     Updated: 01/25/25 0704     QT Interval 477 ms      QTC Interval 415 ms     Narrative:      HEART RATE=48  bpm  RR Gndmopgl=1778  ms  AL Ezmhjnpl=194  ms  P Horizontal Axis=-33  deg  P Front Axis=7  deg  QRSD Interval=160  ms  QT Epqrwldy=331  ms  JIfI=965  ms  QRS Axis=-31  deg  T Wave Axis=126  deg  - ABNORMAL ECG -  Sinus bradycardia  Atrial premature complex  Prolonged AL interval  Left bundle branch block  ST elevation secondary to IVCD  When compared with ECG of 24-Jan-2025 15:25:42,  Significant change in rhythm  New conduction abnormality  Date and Time of Study:2025-01-25 04:05:52          CBC     Results from last 7 days   Lab Units 01/25/25  0338 01/24/25  1828 01/24/25  1724 01/24/25  1610 01/24/25  1545 01/24/25  1509 01/24/25  1409 01/24/25  1251 01/24/25  1202 01/24/25  1019 01/24/25  0728 01/22/25  0843   WBC 10*3/mm3 8.33  --   --   --  9.97  --   --   --  12.78* 13.04*  --  7.40   HEMOGLOBIN g/dL 9.5* 9.1*  --   --  8.1*  --   --   --  9.2* 9.3*  --  14.7   HEMOGLOBIN, POC g/dL  --   --  9.4* 8.2*  --  8.4* 8.3*   < >  --   --    < >  --    PLATELETS 10*3/mm3 110*  --   --   --  101*  --   --   --  119* 108*  --  189    < > = values in this interval not displayed.     BMP   Results from last 7 days   Lab Units 01/25/25 0338 01/24/25 1828 01/24/25  1409 01/24/25  1251 01/24/25  1202 01/22/25  0843   SODIUM mmol/L 142  --   --   --  143 140   POTASSIUM mmol/L 4.1 3.7  --   --  3.8 3.9   CHLORIDE mmol/L 109*  --   --   --  107 108*   CO2 mmol/L 23.6  --   --   --  25.2 24.1   BUN mg/dL 18  --   --   --  14 18   CREATININE mg/dL 0.75  --  0.88 0.98 0.89 0.67   GLUCOSE mg/dL 129*  --   --   --  120* 109*   MAGNESIUM mg/dL 2.5*  --   --   --   --   --    PHOSPHORUS mg/dL 4.2  --   --   --  3.5  --      CMP   Results from last 7 days   Lab Units 01/25/25 0338 01/24/25  1828 01/24/25  1409 01/24/25  1251 01/24/25  1202 01/22/25  0843   SODIUM mmol/L 142  --   --   --  143 140   POTASSIUM mmol/L 4.1 3.7  --   --  3.8 3.9   CHLORIDE mmol/L 109*  --   --   --  107 108*   CO2 mmol/L 23.6  --   --   --  25.2 24.1   BUN mg/dL 18  --   --   --  14 18   CREATININE mg/dL 0.75  --  0.88 0.98 0.89 0.67   GLUCOSE mg/dL 129*  --   --   --  120* 109*   ALBUMIN g/dL 4.4  --   --   --  4.3 4.1   BILIRUBIN mg/dL  --   --   --   --   --  0.6   ALK PHOS U/L  --   --   --   --   --  95   AST (SGOT) U/L  --   --   --   --   --  27   ALT (SGPT) U/L  --   --   --   --   --  16     Cardiac Studies:  Echo- Results for orders placed in visit on 01/24/25    Intra-Op Anesthesia FRAN    Narrative  Intra-Op Anesthesia  FRAN    Procedure Performed: Intra-Op Anesthesia FRAN  Start Time:  1/24/2025 7:10 AM  End Time:   1/24/2025 7:20 AM    Preanesthesia Checklist:  Patient identified, IV assessed, risks and benefits discussed, monitors and equipment assessed, procedure being performed at surgeon's request and anesthesia consent obtained.    General Procedure Information  FRAN Placed for monitoring purposes only -- This is not a diagnostic FRAN  Diagnostic Indications for Echo:  assessment of ascending aorta, assessment of surgical repair, defect repair evaluation and hemodynamic monitoring  Location performed:  OR  Intubated  Bite block placed  Heart visualized  Probe Insertion:  Easy  Probe Type:  Multiplane  Modalities:  Color flow mapping, continuous wave Doppler and pulse wave Doppler    Echocardiographic and Doppler Measurements    Ventricles    Right Ventricle:  Cavity size normal.  Hypertrophy not present.  Thrombus not present.  Global function normal.  Left Ventricle:  Cavity size normal.  Thrombus not present.  Global Function mildly impaired.        Valves    Aortic Valve:  Annulus bioprosthetic.  Stenosis severe.  Mean Gradient: 72/35 mmHg.  Pressure Half-time: 4 ms.  Regurgitation absent.  Leaflets calcified and thickened.  Leaflet motions restricted.    Mitral Valve:  Annulus normal.  Stenosis not present.  Regurgitation trace.  Leaflets normal.  Leaflet motions normal.    Tricuspid Valve:  Annulus normal.  Stenosis not present.  Regurgitation trace.  Leaflets normal.  Leaflet motions normal.  Pulmonic Valve:  Annulus normal.      Aorta    Ascending Aorta:  Size dilated.  Diameter 3.9 cm.  Dissection not present.  Plaque thickness less than 3 mm.  Mobile plaque not present.  Aortic Arch:  Size normal.  Diameter 2.5 cm.  Dissection not present.  Plaque thickness less than 3 mm.  Mobile plaque not present.  Descending Aorta:  Size normal.  Diameter 2.7 cm.  Dissection not present.  Plaque thickness less than 3 mm.  Mobile  plaque not present.      Atria    Right Atrium:  Size normal.  Spontaneous echo contrast not present.  Thrombus not present.  Tumor not present.  Device not present.    Left Atrium:  Size dilated.  Spontaneous echo contrast not present.  Thrombus not present.  Tumor not present.  Device not present.  Left atrial appendage normal.              Other Findings  Pericardium:  normal  Pleural Effusion:  none  Pulmonary Arteries:  normal  Pulmonary Venous Flow:  normal    Anesthesia Information  Performed Personally  Anesthesiologist:  Froilan Cummings MD      Echocardiogram Comments:  BIOPROSTHETIC AV. R CUSP IMMOBILE. L RESTRICTED. SEVERE AS PK VEL4.24, 72/35  MV: TRACE MR  TV: TRACE TI  LA ENLARGEMENT 5.2CM  LVH  EF 45    Stress Myoview-  Cath-      Medication Review:   Scheduled Meds:amLODIPine, 5 mg, Oral, Q24H  aspirin, 81 mg, Oral, Daily  atorvastatin, 40 mg, Oral, Nightly  ceFAZolin, 2 g, Intravenous, Q8H  chlorhexidine, 15 mL, Mouth/Throat, Q12H  enoxaparin, 40 mg, Subcutaneous, Q12H  furosemide, 40 mg, Intravenous, Q12H  levothyroxine, 112 mcg, Oral, Daily  mupirocin, 1 Application, Each Nare, BID  pantoprazole, 40 mg, Oral, Q AM  polyethylene glycol, 17 g, Oral, BID  potassium chloride, 20 mEq, Oral, BID With Meals  senna-docusate sodium, 2 tablet, Oral, BID      Continuous Infusions:dexmedetomidine, 0.2-1.5 mcg/kg/hr, Last Rate: Stopped (01/24/25 1533)  insulin, 0-100 Units/hr, Last Rate: 0.3 Units/hr (01/25/25 1531)  niCARdipine, 5-15 mg/hr, Last Rate: Stopped (01/25/25 0712)  norepinephrine, 0.02-0.06 mcg/kg/min  phenylephrine, 0.5-3 mcg/kg/min  sodium chloride, 30 mL/hr, Last Rate: 30 mL/hr (01/24/25 1246)      PRN Meds:.  acetaminophen **OR** acetaminophen **OR** acetaminophen    albumin human    Calcium Replacement - Follow Nurse / BPA Driven Protocol    cyclobenzaprine    dextrose    dextrose    glucagon (human recombinant)    HYDROcodone-acetaminophen    Magnesium Cardiology Dose Replacement - Follow  Nurse / BPA Driven Protocol    meperidine    Morphine **AND** naloxone    niCARdipine    nitroglycerin    norepinephrine    ondansetron    Phosphorus Replacement - Follow Nurse / BPA Driven Protocol    Potassium Replacement - Follow Nurse / BPA Driven Protocol    vasopressin      Assessment & Plan     Prosthetic aortic valve stenosis    MDM:    1.  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  Continue supportive care    2.  Hypertension:    Blood pressure is controlled patient is on nicardipine.    3.  Mixed hyperlipidemia:    Patient is on Lipitor.    4.  CAD:    Recent cardiac catheterization showed nonobstructive CAD    Valentino Sanabria MD  01/25/25  16:02 EST

## 2025-01-25 NOTE — OUTREACH NOTE
Medical Week 2 Survey      Flowsheet Row Responses   [facility] facility patient discharged from? [facility]   Does the patient have one of the following disease processes/diagnoses(primary or secondary)? Other   Week 2 attempt successful? No   Unsuccessful attempts Attempt 1   Revoke Readmitted            JUAN MARTIN - Registered Nurse   Unable to assess

## 2025-01-26 ENCOUNTER — APPOINTMENT (OUTPATIENT)
Dept: GENERAL RADIOLOGY | Facility: HOSPITAL | Age: 70
DRG: 220 | End: 2025-01-26
Payer: MEDICARE

## 2025-01-26 LAB
ANION GAP SERPL CALCULATED.3IONS-SCNC: 9.7 MMOL/L (ref 5–15)
BUN SERPL-MCNC: 18 MG/DL (ref 8–23)
BUN/CREAT SERPL: 25 (ref 7–25)
CALCIUM SPEC-SCNC: 8.2 MG/DL (ref 8.6–10.5)
CHLORIDE SERPL-SCNC: 102 MMOL/L (ref 98–107)
CO2 SERPL-SCNC: 25.3 MMOL/L (ref 22–29)
CREAT SERPL-MCNC: 0.72 MG/DL (ref 0.57–1)
DEPRECATED RDW RBC AUTO: 48.1 FL (ref 37–54)
EGFRCR SERPLBLD CKD-EPI 2021: 91.2 ML/MIN/1.73
ERYTHROCYTE [DISTWIDTH] IN BLOOD BY AUTOMATED COUNT: 14.3 % (ref 12.3–15.4)
GLUCOSE BLDC GLUCOMTR-MCNC: 115 MG/DL (ref 70–105)
GLUCOSE BLDC GLUCOMTR-MCNC: 125 MG/DL (ref 70–105)
GLUCOSE BLDC GLUCOMTR-MCNC: 128 MG/DL (ref 70–105)
GLUCOSE BLDC GLUCOMTR-MCNC: 133 MG/DL (ref 70–105)
GLUCOSE BLDC GLUCOMTR-MCNC: 138 MG/DL (ref 70–105)
GLUCOSE BLDC GLUCOMTR-MCNC: 146 MG/DL (ref 70–105)
GLUCOSE BLDC GLUCOMTR-MCNC: 178 MG/DL (ref 70–105)
GLUCOSE SERPL-MCNC: 124 MG/DL (ref 65–99)
HCT VFR BLD AUTO: 30.9 % (ref 34–46.6)
HGB BLD-MCNC: 9.8 G/DL (ref 12–15.9)
MCH RBC QN AUTO: 28.9 PG (ref 26.6–33)
MCHC RBC AUTO-ENTMCNC: 31.7 G/DL (ref 31.5–35.7)
MCV RBC AUTO: 91.2 FL (ref 79–97)
PLATELET # BLD AUTO: 114 10*3/MM3 (ref 140–450)
PMV BLD AUTO: 12.5 FL (ref 6–12)
POTASSIUM SERPL-SCNC: 3.8 MMOL/L (ref 3.5–5.2)
RBC # BLD AUTO: 3.39 10*6/MM3 (ref 3.77–5.28)
SODIUM SERPL-SCNC: 137 MMOL/L (ref 136–145)
WBC NRBC COR # BLD AUTO: 15.23 10*3/MM3 (ref 3.4–10.8)

## 2025-01-26 PROCEDURE — 25010000002 ENOXAPARIN PER 10 MG: Performed by: NURSE PRACTITIONER

## 2025-01-26 PROCEDURE — 25010000002 CEFAZOLIN PER 500 MG: Performed by: NURSE PRACTITIONER

## 2025-01-26 PROCEDURE — 25010000002 FUROSEMIDE PER 20 MG: Performed by: THORACIC SURGERY (CARDIOTHORACIC VASCULAR SURGERY)

## 2025-01-26 PROCEDURE — 80048 BASIC METABOLIC PNL TOTAL CA: CPT | Performed by: NURSE PRACTITIONER

## 2025-01-26 PROCEDURE — 85027 COMPLETE CBC AUTOMATED: CPT | Performed by: NURSE PRACTITIONER

## 2025-01-26 PROCEDURE — 82948 REAGENT STRIP/BLOOD GLUCOSE: CPT | Performed by: THORACIC SURGERY (CARDIOTHORACIC VASCULAR SURGERY)

## 2025-01-26 PROCEDURE — 71045 X-RAY EXAM CHEST 1 VIEW: CPT

## 2025-01-26 PROCEDURE — 93005 ELECTROCARDIOGRAM TRACING: CPT | Performed by: NURSE PRACTITIONER

## 2025-01-26 PROCEDURE — 99232 SBSQ HOSP IP/OBS MODERATE 35: CPT | Performed by: INTERNAL MEDICINE

## 2025-01-26 PROCEDURE — 82948 REAGENT STRIP/BLOOD GLUCOSE: CPT

## 2025-01-26 PROCEDURE — 25010000002 KETOROLAC TROMETHAMINE PER 15 MG

## 2025-01-26 PROCEDURE — 25010000002 BUMETANIDE PER 0.5 MG: Performed by: THORACIC SURGERY (CARDIOTHORACIC VASCULAR SURGERY)

## 2025-01-26 RX ORDER — INSULIN LISPRO 100 [IU]/ML
2-9 INJECTION, SOLUTION INTRAVENOUS; SUBCUTANEOUS
Status: DISCONTINUED | OUTPATIENT
Start: 2025-01-26 | End: 2025-01-29 | Stop reason: HOSPADM

## 2025-01-26 RX ORDER — BUMETANIDE 0.25 MG/ML
2 INJECTION, SOLUTION INTRAMUSCULAR; INTRAVENOUS EVERY 8 HOURS
Status: DISCONTINUED | OUTPATIENT
Start: 2025-01-26 | End: 2025-01-27

## 2025-01-26 RX ORDER — POTASSIUM CHLORIDE 1500 MG/1
20 TABLET, EXTENDED RELEASE ORAL ONCE
Status: DISCONTINUED | OUTPATIENT
Start: 2025-01-26 | End: 2025-01-26 | Stop reason: SDUPTHER

## 2025-01-26 RX ORDER — IBUPROFEN 600 MG/1
1 TABLET ORAL
Status: DISCONTINUED | OUTPATIENT
Start: 2025-01-26 | End: 2025-01-29 | Stop reason: HOSPADM

## 2025-01-26 RX ORDER — KETOROLAC TROMETHAMINE 30 MG/ML
15 INJECTION, SOLUTION INTRAMUSCULAR; INTRAVENOUS EVERY 8 HOURS
Status: COMPLETED | OUTPATIENT
Start: 2025-01-26 | End: 2025-01-27

## 2025-01-26 RX ORDER — GUAIFENESIN 600 MG/1
600 TABLET, EXTENDED RELEASE ORAL EVERY 12 HOURS SCHEDULED
Status: DISCONTINUED | OUTPATIENT
Start: 2025-01-26 | End: 2025-01-29 | Stop reason: HOSPADM

## 2025-01-26 RX ORDER — NICOTINE POLACRILEX 4 MG
15 LOZENGE BUCCAL
Status: DISCONTINUED | OUTPATIENT
Start: 2025-01-26 | End: 2025-01-29 | Stop reason: HOSPADM

## 2025-01-26 RX ORDER — DEXTROSE MONOHYDRATE 25 G/50ML
25 INJECTION, SOLUTION INTRAVENOUS
Status: DISCONTINUED | OUTPATIENT
Start: 2025-01-26 | End: 2025-01-29 | Stop reason: HOSPADM

## 2025-01-26 RX ADMIN — ASPIRIN 81 MG: 81 TABLET, COATED ORAL at 08:02

## 2025-01-26 RX ADMIN — CHLORHEXIDINE GLUCONATE 15 ML: 1.2 RINSE ORAL at 20:38

## 2025-01-26 RX ADMIN — MUPIROCIN 1 APPLICATION: 20 OINTMENT TOPICAL at 08:02

## 2025-01-26 RX ADMIN — MUPIROCIN 1 APPLICATION: 20 OINTMENT TOPICAL at 20:39

## 2025-01-26 RX ADMIN — BUMETANIDE 2 MG: 0.25 INJECTION INTRAMUSCULAR; INTRAVENOUS at 23:35

## 2025-01-26 RX ADMIN — CHLORHEXIDINE GLUCONATE 15 ML: 1.2 RINSE ORAL at 08:02

## 2025-01-26 RX ADMIN — CEFAZOLIN 2 G: 2 INJECTION, POWDER, FOR SOLUTION INTRAMUSCULAR; INTRAVENOUS at 01:17

## 2025-01-26 RX ADMIN — SENNOSIDES AND DOCUSATE SODIUM 2 TABLET: 50; 8.6 TABLET ORAL at 20:39

## 2025-01-26 RX ADMIN — ENOXAPARIN SODIUM 40 MG: 100 INJECTION SUBCUTANEOUS at 10:08

## 2025-01-26 RX ADMIN — ATORVASTATIN CALCIUM 40 MG: 40 TABLET, FILM COATED ORAL at 20:39

## 2025-01-26 RX ADMIN — HYDROCODONE BITARTRATE AND ACETAMINOPHEN 1 TABLET: 5; 325 TABLET ORAL at 21:23

## 2025-01-26 RX ADMIN — HYDROCODONE BITARTRATE AND ACETAMINOPHEN 1 TABLET: 5; 325 TABLET ORAL at 12:16

## 2025-01-26 RX ADMIN — KETOROLAC TROMETHAMINE 15 MG: 30 INJECTION, SOLUTION INTRAMUSCULAR at 14:59

## 2025-01-26 RX ADMIN — POTASSIUM CHLORIDE 20 MEQ: 1500 TABLET, EXTENDED RELEASE ORAL at 08:02

## 2025-01-26 RX ADMIN — PANTOPRAZOLE SODIUM 40 MG: 40 TABLET, DELAYED RELEASE ORAL at 05:38

## 2025-01-26 RX ADMIN — AMLODIPINE BESYLATE 5 MG: 5 TABLET ORAL at 08:02

## 2025-01-26 RX ADMIN — POLYETHYLENE GLYCOL 3350 17 G: 17 POWDER, FOR SOLUTION ORAL at 08:01

## 2025-01-26 RX ADMIN — ENOXAPARIN SODIUM 40 MG: 100 INJECTION SUBCUTANEOUS at 21:23

## 2025-01-26 RX ADMIN — FUROSEMIDE 40 MG: 10 INJECTION, SOLUTION INTRAMUSCULAR; INTRAVENOUS at 08:02

## 2025-01-26 RX ADMIN — POLYETHYLENE GLYCOL 3350 17 G: 17 POWDER, FOR SOLUTION ORAL at 20:39

## 2025-01-26 RX ADMIN — LEVOTHYROXINE SODIUM 112 MCG: 112 TABLET ORAL at 08:02

## 2025-01-26 RX ADMIN — HYDROCODONE BITARTRATE AND ACETAMINOPHEN 1 TABLET: 5; 325 TABLET ORAL at 08:02

## 2025-01-26 RX ADMIN — HYDROCODONE BITARTRATE AND ACETAMINOPHEN 1 TABLET: 5; 325 TABLET ORAL at 17:13

## 2025-01-26 RX ADMIN — KETOROLAC TROMETHAMINE 15 MG: 30 INJECTION, SOLUTION INTRAMUSCULAR at 23:36

## 2025-01-26 RX ADMIN — GUAIFENESIN 600 MG: 600 TABLET, MULTILAYER, EXTENDED RELEASE ORAL at 10:07

## 2025-01-26 RX ADMIN — HYDROCODONE BITARTRATE AND ACETAMINOPHEN 1 TABLET: 5; 325 TABLET ORAL at 01:15

## 2025-01-26 RX ADMIN — GUAIFENESIN 600 MG: 600 TABLET, MULTILAYER, EXTENDED RELEASE ORAL at 20:38

## 2025-01-26 RX ADMIN — SENNOSIDES AND DOCUSATE SODIUM 2 TABLET: 50; 8.6 TABLET ORAL at 08:02

## 2025-01-26 RX ADMIN — BUMETANIDE 2 MG: 0.25 INJECTION INTRAMUSCULAR; INTRAVENOUS at 15:02

## 2025-01-26 RX ADMIN — POTASSIUM CHLORIDE 20 MEQ: 1500 TABLET, EXTENDED RELEASE ORAL at 17:13

## 2025-01-26 NOTE — PROGRESS NOTES
The mediastinal chest tube on the left is stuck either on a wire or suture.  I cannot remove it or even rotated.  Dr. MARTIN is aware and he will take her back to the operating room tomorrow to remove it.  I discussed this with the patient and family.  Her oxygen requirement is up so we will increase her diuresis and also use Mucinex and flutter valve.  She has right lower lobe atelectasis and we need to work on her breathing.

## 2025-01-26 NOTE — PROGRESS NOTES
LOS: 2 days   Admitting Physician- Travis Moreno, *    Reason For Followup:    Valvular heart disease  Aortic stenosis  S/p aortic valve replacement  Hypertension  Hyperlipidemia  Obesity    Subjective     Patient is sitting up in chair.    Objective     Hemodynamics are stable    Review of Systems:   Review of Systems   Constitutional: Negative for chills and fever.   HENT:  Negative for ear discharge and nosebleeds.    Eyes:  Negative for discharge and redness.   Cardiovascular:  Negative for chest pain, orthopnea, palpitations, paroxysmal nocturnal dyspnea and syncope.   Respiratory:  Positive for shortness of breath. Negative for cough and wheezing.    Endocrine: Negative for heat intolerance.   Skin:  Negative for rash.   Musculoskeletal:  Negative for arthritis and myalgias.   Gastrointestinal:  Negative for abdominal pain, melena, nausea and vomiting.   Genitourinary:  Negative for dysuria and hematuria.   Neurological:  Negative for dizziness, light-headedness, numbness and tremors.   Psychiatric/Behavioral:  Negative for depression. The patient is not nervous/anxious.          Vital Signs  Vitals:    01/26/25 0500 01/26/25 0600 01/26/25 0756 01/26/25 1131   BP: 128/59 132/66 110/60 109/50   BP Location:   Right arm Right arm   Patient Position:   Lying Lying   Pulse: 81 74 90 82   Resp:   (!) 29 23   Temp:   98.6 °F (37 °C) 99.3 °F (37.4 °C)   TempSrc:   Oral Oral   SpO2: 92% 93% 91% 95%   Weight:       Height:         Wt Readings from Last 1 Encounters:   01/25/25 99.6 kg (219 lb 9.6 oz)       Intake/Output Summary (Last 24 hours) at 1/26/2025 1150  Last data filed at 1/26/2025 0800  Gross per 24 hour   Intake 2902 ml   Output 2260 ml   Net 642 ml     Physical Exam:  Constitutional:       Appearance: Well-developed.   Eyes:      General: No scleral icterus.        Right eye: No discharge.   HENT:      Head: Normocephalic and atraumatic.   Neck:      Thyroid: No thyromegaly.       Lymphadenopathy: No cervical adenopathy.   Pulmonary:      Effort: Pulmonary effort is normal. No respiratory distress.      Breath sounds: Normal breath sounds. No wheezing. No rales.   Cardiovascular:      Normal rate. Regular rhythm.      No gallop.    Edema:     Peripheral edema absent.   Abdominal:      Tenderness: There is no abdominal tenderness.   Skin:     Findings: No erythema or rash.   Neurological:      Mental Status: Alert and oriented to person, place, and time.         Results Review:   Lab Results (last 24 hours)       Procedure Component Value Units Date/Time    POC Glucose 4x Daily Before Meals & at Bedtime [[id number]]  (Abnormal) Collected: 01/26/25 1108    Specimen: Blood Updated: 01/26/25 1110     Glucose 133 mg/dL      Comment: Serial Number: [id number]:  [id number]       Tissue / Bone Culture - Tissue, Aortic valve [[id number]] Collected: 01/24/25 0903    Specimen: Tissue from Aortic valve Updated: 01/26/25 0940     Tissue Culture No growth at 2 days     Gram Stain Rare (1+) WBCs seen      No organisms seen    POC Glucose Once [[id number]]  (Abnormal) Collected: 01/26/25 0541    Specimen: Blood Updated: 01/26/25 0543     Glucose 125 mg/dL      Comment: Serial Number: [id number]:  [id number]       Basic Metabolic Panel [[id number]]  (Abnormal) Collected: 01/26/25 0338    Specimen: Blood Updated: 01/26/25 0405     Glucose 124 mg/dL      BUN 18 mg/dL      Creatinine 0.72 mg/dL      Sodium 137 mmol/L      Potassium 3.8 mmol/L      Chloride 102 mmol/L      CO2 25.3 mmol/L      Calcium 8.2 mg/dL      BUN/Creatinine Ratio 25.0     Anion Gap 9.7 mmol/L      eGFR 91.2 mL/min/1.73     Narrative:      GFR Categories in Chronic Kidney Disease (CKD)      GFR Category          GFR (mL/min/1.73)    Interpretation  G1                     90 or greater         Normal or high (1)  G2                      60-89                Mild decrease (1)  G3a                   45-59                Mild to  moderate decrease  G3b                   30-44                Moderate to severe decrease  G4                    15-29                Severe decrease  G5                    14 or less           Kidney failure          (1)In the absence of evidence of kidney disease, neither GFR category G1 or G2 fulfill the criteria for CKD.    eGFR calculation 2021 CKD-EPI creatinine equation, which does not include race as a factor    CBC (No Diff) [[id number]]  (Abnormal) Collected: 01/26/25 0338    Specimen: Blood Updated: 01/26/25 0356     WBC 15.23 10*3/mm3      RBC 3.39 10*6/mm3      Hemoglobin 9.8 g/dL      Hematocrit 30.9 %      MCV 91.2 fL      MCH 28.9 pg      MCHC 31.7 g/dL      RDW 14.3 %      RDW-SD 48.1 fl      MPV 12.5 fL      Platelets 114 10*3/mm3     POC Glucose Once [[id number]]  (Abnormal) Collected: 01/26/25 0341    Specimen: Blood Updated: 01/26/25 0342     Glucose 138 mg/dL      Comment: Serial Number: [id number]:  [id number]       POC Glucose Once [[id number]]  (Abnormal) Collected: 01/26/25 0121    Specimen: Blood Updated: 01/26/25 0122     Glucose 115 mg/dL      Comment: Serial Number: [id number]:  [id number]       POC Glucose Once [[id number]]  (Abnormal) Collected: 01/25/25 2237    Specimen: Blood Updated: 01/25/25 2239     Glucose 140 mg/dL      Comment: Serial Number: [id number]:  [id number]       POC Glucose Once [[id number]]  (Abnormal) Collected: 01/25/25 1958    Specimen: Blood Updated: 01/25/25 2000     Glucose 163 mg/dL      Comment: Serial Number: [id number]:  [id number]       POC Glucose Once [[id number]]  (Abnormal) Collected: 01/25/25 1847    Specimen: Blood Updated: 01/25/25 1848     Glucose 172 mg/dL      Comment: Serial Number: [id number]:  [id number]       POC Glucose Once [[id number]]  (Abnormal) Collected: 01/25/25 1640    Specimen: Blood Updated: 01/25/25 1642     Glucose 135 mg/dL      Comment: Serial Number: [id number]:  [id number]       POC Glucose Once  [[id number]]  (Abnormal) Collected: 01/25/25 1530    Specimen: Blood Updated: 01/25/25 1531     Glucose 135 mg/dL      Comment: Serial Number: [id number]:  [id number]       POC Glucose Once [[id number]]  (Abnormal) Collected: 01/25/25 1343    Specimen: Blood Updated: 01/25/25 1345     Glucose 166 mg/dL      Comment: Serial Number: [id number]:  [id number]       POC Glucose Once [[id number]]  (Abnormal) Collected: 01/25/25 1149    Specimen: Blood Updated: 01/25/25 1150     Glucose 146 mg/dL      Comment: Serial Number: [id number]:  [id number]             Imaging Results (Last 72 Hours)       Procedure Component Value Units Date/Time    XR Chest 1 View [[id number]] Collected: 01/26/25 0826     Updated: 01/26/25 0833    Narrative:      XR CHEST 1 VW    Date of Exam: 1/26/2025 4:40 AM EST    Indication: Post-Op Heart Surgery    Comparison: 1 day prior.    Findings:  Right-sided IJ line remains in place. There are persistent bibasilar opacities, greater on the right, likely combination of pleural effusions and atelectasis. Mild edema pattern also present with perihilar and interstitial opacities noted. There is no   distinct pneumothorax. No new focal airspace consolidation elsewhere. Stable heart and mediastinal contours.        Impression:      Impression:  Right-sided IJ line remains in place. There are persistent bibasilar opacities, greater on the right, likely combination of pleural effusions and atelectasis. Mild edema pattern also present with perihilar and interstitial opacities noted. There is no   distinct pneumothorax. No new focal airspace consolidation elsewhere. Stable heart and mediastinal contours.        Electronically Signed: Jeff Travis MD    1/26/2025 8:31 AM EST    Workstation ID: [id number]    XR Chest 1 View [[id number]] Collected: 01/25/25 1502     Updated: 01/25/25 1506    Narrative:      XR CHEST 1 VW    Date of Exam: 1/25/2025 1:54 PM EST    Indication: s/p med ct  removal    Comparison: 1/25/2025 at 0523 hours, 1/24/2025 at 1424 hours    Findings:  Postoperative changes are noted. Mediastinal drains remain in place. The endotracheal tube and nasogastric tube have been removed. The Raymond-Rip catheter has been removed. A right IJ venous sheath remains in place. Residual atelectasis is seen in the   lung bases. There may be small bilateral pleural effusions. No definitive pneumothorax is seen.      Impression:      Impression:  1.The endotracheal tube and nasogastric tube have been removed. The Raymond-Rip catheter has been removed. A right IJ venous sheath remains in place.  2.Mediastinal drains remain in place.  3.Residual atelectasis is seen in the lung bases. There may be small bilateral pleural effusions. No definitive pneumothorax is seen.        Electronically Signed: Jaswinder Jennings MD    1/25/2025 3:04 PM EST    Workstation ID: [id number]    XR Chest 1 View [[id number]] Collected: 01/25/25 0841     Updated: 01/25/25 0844    Narrative:      XR CHEST 1 VW    Date of Exam: 1/25/2025 5:20 AM EST    Indication: Post-Op Heart Surgery    Comparison: 1/24/2025    Findings:  Cardiomediastinal silhouette is unchanged. Removal of endotracheal tube and gastric tube. Removal of Raymond-Rip catheter with persistent right IJ sheath. Mediastinal drain is similar. There is pulmonary vascular prominence. There is developing left basal   airspace disease, likely atelectasis. There is a small left effusion. No pneumothorax. No acute osseous abnormality identified.      Impression:      Impression:  1.Developing left basilar airspace disease with small left effusion.      Electronically Signed: Johnathan Lynn MD    1/25/2025 8:42 AM EST    Workstation ID: [id number]    XR Chest 1 View [[id number]] Collected: 01/24/25 1442     Updated: 01/24/25 1446    Narrative:      XR CHEST 1 VW    Date of Exam: 1/24/2025 2:41 PM EST    Indication: post op mediastinal hematoma?    Comparison: AP chest 1/24/2025 at  1205.    Findings:  Cardiomediastinal silhouette is within normal limits and appear stable compared to today's earlier chest x-ray. Median sternotomy and cardiac valve replacement changes are redemonstrated. ET tube tip is seen in the mid to lower thoracic trachea with the   tip 1.5 cm above the chronic. Mediastinal drains appear similarly positioned. No visible pneumothorax. Right IJ Chireno-Rip catheter extends to the main pulmonary outflow tract. There may be mild central vascular congestion without overt edema. No focal   lung consolidation is identified.      Impression:      Impression:    1. Support lines and tubes appear similarly positioned. No visible pneumothorax.  2. Cardiomediastinal silhouette is stable and appears within normal limits.  3. There may be mild central vascular congestive. No nyla edema or focal lung consolidation.      Electronically Signed: Perri Bobo MD    1/24/2025 2:43 PM EST    Workstation ID: [id number]    XR Chest 1 View [[id number]] Collected: 01/24/25 1226     Updated: 01/24/25 1229    Narrative:      XR CHEST 1 VW    Date of Exam: 1/24/2025 12:07 PM EST    Indication: Post-Op Check Line & Tube Placement    Comparison: PA and lateral chest 1/22/2025.    Findings: Normal heart size. No evidence of pulmonary edema. Cardiac valve replacement changes are present. ET tube is seen in the midthoracic trachea level. Enteric tube extends to the mid gastric body level, with the tip not included in the imaging   field-of-view. Right IJ Chireno-Rip catheter tip extends to the pulmonary outflow tract level. No pleural effusion, pneumothorax or acute osseous abnormality is identified. Mediastinal drain and right chest tube are noted      Impression:      Impression:    1. Support line and tube placements as described above. No visible pneumothorax.  2. No acute chest finding.      Electronically Signed: Perri Bobo MD    1/24/2025 12:27 PM EST    Workstation ID: [id number]          ECG/EMG  Results (most recent)       Procedure Component Value Units Date/Time    Telemetry Scan [[id number]] Resulted: 01/24/25     Updated: 01/24/25 0939    Telemetry Scan [[id number]] Resulted: 01/24/25     Updated: 01/24/25 1309    ECG 12 Lead Other; s/p reop AVR [[id number]] Collected: 01/24/25 1525     Updated: 01/24/25 1526     QT Interval 509 ms      QTC Interval 442 ms     Narrative:      HEART RATE=45  bpm  RR Bvvfafdi=7454  ms  NJ Qtxpddiv=444  ms  P Horizontal Axis=-89  deg  P Front Axis=-60  deg  QRSD Lxuqnetm=171  ms  QT Dxmcypiw=876  ms  XDgY=015  ms  QRS Axis=-48  deg  T Wave Axis=132  deg  - ABNORMAL ECG -  Sinus or ectopic atrial bradycardia  Left bundle branch block  ST elevation secondary to IVCD  Date and Time of Study:2025-01-24 15:25:42    Telemetry Scan [[id number]] Resulted: 01/24/25     Updated: 01/24/25 1601    ECG 12 Lead Other; s/p reop AVR [[id number]] Collected: 01/25/25 0405     Updated: 01/25/25 0704     QT Interval 477 ms      QTC Interval 415 ms     Narrative:      HEART RATE=48  bpm  RR Myesmmhb=1478  ms  NJ Ffbaquws=576  ms  P Horizontal Axis=-33  deg  P Front Axis=7  deg  QRSD Interval=160  ms  QT Cygnvhed=309  ms  SNaI=016  ms  QRS Axis=-31  deg  T Wave Axis=126  deg  - ABNORMAL ECG -  Sinus bradycardia  Atrial premature complex  Prolonged NJ interval  Left bundle branch block  ST elevation secondary to IVCD  When compared with ECG of 24-Jan-2025 15:25:42,  Significant change in rhythm  New conduction abnormality  Date and Time of Study:2025-01-25 04:05:52    ECG 12 Lead Other; s/p reop AVR [[id number]] Collected: 01/26/25 0609     Updated: 01/26/25 0610     QT Interval 419 ms      QTC Interval 483 ms     Narrative:      HEART RATE=80  bpm  RR Jaobahmy=390  ms  NJ Erclujgs=754  ms  P Horizontal Axis=251  deg  P Front Axis=5  deg  QRSD Zmvjwgko=664  ms  QT Hkrblrhe=481  ms  NIxP=618  ms  QRS Axis=-17  deg  T Wave Axis=141  deg  - ABNORMAL ECG -  Sinus rhythm  Borderline prolonged NJ  interval  Left bundle branch block  ST elevation secondary to IVCD  Date and Time of Study:2025-01-26 06:09:55          CBC    Results from last 7 days   Lab Units 01/26/25 0338 01/25/25  0338 01/24/25  1828 01/24/25  1724 01/24/25  1610 01/24/25  1545 01/24/25  1509 01/24/25  1251 01/24/25  1202 01/24/25  1019 01/24/25  0728 01/22/25  0843   WBC 10*3/mm3 15.23* 8.33  --   --   --  9.97  --   --  12.78* 13.04*  --  7.40   HEMOGLOBIN g/dL 9.8* 9.5* 9.1*  --   --  8.1*  --   --  9.2* 9.3*  --  14.7   HEMOGLOBIN, POC g/dL  --   --   --  9.4* 8.2*  --  8.4*   < >  --   --    < >  --    PLATELETS 10*3/mm3 114* 110*  --   --   --  101*  --   --  119* 108*  --  189    < > = values in this interval not displayed.     BMP   Results from last 7 days   Lab Units 01/26/25 0338 01/25/25 0338 01/24/25  1828 01/24/25  1409 01/24/25  1251 01/24/25  1202 01/22/25  0843   SODIUM mmol/L 137 142  --   --   --  143 140   POTASSIUM mmol/L 3.8 4.1 3.7  --   --  3.8 3.9   CHLORIDE mmol/L 102 109*  --   --   --  107 108*   CO2 mmol/L 25.3 23.6  --   --   --  25.2 24.1   BUN mg/dL 18 18  --   --   --  14 18   CREATININE mg/dL 0.72 0.75  --  0.88 0.98 0.89 0.67   GLUCOSE mg/dL 124* 129*  --   --   --  120* 109*   MAGNESIUM mg/dL  --  2.5*  --   --   --   --   --    PHOSPHORUS mg/dL  --  4.2  --   --   --  3.5  --      CMP   Results from last 7 days   Lab Units 01/26/25  0338 01/25/25  0338 01/24/25  1828 01/24/25  1409 01/24/25  1251 01/24/25  1202 01/22/25  0843   SODIUM mmol/L 137 142  --   --   --  143 140   POTASSIUM mmol/L 3.8 4.1 3.7  --   --  3.8 3.9   CHLORIDE mmol/L 102 109*  --   --   --  107 108*   CO2 mmol/L 25.3 23.6  --   --   --  25.2 24.1   BUN mg/dL 18 18  --   --   --  14 18   CREATININE mg/dL 0.72 0.75  --  0.88 0.98 0.89 0.67   GLUCOSE mg/dL 124* 129*  --   --   --  120* 109*   ALBUMIN g/dL  --  4.4  --   --   --  4.3 4.1   BILIRUBIN mg/dL  --   --   --   --   --   --  0.6   ALK PHOS U/L  --   --   --   --   --   --  95    AST (SGOT) U/L  --   --   --   --   --   --  27   ALT (SGPT) U/L  --   --   --   --   --   --  16     Cardiac Studies:  Echo- Results for orders placed in visit on 01/24/25    Intra-Op Anesthesia FRAN    Narrative  Intra-Op Anesthesia FRAN    Procedure Performed: Intra-Op Anesthesia FRAN  Start Time:  1/24/2025 7:10 AM  End Time:   1/24/2025 7:20 AM    Preanesthesia Checklist:  Patient identified, IV assessed, risks and benefits discussed, monitors and equipment assessed, procedure being performed at surgeon's request and anesthesia consent obtained.    General Procedure Information  FRAN Placed for monitoring purposes only -- This is not a diagnostic FRAN  Diagnostic Indications for Echo:  assessment of ascending aorta, assessment of surgical repair, defect repair evaluation and hemodynamic monitoring  Location performed:  OR  Intubated  Bite block placed  Heart visualized  Probe Insertion:  Easy  Probe Type:  Multiplane  Modalities:  Color flow mapping, continuous wave Doppler and pulse wave Doppler    Echocardiographic and Doppler Measurements    Ventricles    Right Ventricle:  Cavity size normal.  Hypertrophy not present.  Thrombus not present.  Global function normal.  Left Ventricle:  Cavity size normal.  Thrombus not present.  Global Function mildly impaired.        Valves    Aortic Valve:  Annulus bioprosthetic.  Stenosis severe.  Mean Gradient: 72/35 mmHg.  Pressure Half-time: 4 ms.  Regurgitation absent.  Leaflets calcified and thickened.  Leaflet motions restricted.    Mitral Valve:  Annulus normal.  Stenosis not present.  Regurgitation trace.  Leaflets normal.  Leaflet motions normal.    Tricuspid Valve:  Annulus normal.  Stenosis not present.  Regurgitation trace.  Leaflets normal.  Leaflet motions normal.  Pulmonic Valve:  Annulus normal.      Aorta    Ascending Aorta:  Size dilated.  Diameter 3.9 cm.  Dissection not present.  Plaque thickness less than 3 mm.  Mobile plaque not present.  Aortic  Arch:  Size normal.  Diameter 2.5 cm.  Dissection not present.  Plaque thickness less than 3 mm.  Mobile plaque not present.  Descending Aorta:  Size normal.  Diameter 2.7 cm.  Dissection not present.  Plaque thickness less than 3 mm.  Mobile plaque not present.      Atria    Right Atrium:  Size normal.  Spontaneous echo contrast not present.  Thrombus not present.  Tumor not present.  Device not present.    Left Atrium:  Size dilated.  Spontaneous echo contrast not present.  Thrombus not present.  Tumor not present.  Device not present.  Left atrial appendage normal.              Other Findings  Pericardium:  normal  Pleural Effusion:  none  Pulmonary Arteries:  normal  Pulmonary Venous Flow:  normal    Anesthesia Information  Performed Personally  Anesthesiologist:  Froilan Cummings MD      Echocardiogram Comments:  BIOPROSTHETIC AV. R CUSP IMMOBILE. L RESTRICTED. SEVERE AS PK VEL4.24, 72/35  MV: TRACE MR  TV: TRACE TI  LA ENLARGEMENT 5.2CM  LVH  EF 45    Stress Myoview-  Cath-      Medication Review:   Scheduled Meds:amLODIPine, 5 mg, Oral, Q24H  aspirin, 81 mg, Oral, Daily  atorvastatin, 40 mg, Oral, Nightly  bumetanide, 2 mg, Intravenous, Q8H  chlorhexidine, 15 mL, Mouth/Throat, Q12H  enoxaparin, 40 mg, Subcutaneous, Q12H  guaiFENesin, 600 mg, Oral, Q12H  insulin lispro, 2-9 Units, Subcutaneous, 4x Daily AC & at Bedtime  levothyroxine, 112 mcg, Oral, Daily  mupirocin, 1 Application, Each Nare, BID  pantoprazole, 40 mg, Oral, Q AM  polyethylene glycol, 17 g, Oral, BID  potassium chloride, 20 mEq, Oral, BID With Meals  senna-docusate sodium, 2 tablet, Oral, BID      Continuous Infusions:dexmedetomidine, 0.2-1.5 mcg/kg/hr, Last Rate: Stopped (01/24/25 1533)  niCARdipine, 5-15 mg/hr, Last Rate: Stopped (01/25/25 0712)  norepinephrine, 0.02-0.06 mcg/kg/min  phenylephrine, 0.5-3 mcg/kg/min  sodium chloride, 30 mL/hr, Last Rate: 30 mL/hr (01/24/25 1246)      PRN Meds:.  acetaminophen **OR** acetaminophen **OR**  acetaminophen    Calcium Replacement - Follow Nurse / BPA Driven Protocol    cyclobenzaprine    dextrose    dextrose    glucagon (human recombinant)    HYDROcodone-acetaminophen    Magnesium Cardiology Dose Replacement - Follow Nurse / BPA Driven Protocol    Morphine **AND** naloxone    niCARdipine    nitroglycerin    norepinephrine    ondansetron    Phosphorus Replacement - Follow Nurse / BPA Driven Protocol    Potassium Replacement - Follow Nurse / BPA Driven Protocol    vasopressin      Assessment & Plan     Prosthetic aortic valve stenosis    MDM:    1.  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  Continue supportive care.  Gentle diuresis recommended    2.  Hypertension:    Patient is off Cardene blood pressure is stable    3.  Mixed hyperlipidemia:    Patient is on Lipitor.    4.  CAD:    Recent cardiac catheterization showed nonobstructive CAD    5.  Diabetes mellitus:    Patient is on insulin.    Valentino Sanabria MD  01/26/25  11:50 EST

## 2025-01-27 ENCOUNTER — APPOINTMENT (OUTPATIENT)
Dept: GENERAL RADIOLOGY | Facility: HOSPITAL | Age: 70
DRG: 220 | End: 2025-01-27
Payer: MEDICARE

## 2025-01-27 PROBLEM — Z95.2 S/P AVR: Status: ACTIVE | Noted: 2025-01-27

## 2025-01-27 LAB
ABO GROUP BLD: NORMAL
ANION GAP SERPL CALCULATED.3IONS-SCNC: 7.3 MMOL/L (ref 5–15)
BACTERIA SPEC AEROBE CULT: NORMAL
BLD GP AB SCN SERPL QL: NEGATIVE
BUN SERPL-MCNC: 25 MG/DL (ref 8–23)
BUN/CREAT SERPL: 32.1 (ref 7–25)
CALCIUM SPEC-SCNC: 8.3 MG/DL (ref 8.6–10.5)
CHLORIDE SERPL-SCNC: 101 MMOL/L (ref 98–107)
CO2 SERPL-SCNC: 28.7 MMOL/L (ref 22–29)
CREAT SERPL-MCNC: 0.78 MG/DL (ref 0.57–1)
DEPRECATED RDW RBC AUTO: 46.7 FL (ref 37–54)
EGFRCR SERPLBLD CKD-EPI 2021: 82.9 ML/MIN/1.73
ERYTHROCYTE [DISTWIDTH] IN BLOOD BY AUTOMATED COUNT: 13.8 % (ref 12.3–15.4)
GLUCOSE BLDC GLUCOMTR-MCNC: 104 MG/DL (ref 70–105)
GLUCOSE BLDC GLUCOMTR-MCNC: 106 MG/DL (ref 70–105)
GLUCOSE BLDC GLUCOMTR-MCNC: 131 MG/DL (ref 70–105)
GLUCOSE BLDC GLUCOMTR-MCNC: 147 MG/DL (ref 70–105)
GLUCOSE SERPL-MCNC: 111 MG/DL (ref 65–99)
GRAM STN SPEC: NORMAL
GRAM STN SPEC: NORMAL
HCT VFR BLD AUTO: 31.5 % (ref 34–46.6)
HGB BLD-MCNC: 10 G/DL (ref 12–15.9)
LAB AP CASE REPORT: NORMAL
MCH RBC QN AUTO: 29.1 PG (ref 26.6–33)
MCHC RBC AUTO-ENTMCNC: 31.7 G/DL (ref 31.5–35.7)
MCV RBC AUTO: 91.6 FL (ref 79–97)
PATH REPORT.FINAL DX SPEC: NORMAL
PATH REPORT.GROSS SPEC: NORMAL
PLATELET # BLD AUTO: 143 10*3/MM3 (ref 140–450)
PMV BLD AUTO: 11.9 FL (ref 6–12)
POTASSIUM SERPL-SCNC: 4 MMOL/L (ref 3.5–5.2)
RBC # BLD AUTO: 3.44 10*6/MM3 (ref 3.77–5.28)
RH BLD: POSITIVE
SODIUM SERPL-SCNC: 137 MMOL/L (ref 136–145)
T&S EXPIRATION DATE: NORMAL
WBC NRBC COR # BLD AUTO: 12.15 10*3/MM3 (ref 3.4–10.8)

## 2025-01-27 PROCEDURE — 82948 REAGENT STRIP/BLOOD GLUCOSE: CPT | Performed by: THORACIC SURGERY (CARDIOTHORACIC VASCULAR SURGERY)

## 2025-01-27 PROCEDURE — 86850 RBC ANTIBODY SCREEN: CPT

## 2025-01-27 PROCEDURE — 25010000002 KETOROLAC TROMETHAMINE PER 15 MG

## 2025-01-27 PROCEDURE — 25010000002 BUMETANIDE PER 0.5 MG: Performed by: NURSE PRACTITIONER

## 2025-01-27 PROCEDURE — 97535 SELF CARE MNGMENT TRAINING: CPT

## 2025-01-27 PROCEDURE — 85027 COMPLETE CBC AUTOMATED: CPT | Performed by: NURSE PRACTITIONER

## 2025-01-27 PROCEDURE — 25010000002 CALCIUM GLUCONATE-NACL 1-0.675 GM/50ML-% SOLUTION: Performed by: NURSE PRACTITIONER

## 2025-01-27 PROCEDURE — 86901 BLOOD TYPING SEROLOGIC RH(D): CPT

## 2025-01-27 PROCEDURE — 97116 GAIT TRAINING THERAPY: CPT

## 2025-01-27 PROCEDURE — 25010000002 ENOXAPARIN PER 10 MG: Performed by: NURSE PRACTITIONER

## 2025-01-27 PROCEDURE — 99232 SBSQ HOSP IP/OBS MODERATE 35: CPT | Performed by: INTERNAL MEDICINE

## 2025-01-27 PROCEDURE — 82948 REAGENT STRIP/BLOOD GLUCOSE: CPT

## 2025-01-27 PROCEDURE — 97530 THERAPEUTIC ACTIVITIES: CPT

## 2025-01-27 PROCEDURE — 86900 BLOOD TYPING SEROLOGIC ABO: CPT

## 2025-01-27 PROCEDURE — 97110 THERAPEUTIC EXERCISES: CPT

## 2025-01-27 PROCEDURE — 80048 BASIC METABOLIC PNL TOTAL CA: CPT | Performed by: NURSE PRACTITIONER

## 2025-01-27 PROCEDURE — 71045 X-RAY EXAM CHEST 1 VIEW: CPT

## 2025-01-27 RX ORDER — POLYETHYLENE GLYCOL 3350 17 G/17G
17 POWDER, FOR SOLUTION ORAL 2 TIMES DAILY PRN
Status: DISCONTINUED | OUTPATIENT
Start: 2025-01-27 | End: 2025-01-29 | Stop reason: HOSPADM

## 2025-01-27 RX ORDER — LACTULOSE 10 G/15ML
20 SOLUTION ORAL ONCE
Status: COMPLETED | OUTPATIENT
Start: 2025-01-27 | End: 2025-01-27

## 2025-01-27 RX ORDER — BUMETANIDE 0.25 MG/ML
2 INJECTION, SOLUTION INTRAMUSCULAR; INTRAVENOUS EVERY 12 HOURS
Status: COMPLETED | OUTPATIENT
Start: 2025-01-27 | End: 2025-01-28

## 2025-01-27 RX ORDER — CALCIUM GLUCONATE 20 MG/ML
1000 INJECTION, SOLUTION INTRAVENOUS ONCE
Status: COMPLETED | OUTPATIENT
Start: 2025-01-27 | End: 2025-01-27

## 2025-01-27 RX ORDER — AMOXICILLIN 250 MG
2 CAPSULE ORAL 2 TIMES DAILY PRN
Status: DISCONTINUED | OUTPATIENT
Start: 2025-01-27 | End: 2025-01-29 | Stop reason: HOSPADM

## 2025-01-27 RX ADMIN — ATORVASTATIN CALCIUM 40 MG: 40 TABLET, FILM COATED ORAL at 20:33

## 2025-01-27 RX ADMIN — GUAIFENESIN 600 MG: 600 TABLET, MULTILAYER, EXTENDED RELEASE ORAL at 20:33

## 2025-01-27 RX ADMIN — POTASSIUM CHLORIDE 20 MEQ: 1500 TABLET, EXTENDED RELEASE ORAL at 17:20

## 2025-01-27 RX ADMIN — CALCIUM GLUCONATE 1000 MG: 20 INJECTION, SOLUTION INTRAVENOUS at 11:36

## 2025-01-27 RX ADMIN — POTASSIUM CHLORIDE 20 MEQ: 1500 TABLET, EXTENDED RELEASE ORAL at 11:37

## 2025-01-27 RX ADMIN — BUMETANIDE 2 MG: 0.25 INJECTION INTRAMUSCULAR; INTRAVENOUS at 20:33

## 2025-01-27 RX ADMIN — HYDROCODONE BITARTRATE AND ACETAMINOPHEN 1 TABLET: 5; 325 TABLET ORAL at 20:33

## 2025-01-27 RX ADMIN — MUPIROCIN 1 APPLICATION: 20 OINTMENT TOPICAL at 20:33

## 2025-01-27 RX ADMIN — ENOXAPARIN SODIUM 40 MG: 100 INJECTION SUBCUTANEOUS at 21:27

## 2025-01-27 RX ADMIN — LEVOTHYROXINE SODIUM 112 MCG: 112 TABLET ORAL at 11:37

## 2025-01-27 RX ADMIN — POLYETHYLENE GLYCOL 3350 17 G: 17 POWDER, FOR SOLUTION ORAL at 11:36

## 2025-01-27 RX ADMIN — PANTOPRAZOLE SODIUM 40 MG: 40 TABLET, DELAYED RELEASE ORAL at 06:00

## 2025-01-27 RX ADMIN — KETOROLAC TROMETHAMINE 15 MG: 30 INJECTION, SOLUTION INTRAMUSCULAR at 11:41

## 2025-01-27 RX ADMIN — CHLORHEXIDINE GLUCONATE 15 ML: 1.2 RINSE ORAL at 20:33

## 2025-01-27 RX ADMIN — MUPIROCIN 1 APPLICATION: 20 OINTMENT TOPICAL at 09:07

## 2025-01-27 RX ADMIN — ASPIRIN 81 MG: 81 TABLET, COATED ORAL at 11:37

## 2025-01-27 RX ADMIN — CHLORHEXIDINE GLUCONATE 15 ML: 1.2 RINSE ORAL at 09:07

## 2025-01-27 RX ADMIN — GUAIFENESIN 600 MG: 600 TABLET, MULTILAYER, EXTENDED RELEASE ORAL at 11:37

## 2025-01-27 RX ADMIN — AMLODIPINE BESYLATE 5 MG: 5 TABLET ORAL at 11:37

## 2025-01-27 RX ADMIN — LACTULOSE 20 G: 20 SOLUTION ORAL at 11:38

## 2025-01-27 NOTE — PROGRESS NOTES
S/P POD# 3 redo sternotomy/ tissue AVR--Tiffanie  EF 50-55% (echo)    Subjective:  no c/o's this morning    Chest tube was unable to be pulled this weekend and plans to return to OR this morning for chest tube removal but Dr. Moreno was able to remove it at bedside this morning.  Wt is up 2.5 kgs from preop      Intake/Output Summary (Last 24 hours) at 1/27/2025 0903  Last data filed at 1/27/2025 0600  Gross per 24 hour   Intake 1066 ml   Output 3185 ml   Net -2119 ml     Temp:  [98.1 °F (36.7 °C)-99.3 °F (37.4 °C)] 98.3 °F (36.8 °C)  Heart Rate:  [81-96] 91  Resp:  [16-24] 20  BP: ()/(46-61) 107/55  CT 10/8    Results from last 7 days   Lab Units 01/27/25 0250 01/26/25 0338 01/25/25  0338 01/24/25  1251 01/24/25  1202 01/24/25  1019   WBC 10*3/mm3 12.15* 15.23* 8.33   < > 12.78* 13.04*   HEMOGLOBIN g/dL 10.0* 9.8* 9.5*   < > 9.2* 9.3*   HEMOGLOBIN, POC   --   --   --    < >  --   --    HEMATOCRIT % 31.5* 30.9* 29.5*   < > 28.8* 28.1*   HEMATOCRIT POC   --   --   --    < >  --   --    PLATELETS 10*3/mm3 143 114* 110*   < > 119* 108*   INR   --   --  1.32*  --  1.57* 1.95*    < > = values in this interval not displayed.     Results from last 7 days   Lab Units 01/27/25 0250 01/26/25 0338 01/25/25 0338   CREATININE mg/dL 0.78   < > 0.75   POTASSIUM mmol/L 4.0   < > 4.1   SODIUM mmol/L 137   < > 142   MAGNESIUM mg/dL  --   --  2.5*   PHOSPHORUS mg/dL  --   --  4.2    < > = values in this interval not displayed.     Physical Exam:  Neuro intact, nad, resting in bed, family at bedside  Tele:  SR with LBBB, 90s  Diminished bases, 97% 7HL  dsg c/d/I (COLLEEN dressing)  Benign abd, no BM  No edema    Assessment/Plan:  Principal Problem:    Prosthetic aortic valve stenosis    - Severe prosthetic aortic valve stenosis (ALEKSEY .73 cm2, peak velocity 403 cm/sec, mean peak gradient 44 mmHg), EF 50-55% (echo), s/p minimally invasive tissue AVR 21 mm Magna (Neelam, 12/2013)--s/p redo sternotomy, tissue AVR #23  Nelli Gupta (Tiffanie, 1/24/2025)  - Non-obstructive CAD--cath 1/15/2025  - HLD with hypertriglyceridemia--statin  - LUÍS, CPAP compliance  - Morbid obesity, stage 3--BMI 44.2  - Hyperglycemia--A1c 5.95  - Hypothyroidism--levothyroxine  - Postop leukocytosis, likely reactive  - Postop ABLA, expected--transfused 1/24  - Postop TCP, consumptive--transfused 1/24    POD# 3.  Doing well.  Chest tube was removed by Dr. Moreno at bedside this morning.  DC all chest tubes, ibarra, cordis.  On asa/statin/ add bb today.  Remains on Bumex 2 mg IV q8hrs, will decrease today.  Lactulose x1.  Mobilize. Anticipate home in next 1-2 days.    Routine care--as above  D/w pt/family, nsg, Dr. Moreno   Anticipate home at discharge    Latanya Tubbs, APRN  1/27/2025  09:03 EST

## 2025-01-27 NOTE — PLAN OF CARE
Goal Outcome Evaluation:      Lulu Graves presents with ADL impairments affecting function including balance, endurance / activity tolerance, and strength. Pt demoing improvement toward baseline, but limited by tachycardia. Demonstrated functioning below baseline abilities indicate the need for continued skilled intervention while inpatient. Tolerating session today without incident. Will continue to follow and progress as tolerated.

## 2025-01-27 NOTE — CASE MANAGEMENT/SOCIAL WORK
Discharge Planning Assessment  [facility]     Patient Name: Lulu Graves  MRN: [id number]  Today's Date: 1/27/2025    Admit Date: 1/24/2025    Plan: [place] Plan: Home with family assist. Lives with spouse. AVR 1/24/2025.   Discharge Needs Assessment       Row Name 01/27/25 1358       Living Environment    People in Home spouse    Name(s) of People in Home Clayton Graves    Current Living Arrangements home    Potentially Unsafe Housing Conditions none    In the past 12 months has the electric, gas, oil, or water company threatened to shut off services in your home? No    Primary Care Provided by self    Provides Primary Care For no one    Family Caregiver if Needed spouse    Family Caregiver Names Clayton Graves    Quality of Family Relationships helpful;involved;supportive    Able to Return to Prior Arrangements yes       Resource/Environmental Concerns    Resource/Environmental Concerns none    Transportation Concerns none       Transportation Needs    In the past 12 months, has lack of transportation kept you from medical appointments or from getting medications? no    In the past 12 months, has lack of transportation kept you from meetings, work, or from getting things needed for daily living? No       Food Insecurity    Within the past 12 months, you worried that your food would run out before you got the money to buy more. Never true    Within the past 12 months, the food you bought just didn't last and you didn't have money to get more. Never true       Transition Planning    Patient/Family Anticipates Transition to home with family    Patient/Family Anticipated Services at Transition none    Transportation Anticipated car, drives self;family or friend will provide       Discharge Needs Assessment    Readmission Within the Last 30 Days planned readmission    Equipment Currently Used at Home none    Concerns to be Addressed discharge planning    Do you want help finding or keeping work or a job? I do not need or want help    Do  you want help with school or training? For example, starting or completing job training or getting a high school diploma, GED or equivalent No    Anticipated Changes Related to Illness none    Equipment Needed After Discharge other (see comments)  CM willl continue to monitor for needs    Provided Post Acute Provider List? N/A    Provided Post Acute Provider Quality & Resource List? N/A                   Discharge Plan       Row Name 01/27/25 8792       Plan    Plan DC Plan: Home with family assist. Lives with spouse. AVR 1/24/2025.    Patient/Family in Agreement with Plan yes    Provided Post Acute Provider List? N/A    Provided Post Acute Provider Quality & Resource List? N/A    Plan Comments CM spoke with patient at bedside to discuss admission assessment and discharge planning. Patient confirms PCP and pharmacy. Patient confirms she is agreeable to meds to bed program at this time. CM updated pharmacy in [place]. Patient denies any difficulty affording medications or food at this time. Patient denies any additional needs for services or DME at this time. Patient reports independent with ADL's and drives at baseline. Patient spouse will provide transportation when ready for DC.CM spoke with patient’s nurse and [facility] NP Latanya Dupont to obtain clinical updates. PT/OT recommending home with family assist.CM will continue to follow for any additional needs and adjust DC plan accordingly. DC Barriers: POD 3 OHS, Cardiac monitoring, O2@4L nc, IV Bumex/Toradol, and monitor labs.               Expected Discharge Date and Time       Expected Discharge Date Expected Discharge Time    Jan 29, 2025            Demographic Summary       Row Name 01/27/25 2702       General Information    Admission Type inpatient    Arrived From home;operating room    Required Notices Provided Important Message from Medicare    Referral Source admission list    Reason for Consult discharge planning    Preferred Language English        Contact Information    Permission Granted to Share Info With                    Functional Status       Row Name 01/27/25 1357       Functional Status    Usual Activity Tolerance good    Current Activity Tolerance moderate       Physical Activity    On average, how many days per week do you engage in moderate to strenuous exercise (like a brisk walk)? 0 days    On average, how many minutes do you engage in exercise at this level? 0 min    Number of minutes of exercise per week 0       Functional Status, IADL    Medications independent    Meal Preparation independent    Housekeeping independent    Laundry independent    Shopping independent       Mental Status    General Appearance WDL WDL       Mental Status Summary    Recent Changes in Mental Status/Cognitive Functioning no changes       Employment/    Employment Status employed part-time    Current or Previous Occupation other (see comments)    Employment/ Comments [employer]                   Chantell Vallejo RN    Office Phone: [phone]  Office Cell:     [phone]

## 2025-01-27 NOTE — PLAN OF CARE
"Goal Outcome Evaluation:         Assessment: Lulu Graves had significant tachycardia today, so gait distance was limited. Multiple checks of vitals and slow gait. Pt presents with functional mobility impairments which indicate the need for skilled intervention. Tolerating session today without incident. Will continue to follow and progress as tolerated.     Plan/Recommendations:   If medically appropriate, Low Intensity Therapy recommended post-acute care - This is recommended as therapy feels this patient would require 2-3 visits per week. OP or HH would be the best option depending on patient's home bound status. Consider, if the patient has other  \"skilled\" needs such as wounds, IV antibiotics, etc. Combined with \"low intensity\" could also equate to a SNF. If patient is medically complex, consider LTAC. Pt requires no DME at discharge.     Pt desires Home with family assist at discharge. Pt cooperative; agreeable to therapeutic recommendations and plan of care.                                      "

## 2025-01-27 NOTE — THERAPY TREATMENT NOTE
"Subjective: Pt agreeable to therapeutic plan of care.    Objective:     Phase 1 Cardiac Rehab Initiated - Acute Care    Cardiac Level III Activities  Sitting tolerance: >10min and supported  Standing tolerance: 1-5min and supported    Precautions:  Mid-sternal incision; avoid scapular retraction and depression.  Cardiovascular impairment post-sx; encourage energy conservation strategies.    MET level equivalent: 1.4-2.0 (Self care ADLs in sitting / slow ambulation in room, light intensity activities)    Bed mobility - Min-A and Assist x 2  Transfers - SBA and with rolling walker  Ambulation - 50 feet SBA, Assist x 2, and with rolling walker    Vitals: Tachycardic; HR began at 112, but quickly escalated to 136 w/ gait; upon sitting, slowly dropped to 126.     Pain: 0 VAS   Location: n/a  Intervention for pain: Repositioned, RN notified, Increased Activity, and Therapeutic Presence    Education: Provided education on the importance of mobility in the acute care setting, Verbal/Tactile Cues, Transfer Training, Gait Training, Energy conservation strategies, and Post-Op Precautions    Assessment: Lulu Star had significant tachycardia today, so gait distance was limited. Multiple checks of vitals and slow gait. Pt presents with functional mobility impairments which indicate the need for skilled intervention. Tolerating session today without incident. Will continue to follow and progress as tolerated.     Plan/Recommendations:   If medically appropriate, Low Intensity Therapy recommended post-acute care - This is recommended as therapy feels this patient would require 2-3 visits per week. OP or HH would be the best option depending on patient's home bound status. Consider, if the patient has other  \"skilled\" needs such as wounds, IV antibiotics, etc. Combined with \"low intensity\" could also equate to a SNF. If patient is medically complex, consider LTAC. Pt requires no DME at discharge.     Pt desires Home with family " assist at discharge. Pt cooperative; agreeable to therapeutic recommendations and plan of care.         Basic Mobility 6-click:  Rollin = Total, A lot = 2, A little = 3; 4 = None  Supine>Sit:   1 = Total, A lot = 2, A little = 3; 4 = None   Sit>Stand with arms:  1 = Total, A lot = 2, A little = 3; 4 = None  Bed>Chair:   1 = Total, A lot = 2, A little = 3; 4 = None  Ambulate in room:  1 = Total, A lot = 2, A little = 3; 4 = None  3-5 Steps with railin = Total, A lot = 2, A little = 3; 4 = None  Score: 18    Modified Cammie: N/A = No pre-op stroke/TIA    Post-Tx Position: Up in Chair and Call light and personal items within reach; LE's elevated  PPE: gloves    Therapy Charges for Today       Code Description Service Date Service Provider Modifiers Qty    26988296706 HC GAIT TRAINING EA 15 MIN 2025 Isa Hollingsworth, PT GP 1    22383306908 HC PT THERAPEUTIC ACT EA 15 MIN 2025 Isa Hollingsworth, PT GP 1           PT Charges       Row Name 25 1224             Time Calculation    Start Time 1105  -CM      Stop Time 1131  -CM      Time Calculation (min) 26 min  -CM      PT Received On 25  -CM      PT - Next Appointment 25  -CM         Time Calculation- PT    Total Timed Code Minutes- PT 26 minute(s)  -CM                User Key  (r) = Recorded By, (t) = Taken By, (c) = Cosigned By      Initials Name Provider Type    Isa Elliott, PT Physical Therapist

## 2025-01-27 NOTE — PROGRESS NOTES
LOS: 3 days   Admitting Physician- Travis Moreno, *        Subjective     No chest pain or shortness of breath    Objective   Lying in bed comfortably    Review of Systems:   Review of Systems   Constitutional: Negative for chills and fever.   HENT:  Negative for ear discharge and nosebleeds.    Eyes:  Negative for discharge and redness.   Cardiovascular:  Negative for chest pain, orthopnea, palpitations, paroxysmal nocturnal dyspnea and syncope.   Respiratory:  Negative for cough, shortness of breath and wheezing.    Endocrine: Negative for heat intolerance.   Skin:  Negative for rash.   Musculoskeletal:  Negative for arthritis and myalgias.   Gastrointestinal:  Negative for abdominal pain, melena, nausea and vomiting.   Genitourinary:  Negative for dysuria and hematuria.   Neurological:  Negative for dizziness, light-headedness, numbness and tremors.   Psychiatric/Behavioral:  Negative for depression. The patient is not nervous/anxious.          Vital Signs  Vitals:    01/27/25 0500 01/27/25 0614 01/27/25 0748 01/27/25 1205   BP: 105/51 101/47 107/55 121/68   BP Location:   Right arm Right arm   Patient Position:   Lying Sitting   Pulse: 85 91     Resp:   20 26   Temp:   98.3 °F (36.8 °C) 98.4 °F (36.9 °C)   TempSrc:   Oral Oral   SpO2: 97% 97%     Weight:       Height:         Wt Readings from Last 1 Encounters:   01/25/25 99.6 kg (219 lb 9.6 oz)       Intake/Output Summary (Last 24 hours) at 1/27/2025 1308  Last data filed at 1/27/2025 0600  Gross per 24 hour   Intake 1066 ml   Output 3185 ml   Net -2119 ml     Physical Exam:  Constitutional:       Appearance: Well-developed.   Eyes:      General: No scleral icterus.        Right eye: No discharge.   HENT:      Head: Normocephalic and atraumatic.   Neck:      Thyroid: No thyromegaly.      Lymphadenopathy: No cervical adenopathy.   Pulmonary:      Effort: Pulmonary effort is normal. No respiratory distress.      Breath sounds: Normal breath sounds. No  "wheezing. No rales.   Cardiovascular:      Normal rate. Regular rhythm.      No gallop.    Edema:     Peripheral edema absent.   Abdominal:      Tenderness: There is no abdominal tenderness.   Skin:     Findings: No erythema or rash.   Neurological:      Mental Status: Alert and oriented to person, place, and time.         Results Review:   Lab Results (last 24 hours)       Procedure Component Value Units Date/Time    POC Glucose 4x Daily Before Meals & at Bedtime [[id number]]  (Abnormal) Collected: 01/27/25 1146    Specimen: Blood Updated: 01/27/25 1148     Glucose 106 mg/dL      Comment: Serial Number: [id number]:  [id number]       Tissue Pathology Exam [[id number]] Collected: 01/24/25 0904    Specimen: Tissue from Aortic valve Updated: 01/27/25 1106     Case Report --     Surgical Pathology Report                         Case: [id number]                                  Authorizing Provider:  Travis Moreno MD Collected:           01/24/2025 09:04 AM          Ordering Location:     [facility]       Received:            01/24/2025 09:11 AM                                 CARDIOVASCULAR OPERATING                                                                            ROOM                                                                         Pathologist:           Abhi Bonds MD                                                            Specimen:    Aortic valve, Explanted porcine aortic valve.                                               Final Diagnosis --     \"Explanted porcine aortic valve\":  Medical device received and confirmed consistent with prosthetic valve (gross diagnosis only)    JESUS       Gross Description --     1. Aortic valve.  Received in formalin designated explanted porcine aortic valve is a prosthetic valve measuring 2.7 x 2.7 x 1.7 cm in dimension.  Gross diagnosis only, no cassettes submitted.  JESUS        Tissue / Bone Culture - Tissue, Aortic valve [[id number]] " Collected: 01/24/25 0903    Specimen: Tissue from Aortic valve Updated: 01/27/25 0808     Tissue Culture No growth at 3 days     Gram Stain Rare (1+) WBCs seen      No organisms seen    POC Glucose 4x Daily Before Meals & at Bedtime [[id number]]  (Normal) Collected: 01/27/25 0753    Specimen: Blood Updated: 01/27/25 0754     Glucose 104 mg/dL      Comment: Serial Number: [id number]:  [id number]       Basic Metabolic Panel [[id number]]  (Abnormal) Collected: 01/27/25 0250    Specimen: Blood Updated: 01/27/25 0322     Glucose 111 mg/dL      BUN 25 mg/dL      Creatinine 0.78 mg/dL      Sodium 137 mmol/L      Potassium 4.0 mmol/L      Chloride 101 mmol/L      CO2 28.7 mmol/L      Calcium 8.3 mg/dL      BUN/Creatinine Ratio 32.1     Anion Gap 7.3 mmol/L      eGFR 82.9 mL/min/1.73     Narrative:      GFR Categories in Chronic Kidney Disease (CKD)      GFR Category          GFR (mL/min/1.73)    Interpretation  G1                     90 or greater         Normal or high (1)  G2                      60-89                Mild decrease (1)  G3a                   45-59                Mild to moderate decrease  G3b                   30-44                Moderate to severe decrease  G4                    15-29                Severe decrease  G5                    14 or less           Kidney failure          (1)In the absence of evidence of kidney disease, neither GFR category G1 or G2 fulfill the criteria for CKD.    eGFR calculation 2021 CKD-EPI creatinine equation, which does not include race as a factor    CBC (No Diff) [[id number]]  (Abnormal) Collected: 01/27/25 0250    Specimen: Blood Updated: 01/27/25 0301     WBC 12.15 10*3/mm3      RBC 3.44 10*6/mm3      Hemoglobin 10.0 g/dL      Hematocrit 31.5 %      MCV 91.6 fL      MCH 29.1 pg      MCHC 31.7 g/dL      RDW 13.8 %      RDW-SD 46.7 fl      MPV 11.9 fL      Platelets 143 10*3/mm3     POC Glucose Once [[id number]]  (Abnormal) Collected: 01/26/25 2026    Specimen: Blood  Updated: 01/26/25 2028     Glucose 146 mg/dL      Comment: Serial Number: [id number]:  [id number]       POC Glucose Once [[id number]]  (Abnormal) Collected: 01/26/25 1943    Specimen: Blood Updated: 01/26/25 1945     Glucose 178 mg/dL      Comment: Serial Number: [id number]:  [id number]       POC Glucose Once [[id number]]  (Abnormal) Collected: 01/26/25 1641    Specimen: Blood Updated: 01/26/25 1643     Glucose 128 mg/dL      Comment: Serial Number: [id number]:  [id number]             Imaging Results (Last 72 Hours)       Procedure Component Value Units Date/Time    XR Chest 1 View [[id number]] Resulted: 01/27/25 1212     Updated: 01/27/25 1212    XR Chest 1 View [[id number]] Collected: 01/26/25 0826     Updated: 01/26/25 0833    Narrative:      XR CHEST 1 VW    Date of Exam: 1/26/2025 4:40 AM EST    Indication: Post-Op Heart Surgery    Comparison: 1 day prior.    Findings:  Right-sided IJ line remains in place. There are persistent bibasilar opacities, greater on the right, likely combination of pleural effusions and atelectasis. Mild edema pattern also present with perihilar and interstitial opacities noted. There is no   distinct pneumothorax. No new focal airspace consolidation elsewhere. Stable heart and mediastinal contours.        Impression:      Impression:  Right-sided IJ line remains in place. There are persistent bibasilar opacities, greater on the right, likely combination of pleural effusions and atelectasis. Mild edema pattern also present with perihilar and interstitial opacities noted. There is no   distinct pneumothorax. No new focal airspace consolidation elsewhere. Stable heart and mediastinal contours.        Electronically Signed: Jeff Travis MD    1/26/2025 8:31 AM EST    Workstation ID: [id number]    XR Chest 1 View [[id number]] Collected: 01/25/25 1502     Updated: 01/25/25 1506    Narrative:      XR CHEST 1 VW    Date of Exam: 1/25/2025 1:54 PM EST    Indication: s/p med ct  removal    Comparison: 1/25/2025 at 0523 hours, 1/24/2025 at 1424 hours    Findings:  Postoperative changes are noted. Mediastinal drains remain in place. The endotracheal tube and nasogastric tube have been removed. The Denver-Rip catheter has been removed. A right IJ venous sheath remains in place. Residual atelectasis is seen in the   lung bases. There may be small bilateral pleural effusions. No definitive pneumothorax is seen.      Impression:      Impression:  1.The endotracheal tube and nasogastric tube have been removed. The Denver-Rip catheter has been removed. A right IJ venous sheath remains in place.  2.Mediastinal drains remain in place.  3.Residual atelectasis is seen in the lung bases. There may be small bilateral pleural effusions. No definitive pneumothorax is seen.        Electronically Signed: Jaswinder Jennings MD    1/25/2025 3:04 PM EST    Workstation ID: [id number]    XR Chest 1 View [[id number]] Collected: 01/25/25 0841     Updated: 01/25/25 0844    Narrative:      XR CHEST 1 VW    Date of Exam: 1/25/2025 5:20 AM EST    Indication: Post-Op Heart Surgery    Comparison: 1/24/2025    Findings:  Cardiomediastinal silhouette is unchanged. Removal of endotracheal tube and gastric tube. Removal of Denver-Rip catheter with persistent right IJ sheath. Mediastinal drain is similar. There is pulmonary vascular prominence. There is developing left basal   airspace disease, likely atelectasis. There is a small left effusion. No pneumothorax. No acute osseous abnormality identified.      Impression:      Impression:  1.Developing left basilar airspace disease with small left effusion.      Electronically Signed: Johnathan Lynn MD    1/25/2025 8:42 AM EST    Workstation ID: [id number]    XR Chest 1 View [[id number]] Collected: 01/24/25 1442     Updated: 01/24/25 1446    Narrative:      XR CHEST 1 VW    Date of Exam: 1/24/2025 2:41 PM EST    Indication: post op mediastinal hematoma?    Comparison: AP chest 1/24/2025 at  1205.    Findings:  Cardiomediastinal silhouette is within normal limits and appear stable compared to today's earlier chest x-ray. Median sternotomy and cardiac valve replacement changes are redemonstrated. ET tube tip is seen in the mid to lower thoracic trachea with the   tip 1.5 cm above the chronic. Mediastinal drains appear similarly positioned. No visible pneumothorax. Right IJ Oakman-Rip catheter extends to the main pulmonary outflow tract. There may be mild central vascular congestion without overt edema. No focal   lung consolidation is identified.      Impression:      Impression:    1. Support lines and tubes appear similarly positioned. No visible pneumothorax.  2. Cardiomediastinal silhouette is stable and appears within normal limits.  3. There may be mild central vascular congestive. No nyla edema or focal lung consolidation.      Electronically Signed: Perri Bobo MD    1/24/2025 2:43 PM EST    Workstation ID: [id number]          ECG/EMG Results (most recent)       Procedure Component Value Units Date/Time    Telemetry Scan [[id number]] Resulted: 01/24/25     Updated: 01/24/25 0939    Telemetry Scan [[id number]] Resulted: 01/24/25     Updated: 01/24/25 1309    ECG 12 Lead Other; s/p reop AVR [[id number]] Collected: 01/24/25 1525     Updated: 01/24/25 1526     QT Interval 509 ms      QTC Interval 442 ms     Narrative:      HEART RATE=45  bpm  RR Uotkvari=3308  ms  AK Mycdvpba=870  ms  P Horizontal Axis=-89  deg  P Front Axis=-60  deg  QRSD Husryrzu=313  ms  QT Vvmddqoc=504  ms  KDyK=839  ms  QRS Axis=-48  deg  T Wave Axis=132  deg  - ABNORMAL ECG -  Sinus or ectopic atrial bradycardia  Left bundle branch block  ST elevation secondary to IVCD  Date and Time of Study:2025-01-24 15:25:42    Telemetry Scan [[id number]] Resulted: 01/24/25     Updated: 01/24/25 1601    ECG 12 Lead Other; s/p reop AVR [[id number]] Collected: 01/25/25 0405     Updated: 01/25/25 0704     QT Interval 477 ms      QTC Interval 415  ms     Narrative:      HEART RATE=48  bpm  RR Jwhcjcth=7765  ms  WA Htdtqgxc=632  ms  P Horizontal Axis=-33  deg  P Front Axis=7  deg  QRSD Interval=160  ms  QT Lpyrfsuv=678  ms  DNjR=838  ms  QRS Axis=-31  deg  T Wave Axis=126  deg  - ABNORMAL ECG -  Sinus bradycardia  Atrial premature complex  Prolonged WA interval  Left bundle branch block  ST elevation secondary to IVCD  When compared with ECG of 24-Jan-2025 15:25:42,  Significant change in rhythm  New conduction abnormality  Date and Time of Study:2025-01-25 04:05:52    ECG 12 Lead Other; s/p reop AVR [[id number]] Collected: 01/26/25 0609     Updated: 01/26/25 0610     QT Interval 419 ms      QTC Interval 483 ms     Narrative:      HEART RATE=80  bpm  RR Gvcjnsgs=221  ms  WA Ukzpxxoi=406  ms  P Horizontal Axis=251  deg  P Front Axis=5  deg  QRSD Hvnhixqq=356  ms  QT Wcdurcer=474  ms  XTeK=793  ms  QRS Axis=-17  deg  T Wave Axis=141  deg  - ABNORMAL ECG -  Sinus rhythm  Borderline prolonged WA interval  Left bundle branch block  ST elevation secondary to IVCD  Date and Time of Study:2025-01-26 06:09:55    Telemetry Scan [[id number]] Resulted: 01/24/25     Updated: 01/27/25 0856    Telemetry Scan [[id number]] Resulted: 01/24/25     Updated: 01/27/25 0924    Telemetry Scan [[id number]] Resulted: 01/24/25     Updated: 01/27/25 0944    Telemetry Scan [[id number]] Resulted: 01/24/25     Updated: 01/27/25 0947    Telemetry Scan [[id number]] Resulted: 01/24/25     Updated: 01/27/25 1007    Telemetry Scan [[id number]] Resulted: 01/24/25     Updated: 01/27/25 1035    Telemetry Scan [[id number]] Resulted: 01/24/25     Updated: 01/27/25 1037    Telemetry Scan [[id number]] Resulted: 01/24/25     Updated: 01/27/25 1042    Telemetry Scan [[id number]] Resulted: 01/24/25     Updated: 01/27/25 1051    Telemetry Scan [[id number]] Resulted: 01/24/25     Updated: 01/27/25 1058    Telemetry Scan [[id number]] Resulted: 01/24/25     Updated: 01/27/25 1112    Telemetry Scan [[id number]] Resulted:  01/24/25     Updated: 01/27/25 1141    Telemetry Scan [[id number]] Resulted: 01/24/25     Updated: 01/27/25 1201    Telemetry Scan [[id number]] Resulted: 01/24/25     Updated: 01/27/25 1222    Telemetry Scan [[id number]] Resulted: 01/24/25     Updated: 01/27/25 1245          CBC    Results from last 7 days   Lab Units 01/27/25  0250 01/26/25  0338 01/25/25  0338 01/24/25  1828 01/24/25  1724 01/24/25  1610 01/24/25  1545 01/24/25  1251 01/24/25  1202 01/24/25  1019 01/24/25  0728 01/22/25  0843   WBC 10*3/mm3 12.15* 15.23* 8.33  --   --   --  9.97  --  12.78* 13.04*  --  7.40   HEMOGLOBIN g/dL 10.0* 9.8* 9.5* 9.1*  --   --  8.1*  --  9.2* 9.3*  --  14.7   HEMOGLOBIN, POC g/dL  --   --   --   --  9.4* 8.2*  --    < >  --   --    < >  --    PLATELETS 10*3/mm3 143 114* 110*  --   --   --  101*  --  119* 108*  --  189    < > = values in this interval not displayed.     BMP   Results from last 7 days   Lab Units 01/27/25  0250 01/26/25  0338 01/25/25  0338 01/24/25  1828 01/24/25  1409 01/24/25  1251 01/24/25  1202 01/22/25  0843   SODIUM mmol/L 137 137 142  --   --   --  143 140   POTASSIUM mmol/L 4.0 3.8 4.1 3.7  --   --  3.8 3.9   CHLORIDE mmol/L 101 102 109*  --   --   --  107 108*   CO2 mmol/L 28.7 25.3 23.6  --   --   --  25.2 24.1   BUN mg/dL 25* 18 18  --   --   --  14 18   CREATININE mg/dL 0.78 0.72 0.75  --  0.88 0.98 0.89 0.67   GLUCOSE mg/dL 111* 124* 129*  --   --   --  120* 109*   MAGNESIUM mg/dL  --   --  2.5*  --   --   --   --   --    PHOSPHORUS mg/dL  --   --  4.2  --   --   --  3.5  --      CMP   Results from last 7 days   Lab Units 01/27/25  0250 01/26/25  0338 01/25/25  0338 01/24/25  1828 01/24/25  1409 01/24/25  1251 01/24/25  1202 01/22/25  0843   SODIUM mmol/L 137 137 142  --   --   --  143 140   POTASSIUM mmol/L 4.0 3.8 4.1 3.7  --   --  3.8 3.9   CHLORIDE mmol/L 101 102 109*  --   --   --  107 108*   CO2 mmol/L 28.7 25.3 23.6  --   --   --  25.2 24.1   BUN mg/dL 25* 18 18  --   --   --  14 18    CREATININE mg/dL 0.78 0.72 0.75  --  0.88 0.98 0.89 0.67   GLUCOSE mg/dL 111* 124* 129*  --   --   --  120* 109*   ALBUMIN g/dL  --   --  4.4  --   --   --  4.3 4.1   BILIRUBIN mg/dL  --   --   --   --   --   --   --  0.6   ALK PHOS U/L  --   --   --   --   --   --   --  95   AST (SGOT) U/L  --   --   --   --   --   --   --  27   ALT (SGPT) U/L  --   --   --   --   --   --   --  16     Cardiac Studies:  Echo- Results for orders placed in visit on 01/24/25    Intra-Op Anesthesia FRAN    Narrative  Intra-Op Anesthesia FRAN    Procedure Performed: Intra-Op Anesthesia FRAN  Start Time:  1/24/2025 7:10 AM  End Time:   1/24/2025 7:20 AM    Preanesthesia Checklist:  Patient identified, IV assessed, risks and benefits discussed, monitors and equipment assessed, procedure being performed at surgeon's request and anesthesia consent obtained.    General Procedure Information  FRAN Placed for monitoring purposes only -- This is not a diagnostic FRAN  Diagnostic Indications for Echo:  assessment of ascending aorta, assessment of surgical repair, defect repair evaluation and hemodynamic monitoring  Location performed:  OR  Intubated  Bite block placed  Heart visualized  Probe Insertion:  Easy  Probe Type:  Multiplane  Modalities:  Color flow mapping, continuous wave Doppler and pulse wave Doppler    Echocardiographic and Doppler Measurements    Ventricles    Right Ventricle:  Cavity size normal.  Hypertrophy not present.  Thrombus not present.  Global function normal.  Left Ventricle:  Cavity size normal.  Thrombus not present.  Global Function mildly impaired.        Valves    Aortic Valve:  Annulus bioprosthetic.  Stenosis severe.  Mean Gradient: 72/35 mmHg.  Pressure Half-time: 4 ms.  Regurgitation absent.  Leaflets calcified and thickened.  Leaflet motions restricted.    Mitral Valve:  Annulus normal.  Stenosis not present.  Regurgitation trace.  Leaflets normal.  Leaflet motions normal.    Tricuspid Valve:  Annulus normal.   Stenosis not present.  Regurgitation trace.  Leaflets normal.  Leaflet motions normal.  Pulmonic Valve:  Annulus normal.      Aorta    Ascending Aorta:  Size dilated.  Diameter 3.9 cm.  Dissection not present.  Plaque thickness less than 3 mm.  Mobile plaque not present.  Aortic Arch:  Size normal.  Diameter 2.5 cm.  Dissection not present.  Plaque thickness less than 3 mm.  Mobile plaque not present.  Descending Aorta:  Size normal.  Diameter 2.7 cm.  Dissection not present.  Plaque thickness less than 3 mm.  Mobile plaque not present.      Atria    Right Atrium:  Size normal.  Spontaneous echo contrast not present.  Thrombus not present.  Tumor not present.  Device not present.    Left Atrium:  Size dilated.  Spontaneous echo contrast not present.  Thrombus not present.  Tumor not present.  Device not present.  Left atrial appendage normal.              Other Findings  Pericardium:  normal  Pleural Effusion:  none  Pulmonary Arteries:  normal  Pulmonary Venous Flow:  normal    Anesthesia Information  Performed Personally  Anesthesiologist:  Froilan Cummings MD      Echocardiogram Comments:  BIOPROSTHETIC AV. R CUSP IMMOBILE. L RESTRICTED. SEVERE AS PK VEL4.24, 72/35  MV: TRACE MR  TV: TRACE TI  LA ENLARGEMENT 5.2CM  LVH  EF 45    Stress Myoview-  Cath-      Medication Review:   Scheduled Meds:amLODIPine, 5 mg, Oral, Q24H  aspirin, 81 mg, Oral, Daily  atorvastatin, 40 mg, Oral, Nightly  bumetanide, 2 mg, Intravenous, Q12H  chlorhexidine, 15 mL, Mouth/Throat, Q12H  enoxaparin, 40 mg, Subcutaneous, Q12H  guaiFENesin, 600 mg, Oral, Q12H  insulin lispro, 2-9 Units, Subcutaneous, 4x Daily AC & at Bedtime  levothyroxine, 112 mcg, Oral, Daily  mupirocin, 1 Application, Each Nare, BID  pantoprazole, 40 mg, Oral, Q AM  polyethylene glycol, 17 g, Oral, BID  potassium chloride, 20 mEq, Oral, BID With Meals  senna-docusate sodium, 2 tablet, Oral, BID      Continuous Infusions:     PRN Meds:.  acetaminophen **OR** acetaminophen  **OR** acetaminophen    Calcium Replacement - Follow Nurse / BPA Driven Protocol    cyclobenzaprine    dextrose    dextrose    glucagon (human recombinant)    HYDROcodone-acetaminophen    Magnesium Cardiology Dose Replacement - Follow Nurse / BPA Driven Protocol    [DISCONTINUED] Morphine **AND** naloxone    nitroglycerin    ondansetron    Phosphorus Replacement - Follow Nurse / BPA Driven Protocol    Potassium Replacement - Follow Nurse / BPA Driven Protocol        MDM:    1.  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  Patient is ambulating well without any problems and her blood pressure and heart are stable    2.  Hypertension:    Patient is off Cardene blood pressure is stable on home oral medicines    3.  Mixed hyperlipidemia:    Patient is on Lipitor.  Lipids are followed by the primary care doctor.    4.  CAD:    Recent cardiac catheterization showed nonobstructive CAD.  Patient has normal V systolic function    5.  Diabetes mellitus:    Patient is on insulin.  Patient to be switched to oral medicines at home    Jayson Edmondson MD  01/27/25  13:08 EST

## 2025-01-27 NOTE — THERAPY TREATMENT NOTE
Subjective: Pt agreeable to therapeutic plan of care.  Cognition: oriented to Person, Place, Time, and Situation  Patient recalls 2/3 sternal precautions, requiring min verbal cuing for precautions.     Objective:     Precautions - sternal precautions    Bed Mobility: Min-A and Assist x 2   Functional Transfers: SBA     Balance: supported, with UE support, and standing SBA  Functional Ambulation: SBA and with rolling walker    Upper Body Dressing: Min-A  ADL Position: supported sitting  ADL Comments: Heart Hugger    Lower Body Dressing: Mod-A  ADL Position: supported sitting  ADL Comments: socks    Therapeutic Exercise - OT edu on Cardiac Rehab HEP. Pt not completing at this time due to tachycardia.Encouraged to complete 3x/daily to facilitate increased activity tolerance. Pt will require additional education to ensure carryover.     Vitals:   Supine: 107/63, 91% 4L SpO2, 115 HR  Seated:  106 / 75, 88% on 4L - increased to 6L for movement, 119 HR  Standin / 63, 90% on 6L, 131 HR  Return to Seated: 98 / 56, 91% on 6L, 136 HR s/p ambulation.     Pain: 2 VAS  Location: sternal  Interventions for pain: Repositioned, Increased Activity, and Therapeutic Presence  Education: Provided education on the importance of mobility in the acute care setting, ADL training, and Transfer Training    Assessment: Lulu Graves presents with ADL impairments affecting function including balance, endurance / activity tolerance, and strength. Pt demoing improvement toward baseline, but limited by tachycardia. Demonstrated functioning below baseline abilities indicate the need for continued skilled intervention while inpatient. Tolerating session today without incident. Will continue to follow and progress as tolerated.     Plan/Recommendations:   Low Intensity Therapy recommended post-acute care - This is recommended as therapy feels this patient would require 2-3 visits per week. OP or HH would be the best option depending on patient's  "home bound status. Consider, if the patient has other  \"skilled\" needs such as wounds, IV antibiotics, etc. Combined with \"low intensity\" could also equate to a SNF. If patient is medically complex, consider LTAC.. Pt requires no DME at discharge.     Pt desires Home with family assist at discharge. Pt cooperative; agreeable to therapeutic recommendations and plan of care.     Post-Tx Position: Up in Chair, Alarms activated, and Call light and personal items within reach  PPE: gloves       Time Calculation- OT       Row Name 01/27/25 1625             Time Calculation- OT    OT Start Time 1102  -ES      OT Stop Time 1134  -ES      OT Time Calculation (min) 32 min  -ES      Total Timed Code Minutes- OT 32 minute(s)  -ES      OT Received On 01/27/25  -ES      OT - Next Appointment 01/28/25  -ES                User Key  (r) = Recorded By, (t) = Taken By, (c) = Cosigned By      Initials Name Provider Type    Earline Matias OT Occupational Therapist                    "

## 2025-01-28 LAB
ANION GAP SERPL CALCULATED.3IONS-SCNC: 11.6 MMOL/L (ref 5–15)
BUN SERPL-MCNC: 24 MG/DL (ref 8–23)
BUN/CREAT SERPL: 38.1 (ref 7–25)
CALCIUM SPEC-SCNC: 8.5 MG/DL (ref 8.6–10.5)
CHLORIDE SERPL-SCNC: 100 MMOL/L (ref 98–107)
CO2 SERPL-SCNC: 24.4 MMOL/L (ref 22–29)
CREAT SERPL-MCNC: 0.63 MG/DL (ref 0.57–1)
DEPRECATED RDW RBC AUTO: 45.8 FL (ref 37–54)
EGFRCR SERPLBLD CKD-EPI 2021: 96.8 ML/MIN/1.73
ERYTHROCYTE [DISTWIDTH] IN BLOOD BY AUTOMATED COUNT: 13.8 % (ref 12.3–15.4)
GLUCOSE BLDC GLUCOMTR-MCNC: 101 MG/DL (ref 70–105)
GLUCOSE BLDC GLUCOMTR-MCNC: 102 MG/DL (ref 70–105)
GLUCOSE BLDC GLUCOMTR-MCNC: 175 MG/DL (ref 70–105)
GLUCOSE SERPL-MCNC: 112 MG/DL (ref 65–99)
HCT VFR BLD AUTO: 35 % (ref 34–46.6)
HGB BLD-MCNC: 11.1 G/DL (ref 12–15.9)
MCH RBC QN AUTO: 28.8 PG (ref 26.6–33)
MCHC RBC AUTO-ENTMCNC: 31.7 G/DL (ref 31.5–35.7)
MCV RBC AUTO: 90.7 FL (ref 79–97)
PLATELET # BLD AUTO: 219 10*3/MM3 (ref 140–450)
PMV BLD AUTO: 11.3 FL (ref 6–12)
POTASSIUM SERPL-SCNC: 4.1 MMOL/L (ref 3.5–5.2)
QT INTERVAL: 391 MS
QTC INTERVAL: 420 MS
RBC # BLD AUTO: 3.86 10*6/MM3 (ref 3.77–5.28)
SODIUM SERPL-SCNC: 136 MMOL/L (ref 136–145)
WBC NRBC COR # BLD AUTO: 12.7 10*3/MM3 (ref 3.4–10.8)

## 2025-01-28 PROCEDURE — 99232 SBSQ HOSP IP/OBS MODERATE 35: CPT | Performed by: INTERNAL MEDICINE

## 2025-01-28 PROCEDURE — 25010000002 ENOXAPARIN PER 10 MG: Performed by: NURSE PRACTITIONER

## 2025-01-28 PROCEDURE — 80048 BASIC METABOLIC PNL TOTAL CA: CPT | Performed by: NURSE PRACTITIONER

## 2025-01-28 PROCEDURE — 85027 COMPLETE CBC AUTOMATED: CPT | Performed by: NURSE PRACTITIONER

## 2025-01-28 PROCEDURE — 25010000002 BUMETANIDE PER 0.5 MG

## 2025-01-28 PROCEDURE — 25010000002 BUMETANIDE PER 0.5 MG: Performed by: NURSE PRACTITIONER

## 2025-01-28 PROCEDURE — 63710000001 INSULIN LISPRO (HUMAN) PER 5 UNITS: Performed by: THORACIC SURGERY (CARDIOTHORACIC VASCULAR SURGERY)

## 2025-01-28 PROCEDURE — 82948 REAGENT STRIP/BLOOD GLUCOSE: CPT

## 2025-01-28 PROCEDURE — 97116 GAIT TRAINING THERAPY: CPT

## 2025-01-28 PROCEDURE — 94762 N-INVAS EAR/PLS OXIMTRY CONT: CPT

## 2025-01-28 PROCEDURE — 82948 REAGENT STRIP/BLOOD GLUCOSE: CPT | Performed by: THORACIC SURGERY (CARDIOTHORACIC VASCULAR SURGERY)

## 2025-01-28 PROCEDURE — 97110 THERAPEUTIC EXERCISES: CPT

## 2025-01-28 RX ORDER — METOPROLOL TARTRATE 25 MG/1
25 TABLET, FILM COATED ORAL EVERY 12 HOURS SCHEDULED
Status: DISCONTINUED | OUTPATIENT
Start: 2025-01-28 | End: 2025-01-29 | Stop reason: HOSPADM

## 2025-01-28 RX ADMIN — ENOXAPARIN SODIUM 40 MG: 100 INJECTION SUBCUTANEOUS at 08:29

## 2025-01-28 RX ADMIN — Medication 12.5 MG: at 09:35

## 2025-01-28 RX ADMIN — LEVOTHYROXINE SODIUM 112 MCG: 112 TABLET ORAL at 08:29

## 2025-01-28 RX ADMIN — ENOXAPARIN SODIUM 40 MG: 100 INJECTION SUBCUTANEOUS at 21:56

## 2025-01-28 RX ADMIN — ASPIRIN 81 MG: 81 TABLET, COATED ORAL at 08:29

## 2025-01-28 RX ADMIN — INSULIN LISPRO 2 UNITS: 100 INJECTION, SOLUTION INTRAVENOUS; SUBCUTANEOUS at 20:28

## 2025-01-28 RX ADMIN — BUMETANIDE 2 MG: 0.25 INJECTION INTRAMUSCULAR; INTRAVENOUS at 08:30

## 2025-01-28 RX ADMIN — MUPIROCIN 1 APPLICATION: 20 OINTMENT TOPICAL at 08:30

## 2025-01-28 RX ADMIN — BUMETANIDE 2 MG: 0.25 INJECTION INTRAMUSCULAR; INTRAVENOUS at 20:26

## 2025-01-28 RX ADMIN — GUAIFENESIN 600 MG: 600 TABLET, MULTILAYER, EXTENDED RELEASE ORAL at 08:29

## 2025-01-28 RX ADMIN — MUPIROCIN 1 APPLICATION: 20 OINTMENT TOPICAL at 20:42

## 2025-01-28 RX ADMIN — HYDROCODONE BITARTRATE AND ACETAMINOPHEN 1 TABLET: 5; 325 TABLET ORAL at 12:39

## 2025-01-28 RX ADMIN — POTASSIUM CHLORIDE 20 MEQ: 1500 TABLET, EXTENDED RELEASE ORAL at 17:29

## 2025-01-28 RX ADMIN — HYDROCODONE BITARTRATE AND ACETAMINOPHEN 1 TABLET: 5; 325 TABLET ORAL at 20:26

## 2025-01-28 RX ADMIN — ATORVASTATIN CALCIUM 40 MG: 40 TABLET, FILM COATED ORAL at 20:26

## 2025-01-28 RX ADMIN — GUAIFENESIN 600 MG: 600 TABLET, MULTILAYER, EXTENDED RELEASE ORAL at 20:26

## 2025-01-28 RX ADMIN — AMLODIPINE BESYLATE 5 MG: 5 TABLET ORAL at 08:29

## 2025-01-28 RX ADMIN — POTASSIUM CHLORIDE 20 MEQ: 1500 TABLET, EXTENDED RELEASE ORAL at 08:28

## 2025-01-28 RX ADMIN — METOPROLOL TARTRATE 25 MG: 25 TABLET, FILM COATED ORAL at 20:26

## 2025-01-28 RX ADMIN — PANTOPRAZOLE SODIUM 40 MG: 40 TABLET, DELAYED RELEASE ORAL at 06:15

## 2025-01-28 NOTE — DISCHARGE SUMMARY
Date of Admission: 1/24/2025  Date of Discharge:  1/29/2025    Discharge Diagnosis:   - Severe prosthetic aortic valve stenosis, EF 50-55% (echo), s/p minimally invasive tissue AVR 21 mm Magna (Neelam, 12/2013) now s/p redo sternotomy, tissue AVR 23 mm Magna Ease (Tiffanie, 1/24/2025)  - Non-obstructive CAD (cath 1/15/2025)  - HLD with hypertriglyceridemia  - LUÍS, CPAP compliance  - Morbid obesity, stage 3--BMI 44.2  - Hyperglycemia--A1c 5.95  - Hypothyroidism  - Postop leukocytosis, likely reactive  - Postop ABLA, expected--transfused 1/24  - Postop TCP, consumptive--transfused 1/24    Presenting Problem/History of Present Illness:  - Severe prosthetic aortic valve stenosis, EF 50-55% (echo), s/p minimally invasive tissue AVR 21 mm Magna (Neelam, 12/2013)   - Non-obstructive CAD (cath 1/15/2025)  - HLD with hypertriglyceridemia  - LUÍS, CPAP compliance  - Morbid obesity, stage 3--BMI 44.2  - Hyperglycemia--A1c 5.95  - Hypothyroidism    Chronic Comorbid Conditions relative to Cardiac Surgery include:  Cardiovascular: Hyperlipidemia, Aortic Stenosis, and Prior Cardiac Valve Replacement  Respiratory: Obstructive Sleep Apnea  Endocrine: Hypothyroidism and Morbid Obesity (BMI 40-49.9)  Nephrology: None  Hematology: None   Other: None    Hospital Course:  Patient is a 69 y.o. female who was admitted to the hospital on 1/24/25 for same day surgery. That morning she was taken to the Operating Room and under general anesthesia and via redo median sternotomy underwent aortic valve replacement with a 23 mm Magna Ease pericardial prosthesis by Dr. Moreno. Patient tolerated the procedure well and was transported to the Cardiovascular Unit in stable condition. Over the next several hours she was successfully weaned from the ventilator and extubated. Initially post-operatively patient was bradycardic, but her heart rate gradually improved over the next few days. On post-operative day #1 her Clemons and arterial line were  removed. On post-operative day #2 at attempt was made to remove her mediastinal chest tube, but the tube was stuck. Plan was made for patient to return to the OR the following day for chest tube removal, but the tube was later successfully removed at bedside by Dr. Moreno. On post-operative day #3 her central line, ibarra catheter, and other remaining chest tubes were removed. Throughout her post-operative course she was aggressively diuresed. On post-operative day #4 she was started on beta blocker. That night an overnight oximetry was performed demonstrating no need for oxygen at home. On post-operative day #5, on 1/29/25, she was deemed stable for discharge home with family.  Dr. Edmondson stopped amlodipine and increased metoprolol to 50 mg bid.  TSH was checked the day of discharge and was 8.96.       Procedures Performed:  1/24/25 Dr. Moreno  AVR with Magna ease bioprosthesis #23  Redo sternotomy       Consults:   Consults       Date and Time Order Name Status Description    1/26/2025  9:40 AM Inpatient Anesthesiology Consult      1/24/2025 11:50 AM Inpatient Cardiology Consult      1/15/2025  2:09 PM Inpatient Cardiology Consult Completed             Pertinent Test Results:    Lab Results   Component Value Date    WBC 12.58 (H) 01/29/2025    HGB 10.8 (L) 01/29/2025    HCT 33.7 (L) 01/29/2025    MCV 89.2 01/29/2025     01/29/2025      Lab Results   Component Value Date    GLUCOSE 117 (H) 01/29/2025    CALCIUM 8.6 01/29/2025     01/29/2025    K 3.9 01/29/2025    CO2 27.0 01/29/2025    CL 99 01/29/2025    BUN 22 01/29/2025    CREATININE 0.73 01/29/2025    EGFRIFAFRI 89 10/28/2019    EGFRIFNONA 76 11/08/2021    BCR 30.1 (H) 01/29/2025    ANIONGAP 10.0 01/29/2025     Lab Results   Component Value Date    INR 1.32 (H) 01/25/2025    PROTIME 16.3 (H) 01/25/2025       Condition on Discharge: Stable    Vital Signs  Temp:  [98.1 °F (36.7 °C)-99.7 °F (37.6 °C)] 98.1 °F (36.7 °C)  Heart Rate:   [] 106  Resp:  [16-30] 16  BP: ()/(40-72) 105/58  Body mass index is 44.39 kg/m².    Discharge Disposition  Home or Self Care    Discharge Medications     Discharge Medications        New Medications        Instructions Start Date   bumetanide 1 MG tablet  Commonly known as: BUMEX   Take 1 tablet by mouth 2 (Two) Times a Day for 3 days, THEN 1 tablet Daily for 12 days.   Start Date: January 29, 2025     HYDROcodone-acetaminophen 5-325 MG per tablet  Commonly known as: NORCO   1 tablet, Oral, Every 6 Hours PRN      potassium chloride 20 MEQ CR tablet  Commonly known as: KLOR-CON M20   Take 1 tablet by mouth 2 (Two) Times a Day for 3 days, THEN 1 tablet Daily for 12 days.   Start Date: January 29, 2025            Changes to Medications        Instructions Start Date   metoprolol tartrate 50 MG tablet  Commonly known as: LOPRESSOR  What changed:   medication strength  how much to take  when to take this   50 mg, Oral, Every 12 Hours Scheduled             Continue These Medications        Instructions Start Date   aspirin 81 MG tablet   1 tablet, Nightly      [place] GUMMY PO   500 mg, Daily      fluticasone 50 MCG/ACT nasal spray  Commonly known as: FLONASE   2 sprays, Nasal, As Needed      levothyroxine 112 MCG tablet  Commonly known as: SYNTHROID, LEVOTHROID   112 mcg, Oral, Daily      loratadine 10 MG tablet  Commonly known as: CLARITIN   10 mg, Oral, Daily      multivitamin with minerals tablet tablet   1 tablet, Daily      NON FORMULARY   [place], Cinnamon, Apple Cider Vinegar, Tumeric, Curmemic, berberine, and ginseng supplement      PROBIOTIC PRODUCT PO   1 capsule, Daily      simvastatin 20 MG tablet  Commonly known as: ZOCOR   20 mg, Oral, Every Evening             Stop These Medications      amLODIPine 5 MG tablet  Commonly known as: NORVASC     amoxicillin 500 MG capsule  Commonly known as: AMOXIL     mupirocin 2 % ointment  Commonly known as: BACTROBAN              Discharge  Diet: Healthy heart    Activity at Discharge:   Activity Instructions       Activity as Tolerated      Bathing Restrictions      No tub baths, hot tubs/jacuzzi tubs, swimming pools, rivers, lakes, oceans for 6 weeks    Type of Restriction: Bathing    Bathing Restrictions: No Tub Bath    Driving Restrictions      Do not resume driving while taking narcotic pain medication    Type of Restriction: Driving    Driving Restrictions: No Driving (Time Limited)    Length: 2 Weeks    Lifting Restrictions      Type of Restriction: Lifting    Lifting Restrictions: Lifting Restriction (Indicate Limit)    Weight Limit (Pounds): 10    Length of Lifting Restriction: 6 weeks            Follow-up Appointments  Future Appointments   Date Time Provider Department Center   2/13/2025  1:15 PM Latanya Tubbs APRN [facility]   2/20/2025  1:30 PM Aurora Larose APRN [facility]   4/28/2025  1:50 PM Jayson Edmondson MD [facility]   7/28/2025 10:45 AM Sushma Hall APRN [facility]   7/29/2025  9:00 AM Angelique Wylie MD [facility]     Additional Instructions for the Follow-ups that You Need to Schedule       Ambulatory Referral to Cardiac Rehab   As directed      Discharge Follow-up with PCP   As directed       Currently Documented PCP:    Sushma Hall APRN    PCP Phone Number:    [phone]     Follow Up Details: in one month        Discharge Follow-up with Specialty: Cardiology; 1 Month   As directed      Specialty: Cardiology   Follow Up: 1 Month   Follow Up Details: follow-up appt with GUILLERMO Deleon  on 2/20/2025 at 1:30 PM        Discharge Follow-up with Specified Provider: Cardiac Surgery; 2 Weeks   As directed      To: Cardiac Surgery   Follow Up: 2 Weeks   Follow Up Details: NP postop follow-up is on 2/13/2025 at 1:15 PM        Call MD With Problems / Concerns    Feb 28, 2025 (Approximate)      Instructions: Call for temp >101 or any surgical wound drainage    Order  Comments: Instructions: Call for temp >101 or any surgical wound drainage                 Test Results Pending at Discharge  Pending Results       Procedure [Order ID] Specimen - Date/Time    Potassium [[id number]]     Specimen: Blood             Latanya Tubbs, GUILLERMO  01/29/25  10:55 EST

## 2025-01-28 NOTE — PLAN OF CARE
"Goal Outcome Evaluation:      Assessment: Lulu Graves tends to be quite talkative while ambulating and ambulates at moderate pace. This resulted in her requiring a short standing rest break while ambulating. Recommended to pt that she slow her pace and avoid conversation while ambulating until farther along in healing process. Pt presents with functional mobility impairments which indicate the need for skilled intervention. Tolerating session today without incident. Will continue to follow and progress as tolerated.     Plan/Recommendations:   If medically appropriate, Low Intensity Therapy recommended post-acute care - This is recommended as therapy feels this patient would require 2-3 visits per week. OP or HH would be the best option depending on patient's home bound status. Consider, if the patient has other  \"skilled\" needs such as wounds, IV antibiotics, etc. Combined with \"low intensity\" could also equate to a SNF. If patient is medically complex, consider LTAC. Pt requires no DME at discharge.     Pt desires Home with family assist at discharge. Pt cooperative; agreeable to therapeutic recommendations and plan of care.                                           "

## 2025-01-28 NOTE — CONSULTS
Pt educated on cardiac rehab program. Education materials given to patient. Very interested. Will call and follow up with patient after discharge.

## 2025-01-28 NOTE — THERAPY TREATMENT NOTE
"Subjective: Pt agreeable to therapeutic plan of care.    Objective:     Phase 1 Cardiac Rehab Initiated - Acute Care    Cardiac Level IV Activities  Sitting tolerance: >10min and supported  Standing tolerance: >10min and unsupported    Precautions:  Mid-sternal incision; avoid scapular retraction and depression.  Cardiovascular impairment post-sx; encourage energy conservation strategies.      MET level equivalent: 3.0-3.5 (Standing ADLs / showering, slow stair climbing up to 2 flights, unlimited ambulation on flat surfaces up to 15mins)    Bed mobility - N/A or Not attempted.  Transfers - Independent; follows sternal precautions  Ambulation - 410 feet Independent    Vitals: WNL; required one standing rest break due to increased resp rate to 43 w/ mild soa.     Pain: 0 VAS   Location: n/a  Intervention for pain: Repositioned, RN provided medication, RN notified, Increased Activity, and Therapeutic Presence    Education: Provided education on the importance of mobility in the acute care setting, Verbal/Tactile Cues, Transfer Training, Gait Training, and Energy conservation strategies    Assessment: Lulu Graves tends to be quite talkative while ambulating and ambulates at moderate pace. This resulted in her requiring a short standing rest break while ambulating. Recommended to pt that she slow her pace and avoid conversation while ambulating until farther along in healing process. Pt presents with functional mobility impairments which indicate the need for skilled intervention. Tolerating session today without incident. Will continue to follow and progress as tolerated.     Plan/Recommendations:   If medically appropriate, Low Intensity Therapy recommended post-acute care - This is recommended as therapy feels this patient would require 2-3 visits per week. OP or HH would be the best option depending on patient's home bound status. Consider, if the patient has other  \"skilled\" needs such as wounds, IV antibiotics, etc. " "Combined with \"low intensity\" could also equate to a SNF. If patient is medically complex, consider LTAC. Pt requires no DME at discharge.     Pt desires Home with family assist at discharge. Pt cooperative; agreeable to therapeutic recommendations and plan of care.         Basic Mobility 6-click:  Rollin = Total, A lot = 2, A little = 3; 4 = None  Supine>Sit:   1 = Total, A lot = 2, A little = 3; 4 = None   Sit>Stand with arms:  1 = Total, A lot = 2, A little = 3; 4 = None  Bed>Chair:   1 = Total, A lot = 2, A little = 3; 4 = None  Ambulate in room:  1 = Total, A lot = 2, A little = 3; 4 = None  3-5 Steps with railin = Total, A lot = 2, A little = 3; 4 = None  Score: 22    Modified [place]: N/A = No pre-op stroke/TIA    Post-Tx Position: Up in Chair and Call light and personal items within reach  PPE: gloves and surgical mask    Therapy Charges for Today       Code Description Service Date Service Provider Modifiers Qty    43212657179 HC GAIT TRAINING EA 15 MIN 2025 Isa Hollingsworth, PT GP 1    37172334135 HC PT THERAPEUTIC ACT EA 15 MIN 2025 Isa Hollingsworth, PT GP 1    11546066857 HC GAIT TRAINING EA 15 MIN 2025 Isa Hollingsworth, PT GP 1           PT Charges       Row Name 25 1601             Time Calculation    Start Time 1545  -CM      Stop Time 1600  -CM      Time Calculation (min) 15 min  -CM      PT Received On 25  -CM      PT - Next Appointment 25  -CM         Time Calculation- PT    Total Timed Code Minutes- PT 15 minute(s)  -CM                User Key  (r) = Recorded By, (t) = Taken By, (c) = Cosigned By      Initials Name Provider Type    Isa Elliott, PT Physical Therapist                    "

## 2025-01-28 NOTE — THERAPY TREATMENT NOTE
"Subjective: Pt agreeable to therapeutic plan of care.  Cognition: oriented to Person, Place, Time, and Situation    Objective:     Precautions - tachycardic, sternal    Functional Transfers: Independent  Functional Ambulation: Supervision    Therapeutic Exercise - 10 Reps B UE and B LE AROM unsupported sitting in chair. Utilized cardiac rehab flier.    Pain: 3 VAS  Location: sternal  Interventions for pain: Repositioned, Increased Activity, and Therapeutic Presence  Education: Provided education on the importance of mobility in the acute care setting, Verbal/Tactile Cues, Transfer Training, Post-Op Precautions, and HEP    Phase 1 Cardiac Rehab Initiated - Acute Care    Cardiac Level II Activities  Sitting tolerance: >10min  Standing tolerance: 1-5min    Precautions:  Mid-sternal incision; avoid scapular retraction and depression.  Cardiovascular impairment post-sx; encourage energy conservation strategies.    MET level equivalent: 1.4-2.0 (Self care ADLs in sitting / slow ambulation in room, light intensity activities)     Assessment: Lulu Graves presents with ADL impairments affecting function including endurance / activity tolerance, pain, range of motion (ROM), and strength. Demonstrated functioning below baseline abilities indicate the need for continued skilled intervention while inpatient. Tolerating session today without incident. Will continue to follow and progress as tolerated.     Plan/Recommendations:   Low Intensity Therapy recommended post-acute care - This is recommended as therapy feels this patient would require 2-3 visits per week. OP or HH would be the best option depending on patient's home bound status. Consider, if the patient has other  \"skilled\" needs such as wounds, IV antibiotics, etc. Combined with \"low intensity\" could also equate to a SNF. If patient is medically complex, consider LTAC.. Pt requires no DME at discharge.     Pt desires Outpatient therapy at discharge. Pt cooperative; " agreeable to therapeutic recommendations and plan of care.     Modified [place]: 3 = Moderate disability (Requires some help, but able to walk unassisted)    Post-Tx Position: Up in Chair and Call light and personal items within reach  PPE: gloves    Therapy Charges for Today       Code Description Service Date Service Provider Modifiers Qty    46780269136  OT THER PROC EA 15 MIN 1/28/2025 Casandra Cardona, OT GO 1    05707095782 HC OT THER PROC EA 15 MIN 1/28/2025 Casandra Cardona, OT GO 1           Time Calculation- OT       Row Name 01/28/25 1304             Time Calculation- OT    OT Start Time 1010  -      OT Stop Time 1033  -      OT Time Calculation (min) 23 min  -      Total Timed Code Minutes- OT 23 minute(s)  -      OT Received On 01/28/25  -      OT - Next Appointment 01/29/25  -                User Key  (r) = Recorded By, (t) = Taken By, (c) = Cosigned By      Initials Name Provider Type     Casandra Cardona, OT Occupational Therapist

## 2025-01-28 NOTE — PROGRESS NOTES
LOS: 4 days   Admitting Physician- VinayakashivelisseTravis, *        Subjective     No chest pain or shortness of breath    Objective   Lying in bed comfortably but also ambulating in hallways.  Had her chest tubes removed yesterday    Review of Systems:   Review of Systems   Constitutional: Negative for chills and fever.   HENT:  Negative for ear discharge and nosebleeds.    Eyes:  Negative for discharge and redness.   Cardiovascular:  Negative for chest pain, orthopnea, palpitations, paroxysmal nocturnal dyspnea and syncope.   Respiratory:  Negative for cough, shortness of breath and wheezing.    Endocrine: Negative for heat intolerance.   Skin:  Negative for rash.   Musculoskeletal:  Negative for arthritis and myalgias.   Gastrointestinal:  Negative for abdominal pain, melena, nausea and vomiting.   Genitourinary:  Negative for dysuria and hematuria.   Neurological:  Negative for dizziness, light-headedness, numbness and tremors.   Psychiatric/Behavioral:  Negative for depression. The patient is not nervous/anxious.          Vital Signs  Vitals:    01/28/25 0601 01/28/25 0700 01/28/25 0800 01/28/25 1100   BP:  117/59 117/59 105/40   BP Location:   Right arm Right arm   Patient Position:   Sitting Lying   Pulse: 100 102 112 102   Resp:   22 (!) 30   Temp:   99.7 °F (37.6 °C) 99.7 °F (37.6 °C)   TempSrc:   Oral Oral   SpO2: 92% 90%     Weight:       Height:         Wt Readings from Last 1 Encounters:   01/28/25 101 kg (221 lb 12.8 oz)       Intake/Output Summary (Last 24 hours) at 1/28/2025 1213  Last data filed at 1/28/2025 0600  Gross per 24 hour   Intake 524 ml   Output 500 ml   Net 24 ml     Physical Exam:  Constitutional:       Appearance: Well-developed.   Eyes:      General: No scleral icterus.        Right eye: No discharge.   HENT:      Head: Normocephalic and atraumatic.   Neck:      Thyroid: No thyromegaly.      Lymphadenopathy: No cervical adenopathy.   Pulmonary:      Effort: Pulmonary effort is  normal. No respiratory distress.      Breath sounds: Normal breath sounds. No wheezing. No rales.   Cardiovascular:      Normal rate. Regular rhythm.      No gallop.    Edema:     Peripheral edema absent.   Abdominal:      Tenderness: There is no abdominal tenderness.   Skin:     Findings: No erythema or rash.   Neurological:      Mental Status: Alert and oriented to person, place, and time.         Results Review:   Lab Results (last 24 hours)       Procedure Component Value Units Date/Time    POC Glucose 4x Daily Before Meals & at Bedtime [[id number]]  (Normal) Collected: 01/28/25 1129    Specimen: Blood Updated: 01/28/25 1131     Glucose 101 mg/dL      Comment: Serial Number: [id number]:  [id number]       POC Glucose 4x Daily Before Meals & at Bedtime [[id number]]  (Normal) Collected: 01/28/25 0731    Specimen: Blood Updated: 01/28/25 0733     Glucose 102 mg/dL      Comment: Serial Number: [id number]:  [id number]       CBC (No Diff) [[id number]]  (Abnormal) Collected: 01/28/25 0556    Specimen: Blood Updated: 01/28/25 0611     WBC 12.70 10*3/mm3      RBC 3.86 10*6/mm3      Hemoglobin 11.1 g/dL      Hematocrit 35.0 %      MCV 90.7 fL      MCH 28.8 pg      MCHC 31.7 g/dL      RDW 13.8 %      RDW-SD 45.8 fl      MPV 11.3 fL      Platelets 219 10*3/mm3     Basic Metabolic Panel [[id number]]  (Abnormal) Collected: 01/28/25 0456    Specimen: Blood Updated: 01/28/25 0530     Glucose 112 mg/dL      BUN 24 mg/dL      Creatinine 0.63 mg/dL      Sodium 136 mmol/L      Potassium 4.1 mmol/L      Comment: Specimen hemolyzed.  Result may be falsely elevated.        Chloride 100 mmol/L      CO2 24.4 mmol/L      Calcium 8.5 mg/dL      BUN/Creatinine Ratio 38.1     Anion Gap 11.6 mmol/L      eGFR 96.8 mL/min/1.73     Narrative:      GFR Categories in Chronic Kidney Disease (CKD)      GFR Category          GFR (mL/min/1.73)    Interpretation  G1                     90 or greater         Normal or high (1)  G2                       60-89                Mild decrease (1)  G3a                   45-59                Mild to moderate decrease  G3b                   30-44                Moderate to severe decrease  G4                    15-29                Severe decrease  G5                    14 or less           Kidney failure          (1)In the absence of evidence of kidney disease, neither GFR category G1 or G2 fulfill the criteria for CKD.    eGFR calculation 2021 CKD-EPI creatinine equation, which does not include race as a factor    POC Glucose Once [[id number]]  (Abnormal) Collected: 01/27/25 2040    Specimen: Blood Updated: 01/27/25 2042     Glucose 131 mg/dL      Comment: Serial Number: [id number]:  [id number]       POC Glucose Once [[id number]]  (Abnormal) Collected: 01/27/25 1549    Specimen: Blood Updated: 01/27/25 1550     Glucose 147 mg/dL      Comment: Serial Number: [id number]:  [id number]             Imaging Results (Last 72 Hours)       Procedure Component Value Units Date/Time    XR Chest 1 View [[id number]] Collected: 01/27/25 1348     Updated: 01/27/25 1351    Narrative:      XR CHEST 1 VW    Date of Exam: 1/27/2025 11:50 AM EST    Indication: chest tube removal    Comparison: Chest radiograph 1/26/2025    Findings:  Right IJ central catheter has been removed. Prior median sternotomy. Stable enlarged cardiac silhouette. Small right and probable trace left pleural effusions with adjacent right greater than left bibasilar opacities stable from prior. This could reflect   atelectasis versus pneumonia in the right clinical setting. Mild prominence of the central vasculature without overt edema. No visualized pneumothorax. Degenerative related osseous change. Prior valvular repair.      Impression:      Impression:  Removed right IJ central catheter, otherwise no significant interval change from prior exam.      Electronically Signed: Jerry Sterling MD    1/27/2025 1:49 PM EST    Workstation ID: [id number]    XR  Chest 1 View [[id number]] Collected: 01/26/25 0826     Updated: 01/26/25 0833    Narrative:      XR CHEST 1 VW    Date of Exam: 1/26/2025 4:40 AM EST    Indication: Post-Op Heart Surgery    Comparison: 1 day prior.    Findings:  Right-sided IJ line remains in place. There are persistent bibasilar opacities, greater on the right, likely combination of pleural effusions and atelectasis. Mild edema pattern also present with perihilar and interstitial opacities noted. There is no   distinct pneumothorax. No new focal airspace consolidation elsewhere. Stable heart and mediastinal contours.        Impression:      Impression:  Right-sided IJ line remains in place. There are persistent bibasilar opacities, greater on the right, likely combination of pleural effusions and atelectasis. Mild edema pattern also present with perihilar and interstitial opacities noted. There is no   distinct pneumothorax. No new focal airspace consolidation elsewhere. Stable heart and mediastinal contours.        Electronically Signed: Jeff Travis MD    1/26/2025 8:31 AM EST    Workstation ID: [id number]    XR Chest 1 View [[id number]] Collected: 01/25/25 1502     Updated: 01/25/25 1506    Narrative:      XR CHEST 1 VW    Date of Exam: 1/25/2025 1:54 PM EST    Indication: s/p med ct removal    Comparison: 1/25/2025 at 0523 hours, 1/24/2025 at 1424 hours    Findings:  Postoperative changes are noted. Mediastinal drains remain in place. The endotracheal tube and nasogastric tube have been removed. The Thoreau-Rip catheter has been removed. A right IJ venous sheath remains in place. Residual atelectasis is seen in the   lung bases. There may be small bilateral pleural effusions. No definitive pneumothorax is seen.      Impression:      Impression:  1.The endotracheal tube and nasogastric tube have been removed. The Thoreau-Rpi catheter has been removed. A right IJ venous sheath remains in place.  2.Mediastinal drains remain in place.  3.Residual  atelectasis is seen in the lung bases. There may be small bilateral pleural effusions. No definitive pneumothorax is seen.        Electronically Signed: Jaswinder Jennings MD    1/25/2025 3:04 PM EST    Workstation ID: [id number]          ECG/EMG Results (most recent)       Procedure Component Value Units Date/Time    Telemetry Scan [[id number]] Resulted: 01/24/25     Updated: 01/24/25 0939    Telemetry Scan [[id number]] Resulted: 01/24/25     Updated: 01/24/25 1309    ECG 12 Lead Other; s/p reop AVR [[id number]] Collected: 01/24/25 1525     Updated: 01/24/25 1526     QT Interval 509 ms      QTC Interval 442 ms     Narrative:      HEART RATE=45  bpm  RR Dcixfiuu=3909  ms  KS Hvvxjlah=463  ms  P Horizontal Axis=-89  deg  P Front Axis=-60  deg  QRSD Wdnnpwtc=452  ms  QT Sxvoyirc=274  ms  OAkM=550  ms  QRS Axis=-48  deg  T Wave Axis=132  deg  - ABNORMAL ECG -  Sinus or ectopic atrial bradycardia  Left bundle branch block  ST elevation secondary to IVCD  Date and Time of Study:2025-01-24 15:25:42    Telemetry Scan [[id number]] Resulted: 01/24/25     Updated: 01/24/25 1601    ECG 12 Lead Other; s/p reop AVR [[id number]] Collected: 01/25/25 0405     Updated: 01/25/25 0704     QT Interval 477 ms      QTC Interval 415 ms     Narrative:      HEART RATE=48  bpm  RR Bgcndicg=8747  ms  KS Fzmumqkz=012  ms  P Horizontal Axis=-33  deg  P Front Axis=7  deg  QRSD Interval=160  ms  QT Vijpmlmb=808  ms  UWhH=737  ms  QRS Axis=-31  deg  T Wave Axis=126  deg  - ABNORMAL ECG -  Sinus bradycardia  Atrial premature complex  Prolonged KS interval  Left bundle branch block  ST elevation secondary to IVCD  When compared with ECG of 24-Jan-2025 15:25:42,  Significant change in rhythm  New conduction abnormality  Date and Time of Study:2025-01-25 04:05:52    ECG 12 Lead Other; s/p reop AVR [[id number]] Collected: 01/26/25 0609     Updated: 01/26/25 0610     QT Interval 419 ms      QTC Interval 483 ms     Narrative:      HEART RATE=80  bpm  RR  Ibjuswyl=963  ms  CA Tnutjnrl=014  ms  P Horizontal Axis=251  deg  P Front Axis=5  deg  QRSD Xmcjqdmf=757  ms  QT Dlylbfof=141  ms  GRxX=217  ms  QRS Axis=-17  deg  T Wave Axis=141  deg  - ABNORMAL ECG -  Sinus rhythm  Borderline prolonged CA interval  Left bundle branch block  ST elevation secondary to IVCD  Date and Time of Study:2025-01-26 06:09:55    Telemetry Scan [[id number]] Resulted: 01/24/25     Updated: 01/27/25 0856    Telemetry Scan [[id number]] Resulted: 01/24/25     Updated: 01/27/25 0924    Telemetry Scan [[id number]] Resulted: 01/24/25     Updated: 01/27/25 0944    Telemetry Scan [[id number]] Resulted: 01/24/25     Updated: 01/27/25 0947    Telemetry Scan [[id number]] Resulted: 01/24/25     Updated: 01/27/25 1007    Telemetry Scan [[id number]] Resulted: 01/24/25     Updated: 01/27/25 1035    Telemetry Scan [[id number]] Resulted: 01/24/25     Updated: 01/27/25 1037    Telemetry Scan [[id number]] Resulted: 01/24/25     Updated: 01/27/25 1042    Telemetry Scan [[id number]] Resulted: 01/24/25     Updated: 01/27/25 1051    Telemetry Scan [[id number]] Resulted: 01/24/25     Updated: 01/27/25 1058    Telemetry Scan [[id number]] Resulted: 01/24/25     Updated: 01/27/25 1112    Telemetry Scan [[id number]] Resulted: 01/24/25     Updated: 01/27/25 1141    Telemetry Scan [[id number]] Resulted: 01/24/25     Updated: 01/27/25 1201    Telemetry Scan [[id number]] Resulted: 01/24/25     Updated: 01/27/25 1222    Telemetry Scan [[id number]] Resulted: 01/24/25     Updated: 01/27/25 1245    Telemetry Scan [[id number]] Resulted: 01/24/25     Updated: 01/27/25 1535    Telemetry Scan [[id number]] Resulted: 01/24/25     Updated: 01/27/25 1729    Telemetry Scan [[id number]] Resulted: 01/24/25     Updated: 01/27/25 2006    Telemetry Scan [[id number]] Resulted: 01/24/25     Updated: 01/28/25 0825          CBC    Results from last 7 days   Lab Units 01/28/25  0556 01/27/25  0250 01/26/25  0338 01/25/25  0338 01/24/25  1828 01/24/25  1724  01/24/25  1610 01/24/25  1545 01/24/25  1251 01/24/25  1202 01/24/25  1019   WBC 10*3/mm3 12.70* 12.15* 15.23* 8.33  --   --   --  9.97  --  12.78* 13.04*   HEMOGLOBIN g/dL 11.1* 10.0* 9.8* 9.5* 9.1*  --   --  8.1*  --  9.2* 9.3*   HEMOGLOBIN, POC g/dL  --   --   --   --   --  9.4* 8.2*  --    < >  --   --    PLATELETS 10*3/mm3 219 143 114* 110*  --   --   --  101*  --  119* 108*    < > = values in this interval not displayed.     BMP   Results from last 7 days   Lab Units 01/28/25 0456 01/27/25 0250 01/26/25 0338 01/25/25 0338 01/24/25 1828 01/24/25  1409 01/24/25  1251 01/24/25  1202 01/22/25  0843   SODIUM mmol/L 136 137 137 142  --   --   --  143 140   POTASSIUM mmol/L 4.1 4.0 3.8 4.1 3.7  --   --  3.8 3.9   CHLORIDE mmol/L 100 101 102 109*  --   --   --  107 108*   CO2 mmol/L 24.4 28.7 25.3 23.6  --   --   --  25.2 24.1   BUN mg/dL 24* 25* 18 18  --   --   --  14 18   CREATININE mg/dL 0.63 0.78 0.72 0.75  --  0.88 0.98 0.89 0.67   GLUCOSE mg/dL 112* 111* 124* 129*  --   --   --  120* 109*   MAGNESIUM mg/dL  --   --   --  2.5*  --   --   --   --   --    PHOSPHORUS mg/dL  --   --   --  4.2  --   --   --  3.5  --      CMP   Results from last 7 days   Lab Units 01/28/25 0456 01/27/25 0250 01/26/25 0338 01/25/25 0338 01/24/25 1828 01/24/25  1409 01/24/25  1251 01/24/25  1202 01/22/25  0843   SODIUM mmol/L 136 137 137 142  --   --   --  143 140   POTASSIUM mmol/L 4.1 4.0 3.8 4.1 3.7  --   --  3.8 3.9   CHLORIDE mmol/L 100 101 102 109*  --   --   --  107 108*   CO2 mmol/L 24.4 28.7 25.3 23.6  --   --   --  25.2 24.1   BUN mg/dL 24* 25* 18 18  --   --   --  14 18   CREATININE mg/dL 0.63 0.78 0.72 0.75  --  0.88 0.98 0.89 0.67   GLUCOSE mg/dL 112* 111* 124* 129*  --   --   --  120* 109*   ALBUMIN g/dL  --   --   --  4.4  --   --   --  4.3 4.1   BILIRUBIN mg/dL  --   --   --   --   --   --   --   --  0.6   ALK PHOS U/L  --   --   --   --   --   --   --   --  95   AST (SGOT) U/L  --   --   --   --   --   --   --    --  27   ALT (SGPT) U/L  --   --   --   --   --   --   --   --  16     Cardiac Studies:  Echo- Results for orders placed in visit on 01/24/25    Intra-Op Anesthesia FRAN    Narrative  Intra-Op Anesthesia FRAN    Procedure Performed: Intra-Op Anesthesia FRAN  Start Time:  1/24/2025 7:10 AM  End Time:   1/24/2025 7:20 AM    Preanesthesia Checklist:  Patient identified, IV assessed, risks and benefits discussed, monitors and equipment assessed, procedure being performed at surgeon's request and anesthesia consent obtained.    General Procedure Information  FRAN Placed for monitoring purposes only -- This is not a diagnostic FRAN  Diagnostic Indications for Echo:  assessment of ascending aorta, assessment of surgical repair, defect repair evaluation and hemodynamic monitoring  Location performed:  OR  Intubated  Bite block placed  Heart visualized  Probe Insertion:  Easy  Probe Type:  Multiplane  Modalities:  Color flow mapping, continuous wave Doppler and pulse wave Doppler    Echocardiographic and Doppler Measurements    Ventricles    Right Ventricle:  Cavity size normal.  Hypertrophy not present.  Thrombus not present.  Global function normal.  Left Ventricle:  Cavity size normal.  Thrombus not present.  Global Function mildly impaired.        Valves    Aortic Valve:  Annulus bioprosthetic.  Stenosis severe.  Mean Gradient: 72/35 mmHg.  Pressure Half-time: 4 ms.  Regurgitation absent.  Leaflets calcified and thickened.  Leaflet motions restricted.    Mitral Valve:  Annulus normal.  Stenosis not present.  Regurgitation trace.  Leaflets normal.  Leaflet motions normal.    Tricuspid Valve:  Annulus normal.  Stenosis not present.  Regurgitation trace.  Leaflets normal.  Leaflet motions normal.  Pulmonic Valve:  Annulus normal.      Aorta    Ascending Aorta:  Size dilated.  Diameter 3.9 cm.  Dissection not present.  Plaque thickness less than 3 mm.  Mobile plaque not present.  Aortic Arch:  Size normal.  Diameter 2.5 cm.   Dissection not present.  Plaque thickness less than 3 mm.  Mobile plaque not present.  Descending Aorta:  Size normal.  Diameter 2.7 cm.  Dissection not present.  Plaque thickness less than 3 mm.  Mobile plaque not present.      Atria    Right Atrium:  Size normal.  Spontaneous echo contrast not present.  Thrombus not present.  Tumor not present.  Device not present.    Left Atrium:  Size dilated.  Spontaneous echo contrast not present.  Thrombus not present.  Tumor not present.  Device not present.  Left atrial appendage normal.              Other Findings  Pericardium:  normal  Pleural Effusion:  none  Pulmonary Arteries:  normal  Pulmonary Venous Flow:  normal    Anesthesia Information  Performed Personally  Anesthesiologist:  Froilan Cummings MD      Echocardiogram Comments:  BIOPROSTHETIC AV. R CUSP IMMOBILE. L RESTRICTED. SEVERE AS PK VEL4.24, 72/35  MV: TRACE MR  TV: TRACE TI  LA ENLARGEMENT 5.2CM  LVH  EF 45    Stress Myoview-  Cath-      Medication Review:   Scheduled Meds:amLODIPine, 5 mg, Oral, Q24H  aspirin, 81 mg, Oral, Daily  atorvastatin, 40 mg, Oral, Nightly  bumetanide, 2 mg, Intravenous, Q12H  enoxaparin, 40 mg, Subcutaneous, Q12H  guaiFENesin, 600 mg, Oral, Q12H  insulin lispro, 2-9 Units, Subcutaneous, 4x Daily AC & at Bedtime  levothyroxine, 112 mcg, Oral, Daily  metoprolol tartrate, 12.5 mg, Oral, Q12H  mupirocin, 1 Application, Each Nare, BID  pantoprazole, 40 mg, Oral, Q AM  potassium chloride, 20 mEq, Oral, BID With Meals      Continuous Infusions:     PRN Meds:.  acetaminophen **OR** acetaminophen **OR** acetaminophen    Calcium Replacement - Follow Nurse / BPA Driven Protocol    cyclobenzaprine    dextrose    dextrose    glucagon (human recombinant)    HYDROcodone-acetaminophen    Magnesium Cardiology Dose Replacement - Follow Nurse / BPA Driven Protocol    [DISCONTINUED] Morphine **AND** naloxone    nitroglycerin    ondansetron    Phosphorus Replacement - Follow Nurse / BPA Driven  Protocol    polyethylene glycol    Potassium Replacement - Follow Nurse / BPA Driven Protocol    senna-docusate sodium        MDM:    1.  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  Patient is ambulating well without any problems and her blood pressure and heart are stable    2.  Hypertension:    Patient is currently on amlodipine and metoprolol I will increase the dose of metoprolol to help her heart rate.    3.  Mixed hyperlipidemia:    Patient is on Lipitor.  Lipids are followed by the primary care doctor.    4.  CAD:    Recent cardiac catheterization showed nonobstructive CAD.  Patient has normal V systolic function    5.  Diabetes mellitus:    Patient is on insulin.  Patient to be switched to oral medicines at home  Still patient is ambulating very well and will be discharged to home in a day.    Jayson Edmondson MD  01/28/25  12:13 EST

## 2025-01-28 NOTE — CASE MANAGEMENT/SOCIAL WORK
Continued Stay Note  LADARIUS Perez     Patient Name: Lulu Graves  MRN: [id number]  Today's Date: 1/28/2025    Admit Date: 1/24/2025    Plan: DC Plan: Home with family assist. Lives with spouse. AVR 1/24/2025.   Discharge Plan       Row Name 01/28/25 1319       Plan    Plan DC Plan: Home with family assist. Lives with spouse. AVR 1/24/2025.    Patient/Family in Agreement with Plan yes    Provided Post Acute Provider List? N/A    Provided Post Acute Provider Quality & Resource List? N/A    Plan Comments CM reviewed chart documentation for clinical updates. Plans to diurese and mobilize more today. Anticipating DC home tomorrow 1/29/2025 with family.CM will continue to follow for any additional needs and adjust DC plan accordingly. DC Barriers: POD 4 OHS, Cardiac monitoring, IV Bumex ,and monitor labs.                 Expected Discharge Date and Time       Expected Discharge Date Expected Discharge Time    Jan 29, 2025               Chantell Vallejo RN     Office Phone: [phone]  Office Cell:     [phone]

## 2025-01-28 NOTE — PROGRESS NOTES
" LOS: 4 days   Patient Care Team:  Sushma Hall APRN as PCP - General (Nurse Practitioner)  Jayson Edmondson MD as Consulting Physician (Cardiology)  Addy Torres MD as Surgeon (General Surgery)  Anabelle De La Torre RD as Dietitian (Nutrition)  Angelique Wylie MD as Consulting Physician (Sleep Medicine)    Chief Complaint: Post-op follow-up, s/p AVR    Subjective: Doing well. No new complaints. Pain is well-controlled.     Vital Signs  Temp:  [98.3 °F (36.8 °C)-99.7 °F (37.6 °C)] 99.7 °F (37.6 °C)  Heart Rate:  [] 112  Resp:  [15-26] 22  BP: ()/(49-70) 117/59      01/24/25  0615 01/25/25  0558 01/28/25  0500   Weight: 97 kg (213 lb 12.8 oz) 99.6 kg (219 lb 9.6 oz) 101 kg (221 lb 12.8 oz)     Body mass index is 44.8 kg/m².    Intake/Output Summary (Last 24 hours) at 1/28/2025 0857  Last data filed at 1/28/2025 0600  Gross per 24 hour   Intake 524 ml   Output 650 ml   Net -126 ml     No intake/output data recorded.        Objective:  Vital signs: (most recent): Blood pressure 117/59, pulse 112, temperature 99.7 °F (37.6 °C), temperature source Oral, resp. rate 22, height 149.9 cm (59\"), weight 101 kg (221 lb 12.8 oz), SpO2 92%, not currently breastfeeding.            Physical Examination:   General appearance - alert, well appearing, and in no distress and overweight  Mental status - normal mood, behavior, speech, dress, motor activity, and thought processes  Chest - clear to auscultation, no wheezes, rales or rhonchi, symmetric air entry, decreased air entry noted bilateral bases  Heart - tachycardic, regular rhythm, normal S1, S2, no murmurs, rubs, clicks or gallops  Abdomen - soft, nontender, nondistended, no masses or organomegaly  bowel sounds normal  Neurological - alert, oriented, normal speech, no focal findings or movement disorder noted, motor and sensory grossly normal bilaterally  Extremities - peripheral pulses normal, no pedal edema, no clubbing or cyanosis, feet " "normal, good pulses, normal color, temperature and sensation  Skin - normal coloration and turgor, no rashes, no suspicious skin lesions noted  Midsternal and CT dressing CDI     Results Review:      WBC WBC   Date Value Ref Range Status   01/28/2025 12.70 (H) 3.40 - 10.80 10*3/mm3 Final   01/27/2025 12.15 (H) 3.40 - 10.80 10*3/mm3 Final   01/26/2025 15.23 (H) 3.40 - 10.80 10*3/mm3 Final      HGB Hemoglobin   Date Value Ref Range Status   01/28/2025 11.1 (L) 12.0 - 15.9 g/dL Final   01/27/2025 10.0 (L) 12.0 - 15.9 g/dL Final   01/26/2025 9.8 (L) 12.0 - 15.9 g/dL Final      HCT Hematocrit   Date Value Ref Range Status   01/28/2025 35.0 34.0 - 46.6 % Final   01/27/2025 31.5 (L) 34.0 - 46.6 % Final   01/26/2025 30.9 (L) 34.0 - 46.6 % Final      Platelets Platelets   Date Value Ref Range Status   01/28/2025 219 140 - 450 10*3/mm3 Final   01/27/2025 143 140 - 450 10*3/mm3 Final   01/26/2025 114 (L) 140 - 450 10*3/mm3 Final        PT/INR:  No results found for: \"PROTIME\"/No results found for: \"INR\"    Sodium Sodium   Date Value Ref Range Status   01/28/2025 136 136 - 145 mmol/L Final   01/27/2025 137 136 - 145 mmol/L Final   01/26/2025 137 136 - 145 mmol/L Final      Potassium Potassium   Date Value Ref Range Status   01/28/2025 4.1 3.5 - 5.2 mmol/L Final     Comment:     Specimen hemolyzed.  Result may be falsely elevated.   01/27/2025 4.0 3.5 - 5.2 mmol/L Final   01/26/2025 3.8 3.5 - 5.2 mmol/L Final      Chloride Chloride   Date Value Ref Range Status   01/28/2025 100 98 - 107 mmol/L Final   01/27/2025 101 98 - 107 mmol/L Final   01/26/2025 102 98 - 107 mmol/L Final      Bicarbonate CO2   Date Value Ref Range Status   01/28/2025 24.4 22.0 - 29.0 mmol/L Final   01/27/2025 28.7 22.0 - 29.0 mmol/L Final   01/26/2025 25.3 22.0 - 29.0 mmol/L Final      BUN BUN   Date Value Ref Range Status   01/28/2025 24 (H) 8 - 23 mg/dL Final   01/27/2025 25 (H) 8 - 23 mg/dL Final   01/26/2025 18 8 - 23 mg/dL Final      Creatinine " "Creatinine   Date Value Ref Range Status   01/28/2025 0.63 0.57 - 1.00 mg/dL Final   01/27/2025 0.78 0.57 - 1.00 mg/dL Final   01/26/2025 0.72 0.57 - 1.00 mg/dL Final      Calcium Calcium   Date Value Ref Range Status   01/28/2025 8.5 (L) 8.6 - 10.5 mg/dL Final   01/27/2025 8.3 (L) 8.6 - 10.5 mg/dL Final   01/26/2025 8.2 (L) 8.6 - 10.5 mg/dL Final      Magnesium No results found for: \"MG\"       amLODIPine, 5 mg, Oral, Q24H  aspirin, 81 mg, Oral, Daily  atorvastatin, 40 mg, Oral, Nightly  bumetanide, 2 mg, Intravenous, Q12H  enoxaparin, 40 mg, Subcutaneous, Q12H  guaiFENesin, 600 mg, Oral, Q12H  insulin lispro, 2-9 Units, Subcutaneous, 4x Daily AC & at Bedtime  levothyroxine, 112 mcg, Oral, Daily  metoprolol tartrate, 12.5 mg, Oral, Q12H  mupirocin, 1 Application, Each Nare, BID  pantoprazole, 40 mg, Oral, Q AM  potassium chloride, 20 mEq, Oral, BID With Meals             Prosthetic aortic valve stenosis    S/P reop AVR #23 Magna Alonso per Dr. Moreno 1/24/2025      Assessment & Plan    - Severe prosthetic aortic valve stenosis (ALEKSEY .73 cm2, peak velocity 403 cm/sec, mean peak gradient 44 mmHg), EF 50-55% (echo), s/p minimally invasive tissue AVR 21 mm Magna (Neelam, 12/2013)--s/p redo sternotomy, tissue AVR #23 Magna Ease (Tiffanie, 1/24/2025)  - Non-obstructive CAD--cath 1/15/2025  - HLD with hypertriglyceridemia--statin  - LUÍS, CPAP compliance  - Morbid obesity, stage 3--BMI 44.2  - Hyperglycemia--A1c 5.95  - Hypothyroidism--levothyroxine  - Postop leukocytosis, likely reactive, trending down  - Postop ABLA, expected--transfused 1/24  - Postop TCP, consumptive--transfused 1/24      POD # 4    - Doing well. Pain is well-controlled. Would like to get up and walk more to feel more confident about going home.   - 's during my visit. States she just took her PO meds, including her BB. SBP well-controlled.   - On room air but need O2 overnight- will get overnight oxymetry tonight  - C/O SOA when " ambulating. Likely due to volume overload. Wt up 2 lbs from yesterday and 4 lbs up since admission. Net positive 1.9 L since admission- IV Bumex 2 mg BID  - Blood sugars well-controlled on current regimen  - Bilateral opacities, R > L- Encouraged ambulation and pulmonary toilet  - Home with family at discharge. Likely tomorrow. She does not feel quite ready to go, and I think one more day is a good idea.   - Continue routine and supportive care  - F/U in our office 3-4 weeks post discharge      GUILLERMO Prieto  01/28/25  08:57 EST

## 2025-01-29 ENCOUNTER — NURSE TRIAGE (OUTPATIENT)
Dept: CALL CENTER | Facility: HOSPITAL | Age: 70
End: 2025-01-29
Payer: MEDICARE

## 2025-01-29 ENCOUNTER — READMISSION MANAGEMENT (OUTPATIENT)
Dept: CALL CENTER | Facility: HOSPITAL | Age: 70
End: 2025-01-29
Payer: MEDICARE

## 2025-01-29 VITALS
WEIGHT: 219.8 LBS | BODY MASS INDEX: 44.31 KG/M2 | TEMPERATURE: 97.7 F | HEART RATE: 92 BPM | OXYGEN SATURATION: 93 % | RESPIRATION RATE: 13 BRPM | HEIGHT: 59 IN | SYSTOLIC BLOOD PRESSURE: 110 MMHG | DIASTOLIC BLOOD PRESSURE: 46 MMHG

## 2025-01-29 LAB
ANION GAP SERPL CALCULATED.3IONS-SCNC: 10 MMOL/L (ref 5–15)
BUN SERPL-MCNC: 22 MG/DL (ref 8–23)
BUN/CREAT SERPL: 30.1 (ref 7–25)
CALCIUM SPEC-SCNC: 8.6 MG/DL (ref 8.6–10.5)
CHLORIDE SERPL-SCNC: 99 MMOL/L (ref 98–107)
CO2 SERPL-SCNC: 27 MMOL/L (ref 22–29)
CREAT SERPL-MCNC: 0.73 MG/DL (ref 0.57–1)
DEPRECATED RDW RBC AUTO: 44.9 FL (ref 37–54)
EGFRCR SERPLBLD CKD-EPI 2021: 89.7 ML/MIN/1.73
ERYTHROCYTE [DISTWIDTH] IN BLOOD BY AUTOMATED COUNT: 13.5 % (ref 12.3–15.4)
GLUCOSE BLDC GLUCOMTR-MCNC: 166 MG/DL (ref 70–105)
GLUCOSE SERPL-MCNC: 117 MG/DL (ref 65–99)
HCT VFR BLD AUTO: 33.7 % (ref 34–46.6)
HGB BLD-MCNC: 10.8 G/DL (ref 12–15.9)
MCH RBC QN AUTO: 28.6 PG (ref 26.6–33)
MCHC RBC AUTO-ENTMCNC: 32 G/DL (ref 31.5–35.7)
MCV RBC AUTO: 89.2 FL (ref 79–97)
PLATELET # BLD AUTO: 249 10*3/MM3 (ref 140–450)
PMV BLD AUTO: 11 FL (ref 6–12)
POTASSIUM SERPL-SCNC: 3.9 MMOL/L (ref 3.5–5.2)
POTASSIUM SERPL-SCNC: 4.2 MMOL/L (ref 3.5–5.2)
RBC # BLD AUTO: 3.78 10*6/MM3 (ref 3.77–5.28)
SODIUM SERPL-SCNC: 136 MMOL/L (ref 136–145)
TSH SERPL DL<=0.05 MIU/L-ACNC: 8.96 UIU/ML (ref 0.27–4.2)
WBC NRBC COR # BLD AUTO: 12.58 10*3/MM3 (ref 3.4–10.8)

## 2025-01-29 PROCEDURE — 84132 ASSAY OF SERUM POTASSIUM: CPT

## 2025-01-29 PROCEDURE — 85027 COMPLETE CBC AUTOMATED: CPT | Performed by: NURSE PRACTITIONER

## 2025-01-29 PROCEDURE — 25010000002 ENOXAPARIN PER 10 MG: Performed by: NURSE PRACTITIONER

## 2025-01-29 PROCEDURE — 99232 SBSQ HOSP IP/OBS MODERATE 35: CPT | Performed by: INTERNAL MEDICINE

## 2025-01-29 PROCEDURE — 97116 GAIT TRAINING THERAPY: CPT

## 2025-01-29 PROCEDURE — 97551 CAREGIVER TRAING EA ADDL 15: CPT

## 2025-01-29 PROCEDURE — 84443 ASSAY THYROID STIM HORMONE: CPT | Performed by: NURSE PRACTITIONER

## 2025-01-29 PROCEDURE — 80048 BASIC METABOLIC PNL TOTAL CA: CPT | Performed by: NURSE PRACTITIONER

## 2025-01-29 PROCEDURE — 82948 REAGENT STRIP/BLOOD GLUCOSE: CPT | Performed by: THORACIC SURGERY (CARDIOTHORACIC VASCULAR SURGERY)

## 2025-01-29 RX ORDER — BUMETANIDE 1 MG/1
TABLET ORAL
Qty: 18 TABLET | Refills: 0 | Status: SHIPPED | OUTPATIENT
Start: 2025-01-29 | End: 2025-02-13

## 2025-01-29 RX ORDER — POTASSIUM CHLORIDE 1500 MG/1
20 TABLET, EXTENDED RELEASE ORAL 2 TIMES DAILY WITH MEALS
Status: DISCONTINUED | OUTPATIENT
Start: 2025-01-29 | End: 2025-01-29

## 2025-01-29 RX ORDER — METOPROLOL TARTRATE 50 MG
50 TABLET ORAL EVERY 12 HOURS SCHEDULED
Qty: 60 TABLET | Refills: 3 | Status: SHIPPED | OUTPATIENT
Start: 2025-01-29

## 2025-01-29 RX ORDER — HYDROCODONE BITARTRATE AND ACETAMINOPHEN 5; 325 MG/1; MG/1
1 TABLET ORAL EVERY 6 HOURS PRN
Qty: 25 TABLET | Refills: 0 | Status: SHIPPED | OUTPATIENT
Start: 2025-01-29

## 2025-01-29 RX ORDER — BUMETANIDE 1 MG/1
1 TABLET ORAL
Status: DISCONTINUED | OUTPATIENT
Start: 2025-01-29 | End: 2025-01-29 | Stop reason: HOSPADM

## 2025-01-29 RX ORDER — POTASSIUM CHLORIDE 1500 MG/1
20 TABLET, EXTENDED RELEASE ORAL ONCE
Status: COMPLETED | OUTPATIENT
Start: 2025-01-29 | End: 2025-01-29

## 2025-01-29 RX ORDER — POTASSIUM CHLORIDE 1500 MG/1
TABLET, EXTENDED RELEASE ORAL
Qty: 18 TABLET | Refills: 0 | Status: SHIPPED | OUTPATIENT
Start: 2025-01-29 | End: 2025-02-13

## 2025-01-29 RX ADMIN — AMLODIPINE BESYLATE 5 MG: 5 TABLET ORAL at 08:18

## 2025-01-29 RX ADMIN — ASPIRIN 81 MG: 81 TABLET, COATED ORAL at 08:17

## 2025-01-29 RX ADMIN — MUPIROCIN 1 APPLICATION: 20 OINTMENT TOPICAL at 08:17

## 2025-01-29 RX ADMIN — HYDROCODONE BITARTRATE AND ACETAMINOPHEN 1 TABLET: 5; 325 TABLET ORAL at 08:18

## 2025-01-29 RX ADMIN — METOPROLOL TARTRATE 25 MG: 25 TABLET, FILM COATED ORAL at 08:18

## 2025-01-29 RX ADMIN — PANTOPRAZOLE SODIUM 40 MG: 40 TABLET, DELAYED RELEASE ORAL at 05:15

## 2025-01-29 RX ADMIN — GUAIFENESIN 600 MG: 600 TABLET, MULTILAYER, EXTENDED RELEASE ORAL at 08:18

## 2025-01-29 RX ADMIN — BUMETANIDE 1 MG: 1 TABLET ORAL at 10:55

## 2025-01-29 RX ADMIN — POTASSIUM CHLORIDE 20 MEQ: 1500 TABLET, EXTENDED RELEASE ORAL at 08:18

## 2025-01-29 RX ADMIN — LEVOTHYROXINE SODIUM 112 MCG: 112 TABLET ORAL at 08:18

## 2025-01-29 RX ADMIN — ENOXAPARIN SODIUM 40 MG: 100 INJECTION SUBCUTANEOUS at 08:18

## 2025-01-29 RX ADMIN — POTASSIUM CHLORIDE 20 MEQ: 1500 TABLET, EXTENDED RELEASE ORAL at 08:19

## 2025-01-29 NOTE — DISCHARGE INSTRUCTIONS
Post Coronary Artery Bypass Graft (CABG) Surgery Discharge Instructions    To Accompany Standard Discharge Form      Call these physicians to schedule a follow up visit:    Cardiothoracic Surgery Phone:  [phone] Fax:  [phone] Address:  [address] [place] [address] [place] [address]       Wear your Heart Hugger continuously until your follow-up appointment with Cardiothoracic Surgery.  No lifting over 10 pounds (gallon of milk) for six (6) weeks after surgery.  No driving for four (4) weeks.    Showers are to be taken every day.    Clean all incisions with fragrance free liquid anti-bacterial soap.  No bar soap because bacteria grows on bar soap.  Please do not use any lotions or powders on incisions.  No tub baths until incisions are completely healed.   Avoid sitting for long periods of time.  Try to stand and walk every 1-2 hours when you travel or are sitting for a long time.   If your legs are swollen, elevate your legs on pillows above the level of your heart.    Rest as needed during the day, but do not take more than 2 naps during the day.   Extra naps may interfere with your ability to sleep at night.  Take your pain medications as ordered, particularly at night.    Avoid straining or bending over with your head down.    You may participate in sex when you can walk up 2 flights of stairs without shortness of breath.   Avoid positions that put pressure on your upper arms.  Do not have sex after a heavy meal or if you are tired.  Weigh yourself daily.   Weight gain can be a sign of extra fluid in your lungs and body.   Call your care giver if you have gained 2-3 pounds in one day.  Stay away from large crowds and people with colds, influenzas, or COVID.  Keep your incision clean.  Do not hold animals or small children or allow them near your incision sites to prevent infection.    Your physician will release you to return to work after your surgery.   Usually patients may return to  work six (6) weeks after surgery.    If you smoke, now is a good time to quit.  Nicotine can cause increased narrowing of the new grafts.  Call the doctor if you have any of the following:    Incisions that are red, swollen, or increasingly tender or have pus coming from them  A temperature over 101.0 degrees  Sudden trouble breathing. This could be a sign of a clot in your lung  A “pop” in your chest bone or the incision is   Increasing chest pain or pressure radiating to your neck, jaw, or arm. These are signs of a heart attack.  Weakness on one side of your body or difficulty speaking. These are signs of a stroke.    In case of an emergency, contact your physician or nurse practitioner. They will direct you regarding whether to go to the emergency room or call 911.Post Coronary Artery Bypass Graft (CABG) Surgery Discharge Instructions    To Accompany Standard Discharge Form      Call these physicians to schedule a follow up visit:    Cardiothoracic Surgery Phone:  [phone] Fax:  [phone] Address:  [address] [place] [address] [place] [facility]       Wear your Heart Hugger continuously until your follow-up appointment with Cardiothoracic Surgery.  No lifting over 10 pounds (gallon of milk) for six (6) weeks after surgery.  No driving for four (4) weeks.    Showers are to be taken every day.    Clean all incisions with fragrance free liquid anti-bacterial soap.  No bar soap because bacteria grows on bar soap.  Please do not use any lotions or powders on incisions.  No tub baths until incisions are completely healed.   Avoid sitting for long periods of time.  Try to stand and walk every 1-2 hours when you travel or are sitting for a long time.   If your legs are swollen, elevate your legs on pillows above the level of your heart.    Rest as needed during the day, but do not take more than 2 naps during the day.   Extra naps may interfere with your ability to sleep at night.  Take  your pain medications as ordered, particularly at night.    Avoid straining or bending over with your head down.    You may participate in sex when you can walk up 2 flights of stairs without shortness of breath.   Avoid positions that put pressure on your upper arms.  Do not have sex after a heavy meal or if you are tired.  Weigh yourself daily.   Weight gain can be a sign of extra fluid in your lungs and body.   Call your care giver if you have gained 2-3 pounds in one day.  Stay away from large crowds and people with colds, influenzas, or COVID.  Keep your incision clean.  Do not hold animals or small children or allow them near your incision sites to prevent infection.    Your physician will release you to return to work after your surgery.   Usually patients may return to work six (6) weeks after surgery.    If you smoke, now is a good time to quit.  Nicotine can cause increased narrowing of the new grafts.  Call the doctor if you have any of the following:    Incisions that are red, swollen, or increasingly tender or have pus coming from them  A temperature over 101.0 degrees  Sudden trouble breathing. This could be a sign of a clot in your lung  A “pop” in your chest bone or the incision is   Increasing chest pain or pressure radiating to your neck, jaw, or arm. These are signs of a heart attack.  Weakness on one side of your body or difficulty speaking. These are signs of a stroke.    In case of an emergency, contact your physician or nurse practitioner. They will direct you regarding whether to go to the emergency room or call 911.

## 2025-01-29 NOTE — THERAPY TREATMENT NOTE
Subjective: Pt agreeable to therapeutic plan of care.    Objective:    Phase 1 Cardiac Rehab Initiated - Acute Care    Cardiac Level IV Activities  Sitting tolerance: >10min and supported  Standing tolerance: >10min and unsupported    Precautions:  Mid-sternal incision; avoid scapular retraction and depression.  Cardiovascular impairment post-sx; encourage energy conservation strategies.    MET level equivalent: 3.0-3.5 (Standing ADLs / showering, slow stair climbing up to 2 flights, unlimited ambulation on flat surfaces up to 15mins) and instructed in energy conservation strategies    Bed mobility - N/A or Not attempted.  Transfers - Modified-Independent  Ambulation - 410 feet Independent; cues to relax and not hold heart hugger at all times. Also ascending/descending 7 stairs w/ hand held assist only (cga). Pt instructed to ascend/descend w/ non-reciprocal pattern for now to help ease anxiety of stairs. Pt expressed surprise at how easy it was.     Vitals: WNL; tends to move into higher respiratory rate w/ activity and w/ talking. Had pt practice not talking while amb, and to work to take slower, easy breaths w/ activity.     Pain: 0 VAS   Location: n/a  Intervention for pain: Repositioned, RN notified, Increased Activity, and Therapeutic Presence    Education: Provided education on the importance of mobility in the acute care setting, Verbal/Tactile Cues, Transfer Training, Gait Training, Energy conservation strategies, and Post-Op Precautions. Also educated pt on how to perform diaphragmatic breathing. Pt able to demonstrate proper technique. Instructed her to perform slowly at intermittent intervals (commercial breaks) while watching tv. Educated on importance of relaxing to develop more normal pattern while ambulating.     Assessment: Lulu Graves is a little anxious, but does very well w/ mobility. Able to negotiate stairs well. Needed encouragement that she is doing well.  was also present and able to  "observe and encourage pt. All goals met. No need for continued PT.  Tolerating session today without incident. Will d/c from PT today.    Plan/Recommendations:   If medically appropriate, Low Intensity Therapy recommended post-acute care - This is recommended as therapy feels this patient would require 2-3 visits per week. OP or HH would be the best option depending on patient's home bound status. Consider, if the patient has other  \"skilled\" needs such as wounds, IV antibiotics, etc. Combined with \"low intensity\" could also equate to a SNF. If patient is medically complex, consider LTAC. Pt requires no DME at discharge.     Pt desires Home with family assist at discharge. Pt cooperative; agreeable to therapeutic recommendations and plan of care.         Basic Mobility 6-click:  Rollin = Total, A lot = 2, A little = 3; 4 = None  Supine>Sit:   1 = Total, A lot = 2, A little = 3; 4 = None   Sit>Stand with arms:  1 = Total, A lot = 2, A little = 3; 4 = None  Bed>Chair:   1 = Total, A lot = 2, A little = 3; 4 = None  Ambulate in room:  1 = Total, A lot = 2, A little = 3; 4 = None  3-5 Steps with railin = Total, A lot = 2, A little = 3; 4 = None  Score: 23    Modified [place]: N/A = No pre-op stroke/TIA    Post-Tx Position: Up in Chair and Call light and personal items within reach  PPE: gloves and surgical mask    Therapy Charges for Today       Code Description Service Date Service Provider Modifiers Qty    43595471768 HC GAIT TRAINING EA 15 MIN 2025 Isa Hollingsworth, PT GP 1    42365992571 HC GAIT TRAINING EA 15 MIN 2025 Isa Hollingsworth, PT GP 1    [id number] HC CAREGIVER TRAINING STRATEGIES &TQ EA ADDL 15 MIN 2025 Isa Hollingsworth, PT  1           PT Charges       Row Name 25 1108             Time Calculation    Start Time 1040  -CM      Stop Time 1056  -CM      Time Calculation (min) 16 min  -CM      PT Received On 25  -CM         Time Calculation- PT    Total Timed " Code Minutes- PT 16 minute(s)  -JOSEPH                User Key  (r) = Recorded By, (t) = Taken By, (c) = Cosigned By      Initials Name Provider Type    Isa Elliott, PT Physical Therapist

## 2025-01-29 NOTE — PROGRESS NOTES
CARDIOLOGY PROGRESS NOTE:    Lulu Graves  69 y.o.  female  1955  [id number]      Referring Provider: Cardiac surgeon    Reason for follow-up: Status post aortic valve surgery     Patient Care Team:  Sushma Hall APRN as PCP - General (Nurse Practitioner)  Jayson Edmondson MD as Consulting Physician (Cardiology)  Addy Torres MD as Surgeon (General Surgery)  Anabelle De La Torre RD as Dietitian (Nutrition)  Angelique Wylie MD as Consulting Physician (Sleep Medicine)    Subjective .  Patient doing well without any symptoms    Objective sitting in chair comfortably     Review of Systems   Constitutional: Negative for malaise/fatigue.   Cardiovascular:  Negative for chest pain, dyspnea on exertion, leg swelling and palpitations.   Respiratory:  Negative for cough and shortness of breath.    Gastrointestinal:  Negative for abdominal pain, nausea and vomiting.   Neurological:  Negative for dizziness, focal weakness, headaches, light-headedness and numbness.   All other systems reviewed and are negative.      Allergies: Patient has no known allergies.    Scheduled Meds:amLODIPine, 5 mg, Oral, Q24H  aspirin, 81 mg, Oral, Daily  atorvastatin, 40 mg, Oral, Nightly  bumetanide, 1 mg, Oral, BID Diuretics  enoxaparin, 40 mg, Subcutaneous, Q12H  guaiFENesin, 600 mg, Oral, Q12H  insulin lispro, 2-9 Units, Subcutaneous, 4x Daily AC & at Bedtime  levothyroxine, 112 mcg, Oral, Daily  metoprolol tartrate, 25 mg, Oral, Q12H  pantoprazole, 40 mg, Oral, Q AM  potassium chloride, 20 mEq, Oral, BID With Meals      Continuous Infusions:   PRN Meds:.  acetaminophen **OR** acetaminophen **OR** acetaminophen    Calcium Replacement - Follow Nurse / BPA Driven Protocol    cyclobenzaprine    dextrose    dextrose    glucagon (human recombinant)    HYDROcodone-acetaminophen    Magnesium Cardiology Dose Replacement - Follow Nurse / BPA Driven Protocol    [DISCONTINUED] Morphine **AND** naloxone    nitroglycerin     "ondansetron    Phosphorus Replacement - Follow Nurse / BPA Driven Protocol    polyethylene glycol    Potassium Replacement - Follow Nurse / BPA Driven Protocol    senna-docusate sodium        VITAL SIGNS  Vitals:    01/28/25 2343 01/29/25 0623 01/29/25 0739 01/29/25 1150   BP: 104/51 109/65 105/58 110/46   BP Location: Left arm  Left arm Left arm   Patient Position: Lying  Sitting Sitting   Pulse:  97 106 92   Resp: 19  16 13   Temp: 98.4 °F (36.9 °C)  98.1 °F (36.7 °C) 97.7 °F (36.5 °C)   TempSrc: Oral  Oral Oral   SpO2:   94% 93%   Weight:  99.7 kg (219 lb 12.8 oz)     Height:           Flowsheet Rows      Flowsheet Row First Filed Value   Admission Height 149.9 cm (59\") Documented at 01/24/2025 0615   Admission Weight 97 kg (213 lb 12.8 oz) Documented at 01/24/2025 0615             TELEMETRY: Sinus tachycardia    Physical Exam:  Constitutional:       Appearance: Well-developed.   Eyes:      General: No scleral icterus.     Conjunctiva/sclera: Conjunctivae normal.   HENT:      Head: Normocephalic and atraumatic.   Neck:      Vascular: No carotid bruit or JVD.   Pulmonary:      Effort: Pulmonary effort is normal.      Breath sounds: Normal breath sounds. No wheezing. No rales.   Cardiovascular:      Normal rate. Regular rhythm.   Pulses:     Intact distal pulses.   Abdominal:      General: Bowel sounds are normal.      Palpations: Abdomen is soft.   Musculoskeletal:      Cervical back: Normal range of motion and neck supple. Skin:     General: Skin is warm and dry.      Findings: No rash.   Neurological:      Mental Status: Alert.          Results Review:   I reviewed the patient's new clinical results.  Lab Results (last 24 hours)       Procedure Component Value Units Date/Time    TSH [[id number]]  (Abnormal) Collected: 01/29/25 0527    Specimen: Blood Updated: 01/29/25 0915     TSH 8.960 uIU/mL     POC Glucose 4x Daily Before Meals & at Bedtime [[id number]]  (Abnormal) Collected: 01/29/25 0756    Specimen: Blood " Updated: 01/29/25 0758     Glucose 166 mg/dL      Comment: Serial Number: [id number]:  [id number]       Basic Metabolic Panel [[id number]]  (Abnormal) Collected: 01/29/25 0527    Specimen: Blood Updated: 01/29/25 0607     Glucose 117 mg/dL      BUN 22 mg/dL      Creatinine 0.73 mg/dL      Sodium 136 mmol/L      Potassium 3.9 mmol/L      Chloride 99 mmol/L      CO2 27.0 mmol/L      Calcium 8.6 mg/dL      BUN/Creatinine Ratio 30.1     Anion Gap 10.0 mmol/L      eGFR 89.7 mL/min/1.73     Narrative:      GFR Categories in Chronic Kidney Disease (CKD)      GFR Category          GFR (mL/min/1.73)    Interpretation  G1                     90 or greater         Normal or high (1)  G2                      60-89                Mild decrease (1)  G3a                   45-59                Mild to moderate decrease  G3b                   30-44                Moderate to severe decrease  G4                    15-29                Severe decrease  G5                    14 or less           Kidney failure          (1)In the absence of evidence of kidney disease, neither GFR category G1 or G2 fulfill the criteria for CKD.    eGFR calculation 2021 CKD-EPI creatinine equation, which does not include race as a factor    CBC (No Diff) [[id number]]  (Abnormal) Collected: 01/29/25 0527    Specimen: Blood Updated: 01/29/25 0535     WBC 12.58 10*3/mm3      RBC 3.78 10*6/mm3      Hemoglobin 10.8 g/dL      Hematocrit 33.7 %      MCV 89.2 fL      MCH 28.6 pg      MCHC 32.0 g/dL      RDW 13.5 %      RDW-SD 44.9 fl      MPV 11.0 fL      Platelets 249 10*3/mm3     POC Glucose Once [[id number]]  (Abnormal) Collected: 01/28/25 1959    Specimen: Blood Updated: 01/28/25 2001     Glucose 175 mg/dL      Comment: Serial Number: [id number]:  [id number]               Imaging Results (Last 24 Hours)       ** No results found for the last 24 hours. **            EKG      I personally viewed and interpreted the patient's EKG/Telemetry  data:    ECHOCARDIOGRAM:  Results for orders placed in visit on 01/24/25    Intra-Op Anesthesia FRAN    Narrative  Intra-Op Anesthesia FRAN    Procedure Performed: Intra-Op Anesthesia FRAN  Start Time:  1/24/2025 7:10 AM  End Time:   1/24/2025 7:20 AM    Preanesthesia Checklist:  Patient identified, IV assessed, risks and benefits discussed, monitors and equipment assessed, procedure being performed at surgeon's request and anesthesia consent obtained.    General Procedure Information  FRAN Placed for monitoring purposes only -- This is not a diagnostic FRAN  Diagnostic Indications for Echo:  assessment of ascending aorta, assessment of surgical repair, defect repair evaluation and hemodynamic monitoring  Location performed:  OR  Intubated  Bite block placed  Heart visualized  Probe Insertion:  Easy  Probe Type:  Multiplane  Modalities:  Color flow mapping, continuous wave Doppler and pulse wave Doppler    Echocardiographic and Doppler Measurements    Ventricles    Right Ventricle:  Cavity size normal.  Hypertrophy not present.  Thrombus not present.  Global function normal.  Left Ventricle:  Cavity size normal.  Thrombus not present.  Global Function mildly impaired.        Valves    Aortic Valve:  Annulus bioprosthetic.  Stenosis severe.  Mean Gradient: 72/35 mmHg.  Pressure Half-time: 4 ms.  Regurgitation absent.  Leaflets calcified and thickened.  Leaflet motions restricted.    Mitral Valve:  Annulus normal.  Stenosis not present.  Regurgitation trace.  Leaflets normal.  Leaflet motions normal.    Tricuspid Valve:  Annulus normal.  Stenosis not present.  Regurgitation trace.  Leaflets normal.  Leaflet motions normal.  Pulmonic Valve:  Annulus normal.      Aorta    Ascending Aorta:  Size dilated.  Diameter 3.9 cm.  Dissection not present.  Plaque thickness less than 3 mm.  Mobile plaque not present.  Aortic Arch:  Size normal.  Diameter 2.5 cm.  Dissection not present.  Plaque thickness less than 3 mm.  Mobile plaque  not present.  Descending Aorta:  Size normal.  Diameter 2.7 cm.  Dissection not present.  Plaque thickness less than 3 mm.  Mobile plaque not present.      Atria    Right Atrium:  Size normal.  Spontaneous echo contrast not present.  Thrombus not present.  Tumor not present.  Device not present.    Left Atrium:  Size dilated.  Spontaneous echo contrast not present.  Thrombus not present.  Tumor not present.  Device not present.  Left atrial appendage normal.              Other Findings  Pericardium:  normal  Pleural Effusion:  none  Pulmonary Arteries:  normal  Pulmonary Venous Flow:  normal    Anesthesia Information  Performed Personally  Anesthesiologist:  Froilan Cummings MD      Echocardiogram Comments:  BIOPROSTHETIC AV. R CUSP IMMOBILE. L RESTRICTED. SEVERE AS PK VEL4.24, 72/35  MV: TRACE MR  TV: TRACE TI  LA ENLARGEMENT 5.2CM  LVH  EF 45       STRESS MYOVIEW:  Results for orders placed during the hospital encounter of 01/14/25    Stress Test With Myocardial Perfusion One Day    Interpretation Summary    Left ventricular ejection fraction is normal (Calculated EF = 62%).    Low risk for ischemic heart disease.    STRESS CARDIOLITE REPORT    DATE OF PROCEDURE:   January 15, 2025    INDICATION FOR PROCEDURE:  Chest pain,, dyspnea on exertion, aortic stenosis    PROCEDURE PERFORMED: Stress Cardiolite    DESCRIPTION OF PROCEDURE:    After informed consent was obtained.. Patient's resting heart rate was 69 bpm, resting blood pressure was 128/92, resting EKG revealed sinus rhythm with a rate of 69 bpm with Q waves in V1 V2.  Patient exercised on standard Lane protocol for total of 6.01 minutes, patient achieved a heart rate of 136 beats per minute, which is 89% of age-predicted maximum, without chest pain or EKG changes.Patient was given Cardiolite at rest and images obtained and post stress Cardiolite was given and images obtained . Patient tolerated procedure well.  Complications were none.    NUCLEAR  IMAGIN.  There was small area of inferolateral reversible defect consistent with inferolateral ischemia seen.  2.  Gated images reveal normal LV size and contractility, LVEF of 62%.    CONCLUSION:  1.  Stress Cardiolite with abnormal perfusion, inferolateral ischemia.  2.  Normal wall motion, LVEF of 62%.    RECOMMENDATIONS:    Clinical correlation recommended.    Nba Oswald MD  01/15/25  15:47 EST       CARDIAC CATHETERIZATION:  Results for orders placed during the hospital encounter of 25    Cardiac Catheterization/Vascular Study    Conclusion  Table formatting from the original result was not included.  January 15, 2025      Heart Cath Report    NAME:              Lulu Graves  :                1955  AGE/SEX:        69 y.o. female  MRN:                [id number]        Procedures Performed    Bilateral coronary arteriogram    :   Nba Oswald MD    Vascular Access Site: Femoral    Indication for procedure: Chest pain, dyspnea on exertion, abnormal stress test, valvular heart disease, severe aortic stenosis      Procedure Note    After discussing the risks, benefits, and alternatives of the procedure, informed consent was obtained.  Timeout was done before the procedure.  Moderate conscious sedation was given utilizing IV Versed and fentanyl administered by RN with continuous EKG oximetry and hemodynamic monitoring supervised by me throughout the entire case.  I was present with the patient for the duration of moderate sedation and supervised staff who had no other duties and monitored the patient for the entire procedure patient had De 2-3 sedation scale. the vascular access site was prepped and draped in the usual sterile fashion.  2% lidocaine was used for local anesthesia. Appropriate landmarks were assessed.  A 6 British short sheath was inserted in the artery using the modified Seldinger technique.    Selective coronary angiography was performed with  JL4 and JR4 diagnostic catheters. Left ventriculogram  was not performed because patient has prosthetic aortic valve..  All exchanges were performed over the wire.  No specimens were removed.  There were no apparent acute or early complications.  The patient tolerated the procedure well and was transferred to the recovery area in stable condition.    Closure device: Mynx device was deployed successfully after right iliofemoral angiogram was performed. Good hemostasis was achieved.    Complications:  None  Blood Loss: minimal    Hemodynamics    Pressures    Ao:    127/65 mmHg      Coronary Angiography    Left Main :  The left main   without disease    Left Anterior Descending : Gives rise to large diagonal which divides into 2.  Mid LAD has intramyocardial course.  Distal LAD has 30% luminal irregularities for      Left Circumflex : Is dominant vessel.  Gives rise to large mid marginal and distal marginal without disease continues in the AV groove.  PDA is calcified 40% narrowing.    Right Coronary Artery :  The right coronary artery   is non dominant vessel    Dominance:  [x]  Left  [x]  Right  []  Co-Dominant    Left Ventriculography:    Was not done because of aortic stenosis and prosthetic aortic valve    Impression:    No obstructive CAD  Severe prosthetic valve aortic stenosis    Recommendations:    Structural heart team consult evaluate for SAVR versus TAVR        I sincerely appreciate the opportunity to participate in your patient's care. Please feel free to contact me anytime if I can be of assistance in this or any other way.      Pertinent History    Past Medical History:  Diagnosis Date   Allergic   Aortic stenosis   Aortic valve replaced 12/04/2013   Cataract   GERD (gastroesophageal reflux disease)   Glaucoma 02/16/2023  Just started   Heart murmur 1996   Hyperlipidemia   Hypertension   Hypothyroidism   Liver disease  Due to medication   MRSA (methicillin resistant Staphylococcus aureus)  left arm  abscess   Obesity   Osteopenia   PAT (paroxysmal atrial tachycardia)   Sleep apnea     Past Surgical History:  Procedure Laterality Date   ABLATION OF DYSRHYTHMIC FOCUS   or    AORTIC VALVE REPAIR/REPLACEMENT   AORTIC VALVE SURGERY  2013   CARDIAC CATHETERIZATION  Before open heart surgery   CARDIAC VALVE REPLACEMENT  2013    SECTION  1982   COLONOSCOPY  2016   INCISION AND DRAINAGE ABSCESS Left 2023  Procedure: INCISION AND DRAINAGE ABSCESS, left arm;  Surgeon: Addy Torres MD;  Location: [facility];  Service: General;  Laterality: Left;   TUBAL ABDOMINAL LIGATION    Prior to Admission medications  Medication Sig Start Date End Date Taking? Authorizing Provider  aspirin 81 MG tablet Take 1 tablet by mouth Every Night. 12  Yes Cait Osuna MD  levothyroxine (SYNTHROID, LEVOTHROID) 112 MCG tablet Take 1 tablet by mouth Daily. 25  Yes Sushma Hall APRN  loratadine (CLARITIN) 10 MG tablet TAKE 1 TABLET BY MOUTH EVERY DAY 2/10/24  Yes Sushma Hall APRN  metoprolol tartrate (LOPRESSOR) 25 MG tablet Take 0.5 tablets by mouth 2 (Two) Times a Day. 24  Yes Sushma Hall APRN  multivitamin with minerals (MULTIVITAMIN ADULT PO) Take 1 tablet by mouth Daily.   Yes Cait Osuna MD  simvastatin (ZOCOR) 20 MG tablet Take 1 tablet by mouth Every Evening. 24  Yes Sushma Hall APRN  Elderberry-Vitamin C-Zinc (ELDERBERRY IMMUNE HEALTH GUMMY PO) Take 500 mg by mouth Daily.    Cait Osuna MD  fluticasone (FLONASE) 50 MCG/ACT nasal spray Administer 2 sprays into the nostril(s) as directed by provider As Needed for Rhinitis.    Cait Osuna MD  NON FORMULARY [place], Cinnamon, Apple Cider Vinegar, Tumeric, Curmemic, berberine, and ginseng supplement    Cait Osuna MD  PROBIOTIC PRODUCT PO Take 1 capsule by mouth Daily. 16   Cait Osuna MD      Pre-Procedure Notes  H&P Performed  [x]  Yes []   No       []  N/A    Indications:  []  ACS <= 24 HRS  []  ACS >24 HRS  []  New Onset Angina <= 2 mos  []  Worsening Angina  []  Resuscitated Cardiac Arrest  []  Angina on Exertion:  []  Suspected CAD  []  Valvular Disease  []  Pericardial Disease  []  Cardiac Arrythmia  []  Cardiomyopathy  []  LV Dysfunction  []  Syncope  []  Post Cardiac Transplant  []  Eval. For Exercise Clearance  []  Other  []  Pre-Operative Evaluation  If Pre-Op Eval:  Evaluation for Surgery Type:  []  Cardiac Surgery   []  Non-Cardiac Surgery  Functional Capacity:  []  <4 METS  []  >=4 METS w/o symptoms  []  >= 4 METS with symptoms  []  Unknown  Surgical Risk:  []  Low  []  Intermediate  []  High Risk: Vascular  []  High Risk Non-Vascular    Risks, Benefits, & Complications Discussed:  [x]  Yes  []  No  []  N/A    Questions Answered:  [x]  Yes  []  No  []  N/A    Consent Obtained:  [x]  Yes  []  No  []  N/A    CHF: []  Yes  [x]  No  If Yes:  Newly Diagnosed?  []  Yes  []  No  If Yes:  HF Type:  []  Diastolic  []  Systolic  []  Unknown      Nba Oswald MD  1/15/2025  18:02 EST  Electronically signed by Nba Oswald MD, 01/15/25, 6:02 PM EST.       OTHER:         Assessment & Plan     #1 aortic valve disorder  Patient had a bioprosthetic aortic valve stenosis and is status post redo aortic valve surgery with a bioprosthetic valve and is currently stable and has normal LV function  Patient is ambulating very well with the help of medications.  2.  Hypertension  Patient's blood pressure and heart rate are slightly high and hence we will stop the amlodipine and increase the metoprolol  3.  Hyperlipidemia  Patient is on Lipitor and the lipid levels are well within normal limits  4.  Coronary disease  Patient has nonobstructive disease in the past and is currently stable  5.  Diabetes  Patient is on sliding scale insulin and will be followed by the primary care doctor with home medications    I discussed the patients findings  and my recommendations with patient and nurse    Jayson Edmondson MD  01/29/25  13:56 EST

## 2025-01-29 NOTE — PLAN OF CARE
"Goal Outcome Evaluation:      Assessment: Lulu Graves is a little anxious, but does very well w/ mobility. Able to negotiate stairs well. Needed encouragement that she is doing well.  was also present and able to observe and encourage pt. All goals met. No need for continued PT.  Tolerating session today without incident. Will d/c from PT today.    Plan/Recommendations:   If medically appropriate, Low Intensity Therapy recommended post-acute care - This is recommended as therapy feels this patient would require 2-3 visits per week. OP or HH would be the best option depending on patient's home bound status. Consider, if the patient has other  \"skilled\" needs such as wounds, IV antibiotics, etc. Combined with \"low intensity\" could also equate to a SNF. If patient is medically complex, consider LTAC. Pt requires no DME at discharge.     Pt desires Home with family assist at discharge. Pt cooperative; agreeable to therapeutic recommendations and plan of care.              Anticipated Discharge Disposition (PT): home with assist                        "

## 2025-01-29 NOTE — OUTREACH NOTE
Prep Survey      Flowsheet Row Responses   [facility] patient discharged from? [facility]   Is LACE score < 7 ? No   Eligibility [facility]   Date of Admission 01/24/25   Date of Discharge 01/29/25   Discharge Disposition Home or Self Care   Discharge diagnosis Prosthetic aortic valve stenosis, AORTIC VALVE REPLACEMENT REOP   Does the patient have one of the following disease processes/diagnoses(primary or secondary)? Cardiothoracic surgery   Does the patient have Home health ordered? No   Is there a DME ordered? No   Prep survey completed? Yes            Shalini DING - Registered Nurse

## 2025-01-29 NOTE — TELEPHONE ENCOUNTER
Calling has medication question on her Metoprolol, looked at Dr. Edmondson notes says is stopping Amlodipine and increasing Metoprolol Tartrate she was getting 25 mg now up to 50 mg bid. Told  if any question please call the office of Dr. Edmondson to clarify. New script from pharmacy is 50 mg one tablet every 12 hours.   Reason for Disposition  • [1] Caller has NON-URGENT medicine question about med that PCP prescribed AND [2] triager unable to answer question    Additional Information  • Negative: [1] Intentional drug overdose AND [2] suicidal thoughts or ideas  • Negative: Drug overdose and triager unable to answer question  • Negative: Caller requesting a renewal or refill of a medicine patient is currently taking  • Negative: Caller requesting information unrelated to medicine  • Negative: Caller requesting information about COVID-19 Vaccine  • Negative: Caller requesting information about Emergency Contraception  • Negative: Caller requesting information about Combined Birth Control Pills  • Negative: Caller requesting information about Progestin Birth Control Pills  • Negative: Caller requesting information about Post-Op pain or medicines  • Negative: Caller requesting a prescription antibiotic (such as Penicillin) for Strep throat and has a positive culture result  • Negative: Caller requesting a prescription anti-viral med (such as Tamiflu) and has influenza (flu) symptoms  • Negative: Immunization reaction suspected  • Negative: Rash while taking a medicine or within 3 days of stopping it  • Negative: [1] Asthma and [2] having symptoms of asthma (cough, wheezing, etc.)  • Negative: [1] Symptom of illness (e.g., headache, abdominal pain, earache, vomiting) AND [2] more than mild  • Negative: Breastfeeding questions about mother's medicines and diet  • Negative: MORE THAN A DOUBLE DOSE of a prescription or over-the-counter (OTC) drug  • Negative: [1] DOUBLE DOSE (an extra dose or lesser amount) of  "prescription drug AND [2] any symptoms (e.g., dizziness, nausea, pain, sleepiness)  • Negative: [1] DOUBLE DOSE (an extra dose or lesser amount) of over-the-counter (OTC) drug AND [2] any symptoms (e.g., dizziness, nausea, pain, sleepiness)  • Negative: Took another person's prescription drug  • Negative: [1] DOUBLE DOSE (an extra dose or lesser amount) of prescription drug AND [2] NO symptoms  (Exception: A double dose of antibiotics.)  • Negative: Diabetes drug error or overdose (e.g., took wrong type of insulin or took extra dose)  • Negative: [1] Prescription not at pharmacy AND [2] was prescribed by PCP recently (Exception: Triager has access to EMR and prescription is recorded there. Go to Home Care and confirm for pharmacy.)  • Negative: [1] Pharmacy calling with prescription question AND [2] triager unable to answer question  • Negative: [1] Caller has URGENT medicine question about med that PCP or specialist prescribed AND [2] triager unable to answer question  • Negative: Medicine patch causing local rash or itching  • Negative: [1] Caller has medicine question about med NOT prescribed by PCP AND [2] triager unable to answer question (e.g., compatibility with other med, storage)  • Negative: Prescription request for new medicine (not a refill)  • Negative: Caller wants to use a complementary or alternative medicine    Answer Assessment - Initial Assessment Questions  1. NAME of MEDICINE: \"What medicine(s) are you calling about?\"      Metoprolol Tartrate  2. QUESTION: \"What is your question?\" (e.g., double dose of medicine, side effect)      dose  3. PRESCRIBER: \"Who prescribed the medicine?\" Reason: if prescribed by specialist, call should be referred to that group.      Dr. Edmondson  4. SYMPTOMS: \"Do you have any symptoms?\" If Yes, ask: \"What symptoms are you having?\"  \"How bad are the symptoms (e.g., mild, moderate, severe)      no  5. PREGNANCY:  \"Is there any chance that you are pregnant?\" \"When was your " "last menstrual period?\"      no    Protocols used: Medication Question Call-ADULT-    "

## 2025-01-29 NOTE — CASE MANAGEMENT/SOCIAL WORK
Case Management Discharge Note      Final Note: CM reviewed chart documentation for clinical updates. DC orders in place. No additional service needs identified. Patient to transport home via private vehicle with spouse. No barriers.        Transportation Services  Private: Car (with spouse)    Final Discharge Disposition Code: 01 - home or self-care    Chantell Vallejo RN    Office Phone: [phone]  Office Cell:     [phone]

## 2025-01-29 NOTE — PROGRESS NOTES
S/P POD# 5 redo sternotomy/ tissue AVR--[place]  EF 50-55% (echo)    Subjective:  she is worried about getting into her house d/t steps    No events overnight  Overnight oximetry 1/28--no oxygen needed  Remains tachycardic this morning  Wt is up 2.5 kgs from preop, despite diuretics      Intake/Output Summary (Last 24 hours) at 1/29/2025 0843  Last data filed at 1/28/2025 1800  Gross per 24 hour   Intake 500 ml   Output --   Net 500 ml     Temp:  [98.1 °F (36.7 °C)-99.7 °F (37.6 °C)] 98.1 °F (36.7 °C)  Heart Rate:  [] 106  Resp:  [16-30] 16  BP: ()/(40-72) 105/58      Results from last 7 days   Lab Units 01/29/25 0527 01/28/25  0556 01/26/25  0338 01/25/25  0338 01/24/25  1251 01/24/25  1202 01/24/25  1019   WBC 10*3/mm3 12.58* 12.70*   < > 8.33   < > 12.78* 13.04*   HEMOGLOBIN g/dL 10.8* 11.1*   < > 9.5*   < > 9.2* 9.3*   HEMOGLOBIN, POC   --   --   --   --    < >  --   --    HEMATOCRIT % 33.7* 35.0   < > 29.5*   < > 28.8* 28.1*   HEMATOCRIT POC   --   --   --   --    < >  --   --    PLATELETS 10*3/mm3 249 219   < > 110*   < > 119* 108*   INR   --   --   --  1.32*  --  1.57* 1.95*    < > = values in this interval not displayed.     Results from last 7 days   Lab Units 01/29/25 0527 01/26/25 0338 01/25/25 0338   CREATININE mg/dL 0.73   < > 0.75   POTASSIUM mmol/L 3.9   < > 4.1   SODIUM mmol/L 136   < > 142   MAGNESIUM mg/dL  --   --  2.5*   PHOSPHORUS mg/dL  --   --  4.2    < > = values in this interval not displayed.     Physical Exam:  Neuro intact, nad, up in recliner  Tele:  ST with LBBB, 107  Diminished bases, 97% RA  Sternotomy healing well  Benign abd, + BM  No edema    Assessment/Plan:  Principal Problem:    Prosthetic aortic valve stenosis  Active Problems:    S/P reop AVR #23 Magna Ease per Dr. Moreno 1/24/2025    - Severe prosthetic aortic valve stenosis (ALEKSEY .73 cm2, peak velocity 403 cm/sec, mean peak gradient 44 mmHg), EF 50-55% (echo), s/p minimally invasive tissue AVR 21  pavithra Aiken ([place], 12/2013)--s/p redo sternotomy, tissue AVR #23 Nelli Gupta ([place], 1/24/2025)  - Non-obstructive CAD--cath 1/15/2025  - HLD with hypertriglyceridemia--statin  - LUÍS, CPAP compliance  - Morbid obesity, stage 3--BMI 44.2  - Hyperglycemia--A1c 5.95  - Hypothyroidism--levothyroxine  - Postop leukocytosis, likely reactive  - Postop ABLA, expected--transfused 1/24  - Postop TCP, consumptive--transfused 1/24    POD# 5.  Doing well.  On asa/statin/bb.  Will d/w cards about increasing metoprolol after TSH resulted.  Pt is very concerned that her dosage of levothyroxine was recently increased by PCP.  Will check TSH as possible reason for tachycardia.  Start oral diuretics.  Will have PT work with pt on stairs prior to discharge.  Mobilize.     Addendum:  D/w Dr. Edmondson--stop amlodipine, increase metoprolol to 50 mg bid.  TSH 8.96.    Routine care--as above  D/w pt/family, nsg, Dr. Moreno   Anticipate home at discharge    Latanya Tubbs, APRN  1/29/2025  08:43 EST

## 2025-01-30 ENCOUNTER — TRANSITIONAL CARE MANAGEMENT TELEPHONE ENCOUNTER (OUTPATIENT)
Dept: CALL CENTER | Facility: HOSPITAL | Age: 70
End: 2025-01-30
Payer: MEDICARE

## 2025-01-30 NOTE — OUTREACH NOTE
Call Center TCM Note      Flowsheet Row Responses   [facility] patient discharged from? [facility]   Does the patient have one of the following disease processes/diagnoses(primary or secondary)? Cardiothoracic surgery   TCM attempt successful? Yes   Call start time 1517   Call end time 1531   Person spoke with today (if not patient) and relationship pt   Meds reviewed with patient/caregiver? Yes   Is the patient having any side effects they believe may be caused by any medication additions or changes? No   Does the patient have all medications related to this admission filled (includes all antibiotics, pain medications, cardiac medications, etc.) Yes   Is the patient taking all medications as directed (includes completed medication regime)? Yes   Comments Post-op 2/13/25,  Cardiology 2/17/25,  Needs Cardiac Rehab appts   Does the patient have an appointment with their PCP within 7-14 days of discharge? No   Nursing Interventions Patient desires to follow up with specialty only   Psychosocial issues? No   Did the patient receive a copy of their discharge instructions? Yes   Nursing interventions Reviewed instructions with patient   What is the patient's perception of their health status since discharge? Improving   Nursing interventions Nurse provided patient education   Is the patient/caregiver able to teach back normal signs of recovery? Pain or discomfort at incisional site   Nursing interventions Reassured on normal signs of recovery   Is the patient /caregiver able to teach back basic post-op care? No tub bath, swimming, or hot tub until instructed by MD, Shower daily, Use a clean wash cloth and antibacterial bar or liquid soap to clean incisions, Lifting as instructed by MD in discharge instructions   Is the patient/caregiver able to teach back signs and symptoms of incisional infection? Increased redness, swelling or pain at the incisonal site, Increased drainage or bleeding, Incisional warmth, Pus or odor  from incision, Fever   Is the patient/caregiver able to teach back steps to recovery at home? Rest and rebuild strength, gradually increase activity, Eat a well-balance diet   If the patient is a current smoker, are they able to teach back resources for cessation? Not a smoker   Is the patient/caregiver able to teach back the hierarchy of who to call/visit for symptoms/problems? PCP, Specialist, Home health nurse, Urgent Care, ED, 911 Yes   TCM call completed? Yes   Wrap up additional comments Pt denies any incisional complications at this time. Reviewed AVS/meds/instructions with pt. Pt verified post-op, cardiology fu appts.   Call end time 1531   Would this patient benefit from a Referral to Amb Social Work? No   Is the patient interested in additional calls from an ambulatory ? No            Geni Quintana RN    1/30/2025, 15:32 EST

## 2025-02-01 ENCOUNTER — NURSE TRIAGE (OUTPATIENT)
Dept: CALL CENTER | Facility: HOSPITAL | Age: 70
End: 2025-02-01
Payer: MEDICARE

## 2025-02-01 NOTE — TELEPHONE ENCOUNTER
She had a prosthetic aortic valve stenosis repair  at [facility] and was discharged on 01/29/25.- Potassium Chloride Ana ER 20 MEQ- She is getting choked on the pill. She is asking how to take the pill. Explained can place in apple sauce less than a tsp or can crush to aid in swallowing the pill.  She states she understands. She has been half ing the pill. Uses Web MD as a reference.  Reason for Disposition   Caller has medicine question only, adult not sick, AND triager answers question    Additional Information   Negative: [1] Intentional drug overdose AND [2] suicidal thoughts or ideas   Negative: Drug overdose and triager unable to answer question   Negative: Caller requesting a renewal or refill of a medicine patient is currently taking   Negative: Caller requesting information unrelated to medicine   Negative: Caller requesting information about COVID-19 Vaccine   Negative: Caller requesting information about Emergency Contraception   Negative: Caller requesting information about Combined Birth Control Pills   Negative: Caller requesting information about Progestin Birth Control Pills   Negative: Caller requesting information about Post-Op pain or medicines   Negative: Caller requesting a prescription antibiotic (such as Penicillin) for Strep throat and has a positive culture result   Negative: Caller requesting a prescription anti-viral med (such as Tamiflu) and has influenza (flu) symptoms   Negative: Immunization reaction suspected   Negative: Rash while taking a medicine or within 3 days of stopping it   Negative: [1] Asthma and [2] having symptoms of asthma (cough, wheezing, etc.)   Negative: [1] Symptom of illness (e.g., headache, abdominal pain, earache, vomiting) AND [2] more than mild   Negative: Breastfeeding questions about mother's medicines and diet   Negative: MORE THAN A DOUBLE DOSE of a prescription or over-the-counter (OTC) drug   Negative: [1] DOUBLE DOSE (an extra dose or lesser amount) of  "prescription drug AND [2] any symptoms (e.g., dizziness, nausea, pain, sleepiness)   Negative: [1] DOUBLE DOSE (an extra dose or lesser amount) of over-the-counter (OTC) drug AND [2] any symptoms (e.g., dizziness, nausea, pain, sleepiness)   Negative: Took another person's prescription drug   Negative: [1] DOUBLE DOSE (an extra dose or lesser amount) of prescription drug AND [2] NO symptoms  (Exception: A double dose of antibiotics.)   Negative: Diabetes drug error or overdose (e.g., took wrong type of insulin or took extra dose)   Negative: [1] Prescription not at pharmacy AND [2] was prescribed by PCP recently (Exception: Triager has access to EMR and prescription is recorded there. Go to Home Care and confirm for pharmacy.)   Negative: [1] Pharmacy calling with prescription question AND [2] triager unable to answer question   Negative: [1] Caller has URGENT medicine question about med that PCP or specialist prescribed AND [2] triager unable to answer question   Negative: Medicine patch causing local rash or itching   Negative: [1] Caller has medicine question about med NOT prescribed by PCP AND [2] triager unable to answer question (e.g., compatibility with other med, storage)   Negative: Prescription request for new medicine (not a refill)   Negative: [1] Caller has NON-URGENT medicine question about med that PCP prescribed AND [2] triager unable to answer question   Negative: Caller wants to use a complementary or alternative medicine   Negative: [1] Prescription prescribed recently is not at pharmacy AND [2] triager has access to patient's EMR AND [3] prescription is recorded in the EMR   Negative: [1] DOUBLE DOSE (an extra dose or lesser amount) of over-the-counter (OTC) drug AND [2] NO symptoms   Negative: [1] DOUBLE DOSE (an extra dose or lesser amount) of antibiotic drug AND [2] NO symptoms    Answer Assessment - Initial Assessment Questions  1. NAME of MEDICINE: \"What medicine(s) are you calling about?\"   " "   How to take the medication if can't  swallow.  2. QUESTION: \"What is your question?\" (e.g., double dose of medicine, side effect)      How to take the medication if can't swallow.   3. PRESCRIBER: \"Who prescribed the medicine?\" Reason: if prescribed by specialist, call should be referred to that group.      Surgeon  4. SYMPTOMS: \"Do you have any symptoms?\" If Yes, ask: \"What symptoms are you having?\"  \"How bad are the symptoms (e.g., mild, moderate, severe)      Gets choked easily   5. PREGNANCY:  \"Is there any chance that you are pregnant?\" \"When was your last menstrual period?\"      none    Protocols used: Medication Question Call-ADULT-    "

## 2025-02-02 LAB
QT INTERVAL: 419 MS
QT INTERVAL: 477 MS
QT INTERVAL: 509 MS
QTC INTERVAL: 415 MS
QTC INTERVAL: 442 MS
QTC INTERVAL: 483 MS

## 2025-02-06 RX ORDER — SIMVASTATIN 20 MG
20 TABLET ORAL EVERY EVENING
Qty: 90 TABLET | Refills: 1 | Status: SHIPPED | OUTPATIENT
Start: 2025-02-06

## 2025-02-11 DIAGNOSIS — Z95.2 S/P AVR: ICD-10-CM

## 2025-02-11 RX ORDER — HYDROCODONE BITARTRATE AND ACETAMINOPHEN 5; 325 MG/1; MG/1
1 TABLET ORAL EVERY 6 HOURS PRN
Qty: 25 TABLET | Refills: 0 | OUTPATIENT
Start: 2025-02-11

## 2025-02-11 NOTE — TELEPHONE ENCOUNTER
"    Caller: Lulu Graves \"Tresa\"    Relationship: Self    Best call back number: [phone]     Requested Prescriptions:   Requested Prescriptions     Pending Prescriptions Disp Refills    HYDROcodone-acetaminophen (NORCO) 5-325 MG per tablet 25 tablet 0     Sig: Take 1 tablet by mouth Every 6 (Six) Hours As Needed for Moderate Pain.        Pharmacy where request should be sent:  [facility]    Last office visit with prescribing clinician: Visit date not found   Last telemedicine visit with prescribing clinician: Visit date not found   Next office visit with prescribing clinician: 2/13/2025     Additional details provided by patient: PATIENT HAS ONE PILL LEFT.     Does the patient have less than a 3 day supply:  [x] Yes  [] No    Would you like a call back once the refill request has been completed: [x] Yes [] No    If the office needs to give you a call back, can they leave a voicemail: [x] Yes [] No    Mitesh Morrison Rep   02/11/25 14:58 EST           "

## 2025-02-13 ENCOUNTER — OFFICE VISIT (OUTPATIENT)
Dept: CARDIAC SURGERY | Facility: CLINIC | Age: 70
End: 2025-02-13
Payer: MEDICARE

## 2025-02-13 VITALS
TEMPERATURE: 96.9 F | DIASTOLIC BLOOD PRESSURE: 80 MMHG | WEIGHT: 212 LBS | SYSTOLIC BLOOD PRESSURE: 123 MMHG | HEART RATE: 76 BPM | BODY MASS INDEX: 42.74 KG/M2 | RESPIRATION RATE: 18 BRPM | OXYGEN SATURATION: 97 % | HEIGHT: 59 IN

## 2025-02-13 DIAGNOSIS — Z95.2 S/P AVR: Primary | ICD-10-CM

## 2025-02-13 PROCEDURE — 99024 POSTOP FOLLOW-UP VISIT: CPT | Performed by: NURSE PRACTITIONER

## 2025-02-13 PROCEDURE — 3079F DIAST BP 80-89 MM HG: CPT | Performed by: NURSE PRACTITIONER

## 2025-02-13 PROCEDURE — 3074F SYST BP LT 130 MM HG: CPT | Performed by: NURSE PRACTITIONER

## 2025-02-13 PROCEDURE — 1159F MED LIST DOCD IN RCRD: CPT | Performed by: NURSE PRACTITIONER

## 2025-02-13 PROCEDURE — 1160F RVW MEDS BY RX/DR IN RCRD: CPT | Performed by: NURSE PRACTITIONER

## 2025-02-13 NOTE — PROGRESS NOTES
"CARDIOVASCULAR SURGERY FOLLOW-UP PROGRESS NOTE  Chief Complaint: Post-op Follow Up        HPI:   Dear Sushma Toro APRN and colleagues:    It was nice to see Lulu Graves in follow up today after cardiac surgery.  As you know, she is a 69 y.o. female with severe prosthetic aortic valve stensois, non-obstructive CAD, HLD with hypertriglyceridemia, treated LUÍS, hypothyroidism, and morbid obesity who underwent redo sternotomy, tissue AVR 23 mm Magna Ease at [facility] by Dr. Moreno on 1/24/20225. She did well postoperatively and continues to do well.  She comes in today with concerns of constipation and insomnia. She has been taking colace at home but is not having a daily BM. She has completed her diuretics as well. She continues to sleep in the recliner because she can get out of it easily without assistance from her . She has been using her CPAP after pressure adjustments were made by Dr. Wylie.  She has her BP log and wt log today with her.  Her home BP cuff is approximately 20 points lower systolically than our office NIBP.   Her activity level has been fair. She has not seen cardiology, but has an appt on 2/27.  She has not started cardiac rehab and thinks she may want to do it at [place].  Her husban is with her today for her appt.    Physical Exam:         /80 (BP Location: Left arm, Patient Position: Sitting, Cuff Size: Adult)   Pulse 76   Temp 96.9 °F (36.1 °C)   Resp 18   Ht 149.9 cm (59\")   Wt 96.2 kg (212 lb)   LMP  (LMP Unknown)   SpO2 97%   BMI 42.82 kg/m²   Heart:  regular rate and rhythm  Lungs:  clear to auscultation bilaterally  Extremities:  no edema  Incision(s):  mid chest healing well, sternum stable    Assessment/Plan:     S/P redo sternotomy, tissue AVR 23 mm Magna Ease. Overall, she is doing well. Her chest tube exit sites are slightly moist.  She started wearing a bra yesterday.  Her breasts may have been covering her chest tube exit sites causing them to be " moist.  I have instructed her to shower daily and paint chest tube exit sites with betadine for one week.  We discussed her home BP machine readings.  She also brought her work papers to filled out as well.    No significant post-op complications    Keep incisions clean and dry  OK to drive if not taking narcotic pain medicine  OK to begin cardiac rehab  Follow-up as scheduled with cardiology  Follow-up as scheduled with PCP  Follow-up with CT surgery--1 month follow-up    Continue lifting restriction of 10 lbs until 6 weeks and 50 lbs until 12 weeks from the date of surgery, no excessive jarring motions or twisting motions until 12 weeks from the date of surgery.    Thank you for allowing me to participate in the care of your patient.    Regards,  Latanya Tubbs, APRN

## 2025-02-14 ENCOUNTER — TELEPHONE (OUTPATIENT)
Dept: CARDIAC SURGERY | Facility: CLINIC | Age: 70
End: 2025-02-14
Payer: MEDICARE

## 2025-02-14 NOTE — TELEPHONE ENCOUNTER
CALLED PT TO INFORM HER THAT I FAXED HER [employer] PAPERWORK TODAY AND WILL MAIL ORIGINALS TO ADDRESS ON FILE. PT VERBALIZED UNDERSTANDING. COPY ALSO IN PT CHART UNDER MEDIA.

## 2025-02-18 ENCOUNTER — OFFICE VISIT (OUTPATIENT)
Dept: CARDIOLOGY | Facility: CLINIC | Age: 70
End: 2025-02-18
Payer: MEDICARE

## 2025-02-18 VITALS
OXYGEN SATURATION: 94 % | HEART RATE: 65 BPM | HEIGHT: 59 IN | WEIGHT: 215 LBS | DIASTOLIC BLOOD PRESSURE: 77 MMHG | SYSTOLIC BLOOD PRESSURE: 131 MMHG | BODY MASS INDEX: 43.34 KG/M2

## 2025-02-18 DIAGNOSIS — I10 PRIMARY HYPERTENSION: ICD-10-CM

## 2025-02-18 DIAGNOSIS — Z95.2 STATUS POST AORTIC VALVE REPLACEMENT: Primary | ICD-10-CM

## 2025-02-18 DIAGNOSIS — G47.33 OBSTRUCTIVE SLEEP APNEA: ICD-10-CM

## 2025-02-18 DIAGNOSIS — E78.00 PURE HYPERCHOLESTEROLEMIA: ICD-10-CM

## 2025-02-18 PROCEDURE — 1159F MED LIST DOCD IN RCRD: CPT | Performed by: INTERNAL MEDICINE

## 2025-02-18 PROCEDURE — 1160F RVW MEDS BY RX/DR IN RCRD: CPT | Performed by: INTERNAL MEDICINE

## 2025-02-18 PROCEDURE — 99214 OFFICE O/P EST MOD 30 MIN: CPT | Performed by: INTERNAL MEDICINE

## 2025-02-18 PROCEDURE — 3078F DIAST BP <80 MM HG: CPT | Performed by: INTERNAL MEDICINE

## 2025-02-18 PROCEDURE — 3075F SYST BP GE 130 - 139MM HG: CPT | Performed by: INTERNAL MEDICINE

## 2025-02-18 RX ORDER — ACETAMINOPHEN 500 MG
1000 TABLET ORAL EVERY 6 HOURS PRN
COMMUNITY

## 2025-02-18 NOTE — PROGRESS NOTES
Subjective:     Encounter Date:02/18/2025      Patient ID: Lulu Graves is a 69 y.o. female.    Chief Complaint:  History of Present Illness 69-year-old female with history of aortic valve disorder status post redo aortic valve surgery hypertension hyperlipidemia sleep apnea presents to my office for a follow-up.  Patient is currently doing well after surgery without any symptoms of chest pain or shortness of breath at rest or exertion.  No complaints of any PND or orthopnea.  No palpitations dizziness syncope or swelling of the feet.  Patient is taking all the medicines regularly.  Patient does not smoke.    The following portions of the patient's history were reviewed and updated as appropriate: allergies, current medications, past family history, past medical history, past social history, past surgical history, and problem list.  Past Medical History:   Diagnosis Date    Allergic     Aortic stenosis     Aortic valve replaced 12/04/2013    Cataract     GERD (gastroesophageal reflux disease)     Glaucoma 02/16/2023    Just started    Heart murmur 1996    Hyperlipidemia     Hypertension     Hypothyroidism     Liver disease     Due to medication    MRSA (methicillin resistant Staphylococcus aureus)     left arm abscess    Obesity     Osteopenia     PAT (paroxysmal atrial tachycardia)     Sleep apnea 2011     Past Surgical History:   Procedure Laterality Date    ABLATION OF DYSRHYTHMIC FOCUS  2012 or 2013    AORTIC VALVE REPAIR/REPLACEMENT      AORTIC VALVE REPAIR/REPLACEMENT N/A 1/24/2025    Procedure: AORTIC VALVE REPLACEMENT REOP using 23mm Franks Magna Ease porcine valve. WITH TRANSESOPHAGEAL ECHOCARDIOGRAM.;  Surgeon: Travis Moreno MD;  [facility]: [facility];  Service: Cardiothoracic;  Laterality: N/A;    AORTIC VALVE SURGERY  12/04/2013    CARDIAC CATHETERIZATION      Before open heart surgery    CARDIAC CATHETERIZATION N/A 1/15/2025    Procedure: Left Heart Cath and coronary angiogram;  Surgeon:  "Nba Oswald MD;  Location: [facility] CATH INVASIVE LOCATION;  Service: Cardiovascular;  Laterality: N/A;    CARDIAC VALVE REPLACEMENT  2013     SECTION  1982    COLONOSCOPY  2016    INCISION AND DRAINAGE ABSCESS Left 2023    Procedure: INCISION AND DRAINAGE ABSCESS, left arm;  Surgeon: Addy Torres MD;  Location: [facility] MAIN OR;  Service: General;  Laterality: Left;    TUBAL ABDOMINAL LIGATION       /77 (BP Location: Left arm, Patient Position: Sitting, Cuff Size: Adult)   Pulse 65   Ht 149.9 cm (59\")   Wt 97.5 kg (215 lb)   LMP  (LMP Unknown)   SpO2 94%   BMI 43.42 kg/m²   Family History   Problem Relation Age of Onset    Arthritis Mother     Diabetes Brother     Hearing loss Brother     Hypertension Brother     Stroke Paternal Grandmother     Sleep apnea Neg Hx        Current Outpatient Medications:     acetaminophen (TYLENOL) 500 MG tablet, Take 2 tablets by mouth Every 6 (Six) Hours As Needed for Mild Pain., Disp: , Rfl:     aspirin 81 MG tablet, Take 1 tablet by mouth Every Night., Disp: , Rfl:     Elderberry-Vitamin C-Zinc (MatsSoft GUMMY PO), Take 500 mg by mouth Daily., Disp: , Rfl:     fluticasone (FLONASE) 50 MCG/ACT nasal spray, Administer 2 sprays into the nostril(s) as directed by provider As Needed for Rhinitis., Disp: , Rfl:     levothyroxine (SYNTHROID, LEVOTHROID) 112 MCG tablet, Take 1 tablet by mouth Daily., Disp: 90 tablet, Rfl: 0    loratadine (CLARITIN) 10 MG tablet, TAKE 1 TABLET BY MOUTH EVERY DAY, Disp: 30 tablet, Rfl: 11    melatonin 5 MG tablet tablet, Take 1 tablet by mouth Every Night., Disp: , Rfl:     metoprolol tartrate (LOPRESSOR) 50 MG tablet, Take 1 tablet by mouth Every 12 (Twelve) Hours., Disp: 60 tablet, Rfl: 3    multivitamin with minerals (MULTIVITAMIN ADULT PO), Take 1 tablet by mouth Daily., Disp: , Rfl:     NON FORMULARY, Norton, Cinnamon, Apple Cider Vinegar, Tumeric, Curmemic, berberine, and " ginseng supplement, Disp: , Rfl:     PROBIOTIC PRODUCT PO, Take 1 capsule by mouth Daily., Disp: , Rfl:     simvastatin (ZOCOR) 20 MG tablet, Take 1 tablet by mouth Every Evening., Disp: 90 tablet, Rfl: 1  No Known Allergies  Social History     Socioeconomic History    Marital status:    Tobacco Use    Smoking status: Never     Passive exposure: Past    Smokeless tobacco: Never   Vaping Use    Vaping status: Never Used   Substance and Sexual Activity    Alcohol use: Never    Drug use: No    Sexual activity: Yes     Partners: Male     Birth control/protection: None, Tubal ligation     Review of Systems   Constitutional: Negative for malaise/fatigue.   Cardiovascular:  Negative for chest pain, dyspnea on exertion, leg swelling and palpitations.   Respiratory:  Negative for cough and shortness of breath.    Gastrointestinal:  Negative for abdominal pain, nausea and vomiting.   Neurological:  Negative for dizziness, focal weakness, headaches, light-headedness and numbness.   All other systems reviewed and are negative.             Objective:     Constitutional:       Appearance: Well-developed.   Eyes:      General: No scleral icterus.     Conjunctiva/sclera: Conjunctivae normal.   HENT:      Head: Normocephalic and atraumatic.   Neck:      Vascular: No carotid bruit or JVD.   Pulmonary:      Effort: Pulmonary effort is normal.      Breath sounds: Normal breath sounds. No wheezing. No rales.   Cardiovascular:      Normal rate. Regular rhythm.   Pulses:     Intact distal pulses.   Abdominal:      General: Bowel sounds are normal.      Palpations: Abdomen is soft.   Musculoskeletal:      Cervical back: Normal range of motion and neck supple. Skin:     General: Skin is warm and dry.      Findings: No rash.   Neurological:      Mental Status: Alert.       Procedures    Lab Review:         MDM    #1 aortic valve disorder  Patient has history of aortic valve disorder and is status post redo aortic valve surgery with a  #23 Magna Ease bioprosthesis and is stable and has normal V function  2.  Hypertension  Patient blood pressure currently stable on metoprolol but will adjust doses accordingly  3.  Hyperlipidemia  Patient on simvastatin the lipid levels are well within normal limits  4.  Sleep apnea  Pain sleep apnea and uses a CPAP machine.    Patient's previous medical records, labs, and EKG were reviewed and discussed with the patient at today's visit.

## 2025-02-19 ENCOUNTER — TELEPHONE (OUTPATIENT)
Dept: CARDIAC REHAB | Facility: HOSPITAL | Age: 70
End: 2025-02-19
Payer: MEDICARE

## 2025-02-19 DIAGNOSIS — Z95.2 S/P AVR (AORTIC VALVE REPLACEMENT): Primary | ICD-10-CM

## 2025-02-24 ENCOUNTER — OFFICE VISIT (OUTPATIENT)
Dept: FAMILY MEDICINE CLINIC | Facility: CLINIC | Age: 70
End: 2025-02-24
Payer: MEDICARE

## 2025-02-24 ENCOUNTER — LAB (OUTPATIENT)
Dept: LAB | Facility: HOSPITAL | Age: 70
End: 2025-02-24
Payer: MEDICARE

## 2025-02-24 VITALS
WEIGHT: 214 LBS | BODY MASS INDEX: 43.14 KG/M2 | OXYGEN SATURATION: 96 % | SYSTOLIC BLOOD PRESSURE: 121 MMHG | RESPIRATION RATE: 18 BRPM | HEART RATE: 68 BPM | HEIGHT: 59 IN | DIASTOLIC BLOOD PRESSURE: 74 MMHG | TEMPERATURE: 98.3 F

## 2025-02-24 DIAGNOSIS — E03.9 ACQUIRED HYPOTHYROIDISM: ICD-10-CM

## 2025-02-24 DIAGNOSIS — D50.0 BLOOD LOSS ANEMIA: ICD-10-CM

## 2025-02-24 DIAGNOSIS — I10 PRIMARY HYPERTENSION: ICD-10-CM

## 2025-02-24 DIAGNOSIS — R73.03 PREDIABETES: ICD-10-CM

## 2025-02-24 DIAGNOSIS — Z95.2 S/P AVR: ICD-10-CM

## 2025-02-24 DIAGNOSIS — I35.0 NONRHEUMATIC AORTIC VALVE STENOSIS: Primary | ICD-10-CM

## 2025-02-24 LAB
ALBUMIN SERPL-MCNC: 3.9 G/DL (ref 3.5–5.2)
ALBUMIN UR-MCNC: <1.2 MG/DL
ALBUMIN/GLOB SERPL: 1.3 G/DL
ALP SERPL-CCNC: 101 U/L (ref 39–117)
ALT SERPL W P-5'-P-CCNC: 9 U/L (ref 1–33)
ANION GAP SERPL CALCULATED.3IONS-SCNC: 9.5 MMOL/L (ref 5–15)
AST SERPL-CCNC: 19 U/L (ref 1–32)
BASOPHILS # BLD AUTO: 0.04 10*3/MM3 (ref 0–0.2)
BASOPHILS NFR BLD AUTO: 0.5 % (ref 0–1.5)
BILIRUB SERPL-MCNC: 0.4 MG/DL (ref 0–1.2)
BUN SERPL-MCNC: 16 MG/DL (ref 8–23)
BUN/CREAT SERPL: 22.2 (ref 7–25)
CALCIUM SPEC-SCNC: 9.5 MG/DL (ref 8.6–10.5)
CHLORIDE SERPL-SCNC: 103 MMOL/L (ref 98–107)
CO2 SERPL-SCNC: 27.5 MMOL/L (ref 22–29)
CREAT SERPL-MCNC: 0.72 MG/DL (ref 0.57–1)
CREAT UR-MCNC: 113.7 MG/DL
DEPRECATED RDW RBC AUTO: 43.1 FL (ref 37–54)
EGFRCR SERPLBLD CKD-EPI 2021: 91.2 ML/MIN/1.73
EOSINOPHIL # BLD AUTO: 0.42 10*3/MM3 (ref 0–0.4)
EOSINOPHIL NFR BLD AUTO: 5.2 % (ref 0.3–6.2)
ERYTHROCYTE [DISTWIDTH] IN BLOOD BY AUTOMATED COUNT: 13.1 % (ref 12.3–15.4)
GLOBULIN UR ELPH-MCNC: 3.1 GM/DL
GLUCOSE SERPL-MCNC: 79 MG/DL (ref 65–99)
HCT VFR BLD AUTO: 38.7 % (ref 34–46.6)
HGB BLD-MCNC: 12.1 G/DL (ref 12–15.9)
IMM GRANULOCYTES # BLD AUTO: 0.02 10*3/MM3 (ref 0–0.05)
IMM GRANULOCYTES NFR BLD AUTO: 0.2 % (ref 0–0.5)
LYMPHOCYTES # BLD AUTO: 1.87 10*3/MM3 (ref 0.7–3.1)
LYMPHOCYTES NFR BLD AUTO: 23 % (ref 19.6–45.3)
MCH RBC QN AUTO: 27.8 PG (ref 26.6–33)
MCHC RBC AUTO-ENTMCNC: 31.3 G/DL (ref 31.5–35.7)
MCV RBC AUTO: 89 FL (ref 79–97)
MICROALBUMIN/CREAT UR: NORMAL MG/G{CREAT}
MONOCYTES # BLD AUTO: 0.85 10*3/MM3 (ref 0.1–0.9)
MONOCYTES NFR BLD AUTO: 10.5 % (ref 5–12)
NEUTROPHILS NFR BLD AUTO: 4.93 10*3/MM3 (ref 1.7–7)
NEUTROPHILS NFR BLD AUTO: 60.6 % (ref 42.7–76)
NRBC BLD AUTO-RTO: 0 /100 WBC (ref 0–0.2)
PLATELET # BLD AUTO: 234 10*3/MM3 (ref 140–450)
PMV BLD AUTO: 12.2 FL (ref 6–12)
POTASSIUM SERPL-SCNC: 3.8 MMOL/L (ref 3.5–5.2)
PROT SERPL-MCNC: 7 G/DL (ref 6–8.5)
RBC # BLD AUTO: 4.35 10*6/MM3 (ref 3.77–5.28)
SODIUM SERPL-SCNC: 140 MMOL/L (ref 136–145)
T3FREE SERPL-MCNC: 2.93 PG/ML (ref 2–4.4)
T4 FREE SERPL-MCNC: 1.35 NG/DL (ref 0.92–1.68)
TSH SERPL DL<=0.05 MIU/L-ACNC: 2.77 UIU/ML (ref 0.27–4.2)
WBC NRBC COR # BLD AUTO: 8.13 10*3/MM3 (ref 3.4–10.8)

## 2025-02-24 PROCEDURE — 80053 COMPREHEN METABOLIC PANEL: CPT

## 2025-02-24 PROCEDURE — 36415 COLL VENOUS BLD VENIPUNCTURE: CPT

## 2025-02-24 PROCEDURE — 84439 ASSAY OF FREE THYROXINE: CPT

## 2025-02-24 PROCEDURE — 85025 COMPLETE CBC W/AUTO DIFF WBC: CPT

## 2025-02-24 PROCEDURE — 82570 ASSAY OF URINE CREATININE: CPT

## 2025-02-24 PROCEDURE — 84443 ASSAY THYROID STIM HORMONE: CPT

## 2025-02-24 PROCEDURE — 84481 FREE ASSAY (FT-3): CPT

## 2025-02-24 PROCEDURE — 82043 UR ALBUMIN QUANTITATIVE: CPT

## 2025-02-24 NOTE — PATIENT INSTRUCTIONS
Call office for lab results if you have not received a call from our office or [facility] message in 1-2 days.    Continue current medications and treatment.   Follow up with specialists per their recommendations.  Set up cardiac rehab.  Office will call you when fmla paperwork has been completed.   Please take blood pressure daily, keep record, bring with you to follow up.

## 2025-02-24 NOTE — PROGRESS NOTES
Subjective        Lulu Graves is a 69 y.o. female who presents to [facility].     Chief Complaint   Patient presents with    Post-op Problem     Follow up from open heart surgery on 01.24.25         Follow up visit after open heart surgery  Onset was 1 to 6 months.   Pertinent negative symptoms include no abdominal pain, no anorexia, no joint pain, no change in stool, no chest pain, no chills, no congestion, no cough, no diaphoresis, no fatigue, no fever, no headaches, no joint swelling, no myalgias, no nausea, no neck pain, no numbness, no rash, no sore throat, no swollen glands, no dysuria, no vertigo, no visual change, no vomiting and no weakness.     Patient presents for follow-up visit after having open heart surgery.  History is gleaned from patient report and chart. She is accompanied by her spouse.      Patient was admitted at [facility] 1/24/2025-1/29/2025, underwent redo sternotomy, tissue AVR 23 mm Magna Ease 1/20/2025 by Dr. Moreno.  She was evaluated by cardiology during admission.  Dr. Edmondson stopped her amlodipine and increased metoprolol to 50 mg twice daily.  HGB was as low as 9.1 during admit, up to 10.8 at d/c 1/2025.  TSH was elevated at 8.960 and 1/29/2025.  She was advised to follow-up with cardiac surgery in 2 weeks, cardiology 1 month, PCP in 1 month.    Since Hospital discharge-she was seen by cardiac surgery Latanya Dupont 2/13/2025, was advised to continue lifting restriction of 10 pounds until 6 weeks and 50 pounds until 12 weeks from date of surgery.  She was okayed to start cardiac rehab.  She saw cardiologist Dr. Edmondson 2/18/2025, was advised to continue metoprolol and statin.    Blood pressure at home has been 110's/70, heart rate in 70's. She is sitting up to sleep in recliner, is wearing sleep apnea machine. She is planning on starting cardiac rehab. Denies shortness of breath, fevers, dizziness.  She still endorses postsurgical soreness.   She needs [facility] paperwork filled out.    The following portions of the patient's history were reviewed and updated as appropriate: allergies, current medications, past family history, past medical history, past social history, past surgical history and problem list.    No Known Allergies       Current Outpatient Medications:     acetaminophen (TYLENOL) 500 MG tablet, Take 2 tablets by mouth Every 6 (Six) Hours As Needed for Mild Pain., Disp: , Rfl:     aspirin 81 MG tablet, Take 1 tablet by mouth Every Night., Disp: , Rfl:     Elderberry-Vitamin C-Zinc ([employer] PO), Take 500 mg by mouth Daily., Disp: , Rfl:     fluticasone (FLONASE) 50 MCG/ACT nasal spray, Administer 2 sprays into the nostril(s) as directed by provider As Needed for Rhinitis., Disp: , Rfl:     levothyroxine (SYNTHROID, LEVOTHROID) 112 MCG tablet, Take 1 tablet by mouth Daily., Disp: 90 tablet, Rfl: 0    loratadine (CLARITIN) 10 MG tablet, TAKE 1 TABLET BY MOUTH EVERY DAY, Disp: 30 tablet, Rfl: 11    melatonin 5 MG tablet tablet, Take 1 tablet by mouth Every Night., Disp: , Rfl:     multivitamin with minerals (MULTIVITAMIN ADULT PO), Take 1 tablet by mouth Daily., Disp: , Rfl:     NON FORMULARY, Marathon, Cinnamon, Apple Cider Vinegar, Tumeric, Curmemic, berberine, and ginseng supplement, Disp: , Rfl:     PROBIOTIC PRODUCT PO, Take 1 capsule by mouth Daily., Disp: , Rfl:     simvastatin (ZOCOR) 20 MG tablet, Take 1 tablet by mouth Every Evening., Disp: 90 tablet, Rfl: 1    metoprolol tartrate (LOPRESSOR) 50 MG tablet, Take 1 tablet by mouth Every 12 (Twelve) Hours., Disp: 60 tablet, Rfl: 3    Review of Systems   Constitutional:  Negative for chills, diaphoresis, fatigue and fever.   HENT:  Negative for congestion and sore throat.    Respiratory:  Negative for cough.    Cardiovascular:  Negative for chest pain.   Gastrointestinal:  Negative for abdominal pain, anorexia, nausea and vomiting.   Genitourinary:  Negative for dysuria.  "  Musculoskeletal:  Negative for joint pain, myalgias and neck pain.   Skin:  Negative for rash.   Neurological:  Negative for vertigo, weakness, numbness and headaches.        Objective     /74 (BP Location: Left arm, Patient Position: Sitting, Cuff Size: Adult)   Pulse 68   Temp 98.3 °F (36.8 °C) (Oral)   Resp 18   Ht 149.9 cm (59.02\")   Wt 97.1 kg (214 lb)   SpO2 96%   BMI 43.20 kg/m²         Physical Exam  Vitals and nursing note reviewed.   Constitutional:       General: She is not in acute distress.     Appearance: Normal appearance. She is obese. She is not ill-appearing or diaphoretic.      Comments: Still wearing heart hugger, removed for exam   HENT:      Head: Normocephalic and atraumatic.      Nose: Nose normal.      Mouth/Throat:      Mouth: Mucous membranes are moist.   Eyes:      General: No scleral icterus.     Conjunctiva/sclera: Conjunctivae normal.   Cardiovascular:      Rate and Rhythm: Normal rate and regular rhythm.      Pulses: Normal pulses.   Pulmonary:      Effort: Pulmonary effort is normal.      Breath sounds: Normal breath sounds.   Chest:          Comments: Well approximated healing midline sternal incision, no warmth, no drainage, no erythema. 3 small incisions below sternum, scabbing noted, no erythema, no drainage  Abdominal:      General: Bowel sounds are normal. There is no distension.      Palpations: Abdomen is soft.      Tenderness: There is no abdominal tenderness. There is no guarding.   Musculoskeletal:      Cervical back: Normal range of motion and neck supple.      Right lower leg: No edema.      Left lower leg: No edema.   Lymphadenopathy:      Cervical: No cervical adenopathy.   Skin:     General: Skin is warm and dry.      Capillary Refill: Capillary refill takes less than 2 seconds.      Coloration: Skin is not jaundiced.   Neurological:      Mental Status: She is alert and oriented to person, place, and time.      Gait: Gait abnormal (slow).   Psychiatric: "         Mood and Affect: Mood normal.         Behavior: Behavior normal.         Thought Content: Thought content normal.         Judgment: Judgment normal.              Result Review    The following data was reviewed by: GUILLERMO Curiel on 02/24/2025:  CMP          1/28/2025    04:56 1/29/2025    05:27 1/29/2025    14:04 2/24/2025    10:57   CMP   Glucose 112  117   79    BUN 24  22   16    Creatinine 0.63  0.73   0.72    EGFR 96.8  89.7   91.2    Sodium 136  136   140    Potassium 4.1  3.9  4.2  3.8    Chloride 100  99   103    Calcium 8.5  8.6   9.5    Total Protein    7.0    Albumin    3.9    Globulin    3.1    Total Bilirubin    0.4    Alkaline Phosphatase    101    AST (SGOT)    19    ALT (SGPT)    9    Albumin/Globulin Ratio    1.3    BUN/Creatinine Ratio 38.1  30.1   22.2    Anion Gap 11.6  10.0   9.5      CBC          1/28/2025    05:56 1/29/2025    05:27 2/24/2025    10:57   CBC   WBC 12.70  12.58  8.13    RBC 3.86  3.78  4.35    Hemoglobin 11.1  10.8  12.1    Hematocrit 35.0  33.7  38.7    MCV 90.7  89.2  89.0    MCH 28.8  28.6  27.8    MCHC 31.7  32.0  31.3    RDW 13.8  13.5  13.1    Platelets 219  249  234      Lipid Panel          6/14/2024    07:46 1/15/2025    04:12   Lipid Panel   Total Cholesterol 132  154    Triglycerides 160  192    HDL Cholesterol 38  34    VLDL Cholesterol 27  33    LDL Cholesterol  67  87    LDL/HDL Ratio 1.63  2.40      TSH          1/13/2025    11:49 1/29/2025    05:27 2/24/2025    10:57   TSH   TSH 6.710  8.960  2.770      A1C Last 3 Results          6/14/2024    07:46 10/14/2024    08:31 1/13/2025    11:49   HGBA1C Last 3 Results   Hemoglobin A1C 5.90  6.09  5.95                 Assessment & Plan    Diagnoses and all orders for this visit:    1. Nonrheumatic aortic valve stenosis (Primary)    2. S/P reop AVR #23 Magna Ease per Dr. Moreno 1/24/2025    3. Blood loss anemia  -     CBC & Differential; Future    4. Primary hypertension  -     Comprehensive  Metabolic Panel; Future    5. Prediabetes  -     Comprehensive Metabolic Panel; Future  -     Microalbumin / Creatinine Urine Ratio - Urine, Clean Catch; Future    6. Acquired hypothyroidism  -     T3, Free; Future  -     T4, Free; Future  -     TSH; Future       Patient Instructions   Call office for lab results if you have not received a call from our office or [employer] message in 1-2 days.    Continue current medications and treatment.   Follow up with specialists per their recommendations.  Set up cardiac rehab.  Office will call you when la paperwork has been completed.   Please take blood pressure daily, keep record, bring with you to follow up.       Follow Up   Return in about 2 months (around 4/24/2025) for Recheck, Hypertension.    Patient was given instructions and counseling regarding her condition or for health maintenance advice. Please see specific information pulled into the AVS if appropriate.     Sushma Hall, GUILLERMO     02/26/25

## 2025-02-25 ENCOUNTER — TELEPHONE (OUTPATIENT)
Dept: CARDIOLOGY | Facility: CLINIC | Age: 70
End: 2025-02-25
Payer: MEDICARE

## 2025-02-25 RX ORDER — METOPROLOL TARTRATE 50 MG
50 TABLET ORAL EVERY 12 HOURS SCHEDULED
Qty: 60 TABLET | Refills: 3 | Status: SHIPPED | OUTPATIENT
Start: 2025-02-25

## 2025-02-25 NOTE — TELEPHONE ENCOUNTER
Rx Refill Note  Requested Prescriptions     Signed Prescriptions Disp Refills    metoprolol tartrate (LOPRESSOR) 50 MG tablet 60 tablet 3     Sig: Take 1 tablet by mouth Every 12 (Twelve) Hours.     Authorizing Provider: JOSE G COLUNGA     Ordering User: LOLY JAMA      Last office visit with prescribing clinician: 2/18/2025   Last telemedicine visit with prescribing clinician: Visit date not found   Next office visit with prescribing clinician: 8/21/2025                         Would you like a call back once the refill request has been completed: [] Yes [] No    If the office needs to give you a call back, can they leave a voicemail: [] Yes [] No    Loly Jama MA  02/25/25, 10:16 EST

## 2025-02-25 NOTE — TELEPHONE ENCOUNTER
Incoming Refill Request      Medication requested (name and dose): Metoprolol 50 mg    Pharmacy where request should be sent: [facility] [place] [facility]    Additional details provided by patient:     Best call back number: [phone]    Does the patient have less than a 3 day supply:  [] Yes  [x] No

## 2025-03-04 DIAGNOSIS — J30.89 ENVIRONMENTAL AND SEASONAL ALLERGIES: ICD-10-CM

## 2025-03-05 DIAGNOSIS — J30.89 ENVIRONMENTAL AND SEASONAL ALLERGIES: ICD-10-CM

## 2025-03-05 RX ORDER — LORATADINE 10 MG/1
10 TABLET ORAL DAILY
Qty: 30 TABLET | Refills: 11 | Status: SHIPPED | OUTPATIENT
Start: 2025-03-05 | End: 2025-03-05 | Stop reason: SDUPTHER

## 2025-03-05 RX ORDER — LORATADINE 10 MG/1
10 TABLET ORAL DAILY
Qty: 30 TABLET | Refills: 11 | Status: SHIPPED | OUTPATIENT
Start: 2025-03-05

## 2025-03-11 ENCOUNTER — OFFICE VISIT (OUTPATIENT)
Dept: CARDIAC REHAB | Facility: HOSPITAL | Age: 70
End: 2025-03-11
Payer: MEDICARE

## 2025-03-11 DIAGNOSIS — Z95.2 S/P AVR (AORTIC VALVE REPLACEMENT): ICD-10-CM

## 2025-03-11 PROCEDURE — 93798 PHYS/QHP OP CAR RHAB W/ECG: CPT

## 2025-03-12 NOTE — PROGRESS NOTES
"CARDIOVASCULAR SURGERY FOLLOW-UP PROGRESS NOTE  Chief Complaint: Post-op Follow Up        HPI:   Dear Dr. Hall, GUILLERMO Ordaz and colleagues:    It was nice to see Lulu Graves in follow up today after cardiac surgery.  As you know, she is a 69 y.o. female with severe prosthetic aortic valve stensois, non-obstructive CAD, HLD with hypertriglyceridemia, treated LUÍS, hypothyroidism, and morbid obesity who underwent redo sternotomy, tissue AVR 23 mm Magna Ease at [facility] by Dr. Moreno on 1/24/20225. She did well postoperatively and continues to do well.  She comes in today with concerns of insomnia. Melatonin makes her hyper. She reports that her right chest still hurts. It does sounds like she has some neuropathic pain as well. Her activity level has been good. She has  seen cardiology, Dr. Edmondson.  She has started cardiac rehab.  Her  is with her today for her appt.    Answers submitted by the patient for this visit:  Post Operative Visit (Submitted on 3/11/2025)  Chief Complaint: Follow-up  Pain Control: partially controlled  Fever: no fever  Diet: adequate intake  Activity: returning to normal  Operative Site Issues: Yes  Drainage: none  Redness: none  Swelling: improved  Operative site comments:: Right side huets. Like a burning sensation . Probably muscles  repairing.    Physical Exam:         /61 (BP Location: Left arm, Patient Position: Sitting, Cuff Size: Adult)   Pulse 72   Temp 97.3 °F (36.3 °C)   Resp 18   Ht 149.9 cm (59\")   Wt 97 kg (213 lb 12.8 oz)   LMP  (LMP Unknown)   SpO2 95%   BMI 43.18 kg/m²   Heart:  regular rate and rhythm  Lungs:  clear to auscultation bilaterally  Extremities:  no edema  Incision(s):  mid chest healing well, sternum stable    Assessment/Plan:     S/P redo sternotomy, tissue AVR 23 mm Magna Ease. Overall, she is doing well. She is making progress. She has started cardiac rehab and had a good session today. Her right chest has more sore and that could " be purely from surgery. She has been using Tylenol for discomfort.  I did an [facility] report prior to prescribing Flexeril prn muscle pain. We discussed that Flexeril may help her muscle pain and insomnia. She knows not to drive while taking Flexeril. She was asking about going to [place] at Easter time to see her grandkids and to [place]/ [place] this summer. I think she could do both. She will be in touch with us if her insomnia/ muscle pain does not improve.    No significant post-op complications    Keep incisions clean and dry  OK to drive if not taking narcotic pain medicine  Continue cardiac rehab  Follow-up as scheduled with cardiology  Follow-up as scheduled with PCP  Follow-up with CT surgery prn    Continue lifting restriction of 10 lbs until 6 weeks and 50 lbs until 12 weeks from the date of surgery, no excessive jarring motions or twisting motions until 12 weeks from the date of surgery.    Thank you for allowing me to participate in the care of your patient.    Regards,  Latanya Tubbs, APRN

## 2025-03-13 ENCOUNTER — TREATMENT (OUTPATIENT)
Dept: CARDIAC REHAB | Facility: HOSPITAL | Age: 70
End: 2025-03-13
Payer: MEDICARE

## 2025-03-13 ENCOUNTER — OFFICE VISIT (OUTPATIENT)
Dept: CARDIAC SURGERY | Facility: CLINIC | Age: 70
End: 2025-03-13
Payer: COMMERCIAL

## 2025-03-13 VITALS
RESPIRATION RATE: 18 BRPM | BODY MASS INDEX: 43.1 KG/M2 | SYSTOLIC BLOOD PRESSURE: 131 MMHG | HEART RATE: 72 BPM | TEMPERATURE: 97.3 F | DIASTOLIC BLOOD PRESSURE: 61 MMHG | HEIGHT: 59 IN | WEIGHT: 213.8 LBS | OXYGEN SATURATION: 95 %

## 2025-03-13 DIAGNOSIS — Z95.2 S/P AVR: Primary | ICD-10-CM

## 2025-03-13 DIAGNOSIS — Z95.2 S/P AVR (AORTIC VALVE REPLACEMENT): Primary | ICD-10-CM

## 2025-03-13 PROCEDURE — 99024 POSTOP FOLLOW-UP VISIT: CPT | Performed by: NURSE PRACTITIONER

## 2025-03-13 PROCEDURE — 93798 PHYS/QHP OP CAR RHAB W/ECG: CPT

## 2025-03-13 RX ORDER — AMOXICILLIN 500 MG/1
2000 CAPSULE ORAL ONCE
Qty: 4 CAPSULE | Refills: 1 | Status: SHIPPED | OUTPATIENT
Start: 2025-03-13 | End: 2025-03-13

## 2025-03-13 RX ORDER — CYCLOBENZAPRINE HCL 10 MG
10 TABLET ORAL 3 TIMES DAILY PRN
Qty: 12 TABLET | Refills: 0 | Status: SHIPPED | OUTPATIENT
Start: 2025-03-13

## 2025-03-14 ENCOUNTER — TREATMENT (OUTPATIENT)
Dept: CARDIAC REHAB | Facility: HOSPITAL | Age: 70
End: 2025-03-14
Payer: MEDICARE

## 2025-03-14 DIAGNOSIS — Z95.2 S/P AVR (AORTIC VALVE REPLACEMENT): Primary | ICD-10-CM

## 2025-03-14 PROCEDURE — 93798 PHYS/QHP OP CAR RHAB W/ECG: CPT

## 2025-03-17 ENCOUNTER — TREATMENT (OUTPATIENT)
Dept: CARDIAC REHAB | Facility: HOSPITAL | Age: 70
End: 2025-03-17
Payer: MEDICARE

## 2025-03-17 DIAGNOSIS — Z95.2 S/P AVR (AORTIC VALVE REPLACEMENT): Primary | ICD-10-CM

## 2025-03-17 PROCEDURE — 93798 PHYS/QHP OP CAR RHAB W/ECG: CPT

## 2025-03-20 ENCOUNTER — TREATMENT (OUTPATIENT)
Dept: CARDIAC REHAB | Facility: HOSPITAL | Age: 70
End: 2025-03-20
Payer: MEDICARE

## 2025-03-20 DIAGNOSIS — Z95.2 S/P AVR (AORTIC VALVE REPLACEMENT): Primary | ICD-10-CM

## 2025-03-20 PROCEDURE — 93798 PHYS/QHP OP CAR RHAB W/ECG: CPT

## 2025-03-21 ENCOUNTER — TREATMENT (OUTPATIENT)
Dept: CARDIAC REHAB | Facility: HOSPITAL | Age: 70
End: 2025-03-21
Payer: MEDICARE

## 2025-03-21 DIAGNOSIS — Z95.2 S/P AVR (AORTIC VALVE REPLACEMENT): Primary | ICD-10-CM

## 2025-03-21 PROCEDURE — 93798 PHYS/QHP OP CAR RHAB W/ECG: CPT

## 2025-03-24 ENCOUNTER — TREATMENT (OUTPATIENT)
Dept: CARDIAC REHAB | Facility: HOSPITAL | Age: 70
End: 2025-03-24
Payer: MEDICARE

## 2025-03-24 DIAGNOSIS — Z95.2 S/P AVR (AORTIC VALVE REPLACEMENT): Primary | ICD-10-CM

## 2025-03-24 PROCEDURE — 93798 PHYS/QHP OP CAR RHAB W/ECG: CPT

## 2025-03-27 ENCOUNTER — TREATMENT (OUTPATIENT)
Dept: CARDIAC REHAB | Facility: HOSPITAL | Age: 70
End: 2025-03-27
Payer: MEDICARE

## 2025-03-27 DIAGNOSIS — Z95.2 S/P AVR (AORTIC VALVE REPLACEMENT): Primary | ICD-10-CM

## 2025-03-27 PROCEDURE — 93798 PHYS/QHP OP CAR RHAB W/ECG: CPT

## 2025-03-28 ENCOUNTER — TREATMENT (OUTPATIENT)
Dept: CARDIAC REHAB | Facility: HOSPITAL | Age: 70
End: 2025-03-28
Payer: MEDICARE

## 2025-03-28 DIAGNOSIS — Z95.2 S/P AVR (AORTIC VALVE REPLACEMENT): Primary | ICD-10-CM

## 2025-03-28 PROCEDURE — 93798 PHYS/QHP OP CAR RHAB W/ECG: CPT

## 2025-03-31 ENCOUNTER — TREATMENT (OUTPATIENT)
Dept: CARDIAC REHAB | Facility: HOSPITAL | Age: 70
End: 2025-03-31
Payer: MEDICARE

## 2025-03-31 DIAGNOSIS — Z95.2 S/P AVR (AORTIC VALVE REPLACEMENT): Primary | ICD-10-CM

## 2025-03-31 PROCEDURE — 93798 PHYS/QHP OP CAR RHAB W/ECG: CPT

## 2025-04-03 ENCOUNTER — TREATMENT (OUTPATIENT)
Dept: CARDIAC REHAB | Facility: HOSPITAL | Age: 70
End: 2025-04-03
Payer: MEDICARE

## 2025-04-03 DIAGNOSIS — Z95.2 S/P AVR (AORTIC VALVE REPLACEMENT): Primary | ICD-10-CM

## 2025-04-03 PROCEDURE — 93798 PHYS/QHP OP CAR RHAB W/ECG: CPT

## 2025-04-04 ENCOUNTER — TREATMENT (OUTPATIENT)
Dept: CARDIAC REHAB | Facility: HOSPITAL | Age: 70
End: 2025-04-04
Payer: MEDICARE

## 2025-04-04 DIAGNOSIS — Z95.2 S/P AVR (AORTIC VALVE REPLACEMENT): Primary | ICD-10-CM

## 2025-04-04 PROCEDURE — 93798 PHYS/QHP OP CAR RHAB W/ECG: CPT

## 2025-04-05 ENCOUNTER — APPOINTMENT (OUTPATIENT)
Dept: CT IMAGING | Facility: HOSPITAL | Age: 70
End: 2025-04-05
Payer: MEDICARE

## 2025-04-05 ENCOUNTER — APPOINTMENT (OUTPATIENT)
Dept: GENERAL RADIOLOGY | Facility: HOSPITAL | Age: 70
End: 2025-04-05
Payer: MEDICARE

## 2025-04-05 ENCOUNTER — HOSPITAL ENCOUNTER (EMERGENCY)
Facility: HOSPITAL | Age: 70
Discharge: HOME | End: 2025-04-06
Payer: MEDICARE

## 2025-04-05 ENCOUNTER — APPOINTMENT (OUTPATIENT)
Dept: RESPIRATORY THERAPY | Facility: HOSPITAL | Age: 70
End: 2025-04-05
Payer: MEDICARE

## 2025-04-05 VITALS
OXYGEN SATURATION: 93 % | BODY MASS INDEX: 42.93 KG/M2 | RESPIRATION RATE: 16 BRPM | SYSTOLIC BLOOD PRESSURE: 160 MMHG | DIASTOLIC BLOOD PRESSURE: 85 MMHG | WEIGHT: 212.96 LBS | HEIGHT: 59 IN | TEMPERATURE: 97.9 F | HEART RATE: 101 BPM

## 2025-04-05 DIAGNOSIS — I77.810 ASCENDING AORTA DILATION: ICD-10-CM

## 2025-04-05 DIAGNOSIS — R00.2 HEART PALPITATIONS: Primary | ICD-10-CM

## 2025-04-05 DIAGNOSIS — R00.0 TACHYCARDIA, UNSPECIFIED: ICD-10-CM

## 2025-04-05 DIAGNOSIS — R42 LIGHTHEADEDNESS: ICD-10-CM

## 2025-04-05 LAB
ALBUMIN SERPL-MCNC: 4.3 G/DL (ref 3.5–5.2)
ALBUMIN/GLOB SERPL: 1.2 G/DL
ALP SERPL-CCNC: 115 U/L (ref 39–117)
ALT SERPL W P-5'-P-CCNC: 13 U/L (ref 1–33)
ANION GAP SERPL CALCULATED.3IONS-SCNC: 10.3 MMOL/L (ref 5–15)
AST SERPL-CCNC: 25 U/L (ref 1–32)
BACTERIA UR QL AUTO: ABNORMAL /HPF
BASOPHILS # BLD AUTO: 0.05 10*3/MM3 (ref 0–0.2)
BASOPHILS NFR BLD AUTO: 0.5 % (ref 0–1.5)
BILIRUB SERPL-MCNC: 0.3 MG/DL (ref 0–1.2)
BILIRUB UR QL STRIP: NEGATIVE
BUN SERPL-MCNC: 16 MG/DL (ref 8–23)
BUN/CREAT SERPL: 23.2 (ref 7–25)
CALCIUM SPEC-SCNC: 9.6 MG/DL (ref 8.6–10.5)
CHLORIDE SERPL-SCNC: 106 MMOL/L (ref 98–107)
CLARITY UR: CLEAR
CO2 SERPL-SCNC: 24.7 MMOL/L (ref 22–29)
COLOR UR: YELLOW
CREAT SERPL-MCNC: 0.69 MG/DL (ref 0.57–1)
D DIMER PPP FEU-MCNC: 1.11 MCGFEU/ML (ref 0–0.68)
DEPRECATED RDW RBC AUTO: 42.4 FL (ref 37–54)
EGFRCR SERPLBLD CKD-EPI 2021: 94.7 ML/MIN/1.73
EOSINOPHIL # BLD AUTO: 0.27 10*3/MM3 (ref 0–0.4)
EOSINOPHIL NFR BLD AUTO: 2.9 % (ref 0.3–6.2)
ERYTHROCYTE [DISTWIDTH] IN BLOOD BY AUTOMATED COUNT: 13.6 % (ref 12.3–15.4)
GEN 5 1HR TROPONIN T REFLEX: 12 NG/L
GLOBULIN UR ELPH-MCNC: 3.6 GM/DL
GLUCOSE SERPL-MCNC: 109 MG/DL (ref 65–99)
GLUCOSE UR STRIP-MCNC: NEGATIVE MG/DL
HCT VFR BLD AUTO: 45 % (ref 34–46.6)
HGB BLD-MCNC: 14.1 G/DL (ref 12–15.9)
HGB UR QL STRIP.AUTO: ABNORMAL
HOLD SPECIMEN: NORMAL
HOLD SPECIMEN: NORMAL
HYALINE CASTS UR QL AUTO: ABNORMAL /LPF
IMM GRANULOCYTES # BLD AUTO: 0.02 10*3/MM3 (ref 0–0.05)
IMM GRANULOCYTES NFR BLD AUTO: 0.2 % (ref 0–0.5)
KETONES UR QL STRIP: NEGATIVE
LEUKOCYTE ESTERASE UR QL STRIP.AUTO: ABNORMAL
LYMPHOCYTES # BLD AUTO: 2.39 10*3/MM3 (ref 0.7–3.1)
LYMPHOCYTES NFR BLD AUTO: 25.7 % (ref 19.6–45.3)
MAGNESIUM SERPL-MCNC: 1.9 MG/DL (ref 1.6–2.4)
MCH RBC QN AUTO: 27 PG (ref 26.6–33)
MCHC RBC AUTO-ENTMCNC: 31.3 G/DL (ref 31.5–35.7)
MCV RBC AUTO: 86 FL (ref 79–97)
MONOCYTES # BLD AUTO: 1.08 10*3/MM3 (ref 0.1–0.9)
MONOCYTES NFR BLD AUTO: 11.6 % (ref 5–12)
NEUTROPHILS NFR BLD AUTO: 5.48 10*3/MM3 (ref 1.7–7)
NEUTROPHILS NFR BLD AUTO: 59.1 % (ref 42.7–76)
NITRITE UR QL STRIP: NEGATIVE
NRBC BLD AUTO-RTO: 0 /100 WBC (ref 0–0.2)
NT-PROBNP SERPL-MCNC: 103 PG/ML (ref 0–900)
PH UR STRIP.AUTO: 7 [PH] (ref 5–8)
PLATELET # BLD AUTO: 230 10*3/MM3 (ref 140–450)
PMV BLD AUTO: 11.5 FL (ref 6–12)
POTASSIUM SERPL-SCNC: 3.7 MMOL/L (ref 3.5–5.2)
PROT SERPL-MCNC: 7.9 G/DL (ref 6–8.5)
PROT UR QL STRIP: NEGATIVE
RBC # BLD AUTO: 5.23 10*6/MM3 (ref 3.77–5.28)
RBC # UR STRIP: ABNORMAL /HPF
REF LAB TEST METHOD: ABNORMAL
SODIUM SERPL-SCNC: 141 MMOL/L (ref 136–145)
SP GR UR STRIP: 1.01 (ref 1–1.03)
SQUAMOUS #/AREA URNS HPF: ABNORMAL /HPF
T4 FREE SERPL-MCNC: 1.38 NG/DL (ref 0.93–1.7)
TROPONIN T NUMERIC DELTA: 1 NG/L
TROPONIN T SERPL HS-MCNC: 11 NG/L
TSH SERPL DL<=0.05 MIU/L-ACNC: 0.1 UIU/ML (ref 0.27–4.2)
UROBILINOGEN UR QL STRIP: ABNORMAL
WBC # UR STRIP: ABNORMAL /HPF
WBC NRBC COR # BLD AUTO: 9.29 10*3/MM3 (ref 3.4–10.8)
WHOLE BLOOD HOLD COAG: NORMAL
WHOLE BLOOD HOLD SPECIMEN: NORMAL

## 2025-04-05 PROCEDURE — 84443 ASSAY THYROID STIM HORMONE: CPT

## 2025-04-05 PROCEDURE — 93246 EXT ECG>7D<15D RECORDING: CPT

## 2025-04-05 PROCEDURE — 85379 FIBRIN DEGRADATION QUANT: CPT

## 2025-04-05 PROCEDURE — 93005 ELECTROCARDIOGRAM TRACING: CPT

## 2025-04-05 PROCEDURE — 84439 ASSAY OF FREE THYROXINE: CPT

## 2025-04-05 PROCEDURE — 84484 ASSAY OF TROPONIN QUANT: CPT

## 2025-04-05 PROCEDURE — 83880 ASSAY OF NATRIURETIC PEPTIDE: CPT

## 2025-04-05 PROCEDURE — 99285 EMERGENCY DEPT VISIT HI MDM: CPT

## 2025-04-05 PROCEDURE — 80053 COMPREHEN METABOLIC PANEL: CPT

## 2025-04-05 PROCEDURE — 71045 X-RAY EXAM CHEST 1 VIEW: CPT

## 2025-04-05 PROCEDURE — 25810000003 SODIUM CHLORIDE 0.9 % SOLUTION

## 2025-04-05 PROCEDURE — 36415 COLL VENOUS BLD VENIPUNCTURE: CPT

## 2025-04-05 PROCEDURE — 83735 ASSAY OF MAGNESIUM: CPT

## 2025-04-05 PROCEDURE — 81001 URINALYSIS AUTO W/SCOPE: CPT

## 2025-04-05 PROCEDURE — 70450 CT HEAD/BRAIN W/O DYE: CPT

## 2025-04-05 PROCEDURE — 25510000001 IOPAMIDOL PER 1 ML

## 2025-04-05 PROCEDURE — 85025 COMPLETE CBC W/AUTO DIFF WBC: CPT

## 2025-04-05 PROCEDURE — 71275 CT ANGIOGRAPHY CHEST: CPT

## 2025-04-05 RX ORDER — SODIUM CHLORIDE 0.9 % (FLUSH) 0.9 %
10 SYRINGE (ML) INJECTION AS NEEDED
Status: DISCONTINUED | OUTPATIENT
Start: 2025-04-05 | End: 2025-04-06 | Stop reason: HOSPADM

## 2025-04-05 RX ORDER — IOPAMIDOL 755 MG/ML
100 INJECTION, SOLUTION INTRAVASCULAR
Status: COMPLETED | OUTPATIENT
Start: 2025-04-05 | End: 2025-04-05

## 2025-04-05 RX ADMIN — IOPAMIDOL 100 ML: 755 INJECTION, SOLUTION INTRAVENOUS at 21:21

## 2025-04-05 RX ADMIN — SODIUM CHLORIDE 500 ML: 9 INJECTION, SOLUTION INTRAVENOUS at 20:35

## 2025-04-05 NOTE — ED PROVIDER NOTES
Subjective   History of Present Illness  69-year-old female with history of recent aortic valve replacement in January 2025, hypertension, hyperlipidemia, hypothyroidism presents the ED today with complaints of 2 episodes of feeling that her heart was racing.  States her heart rate is usually in the 70s.  She reports she took her blood pressure this morning around 10:30 as she does every morning and her heart rate was 99.  She then walked around and her heart rate got up to 117 bpm per her Apple Watch as she felt that it radiated into her left arm causing left arm soreness along with associated lightheadedness, shortness of breath and increased anxiety.  Patient reports it happened again this afternoon when she was completing a task at home.  Both episodes only lasted approximately 10 minutes.  She has no symptoms at this time as they have all quickly resolved.  Patient does report a history of SVT in 2008, she had an ablation at that time.  Denies unilateral weakness, numbness or tingling, changes in vision, chest pain, cough or congestion, fever, abdominal pain, nausea or vomiting, headache or syncope    PCP: Rafael  Cardio: Debo  Cardiothoracic: Camprrotondo        Review of Systems   Constitutional:  Negative for fever.   HENT:  Negative for congestion.    Respiratory:  Positive for shortness of breath. Negative for cough.    Cardiovascular:  Positive for palpitations.   Gastrointestinal:  Negative for abdominal pain.   Neurological:  Positive for light-headedness. Negative for syncope and headaches.       Past Medical History:   Diagnosis Date    Allergic     Aortic stenosis     Aortic valve replaced 12/04/2013    Cataract     GERD (gastroesophageal reflux disease)     Glaucoma 02/16/2023    Just started    Heart murmur 1996    Hyperlipidemia     Hypertension     Hypothyroidism     Liver disease     Due to medication    MRSA (methicillin resistant Staphylococcus aureus)     left arm abscess    Obesity      Osteopenia     PAT (paroxysmal atrial tachycardia)     Sleep apnea        No Known Allergies    Past Surgical History:   Procedure Laterality Date    ABLATION OF DYSRHYTHMIC FOCUS   or     AORTIC VALVE REPAIR/REPLACEMENT      AORTIC VALVE REPAIR/REPLACEMENT N/A 2025    Procedure: AORTIC VALVE REPLACEMENT REOP using 23mm Franks Magna Ease porcine valve. WITH TRANSESOPHAGEAL ECHOCARDIOGRAM.;  Surgeon: Travis Moreno MD;  Location: [facility] CVOR;  Service: Cardiothoracic;  Laterality: N/A;    AORTIC VALVE SURGERY  2013    CARDIAC CATHETERIZATION      Before open heart surgery    CARDIAC CATHETERIZATION N/A 1/15/2025    Procedure: Left Heart Cath and coronary angiogram;  Surgeon: Nba Oswald MD;  Location: [facility] CATH INVASIVE LOCATION;  Service: Cardiovascular;  Laterality: N/A;    CARDIAC VALVE REPLACEMENT  2013     SECTION  1982    COLONOSCOPY  2016    INCISION AND DRAINAGE ABSCESS Left 2023    Procedure: INCISION AND DRAINAGE ABSCESS, left arm;  Surgeon: Addy Torres MD;  Location: [facility] MAIN OR;  Service: General;  Laterality: Left;    TUBAL ABDOMINAL LIGATION         Family History   Problem Relation Age of Onset    Arthritis Mother     Diabetes Brother     Hearing loss Brother     Hypertension Brother     Stroke Paternal Grandmother     Sleep apnea Neg Hx        Social History     Socioeconomic History    Marital status:    Tobacco Use    Smoking status: Never     Passive exposure: Past    Smokeless tobacco: Never   Vaping Use    Vaping status: Never Used   Substance and Sexual Activity    Alcohol use: Never    Drug use: No    Sexual activity: Yes     Partners: Male     Birth control/protection: None, Tubal ligation           Objective   Physical Exam  Vitals reviewed.   HENT:      Head: Normocephalic.   Eyes:      Extraocular Movements: Extraocular movements intact.      Conjunctiva/sclera: Conjunctivae normal.      Pupils:  "Pupils are equal, round, and reactive to light.   Cardiovascular:      Rate and Rhythm: Normal rate and regular rhythm.      Pulses: Normal pulses.      Heart sounds: Murmur heard.      Comments: Systolic murmur present with history of aortic valve replacement  Pulmonary:      Effort: Pulmonary effort is normal.      Breath sounds: Normal breath sounds.   Abdominal:      General: Bowel sounds are normal.      Palpations: Abdomen is soft.      Tenderness: There is no abdominal tenderness.   Musculoskeletal:         General: Normal range of motion.      Right lower leg: No edema.      Left lower leg: No edema.   Skin:     General: Skin is warm.   Neurological:      General: No focal deficit present.      Mental Status: She is alert and oriented to person, place, and time.   Psychiatric:         Mood and Affect: Mood normal.         Behavior: Behavior normal.         Procedures    EKG independently interpreted by Dr. Eden as sinus rhythm with a left bundle branch block at a rate of 88.  Compared to previous from 1/26/2025 that was sinus rhythm with a left bundle branch block at a rate of 80.         ED Course  ED Course as of 04/06/25 0407   Sat Apr 05, 2025 2005 Requested repeat vitals and orthos [KB]   2019 D-Dimer, Quant(!): 1.11  CT PE ordered [KB]   2029 [facility] 1/15/25:  1. No obstructive CAD  2. Severe prosthetic valve aortic stenosis    1/15/25 echo:  ·  Left ventricular systolic function is normal. Calculated left ventricular EF = 54% Left ventricular ejection fraction appears to be 51 - 55%.  ·  Left ventricular diastolic function is consistent with (grade I) impaired relaxation.  ·  Severe aortic valve stenosis is present.  ·  There is a bioprosthetic aortic valve present.  ·  Mild dilation of the aortic root is present. [KB]   2037 Marked ready for CT [KB]      ED Course User Index  [KB] Michel Gardiner, APRN      /85   Pulse 101   Temp 97.9 °F (36.6 °C) (Oral)   Resp 16   Ht 149.9 cm (59\")   Wt " 96.6 kg (212 lb 15.4 oz)   LMP  (LMP Unknown)   SpO2 93%   BMI 43.01 kg/m²   Labs Reviewed   COMPREHENSIVE METABOLIC PANEL - Abnormal; Notable for the following components:       Result Value    Glucose 109 (*)     All other components within normal limits    Narrative:     GFR Categories in Chronic Kidney Disease (CKD)      GFR Category          GFR (mL/min/1.73)    Interpretation  G1                     90 or greater         Normal or high (1)  G2                      60-89                Mild decrease (1)  G3a                   45-59                Mild to moderate decrease  G3b                   30-44                Moderate to severe decrease  G4                    15-29                Severe decrease  G5                    14 or less           Kidney failure          (1)In the absence of evidence of kidney disease, neither GFR category G1 or G2 fulfill the criteria for CKD.    eGFR calculation 2021 CKD-EPI creatinine equation, which does not include race as a factor   URINALYSIS W/ MICROSCOPIC IF INDICATED (NO CULTURE) - Abnormal; Notable for the following components:    Blood, UA Trace (*)     Leuk Esterase, UA Trace (*)     All other components within normal limits   D-DIMER, QUANTITATIVE - Abnormal; Notable for the following components:    D-Dimer, Quantitative 1.11 (*)     All other components within normal limits    Narrative:     According to the assay 's published package insert, a normal (<0.50 MCGFEU/mL) D-dimer result in conjunction with a non-high clinical probability assessment, excludes deep vein thrombosis (DVT) and pulmonary embolism (PE) with high sensitivity.    D-dimer values increase with age and this can make VTE exclusion of an older population difficult. To address this, the American College of [employer], based on best available evidence and recent guidelines, recommends that clinicians use age-adjusted D-dimer thresholds in patients greater than 50 years of age with: a) a  "low probability of PE who do not meet all Pulmonary Embolism Rule Out Criteria, or b) in those with intermediate probability of PE.   The formula for an age-adjusted D-dimer cut-off is \"age/100\".  For example, a 60 year old patient would have an age-adjusted cut-off of 0.60 MCGFEU/mL and an 80 year old 0.80 MCGFEU/mL.   TSH RFX ON ABNORMAL TO FREE T4 - Abnormal; Notable for the following components:    TSH 0.097 (*)     All other components within normal limits   CBC WITH AUTO DIFFERENTIAL - Abnormal; Notable for the following components:    MCHC 31.3 (*)     Monocytes, Absolute 1.08 (*)     All other components within normal limits   URINALYSIS, MICROSCOPIC ONLY - Abnormal; Notable for the following components:    RBC, UA 3-5 (*)     All other components within normal limits   BNP (IN-HOUSE) - Normal    Narrative:     This assay is used as an aid in the diagnosis of individuals suspected of having heart failure. It can be used as an aid in the diagnosis of acute decompensated heart failure (ADHF) in patients presenting with signs and symptoms of ADHF to the emergency department (ED). In addition, NT-proBNP of <300 pg/mL indicates ADHF is not likely.    Age Range Result Interpretation  NT-proBNP Concentration (pg/mL:      <50             Positive            >450                   Gray                 300-450                    Negative             <300    50-75           Positive            >900                  Gray                300-900                  Negative            <300      >75             Positive            >1800                  Gray                300-1800                  Negative            <300   TROPONIN - Normal    Narrative:     High Sensitive Troponin T Reference Range:  <14.0 ng/L- Negative Female for AMI  <22.0 ng/L- Negative Male for AMI  >=14 - Abnormal Female indicating possible myocardial injury.  >=22 - Abnormal Male indicating possible myocardial injury.   Clinicians would have to " utilize clinical acumen, EKG, Troponin, and serial changes to determine if it is an Acute Myocardial Infarction or myocardial injury due to an underlying chronic condition.        MAGNESIUM - Normal   T4, FREE - Normal   HIGH SENSITIVITIY TROPONIN T 1HR - Normal    Narrative:     High Sensitive Troponin T Reference Range:  <14.0 ng/L- Negative Female for AMI  <22.0 ng/L- Negative Male for AMI  >=14 - Abnormal Female indicating possible myocardial injury.  >=22 - Abnormal Male indicating possible myocardial injury.   Clinicians would have to utilize clinical acumen, EKG, Troponin, and serial changes to determine if it is an Acute Myocardial Infarction or myocardial injury due to an underlying chronic condition.        RAINBOW DRAW    Narrative:     The following orders were created for panel order [place] Draw.  Procedure                               Abnormality         Status                     ---------                               -----------         ------                     Green Top (Gel)[[id number]]                                  Final result               Lavender Top[[id number]]                                     Final result               Gold Top - SST[[id number]]                                   Final result               Light Blue Top[[id number]]                                   Final result                 Please view results for these tests on the individual orders.   GREEN TOP   LAVENDER TOP   GOLD TOP - SST   LIGHT BLUE TOP   CBC AND DIFFERENTIAL    Narrative:     The following orders were created for panel order CBC & Differential.  Procedure                               Abnormality         Status                     ---------                               -----------         ------                     CBC Auto Differential[[id number]]        Abnormal            Final result                 Please view results for these tests on the individual orders.     Medications   sodium chloride 0.9 %  bolus 500 mL (0 mL Intravenous Stopped 4/5/25 2205)   iopamidol (ISOVUE-370) 76 % injection 100 mL (100 mL Intravenous Given 4/5/25 2121)     CT Head Without Contrast  Result Date: 4/5/2025  Impression: No acute intracranial abnormality. Electronically Signed: Paddy Dougherty MD  4/5/2025 9:57 PM EDT  Workstation ID: [id number]    CT Angiogram Chest Pulmonary Embolism  Result Date: 4/5/2025  Impression: 1.No evidence of pulmonary embolism. 2.Status post recent median sternotomy and aortic valve replacement. There is mild edema in the anterior mediastinum without discrete measurable hematoma. 3.Cardiomegaly. 4.Mild aneurysmal dilatation of the ascending aorta up to 44 mm. Electronically Signed: Paddy Dougherty MD  4/5/2025 9:56 PM EDT  Workstation ID: [id number]    XR Chest 1 View  Result Date: 4/5/2025  Impression: No acute cardiopulmonary abnormality. Electronically Signed: Paddy Dougherty MD  4/5/2025 8:53 PM EDT  Workstation ID: [id number]                                                     Medical Decision Making  Patient was seen for the above complaints.  IV was established and blood work was obtained to assess for electrolyte abnormalities, cardiac function, thyroid abnormality, D-dimer, infection.  White blood cell count 9.29, hemoglobin 14.1, TSH 0.097 however free T4 was 1.38, mag 1.9, serial troponins negative, electrolytes fairly within normal limits, .  Urinalysis shows trace blood however patient has had blood in her urine before, negative for UTI.  Chest x-ray independently interpreted by the radiologist as no acute cardiopulmonary abnormality.  Due to elevated dimer, CT PE was obtained and independently interpreted by the radiologist as:  1.No evidence of pulmonary embolism.   2.Status post recent median sternotomy and aortic valve replacement. There is mild edema in the anterior mediastinum without discrete measurable hematoma.   3.Cardiomegaly.   4.Mild aneurysmal dilatation of the ascending aorta up  to 44 mm.     On chart review, patient had chest CT in January of this year that showed 4.2 cm dilation of the ascending aorta.    Head CT was independently interpreted by radiologist as no acute intracranial abnormality.  Patient had negative orthostatic vital signs.  She did not have any symptoms during ER course.  Do not see any signs of infection.  Holter monitor was placed and patient instructed to follow-up with cardiothoracic surgery, cardiology and primary care as soon as possible for recheck.  Discussed return precautions.  Also instructed to push clear fluids and continue medications as prescribed.  Patient and  at bedside verbalized understanding are agreeable plan of care at this time.    I discussed findings with patient who voices understanding of discharge instructions, signs and symptoms requiring return to ED; discharged improved and in stable condition with follow up for re-evaluation.  This document is intended for medical expert use only. Reading of this document by patients and/or patient's family without participating medical staff guidance may result in misinterpretation and unintended morbidity.  Any interpretation of such data is the responsibility of the patient and/or family member responsible for the patient in concert with their primary or specialist providers, not to be left for sources of online searches such as [employer], [employer] or similar queries. Relying on these approaches to knowledge may result in misinterpretation, misguided goals of care and even death should patients or family members try recommendations outside of the realm of professional medical care in a supervised inpatient environment.       Problems Addressed:  Ascending aorta dilation: acute illness or injury  Heart palpitations: acute illness or injury  Lightheadedness: acute illness or injury  Tachycardia, unspecified: acute illness or injury    Amount and/or Complexity of Data Reviewed  Labs: ordered.  Decision-making details documented in ED Course.  Radiology: ordered and independent interpretation performed. Decision-making details documented in ED Course.  ECG/medicine tests: ordered and independent interpretation performed. Decision-making details documented in ED Course.    Risk  Prescription drug management.        Final diagnoses:   Tachycardia, unspecified   Heart palpitations   Lightheadedness   Ascending aorta dilation       ED Disposition  ED Disposition       ED Disposition   Discharge    Condition   Stable    Comment   --               Jayson Edmondson MD  [address] [facility] [place] [address]  [phone]    Schedule an appointment as soon as possible for a visit       Travis Moreno MD  [address] [facility] [address]  [place] [address]  [phone]    Schedule an appointment as soon as possible for a visit       Sushma Hall APRN  [address] [facility] [address]  [place] [facility]  [phone]    Schedule an appointment as soon as possible for a visit            Medication List      No changes were made to your prescriptions during this visit.            Michel Gardiner, GUILLERMO  04/06/25 0413

## 2025-04-06 LAB
QT INTERVAL: 409 MS
QTC INTERVAL: 495 MS

## 2025-04-06 NOTE — DISCHARGE INSTRUCTIONS
Wear the Holter monitor as instructed.  Push clear fluids for hydration.  Continue to take your medications as prescribed.    Follow-up with primary care, cardiology and cardiothoracic surgery.  Call Monday morning to make follow-up appointments    Return with any new or worsening symptoms

## 2025-04-07 ENCOUNTER — TREATMENT (OUTPATIENT)
Dept: CARDIAC REHAB | Facility: HOSPITAL | Age: 70
End: 2025-04-07
Payer: MEDICARE

## 2025-04-07 DIAGNOSIS — Z95.2 S/P AVR (AORTIC VALVE REPLACEMENT): Primary | ICD-10-CM

## 2025-04-07 PROCEDURE — 93798 PHYS/QHP OP CAR RHAB W/ECG: CPT

## 2025-04-08 ENCOUNTER — TELEPHONE (OUTPATIENT)
Dept: CARDIAC SURGERY | Facility: CLINIC | Age: 70
End: 2025-04-08

## 2025-04-08 ENCOUNTER — TELEPHONE (OUTPATIENT)
Dept: CARDIOLOGY | Facility: CLINIC | Age: 70
End: 2025-04-08
Payer: MEDICARE

## 2025-04-08 NOTE — TELEPHONE ENCOUNTER
Caller: SYLVIE     Relationship: SELF     Best call back number: [phone]      What is the best time to reach you: ANY           What was the call regarding: PT CALLED FOR A FOLLOW UP FROM THE HOSPITAL DISCHARGED 4/5/25-  SHE IS WEARING A HOLTER WHICH IS SCHEDULED TO BE SENT BACK ON 4/13/25.

## 2025-04-08 NOTE — TELEPHONE ENCOUNTER
Called patient and she stated that she does feel better now and will also keep her appointment for 4/23/25.    Patient wanted to fully verify.

## 2025-04-08 NOTE — TELEPHONE ENCOUNTER
Cardiology was not consulted during recent hospital visit    The decision to travel is up to patient based on how he is feeling.  From surgical standpoint from January she can travel but with recent ED visit if she is not feeling well then is advised to stay home.  we likely will not have monitor results back by the time she needs to leave her trip.

## 2025-04-08 NOTE — TELEPHONE ENCOUNTER
Caller: SYLVIE    Relationship: SELF    Best call back number: [phone]     What is the best time to reach you: ANY        What was the call regarding: PT CALLED FOR A FOLLOW UP FROM THE HOSPITAL DISCHARGED 4/5/25-  SHE IS WEARING A HOLTER WHICH IS SCHEDULED TO BE SENT BACK ON 4/13/25.  PT SCHEDULED ON 4/23/25 WITH DAVID.    SHE HAS A TRIP PLANNED TO [place] ON 4/17/25 AND RETURNS ON 4/21/25-  SHE IS FLYING AND THE TRIP IS ROUGHLY 2.5 HOURS- SHE WANTED TO MAKE SURE IT WAS SAFE TO TRAVEL.

## 2025-04-10 ENCOUNTER — OFFICE VISIT (OUTPATIENT)
Dept: CARDIAC SURGERY | Facility: CLINIC | Age: 70
End: 2025-04-10
Payer: MEDICARE

## 2025-04-10 ENCOUNTER — TREATMENT (OUTPATIENT)
Dept: CARDIAC REHAB | Facility: HOSPITAL | Age: 70
End: 2025-04-10
Payer: MEDICARE

## 2025-04-10 VITALS
OXYGEN SATURATION: 97 % | BODY MASS INDEX: 42.54 KG/M2 | SYSTOLIC BLOOD PRESSURE: 143 MMHG | TEMPERATURE: 97.5 F | WEIGHT: 211 LBS | HEIGHT: 59 IN | DIASTOLIC BLOOD PRESSURE: 82 MMHG | RESPIRATION RATE: 18 BRPM | HEART RATE: 70 BPM

## 2025-04-10 DIAGNOSIS — Z95.2 S/P AVR (AORTIC VALVE REPLACEMENT): Primary | ICD-10-CM

## 2025-04-10 DIAGNOSIS — Z95.2 S/P AVR: Primary | ICD-10-CM

## 2025-04-10 PROCEDURE — 99024 POSTOP FOLLOW-UP VISIT: CPT | Performed by: NURSE PRACTITIONER

## 2025-04-10 PROCEDURE — 93798 PHYS/QHP OP CAR RHAB W/ECG: CPT

## 2025-04-10 NOTE — PROGRESS NOTES
"  CARDIOVASCULAR SURGERY FOLLOW-UP PROGRESS NOTE  Chief Complaint: Post-op Follow Up        HPI:   Dear Sushma Toro APRN and colleagues:    It was nice to see Lulu Graves in follow up today after cardiac surgery.  As you know, she is a 69 y.o. female with severe prosthetic aortic valve stensois, non-obstructive CAD, HLD with hypertriglyceridemia, treated LUÍS, hypothyroidism, and morbid obesity who underwent redo sternotomy, tissue AVR 23 mm Magna Ease at [facility] ER by Dr. Moreno on 1/24/20225. She did well postoperatively and continues to do well.  She comes in today with no specific concerns. She had episodes of tachycardia last week and is on a heart monitor at this time. She follows up with Dr. Edmondson's office in a couple weeks. She has not had a repeat episode of symptomatic tachycardia since her ED visit. She is suppose to go to [place] next week but is concerned about traveling without knowing the results of the heart monitor.     Answers submitted by the patient for this visit:  Post Operative Visit (Submitted on 4/10/2025)  Chief Complaint: Follow-up  Pain Control: not requiring pain medication  Fever: no fever  Diet: adequate intake  Activity: returning to normal  Operative Site Issues: Yes  Drainage: none  Redness: none  Swelling: improved  Operative site comments:: Incision still very sensitive,which I guess is expected.    Physical Exam:         /82 (BP Location: Right arm, Patient Position: Sitting, Cuff Size: Adult)   Pulse 70   Temp 97.5 °F (36.4 °C)   Resp 18   Ht 149.9 cm (59\")   Wt 95.7 kg (211 lb)   LMP  (LMP Unknown)   SpO2 97%   BMI 42.62 kg/m²   Heart:  regular rate and rhythm  Lungs:  clear to auscultation bilaterally  Extremities:  no edema  Incision(s):  mid chest healing well, sternum stable    Assessment/Plan:     S/P redo sternotomy, tissue AVR 23 mm Magna Ease. Overall, she is doing well from surgical standpoint. She is very anxious after her surgery as she " expected post op recovery to be similar to the first surgery but realizes that is not the case. We discussed benefits of therapy for her anxiety. We discussed travel plans and she came to the conclusion that she will stay to see the cardiologist for the heart monitor results prior to traveling to be safe which I think is wise. She still has some nerve pain on the right side of her chest but it appears to be more intermittent than before.     No significant post-op complications    Keep incisions clean and dry  OK to drive if not taking narcotic pain medicine  Continue cardiac rehab  Follow-up as scheduled with cardiology  Follow-up as scheduled with PCP  Follow-up with CT surgery prn    Continue lifting restriction of 10 lbs until 6 weeks and 50 lbs until 12 weeks from the date of surgery, no excessive jarring motions or twisting motions until 12 weeks from the date of surgery.    Thank you for allowing me to participate in the care of your patient.    Regards,  GUILLERMO Giraldo

## 2025-04-11 ENCOUNTER — APPOINTMENT (OUTPATIENT)
Dept: CARDIAC REHAB | Facility: HOSPITAL | Age: 70
End: 2025-04-11
Payer: MEDICARE

## 2025-04-14 ENCOUNTER — TREATMENT (OUTPATIENT)
Dept: CARDIAC REHAB | Facility: HOSPITAL | Age: 70
End: 2025-04-14
Payer: MEDICARE

## 2025-04-14 DIAGNOSIS — Z95.2 S/P AVR (AORTIC VALVE REPLACEMENT): Primary | ICD-10-CM

## 2025-04-14 PROCEDURE — 93798 PHYS/QHP OP CAR RHAB W/ECG: CPT

## 2025-04-17 ENCOUNTER — TREATMENT (OUTPATIENT)
Dept: CARDIAC REHAB | Facility: HOSPITAL | Age: 70
End: 2025-04-17
Payer: MEDICARE

## 2025-04-17 DIAGNOSIS — Z95.2 S/P AVR (AORTIC VALVE REPLACEMENT): Primary | ICD-10-CM

## 2025-04-17 PROCEDURE — 93798 PHYS/QHP OP CAR RHAB W/ECG: CPT

## 2025-04-18 ENCOUNTER — TREATMENT (OUTPATIENT)
Dept: CARDIAC REHAB | Facility: HOSPITAL | Age: 70
End: 2025-04-18
Payer: MEDICARE

## 2025-04-18 DIAGNOSIS — E03.9 ACQUIRED HYPOTHYROIDISM: ICD-10-CM

## 2025-04-18 DIAGNOSIS — Z95.2 S/P AVR (AORTIC VALVE REPLACEMENT): Primary | ICD-10-CM

## 2025-04-18 PROCEDURE — 93798 PHYS/QHP OP CAR RHAB W/ECG: CPT

## 2025-04-18 RX ORDER — LEVOTHYROXINE SODIUM 112 UG/1
112 TABLET ORAL DAILY
Qty: 90 TABLET | Refills: 0 | Status: SHIPPED | OUTPATIENT
Start: 2025-04-18

## 2025-04-19 LAB
CV ZIO BASELINE AVG BPM: 75 BPM
CV ZIO BASELINE BPM HIGH: 122 BPM
CV ZIO BASELINE BPM LOW: 49 BPM
CV ZIO DEVICE ANALYSIS TIME: NORMAL
CV ZIO ECT SVE COUNT: 114 EPISODES
CV ZIO ECT SVE CPLT COUNT: 0 EPISODES
CV ZIO ECT SVE CPLT FREQ: 0
CV ZIO ECT SVE FREQ: NORMAL
CV ZIO ECT SVE TPLT COUNT: 0 EPISODES
CV ZIO ECT SVE TPLT FREQ: 0
CV ZIO ECT VE COUNT: 530 EPISODES
CV ZIO ECT VE CPLT COUNT: 0 EPISODES
CV ZIO ECT VE CPLT FREQ: 0
CV ZIO ECT VE FREQ: NORMAL
CV ZIO ECT VE TPLT COUNT: 0 EPISODES
CV ZIO ECT VE TPLT FREQ: 0
CV ZIO ECTOPIC SVE COUPLET RAW PERCENT: 0 %
CV ZIO ECTOPIC SVE ISOLATED PERCENT: 0.02 %
CV ZIO ECTOPIC SVE TRIPLET RAW PERCENT: 0 %
CV ZIO ECTOPIC VE COUPLET RAW PERCENT: 0 %
CV ZIO ECTOPIC VE ISOLATED PERCENT: 0.09 %
CV ZIO ECTOPIC VE TRIPLET RAW PERCENT: 0 %
CV ZIO ENROLLMENT END: NORMAL
CV ZIO ENROLLMENT START: NORMAL
CV ZIO PATIENT EVENTS DIARIES: 2
CV ZIO PATIENT EVENTS TRIGGERS: 0
CV ZIO PAUSE COUNT: 0
CV ZIO PRESCRIPTION STATUS: NORMAL
CV ZIO SVT COUNT: 0
CV ZIO TOTAL  ENROLLMENT PERIOD: NORMAL
CV ZIO VT COUNT: 0

## 2025-04-21 ENCOUNTER — TREATMENT (OUTPATIENT)
Dept: CARDIAC REHAB | Facility: HOSPITAL | Age: 70
End: 2025-04-21
Payer: MEDICARE

## 2025-04-21 DIAGNOSIS — Z95.2 S/P AVR (AORTIC VALVE REPLACEMENT): Primary | ICD-10-CM

## 2025-04-21 PROCEDURE — 93798 PHYS/QHP OP CAR RHAB W/ECG: CPT

## 2025-04-21 NOTE — PROGRESS NOTES
Subjective:     Encounter Date:04/23/2025      Patient ID: Lulu Graves is a 70 y.o. female.    Chief Complaint:  History of Present Illness    Lulu Graves is a pleasant 70 y.o. female with history of aortic valve stenosis status post aortic valve replacement, hypertension, dyslipidemia, SVT status post ablation, sleep apnea, obesity who presents to the office today for follow-up for palpitations for which she was evaluated in the emergency department.  Cardiology was not consulted at time of ED visit.  She was discharged home with monitor study.  Patient was monitored for 5 days and 13 hours.  Study relatively benign without evidence of atrial fibrillation, high degree block, ventricular tachycardia noted.  PVC burden of less than 1%.  Patient seen and examined, labs and chart reviewed. Patient states is doing well from cardiology standpoint and she currently denies chest pain, shortness of breath, fatigue/weakness, palpitations, lightheadedness/dizziness, nausea, vomiting, edema.  Patient reports improvement in palpitations/heart rate.  Monitor study results reviewed in detail with patient.  She takes all medications as prescribed.  Vital signs are stable today.  She has never been a smoker.       The following portions of the patient's history were reviewed and updated as appropriate: allergies, current medications, past family history, past medical history, past social history, past surgical history, and problem list.    Past Medical History:   Diagnosis Date    Allergic     Aortic stenosis     Aortic valve replaced 12/04/2013    Cataract     GERD (gastroesophageal reflux disease)     Glaucoma 02/16/2023    Just started    Heart murmur 1996    Hyperlipidemia     Hypertension     Hypothyroidism     Liver disease     Due to medication    MRSA (methicillin resistant Staphylococcus aureus)     left arm abscess    Obesity     Osteopenia     PAT (paroxysmal atrial tachycardia)     Sleep apnea 2011     Past Surgical  "History:   Procedure Laterality Date    ABLATION OF DYSRHYTHMIC FOCUS   or     AORTIC VALVE REPAIR/REPLACEMENT      AORTIC VALVE REPAIR/REPLACEMENT N/A 2025    Procedure: AORTIC VALVE REPLACEMENT REOP using 23mm Franks Magna Ease porcine valve. WITH TRANSESOPHAGEAL ECHOCARDIOGRAM.;  Surgeon: Travis Moreno MD;  Location: [facility] CVOR;  Service: Cardiothoracic;  Laterality: N/A;    AORTIC VALVE SURGERY  2013    CARDIAC CATHETERIZATION      Before open heart surgery    CARDIAC CATHETERIZATION N/A 1/15/2025    Procedure: Left Heart Cath and coronary angiogram;  Surgeon: Nba Oswald MD;  Location: [facility] CATH INVASIVE LOCATION;  Service: Cardiovascular;  Laterality: N/A;    CARDIAC VALVE REPLACEMENT  2013     SECTION  1982    COLONOSCOPY  2016    INCISION AND DRAINAGE ABSCESS Left 2023    Procedure: INCISION AND DRAINAGE ABSCESS, left arm;  Surgeon: Addy Torres MD;  Location: [facility] MAIN OR;  Service: General;  Laterality: Left;    TUBAL ABDOMINAL LIGATION       /73 (BP Location: Left arm, Patient Position: Sitting, Cuff Size: Adult)   Pulse 59   Ht 149.9 cm (59\")   Wt 94.8 kg (209 lb)   LMP  (LMP Unknown)   SpO2 97%   BMI 42.21 kg/m²   Family History   Problem Relation Age of Onset    Arthritis Mother     Diabetes Brother     Hearing loss Brother     Hypertension Brother     Stroke Paternal Grandmother     Sleep apnea Neg Hx        Current Outpatient Medications:     acetaminophen (TYLENOL) 500 MG tablet, Take 2 tablets by mouth Every 6 (Six) Hours As Needed for Mild Pain., Disp: , Rfl:     aspirin 81 MG tablet, Take 1 tablet by mouth Every Night., Disp: , Rfl:     Elderberry-Vitamin C-Zinc (EqualEyes GUMMY PO), Take 500 mg by mouth Daily., Disp: , Rfl:     fluticasone (FLONASE) 50 MCG/ACT nasal spray, Administer 2 sprays into the nostril(s) as directed by provider As Needed for Rhinitis., Disp: , Rfl:     " levothyroxine (SYNTHROID, LEVOTHROID) 112 MCG tablet, Take 1 tablet by mouth Daily., Disp: 90 tablet, Rfl: 0    loratadine (CLARITIN) 10 MG tablet, Take 1 tablet by mouth Daily., Disp: 30 tablet, Rfl: 11    metoprolol tartrate (LOPRESSOR) 50 MG tablet, Take 1 tablet by mouth Every 12 (Twelve) Hours., Disp: 60 tablet, Rfl: 3    multivitamin with minerals (MULTIVITAMIN ADULT PO), Take 1 tablet by mouth Daily., Disp: , Rfl:     NON FORMULARY, [place], Cinnamon, Apple Cider Vinegar, Tumeric, Curmemic, berberine, and ginseng supplement, Disp: , Rfl:     PROBIOTIC PRODUCT PO, Take 1 capsule by mouth Daily., Disp: , Rfl:     simvastatin (ZOCOR) 20 MG tablet, Take 1 tablet by mouth Every Evening., Disp: 90 tablet, Rfl: 1  No Known Allergies  Social History     Socioeconomic History    Marital status:    Tobacco Use    Smoking status: Never     Passive exposure: Past    Smokeless tobacco: Never   Vaping Use    Vaping status: Never Used   Substance and Sexual Activity    Alcohol use: Not Currently    Drug use: Never    Sexual activity: Yes     Partners: Male     Birth control/protection: None, Tubal ligation     Review of Systems   Constitutional: Negative for malaise/fatigue.   Cardiovascular:  Negative for chest pain, dyspnea on exertion, leg swelling and palpitations.   Respiratory:  Negative for cough and shortness of breath.    Gastrointestinal:  Negative for abdominal pain, nausea and vomiting.   Neurological:  Negative for dizziness, headaches, light-headedness, numbness and weakness.   All other systems reviewed and are negative.             Objective:     Vitals reviewed.   Constitutional:       Appearance: Not in distress. Obese.   Eyes:      Pupils: Pupils are equal, round, and reactive to light.   HENT:      Nose: Nose normal.   Pulmonary:      Effort: Pulmonary effort is normal.      Breath sounds: Normal breath sounds. No wheezing. No rhonchi.   Cardiovascular:      Normal rate. Regular rhythm.   Pulses:      Intact distal pulses.   Edema:     Peripheral edema absent.   Abdominal:      Palpations: Abdomen is soft.   Musculoskeletal: Normal range of motion.      Cervical back: Normal range of motion and neck supple. Skin:     General: Skin is warm and dry.   Neurological:      General: No focal deficit present.      Mental Status: Alert and oriented to person, place and time.         Procedures      LAB RESULTS (LAST 7 DAYS)  Lab Review:   Echocardiogram   Results for orders placed in visit on 01/24/25    Intra-Op Anesthesia FRAN    Narrative  Intra-Op Anesthesia FRAN    Procedure Performed: Intra-Op Anesthesia FRAN  Start Time:  1/24/2025 7:10 AM  End Time:   1/24/2025 7:20 AM    Preanesthesia Checklist:  Patient identified, IV assessed, risks and benefits discussed, monitors and equipment assessed, procedure being performed at surgeon's request and anesthesia consent obtained.    General Procedure Information  FRAN Placed for monitoring purposes only -- This is not a diagnostic FRAN  Diagnostic Indications for Echo:  assessment of ascending aorta, assessment of surgical repair, defect repair evaluation and hemodynamic monitoring  Location performed:  OR  Intubated  Bite block placed  Heart visualized  Probe Insertion:  Easy  Probe Type:  Multiplane  Modalities:  Color flow mapping, continuous wave Doppler and pulse wave Doppler    Echocardiographic and Doppler Measurements    Ventricles    Right Ventricle:  Cavity size normal.  Hypertrophy not present.  Thrombus not present.  Global function normal.  Left Ventricle:  Cavity size normal.  Thrombus not present.  Global Function mildly impaired.        Valves    Aortic Valve:  Annulus bioprosthetic.  Stenosis severe.  Mean Gradient: 72/35 mmHg.  Pressure Half-time: 4 ms.  Regurgitation absent.  Leaflets calcified and thickened.  Leaflet motions restricted.    Mitral Valve:  Annulus normal.  Stenosis not present.  Regurgitation trace.  Leaflets normal.  Leaflet motions  normal.    Tricuspid Valve:  Annulus normal.  Stenosis not present.  Regurgitation trace.  Leaflets normal.  Leaflet motions normal.  Pulmonic Valve:  Annulus normal.      Aorta    Ascending Aorta:  Size dilated.  Diameter 3.9 cm.  Dissection not present.  Plaque thickness less than 3 mm.  Mobile plaque not present.  Aortic Arch:  Size normal.  Diameter 2.5 cm.  Dissection not present.  Plaque thickness less than 3 mm.  Mobile plaque not present.  Descending Aorta:  Size normal.  Diameter 2.7 cm.  Dissection not present.  Plaque thickness less than 3 mm.  Mobile plaque not present.      Atria    Right Atrium:  Size normal.  Spontaneous echo contrast not present.  Thrombus not present.  Tumor not present.  Device not present.    Left Atrium:  Size dilated.  Spontaneous echo contrast not present.  Thrombus not present.  Tumor not present.  Device not present.  Left atrial appendage normal.              Other Findings  Pericardium:  normal  Pleural Effusion:  none  Pulmonary Arteries:  normal  Pulmonary Venous Flow:  normal    Anesthesia Information  Performed Personally  Anesthesiologist:  Froilan Cummings MD      Echocardiogram Comments:  BIOPROSTHETIC AV. R CUSP IMMOBILE. L RESTRICTED. SEVERE AS PK VEL4.24, 72/35  MV: TRACE MR  TV: TRACE TI  LA ENLARGEMENT 5.2CM  LVH  EF 45      Cardiac Catheterization   Results for orders placed during the hospital encounter of 25    Cardiac Catheterization/Vascular Study    Conclusion  Table formatting from the original result was not included.  January 15, 2025      Heart Cath Report    NAME:              Lulu Graves  :                1955  AGE/SEX:        69 y.o. female  MRN:                [id number]        Procedures Performed    Bilateral coronary arteriogram    :   Nba Oswald MD    Vascular Access Site: Femoral    Indication for procedure: Chest pain, dyspnea on exertion, abnormal stress test, valvular heart disease, severe aortic  stenosis      Procedure Note    After discussing the risks, benefits, and alternatives of the procedure, informed consent was obtained.  Timeout was done before the procedure.  Moderate conscious sedation was given utilizing IV Versed and fentanyl administered by RN with continuous EKG oximetry and hemodynamic monitoring supervised by me throughout the entire case.  I was present with the patient for the duration of moderate sedation and supervised staff who had no other duties and monitored the patient for the entire procedure patient had De 2-3 sedation scale. the vascular access site was prepped and draped in the usual sterile fashion.  2% lidocaine was used for local anesthesia. Appropriate landmarks were assessed.  A 6 Japanese short sheath was inserted in the artery using the modified Seldinger technique.    Selective coronary angiography was performed with JL4 and JR4 diagnostic catheters. Left ventriculogram  was not performed because patient has prosthetic aortic valve..  All exchanges were performed over the wire.  No specimens were removed.  There were no apparent acute or early complications.  The patient tolerated the procedure well and was transferred to the recovery area in stable condition.    Closure device: Mynx device was deployed successfully after right iliofemoral angiogram was performed. Good hemostasis was achieved.    Complications:  None  Blood Loss: minimal    Hemodynamics    Pressures    Ao:    127/65 mmHg      Coronary Angiography    Left Main :  The left main   without disease    Left Anterior Descending : Gives rise to large diagonal which divides into 2.  Mid LAD has intramyocardial course.  Distal LAD has 30% luminal irregularities for      Left Circumflex : Is dominant vessel.  Gives rise to large mid marginal and distal marginal without disease continues in the AV groove.  PDA is calcified 40% narrowing.    Right Coronary Artery :  The right coronary artery   is non dominant  vessel    Dominance:  [x]  Left  [x]  Right  []  Co-Dominant    Left Ventriculography:    Was not done because of aortic stenosis and prosthetic aortic valve    Impression:    No obstructive CAD  Severe prosthetic valve aortic stenosis    Recommendations:    Structural heart team consult evaluate for SAVR versus TAVR        I sincerely appreciate the opportunity to participate in your patient's care. Please feel free to contact me anytime if I can be of assistance in this or any other way.      Pertinent History    Past Medical History:  Diagnosis Date   Allergic   Aortic stenosis   Aortic valve replaced 2013   Cataract   GERD (gastroesophageal reflux disease)   Glaucoma 2023  Just started   Heart murmur    Hyperlipidemia   Hypertension   Hypothyroidism   Liver disease  Due to medication   MRSA (methicillin resistant Staphylococcus aureus)  left arm abscess   Obesity   Osteopenia   PAT (paroxysmal atrial tachycardia)   Sleep apnea     Past Surgical History:  Procedure Laterality Date   ABLATION OF DYSRHYTHMIC FOCUS   or    AORTIC VALVE REPAIR/REPLACEMENT   AORTIC VALVE SURGERY  2013   CARDIAC CATHETERIZATION  Before open heart surgery   CARDIAC VALVE REPLACEMENT  2013    SECTION  1982   COLONOSCOPY  2016   INCISION AND DRAINAGE ABSCESS Left 2023  Procedure: INCISION AND DRAINAGE ABSCESS, left arm;  Surgeon: Addy Torres MD;  Location: [facility];  Service: General;  Laterality: Left;   TUBAL ABDOMINAL LIGATION    Prior to Admission medications  Medication Sig Start Date End Date Taking? Authorizing Provider  aspirin 81 MG tablet Take 1 tablet by mouth Every Night. 12  Yes Provider, MD Cait  levothyroxine (SYNTHROID, LEVOTHROID) 112 MCG tablet Take 1 tablet by mouth Daily. 25  Yes Sushma Hall APRN  loratadine (CLARITIN) 10 MG tablet TAKE 1 TABLET BY MOUTH EVERY DAY 2/10/24  Yes Sushma Hall APRN  metoprolol tartrate  (LOPRESSOR) 25 MG tablet Take 0.5 tablets by mouth 2 (Two) Times a Day. 9/18/24  Yes Sushma Hall APRN  multivitamin with minerals (MULTIVITAMIN ADULT PO) Take 1 tablet by mouth Daily.   Yes Cait Osuna MD  simvastatin (ZOCOR) 20 MG tablet Take 1 tablet by mouth Every Evening. 7/29/24  Yes Sushma Hall APRN  Elderberry-Vitamin C-Zinc (ELDERBERRY IMMUNE HEALTH GUMMY PO) Take 500 mg by mouth Daily.    ProviderCait MD  fluticasone (FLONASE) 50 MCG/ACT nasal spray Administer 2 sprays into the nostril(s) as directed by provider As Needed for Rhinitis.    ProviderCait MD  NON FORMULARY [place], Cinnamon, Apple Cider Vinegar, Tumeric, Curmemic, berberine, and ginseng supplement    ProviderCait MD  PROBIOTIC PRODUCT PO Take 1 capsule by mouth Daily. 9/8/16   Cait Osuna MD      Pre-Procedure Notes  H&P Performed  [x]  Yes []  No       []  N/A    Indications:  []  ACS <= 24 HRS  []  ACS >24 HRS  []  New Onset Angina <= 2 mos  []  Worsening Angina  []  Resuscitated Cardiac Arrest  []  Angina on Exertion:  []  Suspected CAD  []  Valvular Disease  []  Pericardial Disease  []  Cardiac Arrythmia  []  Cardiomyopathy  []  LV Dysfunction  []  Syncope  []  Post Cardiac Transplant  []  Eval. For Exercise Clearance  []  Other  []  Pre-Operative Evaluation  If Pre-Op Eval:  Evaluation for Surgery Type:  []  Cardiac Surgery   []  Non-Cardiac Surgery  Functional Capacity:  []  <4 METS  []  >=4 METS w/o symptoms  []  >= 4 METS with symptoms  []  Unknown  Surgical Risk:  []  Low  []  Intermediate  []  High Risk: Vascular  []  High Risk Non-Vascular    Risks, Benefits, & Complications Discussed:  [x]  Yes  []  No  []  N/A    Questions Answered:  [x]  Yes  []  No  []  N/A    Consent Obtained:  [x]  Yes  []  No  []  N/A    CHF: []  Yes  [x]  No  If Yes:  Newly Diagnosed?  []  Yes  []  No  If Yes:  HF Type:  []  Diastolic  []  Systolic  []  Unknown      Nba Oswald,  MD  1/15/2025  18:02 EST  Electronically signed by Nba Oswald MD, 01/15/25, 6:02 PM EST.      CBC        BMP        CMP         BNP        TROPONIN        CoAg        Creatinine Clearance  Estimated Creatinine Clearance: 81.6 mL/min (by C-G formula based on SCr of 0.69 mg/dL).    ABG        Radiology  No radiology results for the last day            Assessment    Diagnoses and all orders for this visit:    1. Palpitations (Primary)    2. Aortic stenosis, severe    3. S/P reop AVR #23 Magna Ease per Dr. Moreno 1/24/2025    4. Primary hypertension    5. Hyperlipidemia, unspecified hyperlipidemia type    6. LUÍS on CPAP    7. Class 3 severe obesity due to excess calories without serious comorbidity with body mass index (BMI) of 40.0 to 44.9 in adult                  MDM    Palpitations   Recent ED visit for palpitations, elevated heart rate  Monitor study relatively benign  No evidence of A-fib, high degree block, ventricular tachycardia  PVCs less than 1%  Continue beta-blocker    Dyslipidemia  Statin therapy    Hypertension  Blood pressure 137/73  Lopressor 50 mg    History of aortic valve stenosis  Status post reoperative aortic valve replacement with 23 mm magna ease porcine valve (1/24/2025)   Echocardiogram with preserved LVEF  Beta-blocker    Obesity  BMI 42.21  Diet and lifestyle modifications discussed    LUÍS  CPAP compliance discussed  Follows with sleep medicine    Patient's previous medical records, labs, and EKG were reviewed and discussed with the patient at today's visit.     Electronically signed by GUILLERMO Cotton, 04/23/25, 11:54 AM EDT.

## 2025-04-22 ENCOUNTER — TELEPHONE (OUTPATIENT)
Dept: CARDIAC SURGERY | Facility: CLINIC | Age: 70
End: 2025-04-22
Payer: MEDICARE

## 2025-04-22 NOTE — TELEPHONE ENCOUNTER
[employer] fitness for duty/return to work paperwork was signed and faxed today. Scanned copy into pt chart and mailing original copy to pt address on file.

## 2025-04-23 ENCOUNTER — OFFICE VISIT (OUTPATIENT)
Dept: CARDIOLOGY | Facility: CLINIC | Age: 70
End: 2025-04-23
Payer: MEDICARE

## 2025-04-23 ENCOUNTER — LAB (OUTPATIENT)
Dept: LAB | Facility: HOSPITAL | Age: 70
End: 2025-04-23
Payer: MEDICARE

## 2025-04-23 ENCOUNTER — OFFICE VISIT (OUTPATIENT)
Dept: FAMILY MEDICINE CLINIC | Facility: CLINIC | Age: 70
End: 2025-04-23
Payer: MEDICARE

## 2025-04-23 VITALS
DIASTOLIC BLOOD PRESSURE: 75 MMHG | WEIGHT: 211.2 LBS | BODY MASS INDEX: 42.58 KG/M2 | SYSTOLIC BLOOD PRESSURE: 119 MMHG | RESPIRATION RATE: 18 BRPM | HEART RATE: 72 BPM | TEMPERATURE: 97.8 F | OXYGEN SATURATION: 96 % | HEIGHT: 59 IN

## 2025-04-23 VITALS
DIASTOLIC BLOOD PRESSURE: 73 MMHG | HEART RATE: 59 BPM | WEIGHT: 209 LBS | SYSTOLIC BLOOD PRESSURE: 137 MMHG | HEIGHT: 59 IN | OXYGEN SATURATION: 97 % | BODY MASS INDEX: 42.13 KG/M2

## 2025-04-23 DIAGNOSIS — E03.9 ACQUIRED HYPOTHYROIDISM: Chronic | ICD-10-CM

## 2025-04-23 DIAGNOSIS — R00.2 PALPITATIONS: ICD-10-CM

## 2025-04-23 DIAGNOSIS — I10 PRIMARY HYPERTENSION: Primary | Chronic | ICD-10-CM

## 2025-04-23 DIAGNOSIS — E78.5 HYPERLIPIDEMIA, UNSPECIFIED HYPERLIPIDEMIA TYPE: Chronic | ICD-10-CM

## 2025-04-23 DIAGNOSIS — I35.0 AORTIC STENOSIS, SEVERE: ICD-10-CM

## 2025-04-23 DIAGNOSIS — I10 PRIMARY HYPERTENSION: Chronic | ICD-10-CM

## 2025-04-23 DIAGNOSIS — G47.33 OSA ON CPAP: ICD-10-CM

## 2025-04-23 DIAGNOSIS — E66.813 CLASS 3 SEVERE OBESITY DUE TO EXCESS CALORIES WITHOUT SERIOUS COMORBIDITY WITH BODY MASS INDEX (BMI) OF 40.0 TO 44.9 IN ADULT: ICD-10-CM

## 2025-04-23 DIAGNOSIS — Z95.2 S/P AVR: ICD-10-CM

## 2025-04-23 DIAGNOSIS — R73.03 PREDIABETES: ICD-10-CM

## 2025-04-23 DIAGNOSIS — E66.01 CLASS 3 SEVERE OBESITY DUE TO EXCESS CALORIES WITHOUT SERIOUS COMORBIDITY WITH BODY MASS INDEX (BMI) OF 40.0 TO 44.9 IN ADULT: ICD-10-CM

## 2025-04-23 DIAGNOSIS — R00.2 PALPITATIONS: Primary | ICD-10-CM

## 2025-04-23 LAB
T3FREE SERPL-MCNC: 4.05 PG/ML (ref 2–4.4)
T4 FREE SERPL-MCNC: 1.59 NG/DL (ref 0.92–1.68)
TSH SERPL DL<=0.05 MIU/L-ACNC: 0.03 UIU/ML (ref 0.27–4.2)

## 2025-04-23 PROCEDURE — 84439 ASSAY OF FREE THYROXINE: CPT

## 2025-04-23 PROCEDURE — 84481 FREE ASSAY (FT-3): CPT

## 2025-04-23 PROCEDURE — 36415 COLL VENOUS BLD VENIPUNCTURE: CPT

## 2025-04-23 PROCEDURE — 84443 ASSAY THYROID STIM HORMONE: CPT

## 2025-04-23 NOTE — PROGRESS NOTES
"Subjective        Lulu Graves is a 70 y.o. female who presents to [facility].     Chief Complaint   Patient presents with    Hypertension     2 month fl/u         Follow up checkup  Pertinent negative symptoms include no abdominal pain, no anorexia, no joint pain, no change in stool, no chest pain, no chills, no congestion, no cough, no diaphoresis, no fatigue, no fever, no headaches, no joint swelling, no myalgias, no nausea, no neck pain, no numbness, no rash, no sore throat, no swollen glands, no dysuria, no vertigo, no visual change, no vomiting and no weakness.       Patient presents for follow-up of hypertension.  She states \"I feel fine.\"    Hypertension/aortic valve stenosis s/p aortic valve replacement/SVT s/p ablation-followed by Dr. Edmondson cardiology.  Was hospitalized 1/2025 for preop valve replacement.  She saw nurse practitioner cardiology Amaris Resendiz today for evaluation of palpitations she experienced earlier this month.  She went to ER 4/5/25 with palpitations, cardiology was not consulted, holter monitor was performed.  Recent monitor study with relatively benign findings. She is feeling better in the past few weeks, no further palpitations, denies chest pain or shortness of breath.  She is still participating in cardiac rehab.  She is taking asa 81mg daily, metoprolol 50mg twice daily.  She states blood pressures have been \"good.\"  She did not bring blood pressure record with her today.  Hypothyroidism - stable - currently taking levothyroxine 112mcg po daily, TSH was elevated in 1/2025 so levothyroxine was increased at that time from 100mcg daily.  TSH, free t3, free t4 within normal limits 2/2025.  Repeat labs 4/2025 with tsh low 0.097, free t4 wnl 1.38.  Can recheck labs and consider adjusting thyroid replacement given history of palpitations a few weeks ago.  Hyperlipidemia - stable - taking zocor 20mg daily.  No myalgia or jaundice.  Lipid panel 1/2025 with " triglycerides elevated 192, hdl low 34 otw lipid panel wnl.  Prediabetes - stable - not on medication, most recent hgba1c was 5.95 in 1/2025.   Sleep apnea - compliant with cpap, followed by Dr. Wylie, feels sleep is restorative.     Medical conditions include environmental/seasonal allergies, obesity, osteopenia.    Patient has been erroneously marked as diabetic. Based on the available clinical information, she does not have diabetes and should therefore be excluded from diabetic health maintenance and quality measures for the remainder of the reporting period.     The following portions of the patient's history were reviewed and updated as appropriate: allergies, current medications, past family history, past medical history, past social history, past surgical history and problem list.    No Known Allergies       Current Outpatient Medications:     acetaminophen (TYLENOL) 500 MG tablet, Take 2 tablets by mouth Every 6 (Six) Hours As Needed for Mild Pain., Disp: , Rfl:     aspirin 81 MG tablet, Take 1 tablet by mouth Every Night., Disp: , Rfl:     Elderberry-Vitamin C-Zinc ([facility] PO), Take 500 mg by mouth Daily., Disp: , Rfl:     fluticasone (FLONASE) 50 MCG/ACT nasal spray, Administer 2 sprays into the nostril(s) as directed by provider As Needed for Rhinitis., Disp: , Rfl:     levothyroxine (SYNTHROID, LEVOTHROID) 112 MCG tablet, Take 1 tablet by mouth Daily., Disp: 90 tablet, Rfl: 0    loratadine (CLARITIN) 10 MG tablet, Take 1 tablet by mouth Daily., Disp: 30 tablet, Rfl: 11    metoprolol tartrate (LOPRESSOR) 50 MG tablet, Take 1 tablet by mouth Every 12 (Twelve) Hours., Disp: 60 tablet, Rfl: 3    multivitamin with minerals (MULTIVITAMIN ADULT PO), Take 1 tablet by mouth Daily., Disp: , Rfl:     NON FORMULARY, [place], Cinnamon, Apple Cider Vinegar, Tumeric, Curmemic, berberine, and ginseng supplement, Disp: , Rfl:     PROBIOTIC PRODUCT PO, Take 1 capsule by mouth Daily., Disp: ,  "Rfl:     simvastatin (ZOCOR) 20 MG tablet, Take 1 tablet by mouth Every Evening., Disp: 90 tablet, Rfl: 1    Review of Systems   Constitutional:  Negative for chills, diaphoresis, fatigue and fever.   HENT:  Negative for congestion and sore throat.    Respiratory:  Negative for cough.    Cardiovascular:  Negative for chest pain.   Gastrointestinal:  Negative for abdominal pain, anorexia, nausea and vomiting.   Genitourinary:  Negative for dysuria.   Musculoskeletal:  Negative for joint pain, myalgias and neck pain.   Skin:  Negative for rash.   Neurological:  Negative for vertigo, weakness, numbness and headaches.        Objective     /75 (BP Location: Left arm, Patient Position: Sitting, Cuff Size: Adult)   Pulse 72   Temp 97.8 °F (36.6 °C) (Oral)   Resp 18   Ht 149.9 cm (59.02\")   Wt 95.8 kg (211 lb 3.2 oz)   SpO2 96%   BMI 42.63 kg/m²     Physical Exam  Vitals and nursing note reviewed.   Constitutional:       General: She is not in acute distress.     Appearance: Normal appearance. She is obese. She is not ill-appearing or diaphoretic.   HENT:      Head: Normocephalic and atraumatic.      Nose: Nose normal.      Mouth/Throat:      Mouth: Mucous membranes are moist.   Eyes:      General: No scleral icterus.     Conjunctiva/sclera: Conjunctivae normal.   Neck:      Thyroid: No thyroid mass, thyromegaly or thyroid tenderness.      Trachea: Trachea normal.   Cardiovascular:      Rate and Rhythm: Normal rate and regular rhythm.      Pulses: Normal pulses.   Pulmonary:      Effort: Pulmonary effort is normal. No respiratory distress.      Breath sounds: Normal breath sounds and air entry.   Abdominal:      General: Bowel sounds are normal. There is no distension.      Palpations: Abdomen is soft. There is no mass.      Tenderness: There is no abdominal tenderness. There is no guarding or rebound.   Musculoskeletal:         General: Normal range of motion.      Cervical back: Normal range of motion and " neck supple.      Right lower leg: No edema.      Left lower leg: No edema.   Lymphadenopathy:      Cervical: No cervical adenopathy.   Skin:     General: Skin is warm and dry.      Capillary Refill: Capillary refill takes less than 2 seconds.      Coloration: Skin is not jaundiced.      Findings: No bruising.   Neurological:      General: No focal deficit present.      Mental Status: She is alert and oriented to person, place, and time.      Sensory: Sensation is intact.      Gait: Gait normal.   Psychiatric:         Attention and Perception: Attention normal.         Mood and Affect: Mood and affect normal.         Speech: Speech normal.         Behavior: Behavior normal. Behavior is cooperative.         Thought Content: Thought content normal.         Cognition and Memory: Cognition normal.         Judgment: Judgment normal.        Result Review    The following data was reviewed by: GUILLERMO Curiel on 04/23/2025:  CMP          1/29/2025    05:27 1/29/2025    14:04 2/24/2025    10:57 4/5/2025    19:53   CMP   Glucose 117   79  109    BUN 22   16  16    Creatinine 0.73   0.72  0.69    EGFR 89.7   91.2  94.7    Sodium 136   140  141    Potassium 3.9  4.2  3.8  3.7    Chloride 99   103  106    Calcium 8.6   9.5  9.6    Total Protein   7.0  7.9    Albumin   3.9  4.3    Globulin   3.1  3.6    Total Bilirubin   0.4  0.3    Alkaline Phosphatase   101  115    AST (SGOT)   19  25    ALT (SGPT)   9  13    Albumin/Globulin Ratio   1.3  1.2    BUN/Creatinine Ratio 30.1   22.2  23.2    Anion Gap 10.0   9.5  10.3      CBC          1/29/2025    05:27 2/24/2025    10:57 4/5/2025    19:53   CBC   WBC 12.58  8.13  9.29    RBC 3.78  4.35  5.23    Hemoglobin 10.8  12.1  14.1    Hematocrit 33.7  38.7  45.0    MCV 89.2  89.0  86.0    MCH 28.6  27.8  27.0    MCHC 32.0  31.3  31.3    RDW 13.5  13.1  13.6    Platelets 249  234  230      CBC w/diff          1/29/2025    05:27 2/24/2025    10:57 4/5/2025    19:53   CBC w/Diff   WBC  12.58  8.13  9.29    RBC 3.78  4.35  5.23    Hemoglobin 10.8  12.1  14.1    Hematocrit 33.7  38.7  45.0    MCV 89.2  89.0  86.0    MCH 28.6  27.8  27.0    MCHC 32.0  31.3  31.3    RDW 13.5  13.1  13.6    Platelets 249  234  230    Neutrophil Rel %  60.6  59.1    Immature Granulocyte Rel %  0.2  0.2    Lymphocyte Rel %  23.0  25.7    Monocyte Rel %  10.5  11.6    Eosinophil Rel %  5.2  2.9    Basophil Rel %  0.5  0.5      Lipid Panel          6/14/2024    07:46 1/15/2025    04:12   Lipid Panel   Total Cholesterol 132  154    Triglycerides 160  192    HDL Cholesterol 38  34    VLDL Cholesterol 27  33    LDL Cholesterol  67  87    LDL/HDL Ratio 1.63  2.40      TSH          2/24/2025    10:57 4/5/2025    19:53 4/23/2025    15:49   TSH   TSH 2.770  0.097  0.025      A1C Last 3 Results          6/14/2024    07:46 10/14/2024    08:31 1/13/2025    11:49   HGBA1C Last 3 Results   Hemoglobin A1C 5.90  6.09  5.95      Data reviewed : Radiologic studies      CT ANGIOGRAM CHEST PULMONARY EMBOLISM     Date of Exam: 4/5/2025 9:00 PM EDT     Indication: heart palpitations, SOA, +dimer. AVR Jan 2025.     Comparison: Chest radiograph 4/5/2025 and chest CT 1/16/2025.     Technique: Axial CT images were obtained of the chest after the uneventful intravenous administration of iodinated contrast utilizing pulmonary embolism protocol.  In addition, a 3-D volume rendered image was created for interpretation.  Sagittal and   coronal reconstructions were performed.  Automated exposure control and iterative reconstruction methods were used.        Findings:  The thyroid, trachea and esophagus appear within normal limits. There is a small hiatal hernia. The heart is enlarged. Patient is status post recent appearing median sternotomy. There is a prosthetic aortic valve in place. Mild aneurysmal dilatation of   the ascending aorta up to 44 mm. There are no abnormal mediastinal lymph nodes. There is mild edema in the anterior mediastinum without  discrete measurable mediastinal hematoma. No pericardial effusion. No coronary artery calcification. Main pulmonary   artery is normal diameter. No evidence of pulmonary embolism.     No pneumothorax, pleural effusion or focal airspace consolidation. Airways are patent. No suspicious lung nodules.     No acute findings in the superficial soft tissues or within the limited images of the upper abdomen. No acute osseous abnormality or destructive bone lesion. No significant thoracic degenerative changes.     IMPRESSION:  Impression:  1.No evidence of pulmonary embolism.  2.Status post recent median sternotomy and aortic valve replacement. There is mild edema in the anterior mediastinum without discrete measurable hematoma.  3.Cardiomegaly.  4.Mild aneurysmal dilatation of the ascending aorta up to 44 mm.     Electronically Signed: Paddy Dougherty MD    4/5/2025 9:56 PM EDT         Assessment & Plan    Diagnoses and all orders for this visit:    1. Primary hypertension (Primary)  -     Comprehensive Metabolic Panel; Future  -     CBC Auto Differential; Future    2. S/P reop AVR #23 Magna Ease per Dr. Moreno 1/24/2025  -     CBC Auto Differential; Future    3. Palpitations  -     T3, Free; Future  -     T4, Free; Future  -     TSH; Future  -     CBC Auto Differential; Future    4. Acquired hypothyroidism  -     T3, Free; Future  -     T4, Free; Future  -     TSH; Future  -     Comprehensive Metabolic Panel; Future  -     CBC Auto Differential; Future    5. Hyperlipidemia, unspecified hyperlipidemia type  -     Comprehensive Metabolic Panel; Future    6. Prediabetes  -     Comprehensive Metabolic Panel; Future  -     CBC Auto Differential; Future  -     Hemoglobin A1c; Future    7. LUÍS on CPAP       Patient Instructions   Call office for lab results if you have not received a call from our office or [place] message in 1-2 days.    Continue current medications and treatment.  Pending labs, may need to adjust  levothyroxine dosage.  Have labs drawn 3-4 days prior to f/u appointment.   Follow up with specialists per their recommendations.         Follow Up   Return in about 3 months (around 7/23/2025) for Next scheduled follow up 7/28/2025 or sooner if needed.    Patient was given instructions and counseling regarding her condition or for health maintenance advice. Please see specific information pulled into the AVS if appropriate.     Sushma Hall, GUILLERMO     04/24/25

## 2025-04-23 NOTE — PATIENT INSTRUCTIONS
Call office for lab results if you have not received a call from our office or [employer] message in 1-2 days.    Continue current medications and treatment.  Pending labs, may need to adjust levothyroxine dosage.  Have labs drawn 3-4 days prior to f/u appointment.   Follow up with specialists per their recommendations.

## 2025-04-24 ENCOUNTER — TREATMENT (OUTPATIENT)
Dept: CARDIAC REHAB | Facility: HOSPITAL | Age: 70
End: 2025-04-24
Payer: MEDICARE

## 2025-04-24 DIAGNOSIS — Z95.2 S/P AVR (AORTIC VALVE REPLACEMENT): Primary | ICD-10-CM

## 2025-04-24 DIAGNOSIS — E03.9 ACQUIRED HYPOTHYROIDISM: ICD-10-CM

## 2025-04-24 RX ORDER — LEVOTHYROXINE SODIUM 100 UG/1
100 TABLET ORAL DAILY
Qty: 90 TABLET | Refills: 0 | Status: SHIPPED | OUTPATIENT
Start: 2025-04-24

## 2025-04-25 ENCOUNTER — TREATMENT (OUTPATIENT)
Dept: CARDIAC REHAB | Facility: HOSPITAL | Age: 70
End: 2025-04-25
Payer: MEDICARE

## 2025-04-25 DIAGNOSIS — Z95.2 S/P AVR (AORTIC VALVE REPLACEMENT): Primary | ICD-10-CM

## 2025-04-25 PROCEDURE — 93798 PHYS/QHP OP CAR RHAB W/ECG: CPT

## 2025-04-28 ENCOUNTER — TREATMENT (OUTPATIENT)
Dept: CARDIAC REHAB | Facility: HOSPITAL | Age: 70
End: 2025-04-28
Payer: MEDICARE

## 2025-04-28 DIAGNOSIS — Z95.2 S/P AVR (AORTIC VALVE REPLACEMENT): Primary | ICD-10-CM

## 2025-04-28 PROCEDURE — 93798 PHYS/QHP OP CAR RHAB W/ECG: CPT

## 2025-05-01 ENCOUNTER — TREATMENT (OUTPATIENT)
Dept: CARDIAC REHAB | Facility: HOSPITAL | Age: 70
End: 2025-05-01
Payer: MEDICARE

## 2025-05-01 DIAGNOSIS — Z95.2 S/P AVR (AORTIC VALVE REPLACEMENT): Primary | ICD-10-CM

## 2025-05-01 PROCEDURE — 93798 PHYS/QHP OP CAR RHAB W/ECG: CPT

## 2025-05-02 ENCOUNTER — TREATMENT (OUTPATIENT)
Dept: CARDIAC REHAB | Facility: HOSPITAL | Age: 70
End: 2025-05-02
Payer: MEDICARE

## 2025-05-02 DIAGNOSIS — Z95.2 S/P AVR (AORTIC VALVE REPLACEMENT): Primary | ICD-10-CM

## 2025-05-02 PROCEDURE — 93798 PHYS/QHP OP CAR RHAB W/ECG: CPT

## 2025-05-05 ENCOUNTER — TREATMENT (OUTPATIENT)
Dept: CARDIAC REHAB | Facility: HOSPITAL | Age: 70
End: 2025-05-05
Payer: MEDICARE

## 2025-05-05 DIAGNOSIS — Z95.2 S/P AVR (AORTIC VALVE REPLACEMENT): Primary | ICD-10-CM

## 2025-05-05 PROCEDURE — 93798 PHYS/QHP OP CAR RHAB W/ECG: CPT

## 2025-05-08 ENCOUNTER — TREATMENT (OUTPATIENT)
Dept: CARDIAC REHAB | Facility: HOSPITAL | Age: 70
End: 2025-05-08
Payer: MEDICARE

## 2025-05-08 DIAGNOSIS — Z95.2 S/P AVR (AORTIC VALVE REPLACEMENT): Primary | ICD-10-CM

## 2025-05-08 PROCEDURE — 93798 PHYS/QHP OP CAR RHAB W/ECG: CPT

## 2025-05-09 ENCOUNTER — TREATMENT (OUTPATIENT)
Dept: CARDIAC REHAB | Facility: HOSPITAL | Age: 70
End: 2025-05-09
Payer: MEDICARE

## 2025-05-09 DIAGNOSIS — Z95.2 S/P AVR (AORTIC VALVE REPLACEMENT): Primary | ICD-10-CM

## 2025-05-09 PROCEDURE — 93798 PHYS/QHP OP CAR RHAB W/ECG: CPT

## 2025-05-12 ENCOUNTER — TREATMENT (OUTPATIENT)
Dept: CARDIAC REHAB | Facility: HOSPITAL | Age: 70
End: 2025-05-12
Payer: MEDICARE

## 2025-05-12 DIAGNOSIS — Z95.2 S/P AVR (AORTIC VALVE REPLACEMENT): Primary | ICD-10-CM

## 2025-05-12 PROCEDURE — 93798 PHYS/QHP OP CAR RHAB W/ECG: CPT

## 2025-05-15 ENCOUNTER — TREATMENT (OUTPATIENT)
Dept: CARDIAC REHAB | Facility: HOSPITAL | Age: 70
End: 2025-05-15
Payer: MEDICARE

## 2025-05-15 DIAGNOSIS — Z95.2 S/P AVR (AORTIC VALVE REPLACEMENT): Primary | ICD-10-CM

## 2025-05-15 PROCEDURE — 93798 PHYS/QHP OP CAR RHAB W/ECG: CPT

## 2025-05-16 ENCOUNTER — APPOINTMENT (OUTPATIENT)
Dept: CARDIAC REHAB | Facility: HOSPITAL | Age: 70
End: 2025-05-16
Payer: MEDICARE

## 2025-05-16 ENCOUNTER — TREATMENT (OUTPATIENT)
Dept: CARDIAC REHAB | Facility: HOSPITAL | Age: 70
End: 2025-05-16
Payer: MEDICARE

## 2025-05-16 DIAGNOSIS — Z95.2 S/P AVR (AORTIC VALVE REPLACEMENT): Primary | ICD-10-CM

## 2025-05-16 PROCEDURE — 93798 PHYS/QHP OP CAR RHAB W/ECG: CPT

## 2025-05-19 ENCOUNTER — OFFICE VISIT (OUTPATIENT)
Dept: FAMILY MEDICINE CLINIC | Facility: CLINIC | Age: 70
End: 2025-05-19
Payer: MEDICARE

## 2025-05-19 ENCOUNTER — TREATMENT (OUTPATIENT)
Dept: CARDIAC REHAB | Facility: HOSPITAL | Age: 70
End: 2025-05-19
Payer: MEDICARE

## 2025-05-19 VITALS
HEART RATE: 68 BPM | BODY MASS INDEX: 42.25 KG/M2 | HEIGHT: 59 IN | DIASTOLIC BLOOD PRESSURE: 76 MMHG | TEMPERATURE: 98.1 F | SYSTOLIC BLOOD PRESSURE: 124 MMHG | RESPIRATION RATE: 17 BRPM | OXYGEN SATURATION: 96 % | WEIGHT: 209.6 LBS

## 2025-05-19 DIAGNOSIS — M79.674 GREAT TOE PAIN, RIGHT: Primary | ICD-10-CM

## 2025-05-19 DIAGNOSIS — R73.03 PREDIABETES: ICD-10-CM

## 2025-05-19 DIAGNOSIS — Z95.2 S/P AVR (AORTIC VALVE REPLACEMENT): Primary | ICD-10-CM

## 2025-05-19 DIAGNOSIS — Z95.2 S/P AVR: ICD-10-CM

## 2025-05-19 LAB — GLUCOSE BLDC GLUCOMTR-MCNC: 88 MG/DL (ref 70–130)

## 2025-05-19 PROCEDURE — 3074F SYST BP LT 130 MM HG: CPT | Performed by: NURSE PRACTITIONER

## 2025-05-19 PROCEDURE — 3078F DIAST BP <80 MM HG: CPT | Performed by: NURSE PRACTITIONER

## 2025-05-19 PROCEDURE — 82948 REAGENT STRIP/BLOOD GLUCOSE: CPT | Performed by: NURSE PRACTITIONER

## 2025-05-19 PROCEDURE — 93798 PHYS/QHP OP CAR RHAB W/ECG: CPT

## 2025-05-19 PROCEDURE — 3044F HG A1C LEVEL LT 7.0%: CPT | Performed by: NURSE PRACTITIONER

## 2025-05-19 PROCEDURE — 1126F AMNT PAIN NOTED NONE PRSNT: CPT | Performed by: NURSE PRACTITIONER

## 2025-05-19 PROCEDURE — 1160F RVW MEDS BY RX/DR IN RCRD: CPT | Performed by: NURSE PRACTITIONER

## 2025-05-19 PROCEDURE — 99213 OFFICE O/P EST LOW 20 MIN: CPT | Performed by: NURSE PRACTITIONER

## 2025-05-19 PROCEDURE — 1159F MED LIST DOCD IN RCRD: CPT | Performed by: NURSE PRACTITIONER

## 2025-05-19 NOTE — PROGRESS NOTES
Subjective        Lulu Graves is a 70 y.o. female who presents to [facility].     Chief Complaint   Patient presents with    Toe Pain     Right big toe. Started 4 days ago       History of Present Illness    Patient presents with complaint of right great toe pain.    Right great toe pain - began about a week ago when she was trimming her toenails and thinks she cut her toenail too short at the inner aspect of her toe.  Her toe was sore, this worsened 4 days ago with wearing closed toe shoes that were tight against her toe.  She states the area is slightly swollen, is sore to touch, is not bleeding, not having drainage, no fevers.  She has not tried any antibiotic ointment or soaks.  She has appointment with podiatry [facility] tomorrow.  She had surgery in past on right great toe for ingrown toenail.  She is leaving the end of the week for a 20 day long trip to [place], [place].     Medical conditions include prediabetes, HTN, aortic valve stenosis s/p aortic valve replacement, hypothyroidism, hyperlipidemia, sleep apnea.     The following portions of the patient's history were reviewed and updated as appropriate: allergies, current medications, past family history, past medical history, past social history, past surgical history and problem list.    No Known Allergies       Current Outpatient Medications:     aspirin 81 MG tablet, Take 1 tablet by mouth Every Night., Disp: , Rfl:     Elderberry-Vitamin C-Zinc ([employer] GUMMY PO), Take 500 mg by mouth Daily., Disp: , Rfl:     fluticasone (FLONASE) 50 MCG/ACT nasal spray, Administer 2 sprays into the nostril(s) as directed by provider As Needed for Rhinitis., Disp: , Rfl:     levothyroxine (SYNTHROID, LEVOTHROID) 100 MCG tablet, Take 1 tablet by mouth Daily., Disp: 90 tablet, Rfl: 0    loratadine (CLARITIN) 10 MG tablet, Take 1 tablet by mouth Daily., Disp: 30 tablet, Rfl: 11    metoprolol tartrate  "(LOPRESSOR) 50 MG tablet, Take 1 tablet by mouth Every 12 (Twelve) Hours., Disp: 60 tablet, Rfl: 3    multivitamin with minerals (MULTIVITAMIN ADULT PO), Take 1 tablet by mouth Daily., Disp: , Rfl:     NON FORMULARY, [place], Cinnamon, Apple Cider Vinegar, Tumeric, Curmemic, berberine, and ginseng supplement, Disp: , Rfl:     PROBIOTIC PRODUCT PO, Take 1 capsule by mouth Daily., Disp: , Rfl:     simvastatin (ZOCOR) 20 MG tablet, Take 1 tablet by mouth Every Evening., Disp: 90 tablet, Rfl: 1    Review of Systems   Constitutional:  Negative for fever.   Respiratory:  Negative for shortness of breath.    Cardiovascular:  Negative for chest pain and palpitations.   Skin:  Negative for rash.        Objective     /76 (BP Location: Left arm, Patient Position: Sitting, Cuff Size: Adult)   Pulse 68   Temp 98.1 °F (36.7 °C) (Oral)   Resp 17   Ht 149.9 cm (59.02\")   Wt 95.1 kg (209 lb 9.6 oz)   SpO2 96%   BMI 42.31 kg/m²        Physical Exam  Vitals and nursing note reviewed.   Constitutional:       General: She is not in acute distress.     Appearance: Normal appearance. She is obese. She is not ill-appearing or diaphoretic.   HENT:      Head: Normocephalic and atraumatic.      Mouth/Throat:      Mouth: Mucous membranes are moist.   Eyes:      General: No scleral icterus.     Conjunctiva/sclera: Conjunctivae normal.   Neck:      Trachea: Trachea normal.   Cardiovascular:      Rate and Rhythm: Normal rate and regular rhythm.      Pulses: Normal pulses.   Pulmonary:      Effort: Pulmonary effort is normal. No respiratory distress.      Breath sounds: Normal breath sounds and air entry.   Abdominal:      General: Bowel sounds are normal. There is no distension.      Palpations: Abdomen is soft.      Tenderness: There is no abdominal tenderness. There is no guarding or rebound.   Musculoskeletal:         General: Normal range of motion.      Cervical back: Normal range of motion and neck supple.      Right lower leg: " No edema.      Left lower leg: No edema.   Feet:      Right foot:      Toenail Condition: Right toenails are abnormally thick.      Comments: Right great toe - toenail short, thick - pain with palpation of medial nail fold, no warmth, no erythema, no drainage, no fluctuance. Bandaid and otc antibiotic ointment applied after exam  Lymphadenopathy:      Cervical: No cervical adenopathy.   Skin:     General: Skin is warm and dry.      Capillary Refill: Capillary refill takes less than 2 seconds.      Coloration: Skin is not jaundiced.      Findings: No bruising.   Neurological:      General: No focal deficit present.      Mental Status: She is alert and oriented to person, place, and time.      Sensory: Sensation is intact.      Gait: Gait normal.   Psychiatric:         Attention and Perception: Attention normal.         Mood and Affect: Mood and affect normal.         Speech: Speech normal.         Behavior: Behavior normal. Behavior is cooperative.         Thought Content: Thought content normal.         Cognition and Memory: Cognition normal.         Judgment: Judgment normal.                  Result Review    The following data was reviewed by: GUILLERMO Curiel on 05/19/2025:  CMP          1/29/2025    05:27 1/29/2025    14:04 2/24/2025    10:57 4/5/2025    19:53   CMP   Glucose 117   79  109    BUN 22   16  16    Creatinine 0.73   0.72  0.69    EGFR 89.7   91.2  94.7    Sodium 136   140  141    Potassium 3.9  4.2  3.8  3.7    Chloride 99   103  106    Calcium 8.6   9.5  9.6    Total Protein   7.0  7.9    Albumin   3.9  4.3    Globulin   3.1  3.6    Total Bilirubin   0.4  0.3    Alkaline Phosphatase   101  115    AST (SGOT)   19  25    ALT (SGPT)   9  13    Albumin/Globulin Ratio   1.3  1.2    BUN/Creatinine Ratio 30.1   22.2  23.2    Anion Gap 10.0   9.5  10.3      CBC          1/29/2025    05:27 2/24/2025    10:57 4/5/2025    19:53   CBC   WBC 12.58  8.13  9.29    RBC 3.78  4.35  5.23    Hemoglobin 10.8   12.1  14.1    Hematocrit 33.7  38.7  45.0    MCV 89.2  89.0  86.0    MCH 28.6  27.8  27.0    MCHC 32.0  31.3  31.3    RDW 13.5  13.1  13.6    Platelets 249  234  230      A1C Last 3 Results          6/14/2024    07:46 10/14/2024    08:31 1/13/2025    11:49   HGBA1C Last 3 Results   Hemoglobin A1C 5.90  6.09  5.95           Lab Results   Component Value Date    POCGLU 88 05/19/2025            Assessment & Plan    Diagnoses and all orders for this visit:    1. Great toe pain, right (Primary)    2. S/P reop AVR #23 Magna Ease per Dr. Moreno 1/24/2025    3. Prediabetes  -     POCT Glucose       Patient Instructions   Avoid trimming nails short.  Follow up with podiatry tomorrow as scheduled.  Suggest podiatry trim nails in the future.  Soak toe in clean warm water 2-3 x daily.  Apply otc antibiotic ointment and bandaid twice daily. Wash with dial soap.  Avoid wearing shoes that are tight in toe box.  May consider course of abx pending clinical course and evaluation by podiatry tomorrow.   Seek care immediately if fever, severe pain, drainage from toe, bleeding from toe.   Do not immerse toe in pool, hot tub, pond, creek, or other body of water.   Discussed course of abx with patient as she has prosthetic valve.  She would like to wait until seeing podiatrist tomorrow to consider starting abx.   Blood glucose 88 in clinic today.    Follow Up   Return for Next scheduled follow up 7/28/2025 or sooner if needd.    Patient was given instructions and counseling regarding her condition or for health maintenance advice. Please see specific information pulled into the AVS if appropriate.     Sushma Hall, APRN     05/19/25

## 2025-05-19 NOTE — PATIENT INSTRUCTIONS
Avoid trimming nails short.  Follow up with podiatry tomorrow as scheduled.  Suggest podiatry trim nails in the future.  Soak toe in clean warm water 2-3 x daily.  Apply otc antibiotic ointment and bandaid twice daily. Wash with dial soap.  Avoid wearing shoes that are tight in toe box.  May consider course of abx pending clinical course and evaluation by podiatry tomorrow.   Seek care immediately if fever, severe pain, drainage from toe, bleeding from toe.   Do not immerse toe in pool, hot tub, pond, creek, or other body of water.

## 2025-05-20 ENCOUNTER — PATIENT MESSAGE (OUTPATIENT)
Dept: FAMILY MEDICINE CLINIC | Facility: CLINIC | Age: 70
End: 2025-05-20
Payer: MEDICARE

## 2025-05-20 NOTE — TELEPHONE ENCOUNTER
I called [facility] on [facility] and left voicemail to Dr. Philippe's office to fax the recent office note over to us.

## 2025-05-22 ENCOUNTER — APPOINTMENT (OUTPATIENT)
Dept: CARDIAC REHAB | Facility: HOSPITAL | Age: 70
End: 2025-05-22
Payer: MEDICARE

## 2025-05-23 ENCOUNTER — APPOINTMENT (OUTPATIENT)
Dept: CARDIAC REHAB | Facility: HOSPITAL | Age: 70
End: 2025-05-23
Payer: MEDICARE

## 2025-05-26 ENCOUNTER — APPOINTMENT (OUTPATIENT)
Dept: CARDIAC REHAB | Facility: HOSPITAL | Age: 70
End: 2025-05-26
Payer: MEDICARE

## 2025-05-29 ENCOUNTER — APPOINTMENT (OUTPATIENT)
Dept: CARDIAC REHAB | Facility: HOSPITAL | Age: 70
End: 2025-05-29
Payer: MEDICARE

## 2025-05-30 ENCOUNTER — APPOINTMENT (OUTPATIENT)
Dept: CARDIAC REHAB | Facility: HOSPITAL | Age: 70
End: 2025-05-30
Payer: MEDICARE

## 2025-07-03 RX ORDER — FLUTICASONE PROPIONATE 50 MCG
2 SPRAY, SUSPENSION (ML) NASAL AS NEEDED
Qty: 48 G | Refills: 1 | Status: SHIPPED | OUTPATIENT
Start: 2025-07-03

## 2025-07-03 RX ORDER — METOPROLOL TARTRATE 50 MG
50 TABLET ORAL EVERY 12 HOURS SCHEDULED
Qty: 60 TABLET | Refills: 1 | Status: SHIPPED | OUTPATIENT
Start: 2025-07-03

## 2025-07-03 NOTE — TELEPHONE ENCOUNTER
Rx Refill Note  Requested Prescriptions     Pending Prescriptions Disp Refills    metoprolol tartrate (LOPRESSOR) 50 MG tablet [Pharmacy Med Name: Metoprolol Tartrate Oral Tablet 50 MG] 60 tablet 1     Sig: TAKE 1 TABLET BY MOUTH EVERY 12 HOURS      Last office visit with prescribing clinician: 2/18/2025   Last telemedicine visit with prescribing clinician: Visit date not found   Next office visit with prescribing clinician: 8/21/2025                         Would you like a call back once the refill request has been completed: [] Yes [] No    If the office needs to give you a call back, can they leave a voicemail: [] Yes [] No    April Valdovinos MA  07/03/25, 09:58 EDT   "Heart monitoring hx afib"

## 2025-07-14 ENCOUNTER — OFFICE VISIT (OUTPATIENT)
Dept: CARDIAC REHAB | Facility: HOSPITAL | Age: 70
End: 2025-07-14
Payer: MEDICARE

## 2025-07-14 DIAGNOSIS — Z95.2 S/P AVR (AORTIC VALVE REPLACEMENT): Primary | ICD-10-CM

## 2025-07-17 ENCOUNTER — APPOINTMENT (OUTPATIENT)
Dept: CARDIAC REHAB | Facility: HOSPITAL | Age: 70
End: 2025-07-17

## 2025-07-18 DIAGNOSIS — E03.9 ACQUIRED HYPOTHYROIDISM: ICD-10-CM

## 2025-07-21 ENCOUNTER — OFFICE VISIT (OUTPATIENT)
Dept: CARDIAC REHAB | Facility: HOSPITAL | Age: 70
End: 2025-07-21

## 2025-07-21 ENCOUNTER — LAB (OUTPATIENT)
Dept: LAB | Facility: HOSPITAL | Age: 70
End: 2025-07-21
Payer: MEDICARE

## 2025-07-21 DIAGNOSIS — R73.03 PREDIABETES: ICD-10-CM

## 2025-07-21 DIAGNOSIS — E78.5 HYPERLIPIDEMIA, UNSPECIFIED HYPERLIPIDEMIA TYPE: Chronic | ICD-10-CM

## 2025-07-21 DIAGNOSIS — Z95.2 S/P AVR: ICD-10-CM

## 2025-07-21 DIAGNOSIS — Z95.2 S/P AVR (AORTIC VALVE REPLACEMENT): Primary | ICD-10-CM

## 2025-07-21 DIAGNOSIS — E03.9 ACQUIRED HYPOTHYROIDISM: Chronic | ICD-10-CM

## 2025-07-21 DIAGNOSIS — I10 PRIMARY HYPERTENSION: Chronic | ICD-10-CM

## 2025-07-21 DIAGNOSIS — R00.2 PALPITATIONS: ICD-10-CM

## 2025-07-21 LAB
ALBUMIN SERPL-MCNC: 3.8 G/DL (ref 3.5–5.2)
ALBUMIN/GLOB SERPL: 1.2 G/DL
ALP SERPL-CCNC: 99 U/L (ref 39–117)
ALT SERPL W P-5'-P-CCNC: 12 U/L (ref 1–33)
ANION GAP SERPL CALCULATED.3IONS-SCNC: 10.1 MMOL/L (ref 5–15)
AST SERPL-CCNC: 22 U/L (ref 1–32)
BASOPHILS # BLD AUTO: 0.04 10*3/MM3 (ref 0–0.2)
BASOPHILS NFR BLD AUTO: 0.6 % (ref 0–1.5)
BILIRUB SERPL-MCNC: 0.4 MG/DL (ref 0–1.2)
BUN SERPL-MCNC: 19 MG/DL (ref 8–23)
BUN/CREAT SERPL: 24.4 (ref 7–25)
CALCIUM SPEC-SCNC: 9.4 MG/DL (ref 8.6–10.5)
CHLORIDE SERPL-SCNC: 105 MMOL/L (ref 98–107)
CO2 SERPL-SCNC: 23.9 MMOL/L (ref 22–29)
CREAT SERPL-MCNC: 0.78 MG/DL (ref 0.57–1)
DEPRECATED RDW RBC AUTO: 46.5 FL (ref 37–54)
EGFRCR SERPLBLD CKD-EPI 2021: 82.3 ML/MIN/1.73
EOSINOPHIL # BLD AUTO: 0.23 10*3/MM3 (ref 0–0.4)
EOSINOPHIL NFR BLD AUTO: 3.3 % (ref 0.3–6.2)
ERYTHROCYTE [DISTWIDTH] IN BLOOD BY AUTOMATED COUNT: 14.8 % (ref 12.3–15.4)
GLOBULIN UR ELPH-MCNC: 3.1 GM/DL
GLUCOSE SERPL-MCNC: 121 MG/DL (ref 65–99)
HBA1C MFR BLD: 5.77 % (ref 4.8–5.6)
HCT VFR BLD AUTO: 45.5 % (ref 34–46.6)
HGB BLD-MCNC: 14.4 G/DL (ref 12–15.9)
IMM GRANULOCYTES # BLD AUTO: 0.03 10*3/MM3 (ref 0–0.05)
IMM GRANULOCYTES NFR BLD AUTO: 0.4 % (ref 0–0.5)
LYMPHOCYTES # BLD AUTO: 1.6 10*3/MM3 (ref 0.7–3.1)
LYMPHOCYTES NFR BLD AUTO: 23.2 % (ref 19.6–45.3)
MCH RBC QN AUTO: 27.3 PG (ref 26.6–33)
MCHC RBC AUTO-ENTMCNC: 31.6 G/DL (ref 31.5–35.7)
MCV RBC AUTO: 86.2 FL (ref 79–97)
MONOCYTES # BLD AUTO: 0.57 10*3/MM3 (ref 0.1–0.9)
MONOCYTES NFR BLD AUTO: 8.3 % (ref 5–12)
NEUTROPHILS NFR BLD AUTO: 4.43 10*3/MM3 (ref 1.7–7)
NEUTROPHILS NFR BLD AUTO: 64.2 % (ref 42.7–76)
NRBC BLD AUTO-RTO: 0 /100 WBC (ref 0–0.2)
PLATELET # BLD AUTO: 176 10*3/MM3 (ref 140–450)
PMV BLD AUTO: 11.8 FL (ref 6–12)
POTASSIUM SERPL-SCNC: 4.4 MMOL/L (ref 3.5–5.2)
PROT SERPL-MCNC: 6.9 G/DL (ref 6–8.5)
RBC # BLD AUTO: 5.28 10*6/MM3 (ref 3.77–5.28)
SODIUM SERPL-SCNC: 139 MMOL/L (ref 136–145)
T3FREE SERPL-MCNC: 3.22 PG/ML (ref 2–4.4)
T4 FREE SERPL-MCNC: 1.32 NG/DL (ref 0.92–1.68)
TSH SERPL DL<=0.05 MIU/L-ACNC: 0.13 UIU/ML (ref 0.27–4.2)
WBC NRBC COR # BLD AUTO: 6.9 10*3/MM3 (ref 3.4–10.8)

## 2025-07-21 PROCEDURE — 84443 ASSAY THYROID STIM HORMONE: CPT

## 2025-07-21 PROCEDURE — 83036 HEMOGLOBIN GLYCOSYLATED A1C: CPT

## 2025-07-21 PROCEDURE — 36415 COLL VENOUS BLD VENIPUNCTURE: CPT

## 2025-07-21 PROCEDURE — 80053 COMPREHEN METABOLIC PANEL: CPT

## 2025-07-21 PROCEDURE — 85025 COMPLETE CBC W/AUTO DIFF WBC: CPT

## 2025-07-21 PROCEDURE — 84439 ASSAY OF FREE THYROXINE: CPT

## 2025-07-21 PROCEDURE — 84481 FREE ASSAY (FT-3): CPT

## 2025-07-22 RX ORDER — LEVOTHYROXINE SODIUM 100 UG/1
100 TABLET ORAL DAILY
Qty: 90 TABLET | Refills: 0 | Status: SHIPPED | OUTPATIENT
Start: 2025-07-22 | End: 2025-07-28 | Stop reason: SDUPTHER

## 2025-07-24 ENCOUNTER — OFFICE VISIT (OUTPATIENT)
Dept: CARDIAC REHAB | Facility: HOSPITAL | Age: 70
End: 2025-07-24

## 2025-07-24 DIAGNOSIS — Z95.2 S/P AVR (AORTIC VALVE REPLACEMENT): Primary | ICD-10-CM

## 2025-07-28 ENCOUNTER — OFFICE VISIT (OUTPATIENT)
Dept: FAMILY MEDICINE CLINIC | Facility: CLINIC | Age: 70
End: 2025-07-28
Payer: MEDICARE

## 2025-07-28 ENCOUNTER — OFFICE VISIT (OUTPATIENT)
Dept: CARDIAC REHAB | Facility: HOSPITAL | Age: 70
End: 2025-07-28

## 2025-07-28 VITALS
SYSTOLIC BLOOD PRESSURE: 128 MMHG | HEART RATE: 65 BPM | RESPIRATION RATE: 16 BRPM | HEIGHT: 59 IN | WEIGHT: 215 LBS | BODY MASS INDEX: 43.34 KG/M2 | OXYGEN SATURATION: 95 % | DIASTOLIC BLOOD PRESSURE: 70 MMHG | TEMPERATURE: 97.8 F

## 2025-07-28 DIAGNOSIS — J30.89 ENVIRONMENTAL AND SEASONAL ALLERGIES: ICD-10-CM

## 2025-07-28 DIAGNOSIS — M85.89 OSTEOPENIA OF MULTIPLE SITES: ICD-10-CM

## 2025-07-28 DIAGNOSIS — E66.813 CLASS 3 SEVERE OBESITY DUE TO EXCESS CALORIES WITHOUT SERIOUS COMORBIDITY WITH BODY MASS INDEX (BMI) OF 40.0 TO 44.9 IN ADULT: ICD-10-CM

## 2025-07-28 DIAGNOSIS — I35.0 NONRHEUMATIC AORTIC VALVE STENOSIS: ICD-10-CM

## 2025-07-28 DIAGNOSIS — Z95.2 S/P AVR (AORTIC VALVE REPLACEMENT): Primary | ICD-10-CM

## 2025-07-28 DIAGNOSIS — Z95.2 S/P AVR: ICD-10-CM

## 2025-07-28 DIAGNOSIS — E03.9 ACQUIRED HYPOTHYROIDISM: Chronic | ICD-10-CM

## 2025-07-28 DIAGNOSIS — Z00.00 ENCOUNTER FOR SUBSEQUENT ANNUAL WELLNESS VISIT IN MEDICARE PATIENT: Primary | ICD-10-CM

## 2025-07-28 DIAGNOSIS — R73.03 PREDIABETES: ICD-10-CM

## 2025-07-28 DIAGNOSIS — G47.33 OSA ON CPAP: ICD-10-CM

## 2025-07-28 DIAGNOSIS — I10 PRIMARY HYPERTENSION: Chronic | ICD-10-CM

## 2025-07-28 DIAGNOSIS — E78.2 MIXED HYPERLIPIDEMIA: ICD-10-CM

## 2025-07-28 DIAGNOSIS — K76.0 FATTY LIVER: ICD-10-CM

## 2025-07-28 PROCEDURE — G0439 PPPS, SUBSEQ VISIT: HCPCS | Performed by: NURSE PRACTITIONER

## 2025-07-28 PROCEDURE — 1160F RVW MEDS BY RX/DR IN RCRD: CPT | Performed by: NURSE PRACTITIONER

## 2025-07-28 PROCEDURE — G2211 COMPLEX E/M VISIT ADD ON: HCPCS | Performed by: NURSE PRACTITIONER

## 2025-07-28 PROCEDURE — 3044F HG A1C LEVEL LT 7.0%: CPT | Performed by: NURSE PRACTITIONER

## 2025-07-28 PROCEDURE — 3078F DIAST BP <80 MM HG: CPT | Performed by: NURSE PRACTITIONER

## 2025-07-28 PROCEDURE — 1159F MED LIST DOCD IN RCRD: CPT | Performed by: NURSE PRACTITIONER

## 2025-07-28 PROCEDURE — 3074F SYST BP LT 130 MM HG: CPT | Performed by: NURSE PRACTITIONER

## 2025-07-28 PROCEDURE — 99214 OFFICE O/P EST MOD 30 MIN: CPT | Performed by: NURSE PRACTITIONER

## 2025-07-28 PROCEDURE — 1126F AMNT PAIN NOTED NONE PRSNT: CPT | Performed by: NURSE PRACTITIONER

## 2025-07-28 RX ORDER — LEVOTHYROXINE SODIUM 88 UG/1
88 TABLET ORAL DAILY
Qty: 90 TABLET | Refills: 0 | Status: SHIPPED | OUTPATIENT
Start: 2025-07-28

## 2025-07-28 NOTE — ASSESSMENT & PLAN NOTE
Orders:    levothyroxine (SYNTHROID, LEVOTHROID) 88 MCG tablet; Take 1 tablet by mouth Daily.    T3, Free; Future    T4, Free; Future    TSH; Future

## 2025-07-28 NOTE — PATIENT INSTRUCTIONS
Decrease levothyroxine to 88mcg daily, return to lab in about 8 weeks to recheck.  Otherwise Continue current medications and treatment.   Watch portion sizes.  US to eval fatty liver, fib 4 elevated 2.49.  LFT wnl.  Pending imaging findings, may recommend further evaluation by GI.   Follow up with specialists per their recommendations.  Have labs drawn in 8-12 weeks.

## 2025-07-28 NOTE — PROGRESS NOTES
Subjective   The ABCs of the Annual Wellness Visit  Medicare Wellness Visit      Lulu Graves is a 70 y.o. patient who presents for a Medicare Wellness Visit.    The following portions of the patient's history were reviewed and   updated as appropriate: allergies, current medications, past family history, past medical history, past social history, past surgical history, and problem list.    Compared to one year ago, the patient's physical   health is the same.  Compared to one year ago, the patient's mental   health is the same.    Recent Hospitalizations:  This patient has had a [facility] [place] admission record on file within the last 365 days.  Current Medical Providers:  Patient Care Team:  Sushma Hall APRN as PCP - General (Nurse Practitioner)  Jayson Edmondson MD as Consulting Physician (Cardiology)  Anabelle De La Torre RD as Dietitian (Nutrition)  Angelique Wylie MD as Consulting Physician (Sleep Medicine)  Amaris Resendiz APRN as Nurse Practitioner (Cardiology)  Lidia Philippe DPM as Consulting Physician (Podiatry)  Ronen Richter MD as Surgeon (Cardiothoracic Surgery)    Outpatient Medications Prior to Visit   Medication Sig Dispense Refill    aspirin 81 MG tablet Take 1 tablet by mouth Every Night.      Elderberry-Vitamin C-Zinc (Spotbros GUMMY PO) Take 500 mg by mouth Daily.      fluticasone (FLONASE) 50 MCG/ACT nasal spray Administer 2 sprays into the nostril(s) as directed by provider As Needed for Rhinitis. 48 g 1    loratadine (CLARITIN) 10 MG tablet Take 1 tablet by mouth Daily. 30 tablet 11    metoprolol tartrate (LOPRESSOR) 50 MG tablet TAKE 1 TABLET BY MOUTH EVERY 12 HOURS 60 tablet 1    multivitamin with minerals (MULTIVITAMIN ADULT PO) Take 1 tablet by mouth Daily.      PROBIOTIC PRODUCT PO Take 1 capsule by mouth Daily.      simvastatin (ZOCOR) 20 MG tablet Take 1 tablet by mouth Every Evening. 90 tablet 1    levothyroxine (SYNTHROID, LEVOTHROID) 100 MCG tablet  "Take 1 tablet by mouth Daily. 90 tablet 0    NON FORMULARY [place], Cinnamon, Apple Cider Vinegar, Tumeric, Curmemic, berberine, and ginseng supplement (Patient not taking: Reported on 7/28/2025)       No facility-administered medications prior to visit.     No opioid medication identified on active medication list. I have reviewed chart for other potential  high risk medication/s and harmful drug interactions in the elderly.      Aspirin is on active medication list. Aspirin use is indicated based on review of current medical condition/s. Pros and cons of this therapy have been discussed today. Benefits of this medication outweigh potential harm.  Patient has been encouraged to continue taking this medication.  .      Patient Active Problem List   Diagnosis    Steatosis of liver    LUÍS on CPAP    Hypothyroidism    Hypertension    Hyperlipidemia    Gastroesophageal reflux disease    Class 3 severe obesity due to excess calories without serious comorbidity with body mass index (BMI) of 40.0 to 44.9 in adult    Environmental and seasonal allergies    Osteopenia of multiple sites    Prediabetes    Chest pain    Aortic stenosis, severe    Prosthetic aortic valve stenosis    S/P reop AVR #23 Magna Ease per Dr. Moreno 1/24/2025     Advance Care Planning Advance Directive is not on file.  ACP discussion was held with the patient during this visit. Patient has an advance directive (not in EMR), copy requested.            Objective   Vitals:    07/28/25 1033   BP: 128/70   Pulse: 65   Resp: 16   Temp: 97.8 °F (36.6 °C)   TempSrc: Oral   SpO2: 95%   Weight: 97.5 kg (215 lb)   Height: 149.9 cm (59.02\")   PainSc: 0-No pain       Estimated body mass index is 43.4 kg/m² as calculated from the following:    Height as of this encounter: 149.9 cm (59.02\").    Weight as of this encounter: 97.5 kg (215 lb).    Class 3 Severe Obesity (BMI >=40). Obesity-related health conditions include the following: obstructive sleep apnea, " hypertension, coronary heart disease, impaired fasting glucose, dyslipidemias, and hepatic steatosis. Obesity is unchanged. BMI is is above average; BMI management plan is completed. We discussed Information on healthy weight added to patient's after visit summary.           Does the patient have evidence of cognitive impairment? No  ACE III MINI    ATTENTION  What is the year: correct  What is the month of the year: correct  What is the day of the week?: correct  What is the date?: correct  MEMORY  Repeat address three times, only score third attempt: [address] [place], [place]: 7  HOW MANY ANIMALS DID THE PATIENT NAME  Verbal Fluency -- Animal Names (0-25): 22+  CLOCK DRAWING  Clock Drawing: All Correct  MEMORY RECALL  Tell me what you remember about that name and address we were repeating at the beginnin  ACE TOTAL SCORE  Total ACE Score - <25/30 strongly suggests cognitive impairment; <21/30 almost certainly shows dementia: 30    Lab Results   Component Value Date    HGBA1C 5.77 (H) 2025                                                                                                Health  Risk Assessment    Smoking Status:  Social History     Tobacco Use   Smoking Status Never    Passive exposure: Past   Smokeless Tobacco Never     Alcohol Consumption:  Social History     Substance and Sexual Activity   Alcohol Use Never       Fall Risk Screen  STEADI Fall Risk Assessment was completed, and patient is at LOW risk for falls.Assessment completed on:2025    Depression Screening   Little interest or pleasure in doing things? Not at all   Feeling down, depressed, or hopeless? Not at all   PHQ-2 Total Score 0      Health Habits and Functional and Cognitive Screenin/21/2025    11:19 AM   Functional & Cognitive Status   Do you have difficulty preparing food and eating? No   Do you have difficulty bathing yourself, getting dressed or grooming yourself? No   Do you have  difficulty using the toilet? No   Do you have difficulty moving around from place to place? No   Do you have trouble with steps or getting out of a bed or a chair? No   Current Diet Limited Junk Food   Dental Exam Up to date   Eye Exam Up to date   Exercise (times per week) 2 times per week   Current Exercises Include Cardiovascular Workout;Light Weights   Do you need help using the phone?  No   Are you deaf or do you have serious difficulty hearing?  No   Do you need help to go to places out of walking distance? No   Do you need help shopping? No   Do you need help preparing meals?  No   Do you need help with housework?  No   Do you need help with laundry? No   Do you need help taking your medications? No   Do you need help managing money? No   Do you ever drive or ride in a car without wearing a seat belt? No   Have you felt unusual fatigue (could be tiredness), stress, anger or loneliness in the last month? No   Who do you live with? Spouse   If you need help, do you have trouble finding someone available to you? No   Have you been bothered in the last four weeks by sexual problems? No   Do you have difficulty concentrating, remembering or making decisions? No           Age-appropriate Screening Schedule:  Refer to the list below for future screening recommendations based on patient's age, sex and/or medical conditions. Orders for these recommended tests are listed in the plan section. The patient has been provided with a written plan.    Health Maintenance List  Health Maintenance   Topic Date Due    COVID-19 Vaccine (5 - 2024-25 season) 01/28/2026 (Originally 9/1/2024)    INFLUENZA VACCINE  10/01/2025    LIPID PANEL  01/15/2026    DXA SCAN  02/28/2026    MAMMOGRAM  03/14/2026    COLORECTAL CANCER SCREENING  04/12/2026    ANNUAL WELLNESS VISIT  07/28/2026    TDAP/TD VACCINES (2 - Tdap) 12/06/2033    HEPATITIS C SCREENING  Completed    Pneumococcal Vaccine 50+  Completed    ZOSTER VACCINE  Completed    HEMOGLOBIN  "A1C  Discontinued    URINE MICROALBUMIN-CREATININE RATIO (uACR)  Discontinued                                                                                                                                                CMS Preventative Services Quick Reference  Risk Factors Identified During Encounter  Vision Screening Recommended    The above risks/problems have been discussed with the patient.  Pertinent information has been shared with the patient in the After Visit Summary.  An After Visit Summary and PPPS were made available to the patient.    Follow Up:   Next Medicare Wellness visit to be scheduled in 1 year.         Additional E&M Note during same encounter follows:  Patient has additional, significant, and separately identifiable condition(s)/problem(s) that require work above and beyond the Medicare Wellness Visit     Chief Complaint  Medicare Wellness-subsequent    Subjective   HPI  Tresa is also being seen today for additional medical problem/s.        HTN/aortic valve stenosis s/p aortic valve replacement/SVT s/p ablation-followed by cardiology Dr. Edmondson.  Taking ASA 81 mg daily, metoprolol 50 mg twice daily.  She is in phase 3 of cardiac rehab s/p reop AVR, participating 2 days per week.  Blood pressure at home is around 120's/70.  Denies chest pain, shortness of air, dizziness, ankle swelling, palpitations.  Hypothyroidism-currently taking synthroid 100mcg po daily.  Labs 7/2025 with TSH slightly low but improved at 0.126, free T4/free T3 WNL. She reports feeling \"hot\" lately but it has been in 90's outside and she came to office from exercise session.  She uses fan at night for comfort.   Hyperlipidemia-stable-lipid panel 1/2025 with triglycerides elevated 192, HDL low 34, OTW lipid panel WNL.  Taking Zocor 20 mg daily.  No myalgia or jaundice.  Prediabetes-stable-not on medication.  Hemoglobin A1c was 5.77 in 7/2025. Her sister has fatty liver.  Pt is concerned she has fatty liver, her chart " "indicates that she had fatty liver in 2012, no recent liver imaging. FIB 4 calculated at 2.49, further investigation needed.   Sleep apnea-stable-compliant with CPAP, followed by sleep medicine Dr. Wylie, feels sleep is restorative.    Orqziozmfi-Skoegj-Ovaj 2/2024 showed normal bone mineral density.  She takes multivitamin containing calcium and vitamin d.  Environmental/seasonal allergies-stable-taking Flonase 2 sprays daily as needed, Claritin 10 mg daily.  Obesity-BMI 43.40.  She has gained 6 pounds since 5/2025. She saw weight loss dietitian in past.  She has recipes that she was given by cardiac rehab but hasn't tried recipes yet.         Objective   Vital Signs:  /70   Pulse 65   Temp 97.8 °F (36.6 °C) (Oral)   Resp 16   Ht 149.9 cm (59.02\")   Wt 97.5 kg (215 lb)   SpO2 95%   BMI 43.40 kg/m²   Physical Exam  Vitals and nursing note reviewed.   Constitutional:       General: She is not in acute distress.     Appearance: Normal appearance. She is obese. She is not ill-appearing or diaphoretic.   HENT:      Head: Normocephalic and atraumatic.      Right Ear: Tympanic membrane, ear canal and external ear normal.      Left Ear: Tympanic membrane, ear canal and external ear normal.      Nose: Nose normal.      Mouth/Throat:      Mouth: Mucous membranes are moist.   Eyes:      General: No scleral icterus.     Conjunctiva/sclera: Conjunctivae normal.      Pupils: Pupils are equal, round, and reactive to light.   Neck:      Trachea: Trachea normal.   Cardiovascular:      Rate and Rhythm: Normal rate and regular rhythm.      Pulses: Normal pulses.   Pulmonary:      Effort: Pulmonary effort is normal. No respiratory distress.      Breath sounds: Normal breath sounds and air entry.   Chest:          Comments: Healed midsternal scar  Abdominal:      General: Bowel sounds are normal. There is no distension.      Palpations: Abdomen is soft. There is no mass.      Tenderness: There is no abdominal " tenderness. There is no guarding or rebound.   Musculoskeletal:         General: Normal range of motion.      Cervical back: Normal range of motion and neck supple.      Right lower leg: No edema.      Left lower leg: No edema.   Lymphadenopathy:      Cervical: No cervical adenopathy.   Skin:     General: Skin is warm and dry.      Capillary Refill: Capillary refill takes less than 2 seconds.      Coloration: Skin is not jaundiced.   Neurological:      General: No focal deficit present.      Mental Status: She is alert and oriented to person, place, and time.      Sensory: Sensation is intact.      Gait: Gait normal.   Psychiatric:         Attention and Perception: Attention normal.         Mood and Affect: Mood and affect normal.         Speech: Speech normal.         Behavior: Behavior normal. Behavior is cooperative.         Thought Content: Thought content normal.         Cognition and Memory: Cognition normal.         Judgment: Judgment normal.         The following data was reviewed by: GUILLERMO Curiel on 07/28/2025:    CMP          2/24/2025    10:57 4/5/2025    19:53 7/21/2025    10:59   CMP   Glucose 79  109  121    BUN 16  16  19.0    Creatinine 0.72  0.69  0.78    EGFR 91.2  94.7  82.3    Sodium 140  141  139    Potassium 3.8  3.7  4.4    Chloride 103  106  105    Calcium 9.5  9.6  9.4    Total Protein 7.0  7.9  6.9    Albumin 3.9  4.3  3.8    Globulin 3.1  3.6  3.1    Total Bilirubin 0.4  0.3  0.4    Alkaline Phosphatase 101  115  99    AST (SGOT) 19  25  22    ALT (SGPT) 9  13  12    Albumin/Globulin Ratio 1.3  1.2  1.2    BUN/Creatinine Ratio 22.2  23.2  24.4    Anion Gap 9.5  10.3  10.1      CBC          2/24/2025    10:57 4/5/2025    19:53 7/21/2025    10:59   CBC   WBC 8.13  9.29  6.90    RBC 4.35  5.23  5.28    Hemoglobin 12.1  14.1  14.4    Hematocrit 38.7  45.0  45.5    MCV 89.0  86.0  86.2    MCH 27.8  27.0  27.3    MCHC 31.3  31.3  31.6    RDW 13.1  13.6  14.8    Platelets 234  230  176       CBC w/diff          2/24/2025    10:57 4/5/2025    19:53 7/21/2025    10:59   CBC w/Diff   WBC 8.13  9.29  6.90    RBC 4.35  5.23  5.28    Hemoglobin 12.1  14.1  14.4    Hematocrit 38.7  45.0  45.5    MCV 89.0  86.0  86.2    MCH 27.8  27.0  27.3    MCHC 31.3  31.3  31.6    RDW 13.1  13.6  14.8    Platelets 234  230  176    Neutrophil Rel % 60.6  59.1  64.2    Immature Granulocyte Rel % 0.2  0.2  0.4    Lymphocyte Rel % 23.0  25.7  23.2    Monocyte Rel % 10.5  11.6  8.3    Eosinophil Rel % 5.2  2.9  3.3    Basophil Rel % 0.5  0.5  0.6      Lipid Panel          1/15/2025    04:12   Lipid Panel   Total Cholesterol 154    Triglycerides 192    HDL Cholesterol 34    VLDL Cholesterol 33    LDL Cholesterol  87    LDL/HDL Ratio 2.40      TSH          4/5/2025    19:53 4/23/2025    15:49 7/21/2025    10:59   TSH   TSH 0.097  0.025  0.126      A1C Last 3 Results          10/14/2024    08:31 1/13/2025    11:49 7/21/2025    10:59   HGBA1C Last 3 Results   Hemoglobin A1C 6.09  5.95  5.77      UA          1/22/2025    10:55 4/5/2025    20:08   Urinalysis   Squamous Epithelial Cells, UA 0-2  0-2    Specific Gravity, UA <=1.005  1.008    Ketones, UA Negative  Negative    Blood, UA Trace  Trace    Leukocytes, UA Negative  Trace    Nitrite, UA Negative  Negative    RBC, UA 0-2  3-5    WBC, UA 0-2  0-2    Bacteria, UA None Seen  None Seen           Assessment and Plan      Encounter for subsequent annual wellness visit in Medicare patient         Primary hypertension           Nonrheumatic aortic valve stenosis         S/P reop AVR #23 Magna Ease per Dr. Moreno 1/24/2025         Acquired hypothyroidism    Orders:    levothyroxine (SYNTHROID, LEVOTHROID) 88 MCG tablet; Take 1 tablet by mouth Daily.    T3, Free; Future    T4, Free; Future    TSH; Future    Mixed hyperlipidemia            Prediabetes    Orders:    US Liver; Future    Fatty liver    Orders:    US Liver; Future    LUÍS on CPAP         Osteopenia of multiple  sites         Environmental and seasonal allergies         Class 3 severe obesity due to excess calories without serious comorbidity with body mass index (BMI) of 40.0 to 44.9 in adult  Patient's (Body mass index is 43.4 kg/m².) indicates that they are morbidly/severely obese (BMI > 40 or > 35 with obesity - related health condition) with health conditions that include obstructive sleep apnea, hypertension, impaired fasting glucose, dyslipidemias, and hepatic steatosis . Weight is unchanged. BMI  is above average; BMI management plan is completed. We discussed Information on healthy weight added to patient's after visit summary.     Orders:    US Liver; Future          Patient Instructions   Decrease levothyroxine to 88mcg daily, return to lab in about 8 weeks to recheck.  Otherwise Continue current medications and treatment.   Watch portion sizes.  US to eval fatty liver, fib 4 elevated 2.49.  LFT wnl.  Pending imaging findings, may recommend further evaluation by GI.   Follow up with specialists per their recommendations.  Have labs drawn in 8-12 weeks.     With hepatic steatosis-recommend low-fat diet, weight loss, glycemic control, drink 2 cups of black coffee daily.    Follow Up   Return in about 3 months (around 10/28/2025) for Recheck, Hypertension, hypothyroidism, fatty liver.  Patient was given instructions and counseling regarding her condition or for health maintenance advice. Please see specific information pulled into the AVS if appropriate.

## 2025-07-28 NOTE — ASSESSMENT & PLAN NOTE
Patient's (Body mass index is 43.4 kg/m².) indicates that they are morbidly/severely obese (BMI > 40 or > 35 with obesity - related health condition) with health conditions that include obstructive sleep apnea, hypertension, impaired fasting glucose, dyslipidemias, and hepatic steatosis . Weight is unchanged. BMI  is above average; BMI management plan is completed. We discussed Information on healthy weight added to patient's after visit summary.     Orders:    US Liver; Future

## 2025-07-29 ENCOUNTER — OFFICE VISIT (OUTPATIENT)
Dept: SLEEP MEDICINE | Facility: CLINIC | Age: 70
End: 2025-07-29
Payer: MEDICARE

## 2025-07-29 VITALS
WEIGHT: 215 LBS | OXYGEN SATURATION: 95 % | DIASTOLIC BLOOD PRESSURE: 54 MMHG | BODY MASS INDEX: 42.21 KG/M2 | SYSTOLIC BLOOD PRESSURE: 150 MMHG | HEART RATE: 60 BPM | HEIGHT: 60 IN

## 2025-07-29 DIAGNOSIS — G47.33 OSA ON CPAP: Primary | ICD-10-CM

## 2025-07-29 DIAGNOSIS — E66.813 CLASS 3 SEVERE OBESITY DUE TO EXCESS CALORIES WITHOUT SERIOUS COMORBIDITY WITH BODY MASS INDEX (BMI) OF 40.0 TO 44.9 IN ADULT: ICD-10-CM

## 2025-07-29 DIAGNOSIS — Z95.2 S/P AVR: ICD-10-CM

## 2025-07-29 PROCEDURE — 99213 OFFICE O/P EST LOW 20 MIN: CPT | Performed by: INTERNAL MEDICINE

## 2025-07-29 PROCEDURE — G0463 HOSPITAL OUTPT CLINIC VISIT: HCPCS

## 2025-07-29 PROCEDURE — 1159F MED LIST DOCD IN RCRD: CPT | Performed by: INTERNAL MEDICINE

## 2025-07-29 PROCEDURE — 1160F RVW MEDS BY RX/DR IN RCRD: CPT | Performed by: INTERNAL MEDICINE

## 2025-07-29 PROCEDURE — 3078F DIAST BP <80 MM HG: CPT | Performed by: INTERNAL MEDICINE

## 2025-07-29 PROCEDURE — 3077F SYST BP >= 140 MM HG: CPT | Performed by: INTERNAL MEDICINE

## 2025-07-29 NOTE — PROGRESS NOTES
"  [facility]  Sleep medicine  [facility], [address]  [place] [address]  Phone   Fax       SLEEP CLINIC FOLLOW UP PROGRESS NOTE.    Lulu Graves  [id number]   1955  70 y.o.  female      PCP: Sushma Hall APRN      Date of visit: 7/29/2025    Chief Complaint   Patient presents with    Follow-up    Sleep Apnea       HPI:  This is a 70 y.o. years old patient is here for the management of obstructive sleep apnea.  Sleep apnea is severe in severity with a AHI of 36/hr. Patient is using positive airway pressure therapy with auto CPAP and the symptoms of sleep apnea have improved significantly on the therapy. Normally patient goes to bed at 11 PM and wakes up at 630 AM .  The patient wakes up 0 time(s) during the night and has no problem going back to sleep.  Feels refreshed after waking up.  At present she is working for [employer] and she is working from home.    Also had aortic valve replacement which is a redo.    Medications and allergies are reviewed by me and documented in the encounter.     SOCIAL (habits pertaining to sleep medicine)  History tobacco use:No   History of alcohol use: 0 per week  Caffeine use: 2     REVIEW OF SYSTEMS:   Pertaining positive symptoms are:  [place] Sleepiness Scale :Total score: 1 2      PHYSICAL EXAMINATION:  CONSTITUTIONAL:  Vitals:    07/29/25 0900   BP: 150/54   BP Location: Left arm   Patient Position: Sitting   Pulse: 60   SpO2: 95%   Weight: 97.5 kg (215 lb)   Height: 152.4 cm (60\")    Body mass index is 41.99 kg/m².   NOSE: nasal passages are clear, No deformities noted   RESP SYSTEM: Not in any respiratory distress, no chest deformities noted,   CARDIOVASULAR: No edema noted  NEURO: Oriented x 3, gait normal,  Mood and affect appeared appropriate      Data reviewed:  The Smart card downloaded on 7/29/2025 has been reviewed independently by me for compliance and discussed the data with the patient.   Compliance; 100%  More " than 4 hr use, 100%  Average use of the device 6 hours and 45 minutes per night  Residual AHI: 2.0 /hr (goal < 5.0 /hr)  Mask type: Nasal pillows  Device: ResMed  DME: [employer] [facility]      ASSESSMENT AND PLAN:  Obstructive sleep apnea ( G 47.33).  The symptoms of sleep apnea have improved with the device and the treatment.  Patient's compliance with the device is excellent for treatment of sleep apnea.  I have independently reviewed the smart card down load and discussed with the patient the download data and encouarged the patient to continue to use the device.The residual AHI is acceptable. The device is benefiting the patient and the device is medically necessary.  Without proper control of sleep apnea and good compliance there is a increased risk for hypertension, diabetes mellitus and nonrestorative sleep with hypersomnia which can increase risk for motor vehicle accidents.  Untreated sleep apnea is also a risk factor for development of atrial fibrillation, pulmonary hypertension, insulin resistance and stroke. The patient is also instructed to get the supplies from the [employer] and and change them on a regular basis.  A prescription for supplies has been sent to the [employer].  I have also discussed the good sleep hygiene habits and adequate amount of sleep needed for good health.  Obesity  3 with BMI is Body mass index is 41.99 kg/m².. I have discuss the relationship between the weight and sleep apnea. The benefit of weight loss in reducing severity of sleep apnea was discussed. Discussed diet and exercise with the patient to achieve ideal BMI.   Prosthetic arctic valve  Return in about 1 year (around 7/29/2026) for with smart card down load. . Patient's questions were answered.    7/29/2025  Angelique Wylie MD  Sleep Medicine.  Medical Director,   [facility] sleep centers.

## 2025-07-31 ENCOUNTER — OFFICE VISIT (OUTPATIENT)
Dept: CARDIAC REHAB | Facility: HOSPITAL | Age: 70
End: 2025-07-31

## 2025-07-31 DIAGNOSIS — Z95.2 S/P AVR (AORTIC VALVE REPLACEMENT): Primary | ICD-10-CM

## 2025-08-07 ENCOUNTER — OFFICE VISIT (OUTPATIENT)
Dept: CARDIAC REHAB | Facility: HOSPITAL | Age: 70
End: 2025-08-07

## 2025-08-07 DIAGNOSIS — Z95.2 S/P AVR (AORTIC VALVE REPLACEMENT): Primary | ICD-10-CM

## 2025-08-08 RX ORDER — SIMVASTATIN 20 MG
20 TABLET ORAL EVERY EVENING
Qty: 90 TABLET | Refills: 1 | Status: SHIPPED | OUTPATIENT
Start: 2025-08-08

## 2025-08-14 ENCOUNTER — HOSPITAL ENCOUNTER (OUTPATIENT)
Dept: ULTRASOUND IMAGING | Facility: HOSPITAL | Age: 70
Discharge: HOME | End: 2025-08-14
Admitting: NURSE PRACTITIONER
Payer: MEDICARE

## 2025-08-14 DIAGNOSIS — R73.03 PREDIABETES: ICD-10-CM

## 2025-08-14 DIAGNOSIS — K76.0 FATTY LIVER: ICD-10-CM

## 2025-08-14 DIAGNOSIS — E66.813 CLASS 3 SEVERE OBESITY DUE TO EXCESS CALORIES WITHOUT SERIOUS COMORBIDITY WITH BODY MASS INDEX (BMI) OF 40.0 TO 44.9 IN ADULT: ICD-10-CM

## 2025-08-14 PROCEDURE — 76705 ECHO EXAM OF ABDOMEN: CPT

## 2025-08-18 ENCOUNTER — OFFICE VISIT (OUTPATIENT)
Dept: CARDIAC REHAB | Facility: HOSPITAL | Age: 70
End: 2025-08-18

## 2025-08-18 DIAGNOSIS — Z95.2 S/P AVR (AORTIC VALVE REPLACEMENT): Primary | ICD-10-CM

## 2025-08-21 ENCOUNTER — OFFICE VISIT (OUTPATIENT)
Dept: CARDIAC REHAB | Facility: HOSPITAL | Age: 70
End: 2025-08-21

## 2025-08-21 ENCOUNTER — OFFICE VISIT (OUTPATIENT)
Dept: CARDIOLOGY | Facility: CLINIC | Age: 70
End: 2025-08-21
Payer: MEDICARE

## 2025-08-21 VITALS
BODY MASS INDEX: 42.6 KG/M2 | SYSTOLIC BLOOD PRESSURE: 121 MMHG | HEIGHT: 60 IN | WEIGHT: 217 LBS | HEART RATE: 56 BPM | OXYGEN SATURATION: 95 % | DIASTOLIC BLOOD PRESSURE: 79 MMHG

## 2025-08-21 DIAGNOSIS — E78.5 HYPERLIPIDEMIA, UNSPECIFIED HYPERLIPIDEMIA TYPE: ICD-10-CM

## 2025-08-21 DIAGNOSIS — I35.0 AORTIC STENOSIS, SEVERE: Primary | ICD-10-CM

## 2025-08-21 DIAGNOSIS — G47.33 OBSTRUCTIVE SLEEP APNEA: ICD-10-CM

## 2025-08-21 DIAGNOSIS — I10 PRIMARY HYPERTENSION: ICD-10-CM

## 2025-08-21 DIAGNOSIS — Z95.2 S/P AVR (AORTIC VALVE REPLACEMENT): Primary | ICD-10-CM

## 2025-08-21 PROCEDURE — 3074F SYST BP LT 130 MM HG: CPT | Performed by: INTERNAL MEDICINE

## 2025-08-21 PROCEDURE — 99214 OFFICE O/P EST MOD 30 MIN: CPT | Performed by: INTERNAL MEDICINE

## 2025-08-21 PROCEDURE — 3078F DIAST BP <80 MM HG: CPT | Performed by: INTERNAL MEDICINE

## 2025-08-21 PROCEDURE — 1159F MED LIST DOCD IN RCRD: CPT | Performed by: INTERNAL MEDICINE

## 2025-08-21 PROCEDURE — 1160F RVW MEDS BY RX/DR IN RCRD: CPT | Performed by: INTERNAL MEDICINE

## 2025-08-25 ENCOUNTER — OFFICE VISIT (OUTPATIENT)
Dept: CARDIAC REHAB | Facility: HOSPITAL | Age: 70
End: 2025-08-25

## 2025-08-25 DIAGNOSIS — Z95.2 S/P AVR (AORTIC VALVE REPLACEMENT): Primary | ICD-10-CM

## 2025-08-28 ENCOUNTER — OFFICE VISIT (OUTPATIENT)
Dept: CARDIAC REHAB | Facility: HOSPITAL | Age: 70
End: 2025-08-28

## 2025-08-28 DIAGNOSIS — Z95.2 S/P AVR (AORTIC VALVE REPLACEMENT): Primary | ICD-10-CM

## (undated) DEVICE — PK MINOR LAPAROTOMY 50

## (undated) DEVICE — PAD,ABDOMINAL,8"X10",ST,LF: Brand: MEDLINE

## (undated) DEVICE — CATH DIAG IMPULSE FL4 6F 100CM

## (undated) DEVICE — NDL PERC 1PRT THNWALL W/BASEPLT 18G 7CM

## (undated) DEVICE — CVR HNDL LT SURG ACCSSRY BLU STRL

## (undated) DEVICE — GLV SURG BIOGEL LTX PF 7 1/2

## (undated) DEVICE — LEGGINGS, PAIR, 33X51 XL, STERILE: Brand: MEDLINE

## (undated) DEVICE — DGW .035 MC J3MM 150CM T H AMP: Brand: EMERALD

## (undated) DEVICE — TBG PENCL TELESCP MEGADYNE SMOKE EVAC 15FT

## (undated) DEVICE — CATH DIAG IMPULSE FR4 6F 100CM

## (undated) DEVICE — MYNXGRIP 6F/7F: Brand: MYNXGRIP

## (undated) DEVICE — PK TRY HEART CATH 50

## (undated) DEVICE — KT SURG TURNOVER 050

## (undated) DEVICE — CUFF SCD HEMOFORCE SEQ CALF STD MD

## (undated) DEVICE — PREP SOL POVIDONE/IODINE BT 4OZ

## (undated) DEVICE — UNDERGLV SURG BIOGEL INDICAT PF 8 GRN

## (undated) DEVICE — ELECTRD DEFIB M/FUNC PROPADZ RADIOL 2PK

## (undated) DEVICE — PINNACLE INTRODUCER SHEATH: Brand: PINNACLE